# Patient Record
Sex: MALE | Race: WHITE | Employment: OTHER | URBAN - METROPOLITAN AREA
[De-identification: names, ages, dates, MRNs, and addresses within clinical notes are randomized per-mention and may not be internally consistent; named-entity substitution may affect disease eponyms.]

---

## 2017-01-05 ENCOUNTER — OFFICE VISIT (OUTPATIENT)
Dept: INTERNAL MEDICINE CLINIC | Facility: CLINIC | Age: 77
End: 2017-01-05

## 2017-01-05 VITALS
WEIGHT: 141 LBS | HEIGHT: 67 IN | BODY MASS INDEX: 22.13 KG/M2 | SYSTOLIC BLOOD PRESSURE: 136 MMHG | DIASTOLIC BLOOD PRESSURE: 62 MMHG | HEART RATE: 54 BPM

## 2017-01-05 DIAGNOSIS — R42 VERTIGO: Primary | ICD-10-CM

## 2017-01-05 PROCEDURE — G0463 HOSPITAL OUTPT CLINIC VISIT: HCPCS | Performed by: INTERNAL MEDICINE

## 2017-01-05 PROCEDURE — 99213 OFFICE O/P EST LOW 20 MIN: CPT | Performed by: INTERNAL MEDICINE

## 2017-01-05 NOTE — PROGRESS NOTES
Wright Cabot is a 68year old male. Patient presents with:  ER F/U: Pt stts he went to the ER on 12/30 due to vertigo    HPI:   Mr. Marshell Severs presents this morning for ER follow-up.     On December 30, while picking up prescriptions at his pharmacy for h Take 1 capsule by mouth daily. Disp:  Rfl:    Calcium-Phosphorus-Vitamin D (CITRACAL +D3 OR) Take 2 tablets by mouth daily. Disp:  Rfl:    Ascorbic Acid (VITAMIN C) 1000 MG Oral Tab Take 1,000 mg by mouth daily.  Disp:  Rfl:    TRAVATAN Z 0.004 % Ophthalmic kg/m2  HEENT: Anicteric, conjunctiva pink, PERRL, EOMI without nystagmus, oropharynx normal  NECK: Supple without mass or lymphadenopathy  LUNGS: Resonant to percussion and clear to auscultation  CARDIAC: Rhythm regular S1 S2 normal without murmur or edema

## 2017-01-05 NOTE — PATIENT INSTRUCTIONS
Please rest quietly and use meclizine if vertigo recurs. Keep your appointment for your physical in February.

## 2017-01-23 ENCOUNTER — OFFICE VISIT (OUTPATIENT)
Dept: DERMATOLOGY CLINIC | Facility: CLINIC | Age: 77
End: 2017-01-23

## 2017-01-23 DIAGNOSIS — D23.30 BENIGN NEOPLASM OF SKIN OF FACE: ICD-10-CM

## 2017-01-23 DIAGNOSIS — D48.5 NEOPLASM OF UNCERTAIN BEHAVIOR OF SKIN: Primary | ICD-10-CM

## 2017-01-23 DIAGNOSIS — L82.1 SEBORRHEIC KERATOSES: ICD-10-CM

## 2017-01-23 DIAGNOSIS — D23.4 BENIGN NEOPLASM OF SCALP AND SKIN OF NECK: ICD-10-CM

## 2017-01-23 PROCEDURE — 11101 BIOPSY, EACH ADDED LESION: CPT | Performed by: DERMATOLOGY

## 2017-01-23 PROCEDURE — 99213 OFFICE O/P EST LOW 20 MIN: CPT | Performed by: DERMATOLOGY

## 2017-01-23 PROCEDURE — 11100 BIOPSY OF SKIN LESION: CPT | Performed by: DERMATOLOGY

## 2017-01-25 ENCOUNTER — TELEPHONE (OUTPATIENT)
Dept: DERMATOLOGY CLINIC | Facility: CLINIC | Age: 77
End: 2017-01-25

## 2017-01-29 RX ORDER — AMLODIPINE BESYLATE 5 MG/1
TABLET ORAL
Qty: 90 TABLET | Refills: 1 | Status: SHIPPED | OUTPATIENT
Start: 2017-01-29 | End: 2017-08-20

## 2017-01-29 RX ORDER — TRIAMTERENE AND HYDROCHLOROTHIAZIDE 37.5; 25 MG/1; MG/1
CAPSULE ORAL
Qty: 90 CAPSULE | Refills: 1 | Status: SHIPPED | OUTPATIENT
Start: 2017-01-29 | End: 2017-08-01

## 2017-02-01 NOTE — TELEPHONE ENCOUNTER
Spoke to spouse to relay bx results & KMT message. She voiced understanding. Checked off notification in path book.

## 2017-02-12 NOTE — PROGRESS NOTES
Taylor Boyer is a 68year old male. HPI:     CC:  Patient presents with:  Derm Problem: LOV: 8/31/16. Patient presenting with lesion above right eyebrow and above left cheek that started since last visit.  Patient denies pain, itching or family Hx of FINASTERIDE 5 MG Oral Tab TAKE ONE TABLET BY MOUTH EVERY DAY Disp: 90 tablet Rfl: 3   Probiotic Product (PROBIOTIC OR) Take 1 capsule by mouth daily. Disp:  Rfl:    Calcium-Phosphorus-Vitamin D (CITRACAL +D3 OR) Take 2 tablets by mouth daily.  Disp:  Rfl: SURGERY Left 12/2011    Comment L4-L5 microdiscectomy    SPINAL FUSION  10/2011    Comment L4-L5    COLONOSCOPY  1/2016    SKIN SURGERY Right 10/28/2016    Comment Upper back epidermal inclusion cyst       Social History   Marital Status:   Spouse N distress. Examp erformed, including scalp, head, neck, face,nails, hair, external eyes, including conjunctival mucosa, eyelids, lips external ears, upper back, upper chest, arms, palms.      Multiple light to medium brown, well marginated, uniformly pigmen

## 2017-02-13 ENCOUNTER — OFFICE VISIT (OUTPATIENT)
Dept: INTERNAL MEDICINE CLINIC | Facility: CLINIC | Age: 77
End: 2017-02-13

## 2017-02-13 VITALS
HEART RATE: 61 BPM | DIASTOLIC BLOOD PRESSURE: 58 MMHG | WEIGHT: 145.5 LBS | BODY MASS INDEX: 22.84 KG/M2 | SYSTOLIC BLOOD PRESSURE: 132 MMHG | HEIGHT: 67 IN

## 2017-02-13 DIAGNOSIS — N40.0 BENIGN PROSTATIC HYPERPLASIA, PRESENCE OF LOWER URINARY TRACT SYMPTOMS UNSPECIFIED, UNSPECIFIED MORPHOLOGY: ICD-10-CM

## 2017-02-13 DIAGNOSIS — Z00.00 ANNUAL PHYSICAL EXAM: Primary | ICD-10-CM

## 2017-02-13 DIAGNOSIS — I70.0 AORTIC ATHEROSCLEROSIS (HCC): ICD-10-CM

## 2017-02-13 DIAGNOSIS — K27.9 PEPTIC ULCER DISEASE: ICD-10-CM

## 2017-02-13 DIAGNOSIS — K57.30 DIVERTICULOSIS OF LARGE INTESTINE WITHOUT HEMORRHAGE: ICD-10-CM

## 2017-02-13 DIAGNOSIS — I10 ESSENTIAL HYPERTENSION: ICD-10-CM

## 2017-02-13 DIAGNOSIS — K55.9 ISCHEMIC COLITIS (HCC): ICD-10-CM

## 2017-02-13 DIAGNOSIS — H40.9 GLAUCOMA, UNSPECIFIED GLAUCOMA, UNSPECIFIED LATERALITY: ICD-10-CM

## 2017-02-13 DIAGNOSIS — Z00.00 ENCOUNTER FOR ANNUAL HEALTH EXAMINATION: ICD-10-CM

## 2017-02-13 DIAGNOSIS — M81.0 OSTEOPOROSIS: ICD-10-CM

## 2017-02-13 DIAGNOSIS — E78.00 HYPERCHOLESTEROLEMIA: ICD-10-CM

## 2017-02-13 DIAGNOSIS — D50.9 IRON DEFICIENCY ANEMIA, UNSPECIFIED IRON DEFICIENCY ANEMIA TYPE: ICD-10-CM

## 2017-02-13 DIAGNOSIS — C44.92 SCC (SQUAMOUS CELL CARCINOMA): ICD-10-CM

## 2017-02-13 PROCEDURE — G0439 PPPS, SUBSEQ VISIT: HCPCS | Performed by: INTERNAL MEDICINE

## 2017-02-13 PROCEDURE — 96160 PT-FOCUSED HLTH RISK ASSMT: CPT | Performed by: INTERNAL MEDICINE

## 2017-02-14 NOTE — PROGRESS NOTES
HPI:   Jordan Mar is a 68year old male who presents for a MA (Medicare Advantage) [facility] (Once per calendar year). He feels well this evening, and has no specific issues for discussion. He continues to work part-time as a .   Tariq Monterroso lisinopril. CURRENT MEDICATIONS:     Outpatient Prescriptions Marked as Taking for the 2/13/17 encounter (Office Visit) with Laurie Hua MD:  COD LIVER OIL OR Take 1 capsule by mouth 3 (three) times daily.    MAGNESIUM CITRATE OR Take 500 mg by mouth brother. SOCIAL HISTORY:   He  reports that he quit smoking about 55 years ago. His smoking use included Cigarettes. He has a 1.25 pack-year smoking history. He does not have any smokeless tobacco history on file. He reports that he drinks alcohol.  He re People   get annoyed because I misunderstand what they say:  No   I misunderstand what others are saying and make inappropriate responses:    No I avoid social activities because I cannot hear well and fear I will   reply improperly:  No   Family members a diet, regular exercise and maintenance of current body weight. Reinforced annual influenza vaccine. Return visit in 6 months for blood pressure check. Essential hypertension  Well-controlled.   Continue current medication  -     Comp Metabolic Panel (1 state?: Good    How do you maintain positive mental well-being?: Social Interaction;Puzzles; Visiting Friends; Visiting Family    If you are a male age 38-65 or a female age 47-67, do you take aspirin?: No    Have you had any immunizations at another office see your input here.   Cognitive Assessment     What day of the week is this?: Correct    What month is it?: Correct    What year is it?: Correct    Recall \"Ball\": Correct    Recall \"Flag\": Correct    Recall \"Tree\": Correct       This section provided this or any previous visit.          SPECIFIC DISEASE MONITORING Internal Lab or Procedure External Lab or Procedure   Annual Monitoring of Persistent     Medications (ACE/ARB, digoxin diuretics, anticonvulsants.)    Potassium  Annually POTASSIUM (mmol/L)

## 2017-02-14 NOTE — PATIENT INSTRUCTIONS
Please continue your current medications. Please obtain blood tests soon when you can. Continue to follow a healthy diet, exercise regularly and maintain your current body weight. Return visit in 6 months for a blood pressure check.     Elie Singh this or any previous visit.  Limited to patients who meet one of the following criteria:   • Men who are 73-68 years old and have smoked more than 100 cigarettes in their lifetime   • Anyone with a family history    Colorectal Cancer Screening Covered up to Clients of institutions for the mentally retarded   Persons who live in the same house as a HepB virus carrier   Homosexual men   Illicit injectable drug abusers     Tetanus Toxoid- Only covered with a cut with metal- TD and TDaP Not covered by [facility]

## 2017-02-28 ENCOUNTER — APPOINTMENT (OUTPATIENT)
Dept: LAB | Facility: HOSPITAL | Age: 77
End: 2017-02-28
Attending: INTERNAL MEDICINE
Payer: MEDICARE

## 2017-02-28 DIAGNOSIS — I10 ESSENTIAL HYPERTENSION: ICD-10-CM

## 2017-02-28 DIAGNOSIS — E78.00 HYPERCHOLESTEROLEMIA: ICD-10-CM

## 2017-02-28 DIAGNOSIS — D50.9 IRON DEFICIENCY ANEMIA, UNSPECIFIED IRON DEFICIENCY ANEMIA TYPE: ICD-10-CM

## 2017-02-28 LAB
ALBUMIN SERPL BCP-MCNC: 3.3 G/DL (ref 3.5–4.8)
ALBUMIN/GLOB SERPL: 0.9 {RATIO} (ref 1–2)
ALP SERPL-CCNC: 64 U/L (ref 32–100)
ALT SERPL-CCNC: 21 U/L (ref 17–63)
ANION GAP SERPL CALC-SCNC: 8 MMOL/L (ref 0–18)
AST SERPL-CCNC: 26 U/L (ref 15–41)
BILIRUB SERPL-MCNC: 0.7 MG/DL (ref 0.3–1.2)
BUN SERPL-MCNC: 17 MG/DL (ref 8–20)
BUN/CREAT SERPL: 16.3 (ref 10–20)
CALCIUM SERPL-MCNC: 9.2 MG/DL (ref 8.5–10.5)
CHLORIDE SERPL-SCNC: 95 MMOL/L (ref 95–110)
CHOLEST SERPL-MCNC: 209 MG/DL (ref 110–200)
CO2 SERPL-SCNC: 31 MMOL/L (ref 22–32)
CREAT SERPL-MCNC: 1.04 MG/DL (ref 0.5–1.5)
ERYTHROCYTE [DISTWIDTH] IN BLOOD BY AUTOMATED COUNT: 13.1 % (ref 11–15)
GLOBULIN PLAS-MCNC: 3.6 G/DL (ref 2.5–3.7)
GLUCOSE SERPL-MCNC: 98 MG/DL (ref 70–99)
HCT VFR BLD AUTO: 38.5 % (ref 41–52)
HDLC SERPL-MCNC: 51 MG/DL
HGB BLD-MCNC: 13.2 G/DL (ref 13.5–17.5)
LDLC SERPL CALC-MCNC: 144 MG/DL (ref 0–99)
MCH RBC QN AUTO: 30.3 PG (ref 27–32)
MCHC RBC AUTO-ENTMCNC: 34.2 G/DL (ref 32–37)
MCV RBC AUTO: 88.4 FL (ref 80–100)
NONHDLC SERPL-MCNC: 158 MG/DL
OSMOLALITY UR CALC.SUM OF ELEC: 280 MOSM/KG (ref 275–295)
PLATELET # BLD AUTO: 237 K/UL (ref 140–400)
PMV BLD AUTO: 7.4 FL (ref 7.4–10.3)
POTASSIUM SERPL-SCNC: 3.9 MMOL/L (ref 3.3–5.1)
PROT SERPL-MCNC: 6.9 G/DL (ref 5.9–8.4)
RBC # BLD AUTO: 4.35 M/UL (ref 4.5–5.9)
SODIUM SERPL-SCNC: 134 MMOL/L (ref 136–144)
TRIGL SERPL-MCNC: 70 MG/DL (ref 1–149)
WBC # BLD AUTO: 5.1 K/UL (ref 4–11)

## 2017-02-28 PROCEDURE — 36415 COLL VENOUS BLD VENIPUNCTURE: CPT

## 2017-02-28 PROCEDURE — 80053 COMPREHEN METABOLIC PANEL: CPT

## 2017-02-28 PROCEDURE — 85027 COMPLETE CBC AUTOMATED: CPT

## 2017-02-28 PROCEDURE — 80061 LIPID PANEL: CPT

## 2017-03-15 ENCOUNTER — PATIENT MESSAGE (OUTPATIENT)
Dept: INTERNAL MEDICINE CLINIC | Facility: CLINIC | Age: 77
End: 2017-03-15

## 2017-03-21 NOTE — TELEPHONE ENCOUNTER
From: Amos Dobson  To: Lexx Woodard MD  Sent: 3/15/2017 7:58 AM CDT  Subject: Other    I will be able to keep the rescheduled appt. with Dr. Anita Anna on Aug. 14, at 12:40. Thank you.

## 2017-05-17 ENCOUNTER — APPOINTMENT (OUTPATIENT)
Dept: LAB | Facility: HOSPITAL | Age: 77
End: 2017-05-17
Attending: UROLOGY
Payer: MEDICARE

## 2017-05-17 PROCEDURE — 81003 URINALYSIS AUTO W/O SCOPE: CPT | Performed by: UROLOGY

## 2017-05-23 ENCOUNTER — OFFICE VISIT (OUTPATIENT)
Dept: SURGERY | Facility: CLINIC | Age: 77
End: 2017-05-23

## 2017-05-23 VITALS
DIASTOLIC BLOOD PRESSURE: 67 MMHG | TEMPERATURE: 98 F | SYSTOLIC BLOOD PRESSURE: 143 MMHG | HEIGHT: 67 IN | WEIGHT: 144 LBS | RESPIRATION RATE: 16 BRPM | HEART RATE: 52 BPM | BODY MASS INDEX: 22.6 KG/M2

## 2017-05-23 DIAGNOSIS — R35.1 NOCTURIA: ICD-10-CM

## 2017-05-23 DIAGNOSIS — Z12.5 PROSTATE CANCER SCREENING: ICD-10-CM

## 2017-05-23 DIAGNOSIS — N40.1 BENIGN NON-NODULAR PROSTATIC HYPERPLASIA WITH LOWER URINARY TRACT SYMPTOMS: Primary | ICD-10-CM

## 2017-05-23 PROCEDURE — 99214 OFFICE O/P EST MOD 30 MIN: CPT | Performed by: UROLOGY

## 2017-05-23 PROCEDURE — G0463 HOSPITAL OUTPT CLINIC VISIT: HCPCS | Performed by: UROLOGY

## 2017-05-23 RX ORDER — FINASTERIDE 5 MG/1
5 TABLET, FILM COATED ORAL DAILY
Qty: 90 TABLET | Refills: 3 | Status: SHIPPED | OUTPATIENT
Start: 2017-05-23 | End: 2018-06-16

## 2017-05-23 NOTE — PROGRESS NOTES
HPI:    Patient ID: Awais Damian is a 68year old male. HPI     1.   Voiding Dysfunction  Patient has current AUA score of 4, mild voiding dysfunction category, compared to previous score of 2, mild voiding dysfunction category, on 5/23/16 per chart repeat T score -0.7 hip and -2.2 lumbar spine 3-16, Fosamax DC'd   • Iron deficiency anemia September 2008   • BPH (benign prostatic hyperplasia) September 2008     With abnormal PSA, Rx Proscar   • Peptic ulcer disease 1980   • Diverticulosis 2007   • Hyp by mouth daily. Disp:  Rfl:    Calcium-Phosphorus-Vitamin D (CITRACAL +D3 OR) Take 2 tablets by mouth daily. Disp:  Rfl:    Ascorbic Acid (VITAMIN C) 1000 MG Oral Tab Take 1,000 mg by mouth daily.  Disp:  Rfl:    TRAVATAN Z 0.004 % Ophthalmic Solution Place 2-3+ enlarged, 35-40 grams, soft, no nodules or indurations. Pt is currently taking Finasteride 5 MG daily. Discussed treatment options including continuing long term Finasteride VERSUS green light laser ablation of the prostate.  I fully explained to paco tablet 3      Sig: Take 1 tablet (5 mg total) by mouth daily.            Imaging & Referrals:  None       ID#[id number]  By signing my name below, Padmini Oliva,  attest that this documentation has been prepared under the direction and in the presence of Timothy

## 2017-08-01 RX ORDER — TRIAMTERENE AND HYDROCHLOROTHIAZIDE 37.5; 25 MG/1; MG/1
CAPSULE ORAL
Qty: 90 CAPSULE | Refills: 0 | Status: SHIPPED | OUTPATIENT
Start: 2017-08-01 | End: 2017-11-01

## 2017-08-20 ENCOUNTER — TELEPHONE (OUTPATIENT)
Dept: INTERNAL MEDICINE CLINIC | Facility: CLINIC | Age: 77
End: 2017-08-20

## 2017-08-20 RX ORDER — AMLODIPINE BESYLATE 5 MG/1
TABLET ORAL
Qty: 90 TABLET | Refills: 0 | Status: SHIPPED | OUTPATIENT
Start: 2017-08-20 | End: 2017-11-03

## 2017-08-25 ENCOUNTER — OFFICE VISIT (OUTPATIENT)
Dept: INTERNAL MEDICINE CLINIC | Facility: CLINIC | Age: 77
End: 2017-08-25

## 2017-08-25 VITALS
WEIGHT: 141 LBS | HEART RATE: 54 BPM | DIASTOLIC BLOOD PRESSURE: 62 MMHG | HEIGHT: 67 IN | BODY MASS INDEX: 22.13 KG/M2 | SYSTOLIC BLOOD PRESSURE: 136 MMHG

## 2017-08-25 DIAGNOSIS — I10 ESSENTIAL HYPERTENSION: Primary | ICD-10-CM

## 2017-08-25 PROCEDURE — G0463 HOSPITAL OUTPT CLINIC VISIT: HCPCS | Performed by: INTERNAL MEDICINE

## 2017-08-25 PROCEDURE — 99213 OFFICE O/P EST LOW 20 MIN: CPT | Performed by: INTERNAL MEDICINE

## 2017-08-25 RX ORDER — FINASTERIDE 5 MG/1
5 TABLET, FILM COATED ORAL DAILY
Qty: 90 TABLET | Refills: 1 | Status: CANCELLED | OUTPATIENT
Start: 2017-08-25

## 2017-08-25 RX ORDER — AMLODIPINE BESYLATE 5 MG/1
5 TABLET ORAL
Qty: 90 TABLET | Refills: 1 | Status: CANCELLED | OUTPATIENT
Start: 2017-08-25

## 2017-08-25 RX ORDER — TRIAMTERENE AND HYDROCHLOROTHIAZIDE 37.5; 25 MG/1; MG/1
CAPSULE ORAL
Qty: 90 CAPSULE | Refills: 1 | Status: CANCELLED | OUTPATIENT
Start: 2017-08-25

## 2017-08-25 NOTE — PATIENT INSTRUCTIONS
Please continue your current medications. Continue regular physical activity.   Return in 6 months for a physical.

## 2017-08-25 NOTE — PROGRESS NOTES
Elida Park is a 68year old male. Patient presents with:  Hypertension    HPI:   Mr. Woo Anderson presents this afternoon for six-month follow-up of hypertension. He feels well.   In anticipation of today's visit, he recently began checking his blood p 2008    With abnormal PSA, Rx Proscar   • Diverticulosis 2007   • Glaucoma    • Herpes zoster 2008    Left face   • Hypercholesterolemia    • Hypertension August 2011   • Iron deficiency anemia September 2008   • Ischemic colitis [facility]) December 2015   • Os

## 2017-11-01 RX ORDER — TRIAMTERENE AND HYDROCHLOROTHIAZIDE 37.5; 25 MG/1; MG/1
CAPSULE ORAL
Qty: 90 CAPSULE | Refills: 1 | Status: SHIPPED | OUTPATIENT
Start: 2017-11-01 | End: 2018-04-30

## 2017-11-03 RX ORDER — AMLODIPINE BESYLATE 5 MG/1
TABLET ORAL
Qty: 90 TABLET | Refills: 1 | Status: SHIPPED | OUTPATIENT
Start: 2017-11-03 | End: 2018-05-25

## 2018-01-13 ENCOUNTER — IMMUNIZATION (OUTPATIENT)
Dept: INTERNAL MEDICINE CLINIC | Facility: CLINIC | Age: 78
End: 2018-01-13

## 2018-01-13 PROCEDURE — 90653 IIV ADJUVANT VACCINE IM: CPT | Performed by: INTERNAL MEDICINE

## 2018-01-13 PROCEDURE — G0008 ADMIN INFLUENZA VIRUS VAC: HCPCS | Performed by: INTERNAL MEDICINE

## 2018-01-31 ENCOUNTER — OFFICE VISIT (OUTPATIENT)
Dept: DERMATOLOGY CLINIC | Facility: CLINIC | Age: 78
End: 2018-01-31

## 2018-01-31 DIAGNOSIS — D23.4 BENIGN NEOPLASM OF SCALP AND SKIN OF NECK: ICD-10-CM

## 2018-01-31 DIAGNOSIS — D23.30 BENIGN NEOPLASM OF SKIN OF FACE: ICD-10-CM

## 2018-01-31 DIAGNOSIS — D23.70 BENIGN NEOPLASM OF SKIN OF LOWER LIMB, INCLUDING HIP, UNSPECIFIED LATERALITY: ICD-10-CM

## 2018-01-31 DIAGNOSIS — D23.5 BENIGN NEOPLASM OF SKIN OF TRUNK, EXCEPT SCROTUM: ICD-10-CM

## 2018-01-31 DIAGNOSIS — L82.1 SEBORRHEIC KERATOSES: Primary | ICD-10-CM

## 2018-01-31 DIAGNOSIS — D23.60 BENIGN NEOPLASM OF SKIN OF UPPER LIMB, INCLUDING SHOULDER, UNSPECIFIED LATERALITY: ICD-10-CM

## 2018-01-31 PROCEDURE — 99213 OFFICE O/P EST LOW 20 MIN: CPT | Performed by: DERMATOLOGY

## 2018-01-31 PROCEDURE — G0463 HOSPITAL OUTPT CLINIC VISIT: HCPCS | Performed by: DERMATOLOGY

## 2018-02-11 NOTE — PROGRESS NOTES
Dante Thornton is a 68year old male. HPI:     CC:  Patient presents with:  Skin Cancer: Pt here for annual upper body skin check. No concerns today. Hx of SCC L temple 2017. Allergies:  Clindamycin;  Lisinopril    HISTORY:    Past Medical Hist 90 tablet Rfl: 1   TRIAMTERENE-HCTZ 37.5-25 MG Oral Cap TAKE ONE CAPSULE BY MOUTH EVERY DAY IN THE MORNING Disp: 90 capsule Rfl: 1   finasteride 5 MG Oral Tab Take 1 tablet (5 mg total) by mouth daily.  Disp: 90 tablet Rfl: 3   COD LIVER OIL OR Take 1 capsu APPENDECTOMY  12/2011: BACK SURGERY Left      Comment: L4-L5 microdiscectomy  10/2010: CATARACT Left  12/2010: CATARACT Right  1/2016: COLONOSCOPY  1970:  INGUINAL HERNIA REPAIR Bilateral  January 2017: OTHER      Comment: Excision SCCa left temple  10/28/2 noted.       Physical Examination:     Well-developed well-nourished patient alert oriented in no acute distress.   Examp erformed, including scalp, head, neck, face,nails, hair, external eyes, including conjunctival mucosa, eyelids, lips external ears, upp discussed. Pathophysiology discussed with patient. Therapeutic options reviewed. See  Medications in grid. Instructions reviewed at length. Benign nevi, seborrheic  keratoses, cherry angiomas:  Reassurance regarding other benign skin lesions. Signs and

## 2018-02-23 ENCOUNTER — OFFICE VISIT (OUTPATIENT)
Dept: INTERNAL MEDICINE CLINIC | Facility: CLINIC | Age: 78
End: 2018-02-23

## 2018-02-23 VITALS
BODY MASS INDEX: 22.29 KG/M2 | WEIGHT: 142 LBS | SYSTOLIC BLOOD PRESSURE: 130 MMHG | HEIGHT: 67 IN | TEMPERATURE: 98 F | DIASTOLIC BLOOD PRESSURE: 56 MMHG | HEART RATE: 57 BPM

## 2018-02-23 DIAGNOSIS — E78.00 HYPERCHOLESTEROLEMIA: ICD-10-CM

## 2018-02-23 DIAGNOSIS — D50.9 IRON DEFICIENCY ANEMIA, UNSPECIFIED IRON DEFICIENCY ANEMIA TYPE: ICD-10-CM

## 2018-02-23 DIAGNOSIS — I10 ESSENTIAL HYPERTENSION: ICD-10-CM

## 2018-02-23 DIAGNOSIS — K55.9 ISCHEMIC COLITIS (HCC): ICD-10-CM

## 2018-02-23 DIAGNOSIS — K57.30 DIVERTICULOSIS OF LARGE INTESTINE WITHOUT HEMORRHAGE: ICD-10-CM

## 2018-02-23 DIAGNOSIS — M81.0 OSTEOPOROSIS, UNSPECIFIED OSTEOPOROSIS TYPE, UNSPECIFIED PATHOLOGICAL FRACTURE PRESENCE: ICD-10-CM

## 2018-02-23 DIAGNOSIS — K27.9 PEPTIC ULCER DISEASE: ICD-10-CM

## 2018-02-23 DIAGNOSIS — Z00.00 ENCOUNTER FOR ANNUAL HEALTH EXAMINATION: ICD-10-CM

## 2018-02-23 DIAGNOSIS — N40.0 BENIGN PROSTATIC HYPERPLASIA, UNSPECIFIED WHETHER LOWER URINARY TRACT SYMPTOMS PRESENT: ICD-10-CM

## 2018-02-23 DIAGNOSIS — Z00.00 ANNUAL PHYSICAL EXAM: Primary | ICD-10-CM

## 2018-02-23 DIAGNOSIS — H40.9 GLAUCOMA, UNSPECIFIED GLAUCOMA TYPE, UNSPECIFIED LATERALITY: ICD-10-CM

## 2018-02-23 DIAGNOSIS — C44.92 SCC (SQUAMOUS CELL CARCINOMA): ICD-10-CM

## 2018-02-23 DIAGNOSIS — I70.0 AORTIC ATHEROSCLEROSIS (HCC): ICD-10-CM

## 2018-02-23 PROCEDURE — 99397 PER PM REEVAL EST PAT 65+ YR: CPT | Performed by: INTERNAL MEDICINE

## 2018-02-23 PROCEDURE — 96160 PT-FOCUSED HLTH RISK ASSMT: CPT | Performed by: INTERNAL MEDICINE

## 2018-02-23 PROCEDURE — G0439 PPPS, SUBSEQ VISIT: HCPCS | Performed by: INTERNAL MEDICINE

## 2018-02-23 NOTE — PROGRESS NOTES
HPI:   Pino Dunn is a 68year old male who presents this afternoon for a MA (Medicare Advantage) Supervisit (Once per calendar year). Lately he has been feeling well. No specific issues for discussion.   He follows annually with Dermatology and than 12 months ago.   Smoking status: Former Smoker                                                              Packs/day: 0.25      Years: 5.00         Types: Cigarettes     Quit date: 2/14/1962  Smokeless tobacco: Never Used                             C Take 1,000 mg by mouth daily. TRAVATAN Z 0.004 % Ophthalmic Solution Place 1 drop into both eyes nightly. Boswellia Judah (BOSWELLIA OR) Take 2 tablets by mouth 3 (three) times daily.    Timolol Maleate (ISTALOL) 0.5 % (DAILY) Ophthalmic Solution Appl urinary frequency and chronic nocturia ×1.   No dysuria or hematuria  MUSCULOSKELETAL: No leg swelling  NEURO: No headaches      EXAM:   /56   Pulse 57   Temp (!) 97.5 °F (36.4 °C) (Oral)   Ht 5' 7\" (1.702 m)   Wt 142 lb (64.4 kg)   BMI 22.24 kg/m² EXAM:   GENERAL: Pleasant male appearing well in no distress  /56   Pulse 57   Temp (!) 97.5 °F (36.4 °C) (Oral)   Ht 5' 7\" (1.702 m)   Wt 142 lb (64.4 kg)   BMI 22.24 kg/m²   HEENT: Anicteric, conjunctiva pink, oropharynx normal  NECK: Supple anemia type  Await labs  -     CBC, PLATELET; NO DIFFERENTIAL;  Future    Benign prostatic hyperplasia, unspecified whether lower urinary tract symptoms present  On finasteride with regular Urology follow-up    Diverticulosis of large intestine without hemo 10 years There are no preventive care reminders to display for this patient. Update Health Maintenance if applicable    Flex Sigmoidoscopy Screen every 10 years No results found for this or any previous visit. No flowsheet data found.      Fecal Occult Bloo 4.0    No flowsheet data found. Creatinine  Annually Creatinine (mg/dL)   Date Value   02/28/2017 1.04     CREATININE (P) (mg/dL)   Date Value   03/08/2016 1.00    No flowsheet data found.     Drug Serum Conc  Annually No results found for: DIGOXIN, DIG,

## 2018-02-23 NOTE — PATIENT INSTRUCTIONS
Please obtain blood tests soon when you can. Continue current medications. Continue to eat a healthy diet and to remain physically active. Return visit in 6 months.     Susi Forrester's SCREENING SCHEDULE   Tests on this list are recommended by your p previous visit.  Limited to patients who meet one of the following criteria:   • Men who are 73-68 years old and have smoked more than 100 cigarettes in their lifetime   • Anyone with a family history    Colorectal Cancer Screening Covered up to Age 76 IX concentrates   Clients of institutions for the mentally retarded   Persons who live in the same house as a HepB virus carrier   Homosexual men   Illicit injectable drug abusers     Tetanus Toxoid- Only covered with a cut with metal- TD and TDaP Not cove

## 2018-02-24 ENCOUNTER — APPOINTMENT (OUTPATIENT)
Dept: LAB | Facility: HOSPITAL | Age: 78
End: 2018-02-24
Attending: INTERNAL MEDICINE
Payer: MEDICARE

## 2018-02-24 DIAGNOSIS — D50.9 IRON DEFICIENCY ANEMIA, UNSPECIFIED IRON DEFICIENCY ANEMIA TYPE: ICD-10-CM

## 2018-02-24 DIAGNOSIS — E78.00 HYPERCHOLESTEROLEMIA: ICD-10-CM

## 2018-02-24 LAB
ALBUMIN SERPL BCP-MCNC: 3.8 G/DL (ref 3.5–4.8)
ALBUMIN/GLOB SERPL: 1.1 {RATIO} (ref 1–2)
ALP SERPL-CCNC: 61 U/L (ref 32–100)
ALT SERPL-CCNC: 28 U/L (ref 17–63)
ANION GAP SERPL CALC-SCNC: 9 MMOL/L (ref 0–18)
AST SERPL-CCNC: 32 U/L (ref 15–41)
BILIRUB SERPL-MCNC: 0.6 MG/DL (ref 0.3–1.2)
BUN SERPL-MCNC: 13 MG/DL (ref 8–20)
BUN/CREAT SERPL: 11.8 (ref 10–20)
CALCIUM SERPL-MCNC: 9.6 MG/DL (ref 8.5–10.5)
CHLORIDE SERPL-SCNC: 95 MMOL/L (ref 95–110)
CHOLEST SERPL-MCNC: 248 MG/DL (ref 110–200)
CO2 SERPL-SCNC: 30 MMOL/L (ref 22–32)
CREAT SERPL-MCNC: 1.1 MG/DL (ref 0.5–1.5)
ERYTHROCYTE [DISTWIDTH] IN BLOOD BY AUTOMATED COUNT: 13.5 % (ref 11–15)
GLOBULIN PLAS-MCNC: 3.4 G/DL (ref 2.5–3.7)
GLUCOSE SERPL-MCNC: 107 MG/DL (ref 70–99)
HCT VFR BLD AUTO: 41 % (ref 41–52)
HDLC SERPL-MCNC: 64 MG/DL
HGB BLD-MCNC: 14 G/DL (ref 13.5–17.5)
LDLC SERPL CALC-MCNC: 171 MG/DL (ref 0–99)
MCH RBC QN AUTO: 30.5 PG (ref 27–32)
MCHC RBC AUTO-ENTMCNC: 34.2 G/DL (ref 32–37)
MCV RBC AUTO: 89.4 FL (ref 80–100)
NONHDLC SERPL-MCNC: 184 MG/DL
OSMOLALITY UR CALC.SUM OF ELEC: 279 MOSM/KG (ref 275–295)
PATIENT FASTING: YES
PLATELET # BLD AUTO: 240 K/UL (ref 140–400)
PMV BLD AUTO: 6.6 FL (ref 7.4–10.3)
POTASSIUM SERPL-SCNC: 4.6 MMOL/L (ref 3.3–5.1)
PROT SERPL-MCNC: 7.2 G/DL (ref 5.9–8.4)
RBC # BLD AUTO: 4.59 M/UL (ref 4.5–5.9)
SODIUM SERPL-SCNC: 134 MMOL/L (ref 136–144)
TRIGL SERPL-MCNC: 63 MG/DL (ref 1–149)
WBC # BLD AUTO: 5.2 K/UL (ref 4–11)

## 2018-02-24 PROCEDURE — 80053 COMPREHEN METABOLIC PANEL: CPT

## 2018-02-24 PROCEDURE — 85027 COMPLETE CBC AUTOMATED: CPT

## 2018-02-24 PROCEDURE — 80061 LIPID PANEL: CPT

## 2018-02-24 PROCEDURE — 36415 COLL VENOUS BLD VENIPUNCTURE: CPT

## 2018-03-22 ENCOUNTER — APPOINTMENT (OUTPATIENT)
Dept: LAB | Facility: HOSPITAL | Age: 78
End: 2018-03-22
Attending: UROLOGY
Payer: MEDICARE

## 2018-03-22 DIAGNOSIS — R35.1 NOCTURIA: ICD-10-CM

## 2018-03-22 LAB
BILIRUB UR QL: NEGATIVE
CLARITY UR: CLEAR
COLOR UR: YELLOW
GLUCOSE UR-MCNC: NEGATIVE MG/DL
HGB UR QL STRIP.AUTO: NEGATIVE
KETONES UR-MCNC: NEGATIVE MG/DL
LEUKOCYTE ESTERASE UR QL STRIP.AUTO: NEGATIVE
NITRITE UR QL STRIP.AUTO: NEGATIVE
PH UR: 6 [PH] (ref 5–8)
PROT UR-MCNC: NEGATIVE MG/DL
SP GR UR STRIP: 1.02 (ref 1–1.03)
UROBILINOGEN UR STRIP-ACNC: <2
VIT C UR-MCNC: NEGATIVE MG/DL

## 2018-03-22 PROCEDURE — 81003 URINALYSIS AUTO W/O SCOPE: CPT

## 2018-04-30 RX ORDER — TRIAMTERENE AND HYDROCHLOROTHIAZIDE 37.5; 25 MG/1; MG/1
CAPSULE ORAL
Qty: 90 CAPSULE | Refills: 1 | Status: SHIPPED | OUTPATIENT
Start: 2018-04-30 | End: 2018-10-30

## 2018-05-03 ENCOUNTER — OFFICE VISIT (OUTPATIENT)
Dept: SURGERY | Facility: CLINIC | Age: 78
End: 2018-05-03

## 2018-05-03 VITALS
RESPIRATION RATE: 16 BRPM | DIASTOLIC BLOOD PRESSURE: 60 MMHG | WEIGHT: 145 LBS | BODY MASS INDEX: 22.76 KG/M2 | HEART RATE: 72 BPM | SYSTOLIC BLOOD PRESSURE: 132 MMHG | HEIGHT: 67 IN | TEMPERATURE: 98 F

## 2018-05-03 DIAGNOSIS — K59.01 CONSTIPATION BY DELAYED COLONIC TRANSIT: ICD-10-CM

## 2018-05-03 DIAGNOSIS — R35.0 BENIGN PROSTATIC HYPERPLASIA WITH URINARY FREQUENCY: Primary | ICD-10-CM

## 2018-05-03 DIAGNOSIS — R35.1 NOCTURIA: ICD-10-CM

## 2018-05-03 DIAGNOSIS — N40.1 BENIGN PROSTATIC HYPERPLASIA WITH URINARY FREQUENCY: Primary | ICD-10-CM

## 2018-05-03 PROCEDURE — G0463 HOSPITAL OUTPT CLINIC VISIT: HCPCS | Performed by: UROLOGY

## 2018-05-03 PROCEDURE — 99214 OFFICE O/P EST MOD 30 MIN: CPT | Performed by: UROLOGY

## 2018-05-03 NOTE — PATIENT INSTRUCTIONS
1.   Constipation, I recommend taking generic Benefiber, 1 tablespoon in cool water every day without exception; I recommend not taking it at bedtime because that would encourage increased urine output at night and make it more likely that you to wake up a

## 2018-05-03 NOTE — PROGRESS NOTES
HPI:    Patient ID: Jayashree Fletcher is a 68year old male. HPI    1.   Voiding Dysfunction  Patient has current AUA score of 3, mild voiding dysfunction category, mildly better compared to previous score of 4, mild voiding dysfunction category, on 05/2 difficulty postponing urination, and nocturia 1x. Skin: Negative for color change. Neurological: Negative for speech difficulty. Psychiatric/Behavioral: Negative for behavioral problems. The patient is not nervous/anxious.         HISTORY:  Past Medic THE MORNING  Disp: 90 capsule Rfl: 1   AMLODIPINE BESYLATE 5 MG Oral Tab TAKE ONE TABLET BY MOUTH ONE TIME DAILY  Disp: 90 tablet Rfl: 1   finasteride 5 MG Oral Tab Take 1 tablet (5 mg total) by mouth daily.  Disp: 90 tablet Rfl: 3   COD LIVER OIL OR Take 1 (primary encounter diagnosis)  Nocturia  Constipation by delayed colonic transit     (N40.1,  R35.0) Benign prostatic hyperplasia with urinary frequency  (primary encounter diagnosis)  On JOE, prostate 1-2+ enlarged, no palpable nodules or indurations.  I f because that would encourage increased urine output at night and make it more likely that you to wake up an extra time at night to urinate. 2.   Finasteride 5 mg daily    3. Visit in 1 year.   Please submit urinalysis urine test 1--10 days before the vi

## 2018-05-25 RX ORDER — AMLODIPINE BESYLATE 5 MG/1
TABLET ORAL
Qty: 90 TABLET | Refills: 1 | Status: SHIPPED | OUTPATIENT
Start: 2018-05-25 | End: 2018-11-21

## 2018-06-19 RX ORDER — FINASTERIDE 5 MG/1
TABLET, FILM COATED ORAL
Qty: 90 TABLET | Refills: 3 | Status: SHIPPED | OUTPATIENT
Start: 2018-06-19 | End: 2019-06-07

## 2018-06-19 NOTE — TELEPHONE ENCOUNTER
Patients pharmacy is requesting a refill on finasteride 5mg qty 90 with 3 refills last office visit 5/3/18 if you agree please approve

## 2018-08-24 ENCOUNTER — OFFICE VISIT (OUTPATIENT)
Dept: INTERNAL MEDICINE CLINIC | Facility: CLINIC | Age: 78
End: 2018-08-24
Payer: COMMERCIAL

## 2018-08-24 VITALS
HEART RATE: 54 BPM | WEIGHT: 138 LBS | HEIGHT: 67 IN | DIASTOLIC BLOOD PRESSURE: 58 MMHG | SYSTOLIC BLOOD PRESSURE: 132 MMHG | BODY MASS INDEX: 21.66 KG/M2

## 2018-08-24 DIAGNOSIS — I10 ESSENTIAL HYPERTENSION: Primary | ICD-10-CM

## 2018-08-24 DIAGNOSIS — E78.00 HYPERCHOLESTEROLEMIA: ICD-10-CM

## 2018-08-24 PROCEDURE — G0463 HOSPITAL OUTPT CLINIC VISIT: HCPCS | Performed by: INTERNAL MEDICINE

## 2018-08-24 PROCEDURE — 99213 OFFICE O/P EST LOW 20 MIN: CPT | Performed by: INTERNAL MEDICINE

## 2018-08-24 RX ORDER — ATORVASTATIN CALCIUM 20 MG/1
20 TABLET, FILM COATED ORAL NIGHTLY
Qty: 90 TABLET | Refills: 1 | Status: SHIPPED | OUTPATIENT
Start: 2018-08-24 | End: 2019-03-06

## 2018-08-24 NOTE — PATIENT INSTRUCTIONS
Please begin atorvastatin 20 mg daily. Continue other medications. Continue healthy diet and regular physical activity. Schedule a physical in 6 months.

## 2018-08-24 NOTE — PROGRESS NOTES
Awais Damian is a 68year old male. Patient presents with:  Hypertension    HPI:   Mr. El Blackwood presents this afternoon for six-month follow-up of hypertension. He feels well. No recent out of office BP monitoring. Diet healthy.   He is active and 2008    With abnormal PSA, Rx Proscar   • Diverticulosis 2007   • Glaucoma    • Herpes zoster 2008    Left face   • Hypercholesterolemia    • Hypertension August 2011   • Iron deficiency anemia September 2008   • Ischemic colitis [facility]) December 2015   • Os Prescription sent to pharmacy. The patient indicates understanding of these issues and agrees to the plan. The patient is asked to return in 6 months.     Montana Martin MD  8/24/2018  1:57 PM

## 2018-10-30 RX ORDER — TRIAMTERENE AND HYDROCHLOROTHIAZIDE 37.5; 25 MG/1; MG/1
CAPSULE ORAL
Qty: 90 CAPSULE | Refills: 1 | Status: SHIPPED | OUTPATIENT
Start: 2018-10-30 | End: 2019-04-27

## 2018-11-21 RX ORDER — AMLODIPINE BESYLATE 5 MG/1
TABLET ORAL
Qty: 90 TABLET | Refills: 1 | Status: SHIPPED | OUTPATIENT
Start: 2018-11-21 | End: 2019-05-15

## 2018-12-14 ENCOUNTER — PATIENT OUTREACH (OUTPATIENT)
Dept: CASE MANAGEMENT | Age: 78
End: 2018-12-14

## 2018-12-14 NOTE — PROGRESS NOTES
Outreached to patient in regards to scheduling Medicare Annual exam (AHA). Patient scheduled his appt with his PCP for 02/26/2019. Thank you.

## 2019-01-15 ENCOUNTER — MA CHART PREP (OUTPATIENT)
Dept: FAMILY MEDICINE CLINIC | Facility: CLINIC | Age: 79
End: 2019-01-15

## 2019-01-15 PROBLEM — I67.2 INTRACRANIAL ATHEROSCLEROSIS: Status: ACTIVE | Noted: 2019-01-15

## 2019-01-15 PROBLEM — L82.1 SEBORRHEIC KERATOSES: Status: ACTIVE | Noted: 2019-01-15

## 2019-02-13 ENCOUNTER — OFFICE VISIT (OUTPATIENT)
Dept: DERMATOLOGY CLINIC | Facility: CLINIC | Age: 79
End: 2019-02-13
Payer: COMMERCIAL

## 2019-02-13 DIAGNOSIS — D23.4 BENIGN NEOPLASM OF SCALP AND SKIN OF NECK: ICD-10-CM

## 2019-02-13 DIAGNOSIS — D23.60 BENIGN NEOPLASM OF SKIN OF UPPER LIMB, INCLUDING SHOULDER, UNSPECIFIED LATERALITY: ICD-10-CM

## 2019-02-13 DIAGNOSIS — D23.70 BENIGN NEOPLASM OF SKIN OF LOWER LIMB, INCLUDING HIP, UNSPECIFIED LATERALITY: ICD-10-CM

## 2019-02-13 DIAGNOSIS — D23.30 BENIGN NEOPLASM OF SKIN OF FACE: ICD-10-CM

## 2019-02-13 DIAGNOSIS — L82.1 SEBORRHEIC KERATOSES: Primary | ICD-10-CM

## 2019-02-13 DIAGNOSIS — D23.5 BENIGN NEOPLASM OF SKIN OF TRUNK, EXCEPT SCROTUM: ICD-10-CM

## 2019-02-13 PROCEDURE — G0463 HOSPITAL OUTPT CLINIC VISIT: HCPCS | Performed by: DERMATOLOGY

## 2019-02-13 PROCEDURE — 99213 OFFICE O/P EST LOW 20 MIN: CPT | Performed by: DERMATOLOGY

## 2019-02-14 ENCOUNTER — OFFICE VISIT (OUTPATIENT)
Dept: PODIATRY CLINIC | Facility: CLINIC | Age: 79
End: 2019-02-14
Payer: COMMERCIAL

## 2019-02-14 DIAGNOSIS — M79.675 PAIN IN TOES OF BOTH FEET: Primary | ICD-10-CM

## 2019-02-14 DIAGNOSIS — B35.1 ONYCHOMYCOSIS: ICD-10-CM

## 2019-02-14 DIAGNOSIS — M79.674 PAIN IN TOES OF BOTH FEET: Primary | ICD-10-CM

## 2019-02-14 PROCEDURE — 99202 OFFICE O/P NEW SF 15 MIN: CPT | Performed by: PODIATRIST

## 2019-02-14 PROCEDURE — 11721 DEBRIDE NAIL 6 OR MORE: CPT | Performed by: PODIATRIST

## 2019-02-14 NOTE — PROGRESS NOTES
HPI:    Patient ID: Kitty Gomez is a 66year old male. This pleasant 68-year-old male presents as a new patient to me. He is self-referred. Patient's chief complaint is pain and an inability to care for his toenails.   This is been a long-standin for fungus toenails. Patient's only option is debridement. Careful and complete debridement of all 10 toenails was accomplished today without incident. I reduced nail, subungual debris, and some keratosis.   Upon debridement there is no ulceration or inf

## 2019-02-24 NOTE — PROGRESS NOTES
Jaz Osorio is a 66year old male.   HPI:     CC:  Patient presents with:  Full Skin Exam: LOV 1/31/2018 Patient present for annual full body exam . Patient requesting full body skin exam .Patient denies any personal or family hx of sydnee Gomez No         Current Outpatient Medications:  AMLODIPINE BESYLATE 5 MG Oral Tab TAKE ONE TABLET BY MOUTH ONE TIME DAILY  Disp: 90 tablet Rfl: 1   TRIAMTERENE-HCTZ 37.5-25 MG Oral Cap TAKE ONE CAPSULE BY MOUTH IN THE MORNING  Disp: 90 capsule Rfl: 1   atorvas APPENDECTOMY  1961   • BACK SURGERY Left 12/2011    L4-L5 microdiscectomy   • CATARACT Left 10/2010   • CATARACT Right 12/2010   • COLONOSCOPY  1/2016   • INGUINAL HERNIA REPAIR Bilateral 1970   • OTHER  January 2017    Excision SCCa left temple   • SKIN S Physically abused: Not on file        Forced sexual activity: Not on file    Other Topics      Concerns:        G[place] up on a farm: Not Asked        History of tanning: Not Asked        Outdoor occupation: Not Asked        Pt has a pacemaker: No        P conjunctival mucosa, eyelids, lips external ears, upper back, upper chest, arms, palms.      Multiple light to medium brown, well marginated, uniformly pigmented, macules and papules 6 mm and less scattered on exam. pigmented lesions examined with dermoscop options reviewed. See  Medications in grid. Instructions reviewed at length. Benign nevi, seborrheic  keratoses, cherry angiomas:  Reassurance regarding other benign skin lesions. Signs and symptoms of skin cancer, ABCDE's of melanoma discussed with pa

## 2019-02-26 ENCOUNTER — OFFICE VISIT (OUTPATIENT)
Dept: INTERNAL MEDICINE CLINIC | Facility: CLINIC | Age: 79
End: 2019-02-26
Payer: COMMERCIAL

## 2019-02-26 VITALS
WEIGHT: 140 LBS | BODY MASS INDEX: 21.97 KG/M2 | HEIGHT: 66.75 IN | HEART RATE: 60 BPM | SYSTOLIC BLOOD PRESSURE: 132 MMHG | DIASTOLIC BLOOD PRESSURE: 60 MMHG

## 2019-02-26 DIAGNOSIS — E78.00 HYPERCHOLESTEROLEMIA: ICD-10-CM

## 2019-02-26 DIAGNOSIS — K57.30 DIVERTICULOSIS OF LARGE INTESTINE WITHOUT HEMORRHAGE: ICD-10-CM

## 2019-02-26 DIAGNOSIS — D50.9 IRON DEFICIENCY ANEMIA, UNSPECIFIED IRON DEFICIENCY ANEMIA TYPE: ICD-10-CM

## 2019-02-26 DIAGNOSIS — K55.9 ISCHEMIC COLITIS (HCC): ICD-10-CM

## 2019-02-26 DIAGNOSIS — M81.0 OSTEOPOROSIS, UNSPECIFIED OSTEOPOROSIS TYPE, UNSPECIFIED PATHOLOGICAL FRACTURE PRESENCE: ICD-10-CM

## 2019-02-26 DIAGNOSIS — Z00.00 ANNUAL PHYSICAL EXAM: Primary | ICD-10-CM

## 2019-02-26 DIAGNOSIS — C44.92 SCC (SQUAMOUS CELL CARCINOMA): ICD-10-CM

## 2019-02-26 DIAGNOSIS — N40.0 BENIGN PROSTATIC HYPERPLASIA, UNSPECIFIED WHETHER LOWER URINARY TRACT SYMPTOMS PRESENT: ICD-10-CM

## 2019-02-26 DIAGNOSIS — Z00.00 ENCOUNTER FOR ANNUAL HEALTH EXAMINATION: ICD-10-CM

## 2019-02-26 DIAGNOSIS — I70.0 AORTIC ATHEROSCLEROSIS (HCC): ICD-10-CM

## 2019-02-26 DIAGNOSIS — I10 ESSENTIAL HYPERTENSION: ICD-10-CM

## 2019-02-26 DIAGNOSIS — K27.9 PEPTIC ULCER DISEASE: ICD-10-CM

## 2019-02-26 PROCEDURE — G0439 PPPS, SUBSEQ VISIT: HCPCS | Performed by: INTERNAL MEDICINE

## 2019-02-26 PROCEDURE — 96160 PT-FOCUSED HLTH RISK ASSMT: CPT | Performed by: INTERNAL MEDICINE

## 2019-02-26 PROCEDURE — 99397 PER PM REEVAL EST PAT 65+ YR: CPT | Performed by: INTERNAL MEDICINE

## 2019-02-26 NOTE — PATIENT INSTRUCTIONS
Please obtain blood tests soon. Continue current medication. Continue healthy diet, and try to exercise regularly. Return visit in 6 months.   [address] SCREENING SCHEDULE   Tests on this list are recommended by your physician but may not be co of the following criteria:   • Men who are 73-68 years old and have smoked more than 100 cigarettes in their lifetime   • Anyone with a family history    Colorectal Cancer Screening Covered up to Age 76     Colonoscopy Screen   Covered every 10 years- more the mentally retarded   Persons who live in the same house as a HepB virus carrier   Homosexual men   Illicit injectable drug abusers     Tetanus Toxoid- Only covered with a cut with metal- TD and TDaP Not covered by Medicare Part B) No orders found for th

## 2019-02-26 NOTE — PROGRESS NOTES
HPI:   Randal Ott is a 66year old male who presents this afternoon for a MA (Medicare Advantage) Supervisit (Once per calendar year). His last physical was February 2018. He feels well. No specific issues for discussion.   He continues to work smoked tobacco in the past but quit greater than 12 months ago.   Social History    Tobacco Use      Smoking status: Former Smoker        Packs/day: 0.25        Years: 5.00        Pack years: 1.25        Types: Cigarettes        Quit date: 2/14/1962 capsule by mouth daily.    AMLODIPINE BESYLATE 5 MG Oral Tab TAKE ONE TABLET BY MOUTH ONE TIME DAILY    TRIAMTERENE-HCTZ 37.5-25 MG Oral Cap TAKE ONE CAPSULE BY MOUTH IN THE MORNING    atorvastatin 20 MG Oral Tab Take 1 tablet (20 mg total) by mouth nightly REVIEW OF SYSTEMS:   GENERAL: No fever  LUNGS: No cough wheezing or shortness of breath  CARDIAC: No lightheadedness palpitations or chest pain  GI: Occasional heartburn relieved with Zantac. Occasional constipation, recently better.   No anorexia dysp think I may have hearing loss:   No                 Visual Acuity  Right Eye Visual Acuity: Corrected Right Eye Chart Acuity: 20/40   Left Eye Visual Acuity: Corrected Left Eye Chart Acuity: 20/50   Both Eyes Visual Acuity: Corrected Both Eyes Chart Acuity: therapy. Await labs. -     COMP METABOLIC PANEL (14); Future  -     LIPID PANEL;  Future    History of ischemic colitis ([facility] [address]  Currently inactive    Osteoporosis, unspecified osteoporosis type, unspecified pathological fracture presence  Treated with bi Disease Screening     LDL Annually LDL Cholesterol (mg/dL)   Date Value   02/24/2018 171 (H)   03/08/2016 145 (H)        EKG - w/ Initial Preventative Physical Exam only, or if medically necessary Electrocardiogram date12/30/2016    Colorectal Cancer [place] Internal Lab or Procedure External Lab or Procedure      Annual Monitoring of Persistent     Medications (ACE/ARB, digoxin diuretics, anticonvulsants.)    Potassium  Annually Potassium (mmol/L)   Date Value   02/24/2018 4.6     POTASSIUM (P) (mmol/L)   Caleb

## 2019-02-28 ENCOUNTER — APPOINTMENT (OUTPATIENT)
Dept: LAB | Facility: HOSPITAL | Age: 79
End: 2019-02-28
Attending: INTERNAL MEDICINE
Payer: MEDICARE

## 2019-02-28 DIAGNOSIS — E78.00 HYPERCHOLESTEROLEMIA: ICD-10-CM

## 2019-02-28 DIAGNOSIS — D50.9 IRON DEFICIENCY ANEMIA, UNSPECIFIED IRON DEFICIENCY ANEMIA TYPE: ICD-10-CM

## 2019-02-28 LAB
ALBUMIN SERPL-MCNC: 3.6 G/DL (ref 3.4–5)
ALBUMIN/GLOB SERPL: 0.9 {RATIO} (ref 1–2)
ALP LIVER SERPL-CCNC: 78 U/L (ref 45–117)
ALT SERPL-CCNC: 33 U/L (ref 16–61)
ANION GAP SERPL CALC-SCNC: 5 MMOL/L (ref 0–18)
AST SERPL-CCNC: 27 U/L (ref 15–37)
BILIRUB SERPL-MCNC: 0.5 MG/DL (ref 0.1–2)
BUN BLD-MCNC: 15 MG/DL (ref 7–18)
BUN/CREAT SERPL: 14.3 (ref 10–20)
CALCIUM BLD-MCNC: 8.8 MG/DL (ref 8.5–10.1)
CHLORIDE SERPL-SCNC: 99 MMOL/L (ref 98–107)
CHOLEST SMN-MCNC: 144 MG/DL (ref ?–200)
CO2 SERPL-SCNC: 31 MMOL/L (ref 21–32)
CREAT BLD-MCNC: 1.05 MG/DL (ref 0.7–1.3)
DEPRECATED RDW RBC AUTO: 43.8 FL (ref 35.1–46.3)
ERYTHROCYTE [DISTWIDTH] IN BLOOD BY AUTOMATED COUNT: 13 % (ref 11–15)
GLOBULIN PLAS-MCNC: 3.9 G/DL (ref 2.8–4.4)
GLUCOSE BLD-MCNC: 92 MG/DL (ref 70–99)
HCT VFR BLD AUTO: 42.3 % (ref 39–53)
HDLC SERPL-MCNC: 67 MG/DL (ref 40–59)
HGB BLD-MCNC: 14 G/DL (ref 13–17.5)
LDLC SERPL CALC-MCNC: 66 MG/DL (ref ?–100)
M PROTEIN MFR SERPL ELPH: 7.5 G/DL (ref 6.4–8.2)
MCH RBC QN AUTO: 30.4 PG (ref 26–34)
MCHC RBC AUTO-ENTMCNC: 33.1 G/DL (ref 31–37)
MCV RBC AUTO: 92 FL (ref 80–100)
NONHDLC SERPL-MCNC: 77 MG/DL (ref ?–130)
OSMOLALITY SERPL CALC.SUM OF ELEC: 280 MOSM/KG (ref 275–295)
PLATELET # BLD AUTO: 215 10(3)UL (ref 150–450)
POTASSIUM SERPL-SCNC: 3.9 MMOL/L (ref 3.5–5.1)
RBC # BLD AUTO: 4.6 X10(6)UL (ref 3.8–5.8)
SODIUM SERPL-SCNC: 135 MMOL/L (ref 136–145)
TRIGL SERPL-MCNC: 56 MG/DL (ref 30–149)
VLDLC SERPL CALC-MCNC: 11 MG/DL (ref 0–30)
WBC # BLD AUTO: 6.6 X10(3) UL (ref 4–11)

## 2019-02-28 PROCEDURE — 85027 COMPLETE CBC AUTOMATED: CPT

## 2019-02-28 PROCEDURE — 36415 COLL VENOUS BLD VENIPUNCTURE: CPT

## 2019-02-28 PROCEDURE — 80053 COMPREHEN METABOLIC PANEL: CPT

## 2019-02-28 PROCEDURE — 80061 LIPID PANEL: CPT

## 2019-03-06 RX ORDER — ATORVASTATIN CALCIUM 20 MG/1
TABLET, FILM COATED ORAL
Qty: 90 TABLET | Refills: 3 | Status: SHIPPED | OUTPATIENT
Start: 2019-03-06 | End: 2020-03-05

## 2019-04-15 ENCOUNTER — APPOINTMENT (OUTPATIENT)
Dept: LAB | Facility: HOSPITAL | Age: 79
End: 2019-04-15
Attending: UROLOGY
Payer: MEDICARE

## 2019-04-15 DIAGNOSIS — R35.1 NOCTURIA: ICD-10-CM

## 2019-04-15 PROCEDURE — 81003 URINALYSIS AUTO W/O SCOPE: CPT

## 2019-04-27 RX ORDER — TRIAMTERENE AND HYDROCHLOROTHIAZIDE 37.5; 25 MG/1; MG/1
CAPSULE ORAL
Qty: 90 CAPSULE | Refills: 1 | Status: SHIPPED | OUTPATIENT
Start: 2019-04-27 | End: 2019-10-24

## 2019-05-06 ENCOUNTER — OFFICE VISIT (OUTPATIENT)
Dept: SURGERY | Facility: CLINIC | Age: 79
End: 2019-05-06
Payer: COMMERCIAL

## 2019-05-06 VITALS
DIASTOLIC BLOOD PRESSURE: 66 MMHG | SYSTOLIC BLOOD PRESSURE: 122 MMHG | WEIGHT: 140 LBS | BODY MASS INDEX: 22 KG/M2 | TEMPERATURE: 98 F

## 2019-05-06 DIAGNOSIS — R35.0 BENIGN PROSTATIC HYPERPLASIA WITH URINARY FREQUENCY: Primary | ICD-10-CM

## 2019-05-06 DIAGNOSIS — K59.01 CONSTIPATION BY DELAYED COLONIC TRANSIT: ICD-10-CM

## 2019-05-06 DIAGNOSIS — R35.1 NOCTURIA: ICD-10-CM

## 2019-05-06 DIAGNOSIS — N40.1 BENIGN PROSTATIC HYPERPLASIA WITH URINARY FREQUENCY: Primary | ICD-10-CM

## 2019-05-06 PROCEDURE — G0463 HOSPITAL OUTPT CLINIC VISIT: HCPCS | Performed by: UROLOGY

## 2019-05-06 PROCEDURE — 99214 OFFICE O/P EST MOD 30 MIN: CPT | Performed by: UROLOGY

## 2019-05-06 NOTE — PATIENT INSTRUCTIONS
George Shane M.D.      1.   Constipation, I recommendv that you try either generic Metamucil (with sugar or with artificial sugar) or generic Benefiber or Citrucel (which only comes with artificial sugar) 1 large TABLESPOON

## 2019-05-06 NOTE — PROGRESS NOTES
HPI:    Patient ID: Keyla Mares is a 66year old male.     HPI  Voiding Dysfunction  Patient has current AUA score of 3, mild voiding dysfunction category, stable compared to previous score of 3, mild voiding dysfunction category, on 5/3/2018 per caitlin breath. Cardiovascular: Negative for chest pain. Gastrointestinal: Positive for constipation. Negative for abdominal pain. Genitourinary: Negative for dysuria, flank pain and hematuria (Gross).         Positive for urinary frequency less than 2 hours With abnormal PSA, Rx Proscar   • Diverticulosis 2007   • Glaucoma    • Herpes zoster 2008    Left face   • Hypercholesterolemia    • Hypertension August 2011   • Iron deficiency anemia September 2008   • Ischemic colitis [facility]) December 2015   • Osteoporos indurations   Musculoskeletal: Normal range of motion. Neurological: He is alert and oriented to person, place, and time. Skin: Skin is dry. Psychiatric: He has a normal mood and affect. Thought content normal.   Nursing note and vitals reviewed. daily Metamucil or Citrucel orBenefiber for this; discussed their different characteristics. We revisited controversy of annual PSA testing. Patient confirms that he still wishes to discontinue PSA screening per AUA recommendations.       I explained to described in this documentation. All medical record entries made by the scribe were at my direction and in my presence.   I have reviewed the chart and discharge instructions (if applicable) and agree that the record reflects my personal performance and is

## 2019-05-15 RX ORDER — AMLODIPINE BESYLATE 5 MG/1
TABLET ORAL
Qty: 90 TABLET | Refills: 1 | Status: SHIPPED | OUTPATIENT
Start: 2019-05-15 | End: 2019-11-12

## 2019-06-07 ENCOUNTER — APPOINTMENT (OUTPATIENT)
Dept: GENERAL RADIOLOGY | Facility: HOSPITAL | Age: 79
End: 2019-06-07
Attending: EMERGENCY MEDICINE
Payer: MEDICARE

## 2019-06-07 ENCOUNTER — HOSPITAL ENCOUNTER (EMERGENCY)
Facility: HOSPITAL | Age: 79
Discharge: HOME | End: 2019-06-07
Attending: EMERGENCY MEDICINE
Payer: MEDICARE

## 2019-06-07 VITALS
TEMPERATURE: 98 F | SYSTOLIC BLOOD PRESSURE: 135 MMHG | OXYGEN SATURATION: 99 % | WEIGHT: 140 LBS | HEIGHT: 67 IN | BODY MASS INDEX: 21.97 KG/M2 | HEART RATE: 45 BPM | DIASTOLIC BLOOD PRESSURE: 61 MMHG | RESPIRATION RATE: 18 BRPM

## 2019-06-07 DIAGNOSIS — S46.912A LEFT SHOULDER STRAIN, INITIAL ENCOUNTER: Primary | ICD-10-CM

## 2019-06-07 DIAGNOSIS — R00.1 SLOW HEART RATE: ICD-10-CM

## 2019-06-07 PROCEDURE — 93010 ELECTROCARDIOGRAM REPORT: CPT | Performed by: EMERGENCY MEDICINE

## 2019-06-07 PROCEDURE — 85025 COMPLETE CBC W/AUTO DIFF WBC: CPT

## 2019-06-07 PROCEDURE — 73030 X-RAY EXAM OF SHOULDER: CPT | Performed by: EMERGENCY MEDICINE

## 2019-06-07 PROCEDURE — 36415 COLL VENOUS BLD VENIPUNCTURE: CPT

## 2019-06-07 PROCEDURE — 99284 EMERGENCY DEPT VISIT MOD MDM: CPT

## 2019-06-07 PROCEDURE — 80048 BASIC METABOLIC PNL TOTAL CA: CPT

## 2019-06-07 PROCEDURE — 93005 ELECTROCARDIOGRAM TRACING: CPT

## 2019-06-07 PROCEDURE — 80048 BASIC METABOLIC PNL TOTAL CA: CPT | Performed by: EMERGENCY MEDICINE

## 2019-06-07 PROCEDURE — 85025 COMPLETE CBC W/AUTO DIFF WBC: CPT | Performed by: EMERGENCY MEDICINE

## 2019-06-07 NOTE — ED PROVIDER NOTES
Patient Seen in: [facility] AND [facility] Emergency Department    History   Patient presents with:  Upper Extremity Injury (musculoskeletal)    Stated Complaint: fall last night, left shoulder pain    HPI    Patient is a 66-year-old male who comes in for left s OTHER  January 2017    Excision SCCa left temple   • SKIN SURGERY Right 10/28/2016    Excision epidermal inclusion cyst back   • SPINAL FUSION  10/2011    L4-L5   • TONSILLECTOMY  1968       Family history noncontributory    Social History    Tobacco Use of motion (Pain with range of motion but able to AB duct) and tenderness. He exhibits no bony tenderness, no swelling, no effusion, normal pulse and normal strength. Neurological: He is alert and oriented to person, place, and time.  He has normal reflexe 2016 and I do not feel that this is because of patient's fall. He denies any dizziness today and orthostatics are negative.     MDM   Pulse Ox: 99%, Normal, room air    Cardiac Monitor: Pulse Readings from Last 1 Encounters:  06/07/19 : (!) 45  , sinus, br

## 2019-06-10 RX ORDER — FINASTERIDE 5 MG/1
TABLET, FILM COATED ORAL
Qty: 90 TABLET | Refills: 3 | Status: SHIPPED | OUTPATIENT
Start: 2019-06-10 | End: 2020-06-01

## 2019-06-10 NOTE — TELEPHONE ENCOUNTER
Pt LOV with Dr. Jayson Sandoval 5/6/19 pt pharmacy requesting refill on finasteride if you agree please review and sign med, I copied and pasted part of PVK note below,    2.     Continue finasteride 5 mg daily     3.  Visit in 1 year.

## 2019-06-11 ENCOUNTER — OFFICE VISIT (OUTPATIENT)
Dept: INTERNAL MEDICINE CLINIC | Facility: CLINIC | Age: 79
End: 2019-06-11
Payer: COMMERCIAL

## 2019-06-11 VITALS
HEIGHT: 66.75 IN | SYSTOLIC BLOOD PRESSURE: 144 MMHG | WEIGHT: 138 LBS | HEART RATE: 46 BPM | BODY MASS INDEX: 21.66 KG/M2 | DIASTOLIC BLOOD PRESSURE: 62 MMHG

## 2019-06-11 DIAGNOSIS — M25.512 ACUTE PAIN OF LEFT SHOULDER: ICD-10-CM

## 2019-06-11 DIAGNOSIS — R00.1 SINUS BRADYCARDIA: Primary | ICD-10-CM

## 2019-06-11 PROCEDURE — 99213 OFFICE O/P EST LOW 20 MIN: CPT | Performed by: INTERNAL MEDICINE

## 2019-06-11 PROCEDURE — G0463 HOSPITAL OUTPT CLINIC VISIT: HCPCS | Performed by: INTERNAL MEDICINE

## 2019-06-11 NOTE — PATIENT INSTRUCTIONS
Please schedule a Holter monitor. Call if your left shoulder pain does not continue to go away.   Please discuss switching timolol eyedrops with your Ophthalmologist.  Return visit in August.

## 2019-06-11 NOTE — PROGRESS NOTES
Mauro Winters is a 66year old male. Patient presents with:  ER F/U: Was seen in the ER on 6/7 due to left shoulder pain. During ER visit heart rate was low    HPI:   Harvey Mendez presents this morning for ER follow-up.     On June 7, while unloading his dishw Calcium Carbonate-Vitamin D (CALCIUM 600 + D OR) Take 1 tablet by mouth 2 (two) times daily. Disp:  Rfl:    Coenzyme Q10 (COQ10 OR) Take 1 capsule by mouth daily. Disp:  Rfl:    Probiotic Product (PROBIOTIC OR) Take 1 capsule by mouth daily.  Disp:  Rfl: • TONSILLECTOMY  1968      Social History:  Social History    Tobacco Use      Smoking status: Former Smoker        Packs/day: 0.25        Years: 5.00        Pack years: 1.25        Types: Cigarettes        Quit date: 2/14/1962        Years since [facility]

## 2019-06-12 ENCOUNTER — HOSPITAL ENCOUNTER (OUTPATIENT)
Dept: CV DIAGNOSTICS | Facility: HOSPITAL | Age: 79
Discharge: HOME | End: 2019-06-12
Attending: INTERNAL MEDICINE
Payer: MEDICARE

## 2019-06-12 DIAGNOSIS — R00.1 SINUS BRADYCARDIA: ICD-10-CM

## 2019-06-12 PROCEDURE — 93227 XTRNL ECG REC<48 HR R&I: CPT | Performed by: INTERNAL MEDICINE

## 2019-06-12 PROCEDURE — 93225 XTRNL ECG REC<48 HRS REC: CPT | Performed by: INTERNAL MEDICINE

## 2019-08-27 ENCOUNTER — OFFICE VISIT (OUTPATIENT)
Dept: INTERNAL MEDICINE CLINIC | Facility: CLINIC | Age: 79
End: 2019-08-27
Payer: COMMERCIAL

## 2019-08-27 VITALS
DIASTOLIC BLOOD PRESSURE: 62 MMHG | HEIGHT: 66.75 IN | HEART RATE: 54 BPM | SYSTOLIC BLOOD PRESSURE: 136 MMHG | WEIGHT: 137.88 LBS | BODY MASS INDEX: 21.64 KG/M2

## 2019-08-27 DIAGNOSIS — I10 ESSENTIAL HYPERTENSION: Primary | ICD-10-CM

## 2019-08-27 PROCEDURE — G0463 HOSPITAL OUTPT CLINIC VISIT: HCPCS | Performed by: INTERNAL MEDICINE

## 2019-08-27 PROCEDURE — 99213 OFFICE O/P EST LOW 20 MIN: CPT | Performed by: INTERNAL MEDICINE

## 2019-08-27 RX ORDER — NETARSUDIL 0.2 MG/ML
SOLUTION/ DROPS OPHTHALMIC; TOPICAL
COMMUNITY
Start: 2019-08-22 | End: 2020-02-25 | Stop reason: ALTCHOICE

## 2019-08-27 NOTE — PATIENT INSTRUCTIONS
Please continue your current medications. Remember to get a flu shot soon. Schedule a Medicare physical in February.

## 2019-08-27 NOTE — PROGRESS NOTES
Randal Ott is a 66year old male. Patient presents with:  Hypertension    HPI:   Mr. Glenn Piña presents this morning for six-month follow-up of hypertension.     Because of sinus bradycardia, Timolol was recently stopped by his Ophthalmologist, and Rho Proscar   • Diverticulosis 2007   • Glaucoma    • Herpes zoster 2008    Left face   • Hypercholesterolemia    • Hypertension August 2011   • Iron deficiency anemia September 2008   • Ischemic colitis [facility]) December 2015   • Osteoporosis January 2002    T9 patient is asked to return in February.     Bang Saleem MD  8/27/2019  10:25 AM

## 2019-08-28 ENCOUNTER — OFFICE VISIT (OUTPATIENT)
Dept: DERMATOLOGY CLINIC | Facility: CLINIC | Age: 79
End: 2019-08-28
Payer: COMMERCIAL

## 2019-08-28 DIAGNOSIS — L72.0 EPIDERMAL CYST: ICD-10-CM

## 2019-08-28 DIAGNOSIS — D23.70 BENIGN NEOPLASM OF SKIN OF LOWER LIMB, INCLUDING HIP, UNSPECIFIED LATERALITY: ICD-10-CM

## 2019-08-28 DIAGNOSIS — Z85.828 HISTORY OF NONMELANOMA SKIN CANCER: ICD-10-CM

## 2019-08-28 DIAGNOSIS — D23.4 BENIGN NEOPLASM OF SCALP AND SKIN OF NECK: ICD-10-CM

## 2019-08-28 DIAGNOSIS — D23.5 BENIGN NEOPLASM OF SKIN OF TRUNK, EXCEPT SCROTUM: ICD-10-CM

## 2019-08-28 DIAGNOSIS — D23.60 BENIGN NEOPLASM OF SKIN OF UPPER LIMB, INCLUDING SHOULDER, UNSPECIFIED LATERALITY: ICD-10-CM

## 2019-08-28 DIAGNOSIS — D23.30 BENIGN NEOPLASM OF SKIN OF FACE: ICD-10-CM

## 2019-08-28 DIAGNOSIS — L82.1 SEBORRHEIC KERATOSES: Primary | ICD-10-CM

## 2019-08-28 PROCEDURE — 99213 OFFICE O/P EST LOW 20 MIN: CPT | Performed by: DERMATOLOGY

## 2019-08-28 PROCEDURE — G0463 HOSPITAL OUTPT CLINIC VISIT: HCPCS | Performed by: DERMATOLOGY

## 2019-09-08 NOTE — PROGRESS NOTES
Janeen Diaz is a 66year old male. HPI:     CC:  Patient presents with:  Derm Problem: LOV 2/13/19. pt presenting today with upper body skin check. Denies family and personal HX of skin cancer. Allergies:  Clindamycin;  Lisinopril    HISTORY: Outpatient Medications:  RHOPRESSA 0.02 % Ophthalmic Solution  Disp:  Rfl:    FINASTERIDE 5 MG Oral Tab TAKE ONE TABLET BY MOUTH ONE TIME DAILY  Disp: 90 tablet Rfl: 3   AMLODIPINE BESYLATE 5 MG Oral Tab TAKE ONE TABLET BY MOUTH ONE TIME DAILY Disp: 90 tab Past Surgical History:   Procedure Laterality Date   • APPENDECTOMY  1961   • BACK SURGERY Left 12/2011    L4-L5 microdiscectomy   • CATARACT Left 10/2010   • CATARACT Right 12/2010   • COLONOSCOPY  1/2016   • INGUINAL HERNIA REPAIR Bilateral 1970   • current or ex partner: Not on file        Emotionally abused: Not on file        Physically abused: Not on file        Forced sexual activity: Not on file    Other Topics      Concerns:        Grew up on a farm: Not Asked        History of tanning: Not Ask Physical Examination:     Well-developed well-nourished patient alert oriented in no acute distress.   Examp erformed, including scalp, head, neck, face,nails, hair, external eyes, including conjunctival mucosa, eyelids, lips external ears, upper back, uppe reassurance. Discussed pros and cons of removal not bothersome currently observation. Multiple benign seborrheic keratoses noted. No other suspicious lesions  No active AK's    Benign seborrheic keratoses lentigines.   No significant changes  Please

## 2019-10-10 ENCOUNTER — IMMUNIZATION (OUTPATIENT)
Dept: INTERNAL MEDICINE CLINIC | Facility: CLINIC | Age: 79
End: 2019-10-10
Payer: COMMERCIAL

## 2019-10-10 DIAGNOSIS — Z23 NEED FOR VACCINATION: ICD-10-CM

## 2019-10-10 PROCEDURE — 90662 IIV NO PRSV INCREASED AG IM: CPT | Performed by: INTERNAL MEDICINE

## 2019-10-10 PROCEDURE — G0008 ADMIN INFLUENZA VIRUS VAC: HCPCS | Performed by: INTERNAL MEDICINE

## 2019-10-22 ENCOUNTER — OFFICE VISIT (OUTPATIENT)
Dept: PODIATRY CLINIC | Facility: CLINIC | Age: 79
End: 2019-10-22
Payer: COMMERCIAL

## 2019-10-22 DIAGNOSIS — M79.675 PAIN IN TOES OF BOTH FEET: Primary | ICD-10-CM

## 2019-10-22 DIAGNOSIS — B35.1 ONYCHOMYCOSIS: ICD-10-CM

## 2019-10-22 DIAGNOSIS — M79.674 PAIN IN TOES OF BOTH FEET: Primary | ICD-10-CM

## 2019-10-22 PROCEDURE — 11721 DEBRIDE NAIL 6 OR MORE: CPT | Performed by: PODIATRIST

## 2019-10-22 NOTE — PROGRESS NOTES
HPI:    Patient ID: Adina Wall is a 66year old male. 72-year-old male presents with recurrent pain associated with his toenails. He reports relief by previous treatment. The last time I saw this patient was about 8 months ago.     ROS:     I re debridement of all 10 toenails was accomplished today without incident. I reduced nail, subungual debris, and some keratosis. No ulcerations or infections were noted upon debridement.   I anticipate relief will see patient for care if and when symptoms re

## 2019-10-26 RX ORDER — TRIAMTERENE AND HYDROCHLOROTHIAZIDE 37.5; 25 MG/1; MG/1
CAPSULE ORAL
Qty: 90 CAPSULE | Refills: 1 | Status: SHIPPED | OUTPATIENT
Start: 2019-10-26 | End: 2020-04-19

## 2019-11-12 RX ORDER — AMLODIPINE BESYLATE 5 MG/1
TABLET ORAL
Qty: 90 TABLET | Refills: 1 | Status: SHIPPED | OUTPATIENT
Start: 2019-11-12 | End: 2020-04-19

## 2020-02-25 ENCOUNTER — OFFICE VISIT (OUTPATIENT)
Dept: INTERNAL MEDICINE CLINIC | Facility: CLINIC | Age: 80
End: 2020-02-25
Payer: COMMERCIAL

## 2020-02-25 VITALS
DIASTOLIC BLOOD PRESSURE: 58 MMHG | SYSTOLIC BLOOD PRESSURE: 134 MMHG | HEART RATE: 67 BPM | BODY MASS INDEX: 21.63 KG/M2 | WEIGHT: 137.81 LBS | HEIGHT: 66.75 IN

## 2020-02-25 DIAGNOSIS — H40.9 GLAUCOMA, UNSPECIFIED GLAUCOMA TYPE, UNSPECIFIED LATERALITY: ICD-10-CM

## 2020-02-25 DIAGNOSIS — I10 ESSENTIAL HYPERTENSION: ICD-10-CM

## 2020-02-25 DIAGNOSIS — I70.0 AORTIC ATHEROSCLEROSIS (HCC): ICD-10-CM

## 2020-02-25 DIAGNOSIS — Z00.00 ENCOUNTER FOR ANNUAL HEALTH EXAMINATION: ICD-10-CM

## 2020-02-25 DIAGNOSIS — M81.0 OSTEOPOROSIS, UNSPECIFIED OSTEOPOROSIS TYPE, UNSPECIFIED PATHOLOGICAL FRACTURE PRESENCE: ICD-10-CM

## 2020-02-25 DIAGNOSIS — E78.00 HYPERCHOLESTEROLEMIA: ICD-10-CM

## 2020-02-25 DIAGNOSIS — R35.0 BENIGN PROSTATIC HYPERPLASIA WITH URINARY FREQUENCY: ICD-10-CM

## 2020-02-25 DIAGNOSIS — N40.1 BENIGN PROSTATIC HYPERPLASIA WITH URINARY FREQUENCY: ICD-10-CM

## 2020-02-25 DIAGNOSIS — Z00.00 ANNUAL PHYSICAL EXAM: Primary | ICD-10-CM

## 2020-02-25 DIAGNOSIS — Z85.828 HISTORY OF SKIN CANCER: ICD-10-CM

## 2020-02-25 DIAGNOSIS — Z87.19 HISTORY OF ISCHEMIC COLITIS: ICD-10-CM

## 2020-02-25 DIAGNOSIS — D50.9 IRON DEFICIENCY ANEMIA, UNSPECIFIED IRON DEFICIENCY ANEMIA TYPE: ICD-10-CM

## 2020-02-25 PROBLEM — L82.1 SEBORRHEIC KERATOSES: Status: RESOLVED | Noted: 2019-01-15 | Resolved: 2020-02-25

## 2020-02-25 PROBLEM — I67.2 INTRACRANIAL ATHEROSCLEROSIS: Status: RESOLVED | Noted: 2019-01-15 | Resolved: 2020-02-25

## 2020-02-25 PROCEDURE — 99397 PER PM REEVAL EST PAT 65+ YR: CPT | Performed by: INTERNAL MEDICINE

## 2020-02-25 PROCEDURE — 96160 PT-FOCUSED HLTH RISK ASSMT: CPT | Performed by: INTERNAL MEDICINE

## 2020-02-25 PROCEDURE — G0439 PPPS, SUBSEQ VISIT: HCPCS | Performed by: INTERNAL MEDICINE

## 2020-02-25 RX ORDER — BRIMONIDINE TARTRATE/TIMOLOL 0.2%-0.5%
DROPS OPHTHALMIC (EYE)
COMMUNITY
Start: 2020-02-16

## 2020-02-25 NOTE — PATIENT INSTRUCTIONS
Please obtain blood tests soon when you can. Continue current medication. Continue follow-up with Dermatology, Urology and Ophthalmology. Continue healthy diet and regular physical activity. Return visit in 6 months.   Annual Medicare physical.  Dixon Gibson criteria:   • Men who are 73-68 years old and have smoked more than 100 cigarettes in their lifetime   • Anyone with a family history    Colorectal Cancer Screening Covered up to Age 76     Colonoscopy Screen   Covered every 10 years- more often if abnorma institutions for the mentally retarded   Persons who live in the same house as a HepB virus carrier   Homosexual men   Illicit injectable drug abusers     Tetanus Toxoid- Only covered with a cut with metal- TD and TDaP Not covered by Medicare Part B) No or

## 2020-02-25 NOTE — PROGRESS NOTES
HPI:   Kitty Gomez is a 78year old male who presents this afternoon for an MA (Medicare Advantage) Supervisit (Once per calendar year). He feels well. No specific issues for discussion today. Lives with his wife.   He continues to work part-time ago.  Social History    Tobacco Use      Smoking status: Former Smoker        Packs/day: 0.25        Years: 5.00        Pack years: 1.25        Types: Cigarettes        Quit date: 1962        Years since quittin.0      Smokeless tobacco: Never Us Q10 (COQ10 OR), Take 1 capsule by mouth daily. Probiotic Product (PROBIOTIC OR), Take 1 capsule by mouth daily. Boswellia Judah (BOSWELLIA OR), Take 2 tablets by mouth 3 (three) times daily.   Cholecalciferol (VITAMIN D3) 1000 UNITS Oral Cap, Take  by m 21.74 kg/m²   Estimated body mass index is 21.74 kg/m² as calculated from the following:    Height as of this encounter: 5' 6.75\" (1.695 m). Weight as of this encounter: 137 lb 12.8 oz (62.5 kg).     Medicare Hearing Assessment  (Required for AWV/SWV) HEENT: Anicteric, conjunctiva pink, oropharynx normal  NECK: Supple without mass or thyromegaly  NODES: No peripheral adenopathy  LUNGS: Resonant to percussion and clear to auscultation  CARDIAC: Rhythm regular S1 S2 normal without murmur or edema  ABDOM bisphosphonate    History of ischemic colitis  Clinically resolved. Will monitor for recurrent symptoms. Aortic atherosclerosis (HCC)  Asymptomatic.   Continue risk factor control including statin    History of skin cancer  Continue regular follow-up wi results found for this or any previous visit. No flowsheet data found. Fecal Occult Blood Annually No results found for: FOB No flowsheet data found.     Glaucoma Screening      Ophthalmology Visit Annually: Diabetics, FHx Glaucoma, AA>50, > 65 found for: DIGOXIN, DIG, VALP No flowsheet data found.

## 2020-02-26 ENCOUNTER — APPOINTMENT (OUTPATIENT)
Dept: LAB | Facility: HOSPITAL | Age: 80
End: 2020-02-26
Attending: INTERNAL MEDICINE
Payer: MEDICARE

## 2020-02-26 LAB
ALBUMIN SERPL-MCNC: 3.5 G/DL (ref 3.4–5)
ALBUMIN/GLOB SERPL: 0.9 {RATIO} (ref 1–2)
ALP LIVER SERPL-CCNC: 77 U/L (ref 45–117)
ALT SERPL-CCNC: 35 U/L (ref 16–61)
ANION GAP SERPL CALC-SCNC: 1 MMOL/L (ref 0–18)
AST SERPL-CCNC: 35 U/L (ref 15–37)
BILIRUB SERPL-MCNC: 0.5 MG/DL (ref 0.1–2)
BUN BLD-MCNC: 17 MG/DL (ref 7–18)
BUN/CREAT SERPL: 15.7 (ref 10–20)
CALCIUM BLD-MCNC: 9.5 MG/DL (ref 8.5–10.1)
CHLORIDE SERPL-SCNC: 99 MMOL/L (ref 98–112)
CHOLEST SMN-MCNC: 157 MG/DL (ref ?–200)
CO2 SERPL-SCNC: 33 MMOL/L (ref 21–32)
CREAT BLD-MCNC: 1.08 MG/DL (ref 0.7–1.3)
DEPRECATED RDW RBC AUTO: 42.3 FL (ref 35.1–46.3)
ERYTHROCYTE [DISTWIDTH] IN BLOOD BY AUTOMATED COUNT: 12.9 % (ref 11–15)
GLOBULIN PLAS-MCNC: 3.8 G/DL (ref 2.8–4.4)
GLUCOSE BLD-MCNC: 88 MG/DL (ref 70–99)
HCT VFR BLD AUTO: 40.6 % (ref 39–53)
HDLC SERPL-MCNC: 66 MG/DL (ref 40–59)
HGB BLD-MCNC: 13.6 G/DL (ref 13–17.5)
LDLC SERPL CALC-MCNC: 79 MG/DL (ref ?–100)
M PROTEIN MFR SERPL ELPH: 7.3 G/DL (ref 6.4–8.2)
MCH RBC QN AUTO: 30.4 PG (ref 26–34)
MCHC RBC AUTO-ENTMCNC: 33.5 G/DL (ref 31–37)
MCV RBC AUTO: 90.8 FL (ref 80–100)
NONHDLC SERPL-MCNC: 91 MG/DL (ref ?–130)
OSMOLALITY SERPL CALC.SUM OF ELEC: 277 MOSM/KG (ref 275–295)
PATIENT FASTING Y/N/NP: YES
PATIENT FASTING Y/N/NP: YES
PLATELET # BLD AUTO: 214 10(3)UL (ref 150–450)
POTASSIUM SERPL-SCNC: 4.2 MMOL/L (ref 3.5–5.1)
RBC # BLD AUTO: 4.47 X10(6)UL (ref 3.8–5.8)
SODIUM SERPL-SCNC: 133 MMOL/L (ref 136–145)
TRIGL SERPL-MCNC: 61 MG/DL (ref 30–149)
VLDLC SERPL CALC-MCNC: 12 MG/DL (ref 0–30)
WBC # BLD AUTO: 6 X10(3) UL (ref 4–11)

## 2020-02-26 PROCEDURE — 36415 COLL VENOUS BLD VENIPUNCTURE: CPT | Performed by: INTERNAL MEDICINE

## 2020-02-26 PROCEDURE — 85027 COMPLETE CBC AUTOMATED: CPT | Performed by: INTERNAL MEDICINE

## 2020-02-26 PROCEDURE — 80053 COMPREHEN METABOLIC PANEL: CPT | Performed by: INTERNAL MEDICINE

## 2020-02-26 PROCEDURE — 80061 LIPID PANEL: CPT | Performed by: INTERNAL MEDICINE

## 2020-03-05 RX ORDER — ATORVASTATIN CALCIUM 20 MG/1
TABLET, FILM COATED ORAL
Qty: 90 TABLET | Refills: 1 | Status: SHIPPED | OUTPATIENT
Start: 2020-03-05 | End: 2020-09-02

## 2020-04-19 RX ORDER — TRIAMTERENE AND HYDROCHLOROTHIAZIDE 37.5; 25 MG/1; MG/1
CAPSULE ORAL
Qty: 90 CAPSULE | Refills: 1 | Status: SHIPPED | OUTPATIENT
Start: 2020-04-19 | End: 2020-10-21

## 2020-04-19 RX ORDER — AMLODIPINE BESYLATE 5 MG/1
TABLET ORAL
Qty: 90 TABLET | Refills: 1 | Status: SHIPPED | OUTPATIENT
Start: 2020-04-19 | End: 2020-08-15

## 2020-05-02 ENCOUNTER — APPOINTMENT (OUTPATIENT)
Dept: LAB | Facility: HOSPITAL | Age: 80
End: 2020-05-02
Attending: UROLOGY
Payer: MEDICARE

## 2020-05-02 DIAGNOSIS — R35.1 NOCTURIA: ICD-10-CM

## 2020-05-02 PROCEDURE — 81001 URINALYSIS AUTO W/SCOPE: CPT

## 2020-05-07 ENCOUNTER — TELEPHONE (OUTPATIENT)
Dept: SURGERY | Facility: CLINIC | Age: 80
End: 2020-05-07

## 2020-05-07 NOTE — TELEPHONE ENCOUNTER
Do to COVID-19 (Coronavirus) global pandemic and shelter in place order, patient states he will post pone his yearly visit. Patient is aware of his 5/2/2020 urinalysis results, (normal) and verbalized understanding.      Yearly f/u scheduled for Mon 5/

## 2020-06-01 RX ORDER — FINASTERIDE 5 MG/1
TABLET, FILM COATED ORAL
Qty: 90 TABLET | Refills: 0 | Status: SHIPPED | OUTPATIENT
Start: 2020-06-01 | End: 2020-06-03

## 2020-06-03 RX ORDER — FINASTERIDE 5 MG/1
TABLET, FILM COATED ORAL
Qty: 90 TABLET | Refills: 0 | Status: SHIPPED | OUTPATIENT
Start: 2020-06-03 | End: 2020-09-21

## 2020-06-05 ENCOUNTER — OFFICE VISIT (OUTPATIENT)
Dept: PODIATRY CLINIC | Facility: CLINIC | Age: 80
End: 2020-06-05
Payer: COMMERCIAL

## 2020-06-05 DIAGNOSIS — B35.1 ONYCHOMYCOSIS: ICD-10-CM

## 2020-06-05 DIAGNOSIS — M79.674 PAIN IN TOES OF BOTH FEET: Primary | ICD-10-CM

## 2020-06-05 DIAGNOSIS — M79.675 PAIN IN TOES OF BOTH FEET: Primary | ICD-10-CM

## 2020-06-05 PROCEDURE — 11721 DEBRIDE NAIL 6 OR MORE: CPT | Performed by: PODIATRIST

## 2020-06-05 NOTE — PROGRESS NOTES
HPI:    Patient ID: Jayashree Fletcher is a 78year old male. 80-year-old male presents with recurrent pain associated with his toenails. Last time I saw this patient was approximately 8 months ago. He reports relief by previous care.       ROS:   I did r to reevaluate for treatment if and when symptoms redevelop           ASSESSMENT/PLAN:   Pain in toes of both feet  (primary encounter diagnosis)  Onychomycosis    No orders of the defined types were placed in this encounter.       Meds This Visit:  Requeste

## 2020-08-15 RX ORDER — AMLODIPINE BESYLATE 5 MG/1
TABLET ORAL
Qty: 90 TABLET | Refills: 0 | Status: SHIPPED | OUTPATIENT
Start: 2020-08-15 | End: 2021-01-22

## 2020-08-25 ENCOUNTER — OFFICE VISIT (OUTPATIENT)
Dept: INTERNAL MEDICINE CLINIC | Facility: CLINIC | Age: 80
End: 2020-08-25
Payer: COMMERCIAL

## 2020-08-25 VITALS
HEART RATE: 54 BPM | SYSTOLIC BLOOD PRESSURE: 136 MMHG | WEIGHT: 137.63 LBS | RESPIRATION RATE: 16 BRPM | BODY MASS INDEX: 21.6 KG/M2 | DIASTOLIC BLOOD PRESSURE: 64 MMHG | HEIGHT: 66.75 IN

## 2020-08-25 DIAGNOSIS — I10 ESSENTIAL HYPERTENSION: Primary | ICD-10-CM

## 2020-08-25 PROCEDURE — 3078F DIAST BP <80 MM HG: CPT | Performed by: INTERNAL MEDICINE

## 2020-08-25 PROCEDURE — G0463 HOSPITAL OUTPT CLINIC VISIT: HCPCS | Performed by: INTERNAL MEDICINE

## 2020-08-25 PROCEDURE — 99213 OFFICE O/P EST LOW 20 MIN: CPT | Performed by: INTERNAL MEDICINE

## 2020-08-25 PROCEDURE — 3008F BODY MASS INDEX DOCD: CPT | Performed by: INTERNAL MEDICINE

## 2020-08-25 PROCEDURE — 3075F SYST BP GE 130 - 139MM HG: CPT | Performed by: INTERNAL MEDICINE

## 2020-08-25 RX ORDER — VIT A/VIT C/VIT E/ZINC/COPPER 2148-113
2 TABLET ORAL DAILY
COMMUNITY

## 2020-08-25 NOTE — PATIENT INSTRUCTIONS
Continue current medication. Get a flu shot this fall as soon as you can. Continue healthy diet and regular physical activity. Medicare physical with labs in February.

## 2020-08-25 NOTE — PROGRESS NOTES
Wright Cabot is a 78year old male. Patient presents with:  HTN    HPI:   Mr. Marshell Severs presents this morning for 6-month follow-up of hypertension. He lives with his wife and continues to work as a .   He has been practicing appropriate social Diverticulosis 2007   • Glaucoma    • Herpes zoster 2008    Left face   • Hypercholesterolemia    • Hypertension August 2011   • Iron deficiency anemia September 2008   • Ischemic colitis [facility]) December 2015   • Osteoporosis January 2002    T9 compression understanding of these issues and agrees to the plan. The patient is asked to return in 6 months.     Remy Wong MD  8/25/2020  9:05 AM

## 2020-09-02 RX ORDER — ATORVASTATIN CALCIUM 20 MG/1
TABLET, FILM COATED ORAL
Qty: 90 TABLET | Refills: 1 | Status: SHIPPED | OUTPATIENT
Start: 2020-09-02 | End: 2021-02-22

## 2020-09-16 RX ORDER — FINASTERIDE 5 MG/1
TABLET, FILM COATED ORAL
Qty: 90 TABLET | Refills: 0 | OUTPATIENT
Start: 2020-09-16

## 2020-09-16 NOTE — TELEPHONE ENCOUNTER
RN denied the refill of Finasteride. Patient was last seen by Dr Juan Dunn on 5/6/2019. RN called patient. Spoke to wife. Offer appt. Wife unsure. RN encouraged to call her to have patient to call back to set up an appt with Dr Juan Dunn or LASHA and to approve his refill.

## 2020-09-16 NOTE — TELEPHONE ENCOUNTER
Per pt remembers speaking to Dr. Fadumo Julian in the spring, since appt on 5/11/20 was cancelled due to Covid-19 and was advised he did not have to come in until spring 2021. Pt unsure why needing appt.  Please advise

## 2020-09-17 NOTE — TELEPHONE ENCOUNTER
Patient states he had a phone interview with Dr. Frank Evans on May 11 (ofc visit was cancelled and Dr. Frank Evans called him) and everything was good. Patient agreed to appt 11/30 and is asking for refill of finestraside. He has been out for 4 days.  Please advise

## 2020-09-21 RX ORDER — FINASTERIDE 5 MG/1
5 TABLET, FILM COATED ORAL DAILY
Qty: 90 TABLET | Refills: 0 | Status: SHIPPED | OUTPATIENT
Start: 2020-09-21 | End: 2020-12-14

## 2020-10-20 ENCOUNTER — MED REC SCAN ONLY (OUTPATIENT)
Dept: INTERNAL MEDICINE CLINIC | Facility: CLINIC | Age: 80
End: 2020-10-20

## 2020-10-20 ENCOUNTER — IMMUNIZATION (OUTPATIENT)
Dept: INTERNAL MEDICINE CLINIC | Facility: CLINIC | Age: 80
End: 2020-10-20
Payer: COMMERCIAL

## 2020-10-20 DIAGNOSIS — Z23 NEED FOR VACCINATION: ICD-10-CM

## 2020-10-20 PROCEDURE — G0008 ADMIN INFLUENZA VIRUS VAC: HCPCS | Performed by: INTERNAL MEDICINE

## 2020-10-20 PROCEDURE — 90686 IIV4 VACC NO PRSV 0.5 ML IM: CPT | Performed by: INTERNAL MEDICINE

## 2020-10-21 ENCOUNTER — OFFICE VISIT (OUTPATIENT)
Dept: DERMATOLOGY CLINIC | Facility: CLINIC | Age: 80
End: 2020-10-21
Payer: COMMERCIAL

## 2020-10-21 DIAGNOSIS — Z85.828 HISTORY OF NONMELANOMA SKIN CANCER: ICD-10-CM

## 2020-10-21 DIAGNOSIS — D23.4 BENIGN NEOPLASM OF SCALP AND SKIN OF NECK: ICD-10-CM

## 2020-10-21 DIAGNOSIS — L82.1 SEBORRHEIC KERATOSES: Primary | ICD-10-CM

## 2020-10-21 DIAGNOSIS — D23.70 BENIGN NEOPLASM OF SKIN OF LOWER LIMB, INCLUDING HIP, UNSPECIFIED LATERALITY: ICD-10-CM

## 2020-10-21 DIAGNOSIS — D23.5 BENIGN NEOPLASM OF SKIN OF TRUNK, EXCEPT SCROTUM: ICD-10-CM

## 2020-10-21 DIAGNOSIS — D23.60 BENIGN NEOPLASM OF SKIN OF UPPER LIMB, INCLUDING SHOULDER, UNSPECIFIED LATERALITY: ICD-10-CM

## 2020-10-21 DIAGNOSIS — D23.30 BENIGN NEOPLASM OF SKIN OF FACE: ICD-10-CM

## 2020-10-21 PROCEDURE — 99213 OFFICE O/P EST LOW 20 MIN: CPT | Performed by: DERMATOLOGY

## 2020-10-21 PROCEDURE — G0463 HOSPITAL OUTPT CLINIC VISIT: HCPCS | Performed by: DERMATOLOGY

## 2020-10-21 RX ORDER — TRIAMTERENE AND HYDROCHLOROTHIAZIDE 37.5; 25 MG/1; MG/1
1 CAPSULE ORAL EVERY MORNING
Qty: 90 CAPSULE | Refills: 1 | Status: SHIPPED | OUTPATIENT
Start: 2020-10-21 | End: 2021-04-13

## 2020-10-21 RX ORDER — TRAVOPROST OPHTHALMIC SOLUTION 0.04 MG/ML
SOLUTION OPHTHALMIC
COMMUNITY
Start: 2020-10-19

## 2020-11-01 NOTE — PROGRESS NOTES
Pollo Singh is a 78year old male.   HPI:     CC:  Patient presents with:  Upper Body Exam: LOV 2/13/19 Pateint presents for an upper body check, patient states no concerns at this time, history of SK, no family history of skin cancer, no problems wot 0.0 standard drinks      Comment: Rare glass of wine    Drug use: No       Current Outpatient Medications   Medication Sig Dispense Refill   • finasteride 5 MG Oral Tab Take 1 tablet (5 mg total) by mouth daily.  90 tablet 0   • ATORVASTATIN 20 MG Oral Tab 3-16, Fosamax DC'samira   • Peptic ulcer disease 1980   • SCC (squamous cell carcinoma) January 2017    Left temple     Past Surgical History:   Procedure Laterality Date   • APPENDECTOMY  1961   • BACK SURGERY Left 12/2011    L4-L5 microdiscectomy   • CATARACT organizations: Not on file        Relationship status: Not on file      Intimate partner violence        Fear of current or ex partner: Not on file        Emotionally abused: Not on file        Physically abused: Not on file        Forced sexual activity: new information noted in current visit. Patient has been in their usual state of health. History, medications, allergies reviewed as noted. ROS:  Denies any other systemic complaints. No new or changeing lesions other than noted above.  No fevers lateral left brow. Reassurance. Multiple benign keratoses generalized no new suspicious lesions. Continue careful sun protection, regular skin checks history of SCC no recurrence. No new suspicious lesions.   Moderate sun damage continue careful s

## 2020-11-03 ENCOUNTER — OFFICE VISIT (OUTPATIENT)
Dept: PODIATRY CLINIC | Facility: CLINIC | Age: 80
End: 2020-11-03
Payer: COMMERCIAL

## 2020-11-03 DIAGNOSIS — B35.1 ONYCHOMYCOSIS: ICD-10-CM

## 2020-11-03 DIAGNOSIS — M79.675 PAIN IN TOES OF BOTH FEET: Primary | ICD-10-CM

## 2020-11-03 DIAGNOSIS — M79.674 PAIN IN TOES OF BOTH FEET: Primary | ICD-10-CM

## 2020-11-03 PROCEDURE — 11721 DEBRIDE NAIL 6 OR MORE: CPT | Performed by: PODIATRIST

## 2020-11-03 NOTE — PROGRESS NOTES
HPI:    Patient ID: Pino Dunn is a 78year old male. 77-year-old male presents with recurrent pain associated with his toenails. Last time I saw this patient was June 5 of this year. He reports relief by care.       ROS:     I did go over his med accomplished today without incident. I reduced nail, subungual debris, and some keratosis. No ulcerations or infections were noted upon debridement.   I dissipate relief and will see patient for care if and when symptoms redevelop         ASSESSMENT/PLAN:

## 2020-11-30 ENCOUNTER — OFFICE VISIT (OUTPATIENT)
Dept: SURGERY | Facility: CLINIC | Age: 80
End: 2020-11-30
Payer: COMMERCIAL

## 2020-11-30 VITALS
HEART RATE: 50 BPM | SYSTOLIC BLOOD PRESSURE: 154 MMHG | BODY MASS INDEX: 22 KG/M2 | WEIGHT: 137 LBS | DIASTOLIC BLOOD PRESSURE: 72 MMHG

## 2020-11-30 DIAGNOSIS — R35.0 BENIGN PROSTATIC HYPERPLASIA WITH URINARY FREQUENCY: Primary | ICD-10-CM

## 2020-11-30 DIAGNOSIS — K59.01 CONSTIPATION BY DELAYED COLONIC TRANSIT: ICD-10-CM

## 2020-11-30 DIAGNOSIS — N40.1 BENIGN PROSTATIC HYPERPLASIA WITH URINARY FREQUENCY: Primary | ICD-10-CM

## 2020-11-30 DIAGNOSIS — R35.1 NOCTURIA: ICD-10-CM

## 2020-11-30 PROCEDURE — 3078F DIAST BP <80 MM HG: CPT | Performed by: UROLOGY

## 2020-11-30 PROCEDURE — 3077F SYST BP >= 140 MM HG: CPT | Performed by: UROLOGY

## 2020-11-30 PROCEDURE — G0463 HOSPITAL OUTPT CLINIC VISIT: HCPCS | Performed by: UROLOGY

## 2020-11-30 PROCEDURE — 99214 OFFICE O/P EST MOD 30 MIN: CPT | Performed by: UROLOGY

## 2020-11-30 NOTE — PROGRESS NOTES
HPI:    Patient ID: Adina Wall is a 78year old male. HPI     BPH  Chronic. Patient is currently taking finasteride 5 mg daily; denies side effects. He presently denies any pelvic pain or discomfort.  He feels this is stable.      Voiding Dysfunct enlarged, no palpable nodules or indurations; Continue finasteride 5 mg daily; Visit in 1 year with UA before the visit. 05/06/2019 Office visit with me;  On JOE, prostate 2+ enlarged, no palpable nodules or indurations; Continue finasteride 5 mg daily Comment:Angioedema  Lisinopril              OTHER (SEE COMMENTS)    Comment:Angioedema    HISTORY:  Past Medical History:   Diagnosis Date   • Aortic atherosclerosis ([address]     CT scan 6-16   • BPH (benign prostatic hyperplasia) September 2008    With abnor often have you had to urinate again less than 2 hours after you finished urinating?: Less than 1 time in 5  Over the past month, how often have you found that you stopped and started again several times when you urinated?: Less than 1 time in 5  Over the p Microscopic not indicated  05/17/2017 UA blood = negative; Microscopic not indicated   04/15/2016 PSA = 1.1 x 2 = 2.2 (adjusted for finasteride); UA RBC = 1      I spent 25  minutes with patient, and greater than 50% of this time was spent discussing treat questions concerning them; patient understands all of this and decides to proceed with the following:     Treatment Plan & Patient Instructions    1.   For longstanding constipation, make sure that your Dulcolax product does not contain a \"stimulant\", imelda

## 2020-11-30 NOTE — PATIENT INSTRUCTIONS
Remy Bonner M.D.      1.  For longstanding constipation, make sure that your Dulcolax product does not contain a \"stimulant\", especially if you are using it almost every day; long-term use of \"stimulants\" might l

## 2020-12-14 RX ORDER — FINASTERIDE 5 MG/1
5 TABLET, FILM COATED ORAL DAILY
Qty: 90 TABLET | Refills: 3 | Status: SHIPPED | OUTPATIENT
Start: 2020-12-14 | End: 2021-12-06

## 2020-12-14 NOTE — TELEPHONE ENCOUNTER
Benign Prostatic Hypertrophy Medications      Protocol Criteria:  • Appointment scheduled in the past 12 months or in the next 2 months  • If patients is between the ages of 48 and 79 then PSA done within the last 2 years and is either  o Less than or [facility]

## 2021-01-22 RX ORDER — AMLODIPINE BESYLATE 5 MG/1
TABLET ORAL
Qty: 90 TABLET | Refills: 0 | Status: SHIPPED | OUTPATIENT
Start: 2021-01-22 | End: 2021-01-25

## 2021-01-25 RX ORDER — AMLODIPINE BESYLATE 5 MG/1
TABLET ORAL
Qty: 90 TABLET | Refills: 0 | Status: SHIPPED | OUTPATIENT
Start: 2021-01-25 | End: 2021-02-23 | Stop reason: DRUGHIGH

## 2021-02-22 RX ORDER — ATORVASTATIN CALCIUM 20 MG/1
20 TABLET, FILM COATED ORAL NIGHTLY
Qty: 90 TABLET | Refills: 3 | Status: SHIPPED | OUTPATIENT
Start: 2021-02-22

## 2021-02-23 ENCOUNTER — LAB ENCOUNTER (OUTPATIENT)
Dept: LAB | Facility: HOSPITAL | Age: 81
End: 2021-02-23
Attending: INTERNAL MEDICINE
Payer: MEDICARE

## 2021-02-23 ENCOUNTER — OFFICE VISIT (OUTPATIENT)
Dept: INTERNAL MEDICINE CLINIC | Facility: CLINIC | Age: 81
End: 2021-02-23
Payer: COMMERCIAL

## 2021-02-23 VITALS
SYSTOLIC BLOOD PRESSURE: 162 MMHG | DIASTOLIC BLOOD PRESSURE: 68 MMHG | BODY MASS INDEX: 21.55 KG/M2 | WEIGHT: 137.31 LBS | HEIGHT: 66.75 IN | HEART RATE: 55 BPM

## 2021-02-23 DIAGNOSIS — Z87.19 HISTORY OF ISCHEMIC COLITIS: ICD-10-CM

## 2021-02-23 DIAGNOSIS — R35.0 BENIGN PROSTATIC HYPERPLASIA WITH URINARY FREQUENCY: ICD-10-CM

## 2021-02-23 DIAGNOSIS — Z00.00 ANNUAL PHYSICAL EXAM: Primary | ICD-10-CM

## 2021-02-23 DIAGNOSIS — D50.9 IRON DEFICIENCY ANEMIA, UNSPECIFIED IRON DEFICIENCY ANEMIA TYPE: ICD-10-CM

## 2021-02-23 DIAGNOSIS — M81.0 OSTEOPOROSIS, UNSPECIFIED OSTEOPOROSIS TYPE, UNSPECIFIED PATHOLOGICAL FRACTURE PRESENCE: ICD-10-CM

## 2021-02-23 DIAGNOSIS — I10 ESSENTIAL HYPERTENSION: ICD-10-CM

## 2021-02-23 DIAGNOSIS — N40.1 BENIGN PROSTATIC HYPERPLASIA WITH URINARY FREQUENCY: ICD-10-CM

## 2021-02-23 DIAGNOSIS — Z00.00 ENCOUNTER FOR ANNUAL HEALTH EXAMINATION: ICD-10-CM

## 2021-02-23 DIAGNOSIS — Z85.828 HISTORY OF SKIN CANCER: ICD-10-CM

## 2021-02-23 DIAGNOSIS — E78.00 HYPERCHOLESTEROLEMIA: ICD-10-CM

## 2021-02-23 DIAGNOSIS — I70.0 AORTIC ATHEROSCLEROSIS (HCC): ICD-10-CM

## 2021-02-23 LAB
ALBUMIN SERPL-MCNC: 3.3 G/DL (ref 3.4–5)
ALBUMIN/GLOB SERPL: 0.9 {RATIO} (ref 1–2)
ALP LIVER SERPL-CCNC: 79 U/L
ALT SERPL-CCNC: 33 U/L
ANION GAP SERPL CALC-SCNC: 2 MMOL/L (ref 0–18)
AST SERPL-CCNC: 27 U/L (ref 15–37)
BILIRUB SERPL-MCNC: 0.5 MG/DL (ref 0.1–2)
BUN BLD-MCNC: 18 MG/DL (ref 7–18)
BUN/CREAT SERPL: 18.6 (ref 10–20)
CALCIUM BLD-MCNC: 9.2 MG/DL (ref 8.5–10.1)
CHLORIDE SERPL-SCNC: 100 MMOL/L (ref 98–112)
CHOLEST SMN-MCNC: 144 MG/DL (ref ?–200)
CO2 SERPL-SCNC: 34 MMOL/L (ref 21–32)
CREAT BLD-MCNC: 0.97 MG/DL
DEPRECATED RDW RBC AUTO: 43.1 FL (ref 35.1–46.3)
ERYTHROCYTE [DISTWIDTH] IN BLOOD BY AUTOMATED COUNT: 12.9 % (ref 11–15)
GLOBULIN PLAS-MCNC: 3.8 G/DL (ref 2.8–4.4)
GLUCOSE BLD-MCNC: 95 MG/DL (ref 70–99)
HCT VFR BLD AUTO: 38.5 %
HDLC SERPL-MCNC: 65 MG/DL (ref 40–59)
HGB BLD-MCNC: 12.8 G/DL
LDLC SERPL CALC-MCNC: 67 MG/DL (ref ?–100)
M PROTEIN MFR SERPL ELPH: 7.1 G/DL (ref 6.4–8.2)
MCH RBC QN AUTO: 30.5 PG (ref 26–34)
MCHC RBC AUTO-ENTMCNC: 33.2 G/DL (ref 31–37)
MCV RBC AUTO: 91.9 FL
NONHDLC SERPL-MCNC: 79 MG/DL (ref ?–130)
OSMOLALITY SERPL CALC.SUM OF ELEC: 284 MOSM/KG (ref 275–295)
PATIENT FASTING Y/N/NP: NO
PATIENT FASTING Y/N/NP: NO
PLATELET # BLD AUTO: 194 10(3)UL (ref 150–450)
POTASSIUM SERPL-SCNC: 3.8 MMOL/L (ref 3.5–5.1)
RBC # BLD AUTO: 4.19 X10(6)UL
SODIUM SERPL-SCNC: 136 MMOL/L (ref 136–145)
TRIGL SERPL-MCNC: 61 MG/DL (ref 30–149)
VLDLC SERPL CALC-MCNC: 12 MG/DL (ref 0–30)
WBC # BLD AUTO: 6.3 X10(3) UL (ref 4–11)

## 2021-02-23 PROCEDURE — 3008F BODY MASS INDEX DOCD: CPT | Performed by: INTERNAL MEDICINE

## 2021-02-23 PROCEDURE — G0439 PPPS, SUBSEQ VISIT: HCPCS | Performed by: INTERNAL MEDICINE

## 2021-02-23 PROCEDURE — 36415 COLL VENOUS BLD VENIPUNCTURE: CPT | Performed by: INTERNAL MEDICINE

## 2021-02-23 PROCEDURE — 96160 PT-FOCUSED HLTH RISK ASSMT: CPT | Performed by: INTERNAL MEDICINE

## 2021-02-23 PROCEDURE — 99397 PER PM REEVAL EST PAT 65+ YR: CPT | Performed by: INTERNAL MEDICINE

## 2021-02-23 PROCEDURE — 80061 LIPID PANEL: CPT | Performed by: INTERNAL MEDICINE

## 2021-02-23 PROCEDURE — 3077F SYST BP >= 140 MM HG: CPT | Performed by: INTERNAL MEDICINE

## 2021-02-23 PROCEDURE — 85027 COMPLETE CBC AUTOMATED: CPT | Performed by: INTERNAL MEDICINE

## 2021-02-23 PROCEDURE — 3078F DIAST BP <80 MM HG: CPT | Performed by: INTERNAL MEDICINE

## 2021-02-23 PROCEDURE — 80053 COMPREHEN METABOLIC PANEL: CPT | Performed by: INTERNAL MEDICINE

## 2021-02-23 RX ORDER — AMLODIPINE BESYLATE 10 MG/1
10 TABLET ORAL DAILY
Qty: 90 TABLET | Refills: 1 | Status: SHIPPED | OUTPATIENT
Start: 2021-02-23 | End: 2021-09-10

## 2021-02-23 NOTE — PATIENT INSTRUCTIONS
Await results of today's blood tests. Increase amlodipine to 10 mg daily. Continue other medications. Continue healthy diet and regular physical activity. Follow-up visit in 1 month.   Xavi Forrester's SCREENING SCHEDULE   Tests on this list are shaun than 100 cigarettes in their lifetime   • Anyone with a family history    Colorectal Cancer Screening Covered up to Age 76     Colonoscopy Screen   Covered every 10 years- more often if abnormal There are no preventive care reminders to display for this pa Factor VIII or IX concentrates   Clients of institutions for the mentally retarded   Persons who live in the same house as a HepB virus carrier   Homosexual men   Illicit injectable drug abusers     Tetanus Toxoid- Only covered with a cut with metal- TD an

## 2021-02-23 NOTE — PROGRESS NOTES
HPI:   Gonsalo Rey is an [de-identified]year old male who presents this morning for an MA (Medicare Advantage) Supervisit (Once per calendar year). His last Medicare physical was February 2020. He feels well. No specific issues for discussion.   He continues Packs/day: 0.25        Years: 5.00        Pack years: 1.25        Types: Cigarettes        Quit date: 1962        Years since quittin.0      Smokeless tobacco: Never Used         CAGE Alcohol screening   Miri Reed was screened for Alcohol eye.    •  Omega-3 Fatty Acids (FISH OIL OR), Take 1 capsule by mouth 3 (three) times daily. •  Calcium Carbonate-Vitamin D (CALCIUM 600 + D OR), Take 1 tablet by mouth 2 (two) times daily. •  Coenzyme Q10 (COQ10 OR), Take 1 capsule by mouth daily. diarrhea constipation or rectal bleeding  : No urinary frequency dysuria or hematuria  MUSCULOSKELETAL: No leg swelling  NEURO: No headaches        EXAM:   BP (!) 162/68   Pulse 55   Ht 5' 6.75\" (1.695 m)   Wt 137 lb 4.8 oz (62.3 kg)   BMI 21.67 kg/m² GENERAL: Pleasant male appearing well in no distress  BP (!) 162/68   Pulse 55   Ht 5' 6.75\" (1.695 m)   Wt 137 lb 4.8 oz (62.3 kg)   BMI 21.67 kg/m²   HEENT: Anicteric, conjunctiva pink  NECK: Supple without mass or thyromegaly  NODES: No peripheral ad hypertension  BP elevated today. Increase amlodipine as above and follow-up in 1 month. Hypercholesterolemia  On statin therapy.   Await labs    Iron deficiency anemia, unspecified iron deficiency anemia type  Last CBC normal.  Await labs    Osteoporosi Colorectal Cancer Screening      Colonoscopy Screen every 10 years There are no preventive care reminders to display for this patient.  Update Health Maintenance if applicable    Flex Sigmoidoscopy Screen every 10 years No results found for this or any pr POTASSIUM (P) (mmol/L)   Date Value   03/08/2016 4.0    No flowsheet data found. Creatinine  Annually Creatinine (mg/dL)   Date Value   02/26/2020 1.08    No flowsheet data found.     Drug Serum Conc  Annually No results found for: DIGOXIN, DIG, VALP

## 2021-03-17 ENCOUNTER — IMMUNIZATION (OUTPATIENT)
Dept: LAB | Age: 81
End: 2021-03-17
Attending: HOSPITALIST
Payer: MEDICARE

## 2021-03-17 DIAGNOSIS — Z23 NEED FOR VACCINATION: Primary | ICD-10-CM

## 2021-03-17 PROCEDURE — 0001A SARSCOV2 VAC 30MCG/0.3ML IM: CPT

## 2021-03-24 ENCOUNTER — OFFICE VISIT (OUTPATIENT)
Dept: INTERNAL MEDICINE CLINIC | Facility: CLINIC | Age: 81
End: 2021-03-24
Payer: COMMERCIAL

## 2021-03-24 VITALS
WEIGHT: 137.81 LBS | HEART RATE: 51 BPM | SYSTOLIC BLOOD PRESSURE: 132 MMHG | HEIGHT: 66.75 IN | RESPIRATION RATE: 16 BRPM | DIASTOLIC BLOOD PRESSURE: 58 MMHG | BODY MASS INDEX: 21.63 KG/M2

## 2021-03-24 DIAGNOSIS — I10 ESSENTIAL HYPERTENSION: Primary | ICD-10-CM

## 2021-03-24 PROCEDURE — 3075F SYST BP GE 130 - 139MM HG: CPT | Performed by: INTERNAL MEDICINE

## 2021-03-24 PROCEDURE — 3078F DIAST BP <80 MM HG: CPT | Performed by: INTERNAL MEDICINE

## 2021-03-24 PROCEDURE — 99213 OFFICE O/P EST LOW 20 MIN: CPT | Performed by: INTERNAL MEDICINE

## 2021-03-24 PROCEDURE — 3008F BODY MASS INDEX DOCD: CPT | Performed by: INTERNAL MEDICINE

## 2021-03-24 RX ORDER — MULTIVIT-MIN/IRON/FOLIC ACID/K 18-600-40
1 CAPSULE ORAL DAILY
COMMUNITY

## 2021-03-24 NOTE — PROGRESS NOTES
Jayashree Fletcher is a [de-identified]year old male. Patient presents with:  Hypertension    HPI:   Mr. Sharma Contreras presents this morning for follow-up of hypertension. At his physical in February, dose of amlodipine was increased.     Compliant with medications as listed Diagnosis Date   • Aortic atherosclerosis ([facility] [address]     CT scan 6-16   • BPH (benign prostatic hyperplasia) September 2008    With abnormal PSA, Rx Proscar   • Diverticulosis 2007   • Glaucoma    • Herpes zoster 2008    Left face   • Hypercholesterolemia hypertension  Well-controlled. Continue current medication. Return visit in 6 months. The patient indicates understanding of these issues and agrees to the plan. The patient is asked to return in 6 months.     Jerome Allred MD  3/24/2021  10:02 AM

## 2021-03-24 NOTE — PATIENT INSTRUCTIONS
Your blood pressure today was well controlled at 132/58. Continue your current medication and see me again in 6 months.

## 2021-04-07 ENCOUNTER — IMMUNIZATION (OUTPATIENT)
Dept: LAB | Age: 81
End: 2021-04-07
Attending: HOSPITALIST
Payer: MEDICARE

## 2021-04-07 DIAGNOSIS — Z23 NEED FOR VACCINATION: Primary | ICD-10-CM

## 2021-04-07 PROCEDURE — 0002A SARSCOV2 VAC 30MCG/0.3ML IM: CPT

## 2021-04-13 RX ORDER — TRIAMTERENE AND HYDROCHLOROTHIAZIDE 37.5; 25 MG/1; MG/1
1 CAPSULE ORAL EVERY MORNING
Qty: 90 CAPSULE | Refills: 1 | Status: SHIPPED | OUTPATIENT
Start: 2021-04-13 | End: 2021-10-13

## 2021-05-04 ENCOUNTER — OFFICE VISIT (OUTPATIENT)
Dept: PODIATRY CLINIC | Facility: CLINIC | Age: 81
End: 2021-05-04
Payer: COMMERCIAL

## 2021-05-04 DIAGNOSIS — M79.675 PAIN IN TOES OF BOTH FEET: Primary | ICD-10-CM

## 2021-05-04 DIAGNOSIS — M79.674 PAIN IN TOES OF BOTH FEET: Primary | ICD-10-CM

## 2021-05-04 DIAGNOSIS — B35.1 ONYCHOMYCOSIS: ICD-10-CM

## 2021-05-04 PROCEDURE — 11721 DEBRIDE NAIL 6 OR MORE: CPT | Performed by: PODIATRIST

## 2021-05-04 NOTE — PROGRESS NOTES
HPI:    Patient ID: Kitty Gomez is a [de-identified]year old male. 51-year-old male presents with recurrent pain associated with his toenails. The last visit with this patient was November 3 of 2020.   He reports relief by previous treatment      ROS:     I did longstanding and chronic frustration. Patient is aware of all options of treatment. Careful and complete debridement of all 10 toenails was accomplished today without incident. I reduced nail, subungual debris, and some keratosis.   Upon debridement no u

## 2021-07-02 ENCOUNTER — OFFICE VISIT (OUTPATIENT)
Dept: DERMATOLOGY CLINIC | Facility: CLINIC | Age: 81
End: 2021-07-02
Payer: COMMERCIAL

## 2021-07-02 DIAGNOSIS — D23.5 BENIGN NEOPLASM OF SKIN OF TRUNK, EXCEPT SCROTUM: ICD-10-CM

## 2021-07-02 DIAGNOSIS — L57.0 AK (ACTINIC KERATOSIS): Primary | ICD-10-CM

## 2021-07-02 DIAGNOSIS — D23.60 BENIGN NEOPLASM OF SKIN OF UPPER LIMB, INCLUDING SHOULDER, UNSPECIFIED LATERALITY: ICD-10-CM

## 2021-07-02 DIAGNOSIS — D23.4 BENIGN NEOPLASM OF SCALP AND SKIN OF NECK: ICD-10-CM

## 2021-07-02 DIAGNOSIS — L82.1 SEBORRHEIC KERATOSES: ICD-10-CM

## 2021-07-02 DIAGNOSIS — Z85.828 HISTORY OF NONMELANOMA SKIN CANCER: ICD-10-CM

## 2021-07-02 DIAGNOSIS — D23.30 BENIGN NEOPLASM OF SKIN OF FACE: ICD-10-CM

## 2021-07-02 PROCEDURE — 99213 OFFICE O/P EST LOW 20 MIN: CPT | Performed by: DERMATOLOGY

## 2021-07-02 PROCEDURE — 17003 DESTRUCT PREMALG LES 2-14: CPT | Performed by: DERMATOLOGY

## 2021-07-02 PROCEDURE — 17000 DESTRUCT PREMALG LESION: CPT | Performed by: DERMATOLOGY

## 2021-07-11 NOTE — PROGRESS NOTES
Keyla Mares is a [de-identified]year old male. HPI:     CC:  Patient presents with:  Full Skin Exam: LOV 10/21/20. Presents for regular skin check, spot on forehead for check, hx SK and SCC?         Allergies:  Clindamycin and Lisinopril    HISTORY:    Past Med use: No       Current Outpatient Medications   Medication Sig Dispense Refill   • Triamterene-HCTZ 37.5-25 MG Oral Cap Take 1 capsule by mouth every morning. 90 capsule 1   • Ascorbic Acid (VITAMIN C) 500 MG Oral Cap Take 1 tablet by mouth daily.      • amL -0.7 hip and -2.2 lumbar spine 3-16, Fosamax DC'samira   • Peptic ulcer disease 1980   • SCC (squamous cell carcinoma) January 2017    Left temple     Past Surgical History:   Procedure Laterality Date   • APPENDECTOMY  1961   • BACK SURGERY Left 12/2011    L4- Special Diet: Not Asked        Back Care: Not Asked        Exercise: Not Asked        Bike Helmet: Not Asked        Seat Belt: Not Asked        Self-Exams: Not Asked    Social History Narrative      Not on file    Social Determinants of Health  Financial and new information noted in current visit. Patient has been in their usual state of health. History, medications, allergies reviewed as noted. ROS:  Denies any other systemic complaints. No new or changeing lesions other than noted above.  No fe prominent cutaneous horn at left temple if not resolved the next couple of weeks we will plan shave biopsy  Actinic keratoses. Precancerous nature discussed. Lesions treated with cryo- . Biopsy if not resolved.   x4    Forehead stable  Multiple benign ke these issues and agrees to the plan. The patient is asked to return as noted in follow-up/ above. This note was generated using Dragon voice recognition software. Please contact me regarding any confusion resulting from errors in recognition.

## 2021-09-10 RX ORDER — AMLODIPINE BESYLATE 10 MG/1
TABLET ORAL
Qty: 90 TABLET | Refills: 0 | Status: SHIPPED | OUTPATIENT
Start: 2021-09-10 | End: 2021-09-14

## 2021-09-14 RX ORDER — AMLODIPINE BESYLATE 10 MG/1
TABLET ORAL
Qty: 90 TABLET | Refills: 0 | Status: SHIPPED | OUTPATIENT
Start: 2021-09-14 | End: 2021-12-04

## 2021-09-24 ENCOUNTER — OFFICE VISIT (OUTPATIENT)
Dept: INTERNAL MEDICINE CLINIC | Facility: CLINIC | Age: 81
End: 2021-09-24
Payer: COMMERCIAL

## 2021-09-24 VITALS
HEART RATE: 60 BPM | RESPIRATION RATE: 16 BRPM | HEIGHT: 66.75 IN | DIASTOLIC BLOOD PRESSURE: 58 MMHG | BODY MASS INDEX: 21.66 KG/M2 | WEIGHT: 138 LBS | SYSTOLIC BLOOD PRESSURE: 132 MMHG

## 2021-09-24 DIAGNOSIS — I10 ESSENTIAL HYPERTENSION: Primary | ICD-10-CM

## 2021-09-24 PROCEDURE — G0009 ADMIN PNEUMOCOCCAL VACCINE: HCPCS | Performed by: INTERNAL MEDICINE

## 2021-09-24 PROCEDURE — G0008 ADMIN INFLUENZA VIRUS VAC: HCPCS | Performed by: INTERNAL MEDICINE

## 2021-09-24 PROCEDURE — 90662 IIV NO PRSV INCREASED AG IM: CPT | Performed by: INTERNAL MEDICINE

## 2021-09-24 PROCEDURE — 3075F SYST BP GE 130 - 139MM HG: CPT | Performed by: INTERNAL MEDICINE

## 2021-09-24 PROCEDURE — 90732 PPSV23 VACC 2 YRS+ SUBQ/IM: CPT | Performed by: INTERNAL MEDICINE

## 2021-09-24 PROCEDURE — 3008F BODY MASS INDEX DOCD: CPT | Performed by: INTERNAL MEDICINE

## 2021-09-24 PROCEDURE — 3078F DIAST BP <80 MM HG: CPT | Performed by: INTERNAL MEDICINE

## 2021-09-24 PROCEDURE — 99213 OFFICE O/P EST LOW 20 MIN: CPT | Performed by: INTERNAL MEDICINE

## 2021-09-24 NOTE — PATIENT INSTRUCTIONS
Your blood pressure today was excellent at 132/58. You received a high-dose flu shot and Pneumovax today. You should get 2 doses of Shingrix vaccine at your pharmacy. Continue current medications. Please schedule a Medicare physical in February.

## 2021-09-24 NOTE — PROGRESS NOTES
Jordan Mar is a [de-identified]year old male. Patient presents with: Follow - Up    HPI:   Lucas Hayes presents this morning for 6-month follow-up of hypertension. He continues to work periodically, painting apartments.   BP checks consistently 110-130s/50-60s, wit • Aortic atherosclerosis (HCC)     CT scan 6-16   • BPH (benign prostatic hyperplasia) September 2008    With abnormal PSA, Rx Proscar   • Diverticulosis 2007   • Glaucoma    • Herpes zoster 2008    Left face   • Hypercholesterolemia    • Hypertension Au influenza vaccine today. Discussed and recommended 2 doses of Shingrix at pharmacy. Continue current medications. Medicare physical with labs in February. The patient indicates understanding of these issues and agrees to the plan.   The patient is a

## 2021-10-01 ENCOUNTER — OFFICE VISIT (OUTPATIENT)
Dept: PODIATRY CLINIC | Facility: CLINIC | Age: 81
End: 2021-10-01
Payer: COMMERCIAL

## 2021-10-01 VITALS — BODY MASS INDEX: 22.18 KG/M2 | WEIGHT: 138 LBS | HEIGHT: 66 IN

## 2021-10-01 DIAGNOSIS — M79.675 PAIN IN TOES OF BOTH FEET: Primary | ICD-10-CM

## 2021-10-01 DIAGNOSIS — B35.1 ONYCHOMYCOSIS: ICD-10-CM

## 2021-10-01 DIAGNOSIS — M79.674 PAIN IN TOES OF BOTH FEET: Primary | ICD-10-CM

## 2021-10-01 PROCEDURE — 3008F BODY MASS INDEX DOCD: CPT | Performed by: PODIATRIST

## 2021-10-01 PROCEDURE — 11721 DEBRIDE NAIL 6 OR MORE: CPT | Performed by: PODIATRIST

## 2021-10-01 NOTE — PROGRESS NOTES
HPI:    Patient ID: Keyla Mares is a [de-identified]year old male. 26-year-old male presents for care associated with his painful fungus toenails. Last time I saw him was May 4. He reports relief by previous care.       ROS:     There are no apparent changes i only option of care is periodic debridement. Careful and complete debridement of all 10 nails was accomplished today without incident. I reduced nail, subungual debris, and some keratosis. No ulcerations or infections were noted.   I dissipate relief by

## 2021-10-13 RX ORDER — TRIAMTERENE AND HYDROCHLOROTHIAZIDE 37.5; 25 MG/1; MG/1
1 CAPSULE ORAL EVERY MORNING
Qty: 90 CAPSULE | Refills: 1 | Status: SHIPPED | OUTPATIENT
Start: 2021-10-13 | End: 2021-12-04

## 2021-10-30 ENCOUNTER — LAB ENCOUNTER (OUTPATIENT)
Dept: LAB | Facility: HOSPITAL | Age: 81
End: 2021-10-30
Attending: INTERNAL MEDICINE
Payer: MEDICARE

## 2021-10-30 DIAGNOSIS — R35.1 NOCTURIA: ICD-10-CM

## 2021-10-30 PROCEDURE — 81001 URINALYSIS AUTO W/SCOPE: CPT

## 2021-12-02 ENCOUNTER — HOSPITAL ENCOUNTER (OUTPATIENT)
Facility: HOSPITAL | Age: 81
Setting detail: OBSERVATION
Discharge: HOME | End: 2021-12-04
Attending: EMERGENCY MEDICINE | Admitting: HOSPITALIST
Payer: MEDICARE

## 2021-12-02 ENCOUNTER — APPOINTMENT (OUTPATIENT)
Dept: CT IMAGING | Facility: HOSPITAL | Age: 81
End: 2021-12-02
Attending: EMERGENCY MEDICINE
Payer: MEDICARE

## 2021-12-02 DIAGNOSIS — K55.9 ISCHEMIC COLITIS (HCC): Primary | ICD-10-CM

## 2021-12-02 PROBLEM — R73.9 HYPERGLYCEMIA: Status: ACTIVE | Noted: 2021-12-02

## 2021-12-02 PROCEDURE — 99223 1ST HOSP IP/OBS HIGH 75: CPT | Performed by: HOSPITALIST

## 2021-12-02 PROCEDURE — 74177 CT ABD & PELVIS W/CONTRAST: CPT | Performed by: EMERGENCY MEDICINE

## 2021-12-02 RX ORDER — MORPHINE SULFATE 4 MG/ML
4 INJECTION, SOLUTION INTRAMUSCULAR; INTRAVENOUS ONCE
Status: COMPLETED | OUTPATIENT
Start: 2021-12-02 | End: 2021-12-02

## 2021-12-02 RX ORDER — MORPHINE SULFATE 2 MG/ML
2 INJECTION, SOLUTION INTRAMUSCULAR; INTRAVENOUS ONCE
Status: COMPLETED | OUTPATIENT
Start: 2021-12-02 | End: 2021-12-02

## 2021-12-02 RX ORDER — SODIUM CHLORIDE 9 MG/ML
1000 INJECTION, SOLUTION INTRAVENOUS ONCE
Status: COMPLETED | OUTPATIENT
Start: 2021-12-02 | End: 2021-12-02

## 2021-12-02 RX ORDER — MORPHINE SULFATE 2 MG/ML
INJECTION, SOLUTION INTRAMUSCULAR; INTRAVENOUS
Status: COMPLETED
Start: 2021-12-02 | End: 2021-12-02

## 2021-12-02 RX ORDER — MORPHINE SULFATE 4 MG/ML
4 INJECTION, SOLUTION INTRAMUSCULAR; INTRAVENOUS ONCE
Status: DISCONTINUED | OUTPATIENT
Start: 2021-12-02 | End: 2021-12-02

## 2021-12-02 RX ORDER — ONDANSETRON 2 MG/ML
4 INJECTION INTRAMUSCULAR; INTRAVENOUS EVERY 6 HOURS PRN
Status: DISCONTINUED | OUTPATIENT
Start: 2021-12-02 | End: 2021-12-04

## 2021-12-02 RX ORDER — ACETAMINOPHEN 325 MG/1
650 TABLET ORAL EVERY 6 HOURS PRN
Status: DISCONTINUED | OUTPATIENT
Start: 2021-12-02 | End: 2021-12-04

## 2021-12-02 RX ORDER — MORPHINE SULFATE 4 MG/ML
4 INJECTION, SOLUTION INTRAMUSCULAR; INTRAVENOUS EVERY 2 HOUR PRN
Status: DISCONTINUED | OUTPATIENT
Start: 2021-12-02 | End: 2021-12-04

## 2021-12-02 RX ORDER — MORPHINE SULFATE 4 MG/ML
INJECTION, SOLUTION INTRAMUSCULAR; INTRAVENOUS
Status: COMPLETED
Start: 2021-12-02 | End: 2021-12-02

## 2021-12-02 RX ORDER — TEMAZEPAM 15 MG/1
15 CAPSULE ORAL NIGHTLY PRN
Status: DISCONTINUED | OUTPATIENT
Start: 2021-12-02 | End: 2021-12-04

## 2021-12-02 RX ORDER — MORPHINE SULFATE 2 MG/ML
2 INJECTION, SOLUTION INTRAMUSCULAR; INTRAVENOUS EVERY 2 HOUR PRN
Status: DISCONTINUED | OUTPATIENT
Start: 2021-12-02 | End: 2021-12-04

## 2021-12-02 RX ORDER — DEXTROSE, SODIUM CHLORIDE, AND POTASSIUM CHLORIDE 5; .45; .15 G/100ML; G/100ML; G/100ML
INJECTION INTRAVENOUS CONTINUOUS
Status: DISCONTINUED | OUTPATIENT
Start: 2021-12-02 | End: 2021-12-04

## 2021-12-02 RX ORDER — METOCLOPRAMIDE HYDROCHLORIDE 5 MG/ML
10 INJECTION INTRAMUSCULAR; INTRAVENOUS EVERY 8 HOURS PRN
Status: DISCONTINUED | OUTPATIENT
Start: 2021-12-02 | End: 2021-12-04

## 2021-12-02 RX ORDER — MORPHINE SULFATE 2 MG/ML
1 INJECTION, SOLUTION INTRAMUSCULAR; INTRAVENOUS EVERY 2 HOUR PRN
Status: DISCONTINUED | OUTPATIENT
Start: 2021-12-02 | End: 2021-12-04

## 2021-12-02 NOTE — ED PROVIDER NOTES
Patient Seen in: [facility] AND [facility] Emergency Department      History   Patient presents with:  Abdomen/Flank Pain    Stated Complaint: lla abd pain     Subjective:   HPI    Patient presents the emergency department complaining of left lower quadrant abdo 1.25        Types: Cigarettes        Quit date: 1962        Years since quittin.8      Smokeless tobacco: Never Used    Vaping Use      Vaping Use: Never used    Alcohol use:  Yes      Alcohol/week: 0.0 standard drinks      Comment: Rare glass of Glucose 115 (*)     BUN 19 (*)     All other components within normal limits   URINALYSIS WITH CULTURE REFLEX - Abnormal; Notable for the following components:    Ketones Urine Trace (*)     Urobilinogen Urine 2.0 (*)     All other components within normal Radiology exams  Viewed and reviewed by myself and findings discussed with patient including need for follow up    Admission disposition: 12/2/2021  6:07 PM         Differential diagnosis includes diverticulitis, perforated colon, ischemic colitis

## 2021-12-03 PROCEDURE — 99233 SBSQ HOSP IP/OBS HIGH 50: CPT | Performed by: HOSPITALIST

## 2021-12-03 PROCEDURE — 99223 1ST HOSP IP/OBS HIGH 75: CPT | Performed by: INTERNAL MEDICINE

## 2021-12-03 RX ORDER — VANCOMYCIN HYDROCHLORIDE 125 MG/1
125 CAPSULE ORAL DAILY
Status: DISCONTINUED | OUTPATIENT
Start: 2021-12-03 | End: 2021-12-04

## 2021-12-03 NOTE — CONSULTS
[facility] [address]   Gastroenterology Consultation Note    Gopi Ball  Patient Status:    Inpatient  Date of Admission:         12/2/2021, Hospital day #1  Attending:   Kylie Deleon MD  PCP:     Danyelle Duncan MD    Reason for Consultat SURGERY Left 12/2011    L4-L5 microdiscectomy   • CATARACT Left 10/2010   • CATARACT Right 12/2010   • COLONOSCOPY  1/2016   • INGUINAL HERNIA REPAIR Bilateral 1970   • OTHER  January 2017    Excision SCCa left temple   • SKIN SURGERY Right 10/28/2016    E MG Oral Tab, Take 1 tablet (20 mg total) by mouth nightly., Disp: 90 tablet, Rfl: 3, 12/2/2021 at Unknown time  FINASTERIDE 5 MG Oral Tab, Take 1 tablet (5 mg total) by mouth daily. , Disp: 90 tablet, Rfl: 3, 12/2/2021 at Unknown time  Travoprost, JAYLEEN Free, 99.7 °F (37.6 °C), temperature source Oral, resp. rate 16, height 5' 7\" (1.702 m), weight 133 lb 9.6 oz (60.6 kg), SpO2 93 %. Body mass index is 20.92 kg/m².     General: awake, alert and oriented, no acute distress  HEENT: moist mucus membranes  PULM: no CT abdomen pelvis on admission with findings of diffuse enterocolitis worsened in the disease as well as severe atherosclerotic calcifications. C. difficile and GI stool PCR negative. Benign abdominal exam this afternoon.   The diffuse nature of colitis

## 2021-12-03 NOTE — PROGRESS NOTES
[facility]    Progress Note    Jordan Laangel Patient Status:  Inpatient    1940 MRN [id number]   Location [facility] 5SW/SE Attending Benita Carrizales MD   Hosp Day # 1 PCP Claudio Saldivar MD       Subjective:   Xavi Pineda involving the descending colon. 2. Over distended urinary bladder with moderate prostatomegaly. Correlate for partial bladder outlet obstruction. 3. Moderate left convex scoliosis of the lumbar spine. Postsurgical changes of the lower lumbar spine.   4.

## 2021-12-03 NOTE — PLAN OF CARE
Patient from home with wife, LLQ abd pain since yesterday. Received morphine in emergency, pain 2/10 on arrival to unit. Started on Zosyn. Afebrile, vitals stable. Safety precautions maintained, frequent rounding done. Continue to monitor.     Problem: Ester assistance with activity based on assessment  - Modify environment to reduce risk of injury  - Provide assistive devices as appropriate  - Consider OT/PT consult to assist with strengthening/mobility  - Encourage toileting schedule  Outcome: Progressing

## 2021-12-03 NOTE — H&P
[facility]    PATIENT'S NAME: Dae Dora   ATTENDING PHYSICIAN: Linda Harden MD   PATIENT ACCOUNT#:   [id number]    LOCATION:  [address] [place] [address] Dr RECORD #:   [id number]       YOB: 1940  ADMISSION DATE:       12/02/2 SYSTEMS:  Patient reports that pain started intensely and suddenly around afternoon today after he ate an apple, earlier he ate pancakes. Patient said the pain is crampy, intense, left lower quadrant.   He has been having constipation on and off for the la

## 2021-12-03 NOTE — ED QUICK NOTES
PATIENT AOX3 TO ED VIA EMS. PATIENT CO OF LLQ ABD PAIN X 2 HOURS PTA. PATIENT HX OF ISCHEMIC COLITIS. DENIES N/V DENIES FEVER/DIARRHEA. PAIN 8/10. PATIENT CHANGED INTO GOWN ON NIBP AND SPO2 MONITORS.  SIDE RAILS X2 CALL LIGHT WITHIN REACH

## 2021-12-03 NOTE — PAYOR COMM NOTE
--------------  ADMISSION REVIEW     Payor: Joni Mccauley [place] #:  [id number]  Authorization Number: [id number]    Admit date: 12/2/21  Admit time:  9:34 PM       REVIEW DOCUMENTATION:  Admit to inpatient Once (Order #[id number]) on 12/2/ History:   Procedure Laterality Date   • APPENDECTOMY  1961   • BACK SURGERY Left 12/2011    L4-L5 microdiscectomy   • CATARACT Left 10/2010   • CATARACT Right 12/2010   • COLONOSCOPY  1/2016   • INGUINAL HERNIA REPAIR Bilateral 1970   • OTHER  January 201 General: Skin is warm and dry. Findings: No rash. Neurological:      Mental Status: He is alert and oriented to person, place, and time.                ED Course     Labs Reviewed   BASIC METABOLIC PANEL (8) - Abnormal; Notable for the following co abdominal aorta and major branches. 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 5. Additional incidental findings as above.      Dictated by (CST): Mandy Rockwell MD on 12/02/2021 at 5:42 PM     Finalized by (CST): Sharmin Physician    Filed: 12/2/2021 10:21 PM Status: [id number] Transcription    : Luanne Azul MD (Physician)         [facility]    PATIENT'S NAME: Paige Pardeep   ATTENDING PHYSICIAN: Laxmi Knott MD   PATIENT ACCOUNT#:   [id number]    LO No tobacco, alcohol, or drug use. Lives with his wife. Independent in his basic activities of daily living.     REVIEW OF SYSTEMS:  Patient reports that pain started intensely and suddenly around afternoon today after he ate an apple, earlier he ate panca 80 mL Intravenous (Right Antecubital) Lizzy Sung      dextrose 5 % and 0.45 % NaCl with KCl 20 mEq infusion     Date Action Dose Route User    12/2/2021 5904 New Bag (none) Intravenous Gilbert Ronquillo, RN      morphINE sulfate (PF) 2 MG/ML injection 2 mg NEXT REVIEW DATE

## 2021-12-03 NOTE — PLAN OF CARE
Pt remains NPO. No c/o of pain, no PRNs given. Stool collected for C.diff and GI stool panel. [facility] called and meds refilled recently were charted as \"taken yesterday\" in the med rec; Dr. Yaritza Pulido made aware. No new orders.        Problem: Patient C unsuccessful or patient reports new pain  - Anticipate increased pain with activity and pre-medicate as appropriate  Outcome: Progressing     Problem: SAFETY ADULT - FALL  Goal: Free from fall injury  Description: INTERVENTIONS:  - Assess pt frequently for redness and/or skin breakdown  - Initiate interventions, skin care algorithm/standards of care as needed  Outcome: Progressing

## 2021-12-03 NOTE — ED QUICK NOTES
Orders for admission, patient is aware of plan and ready to go upstairs. Any questions, please call ED ROSALINE tellez  at extension [phone].    Patient presents with:  Abdomen/Flank Pain      Patient AO x 3  Ambulation: ambulatory   Belongings: accompanying patient

## 2021-12-04 VITALS
DIASTOLIC BLOOD PRESSURE: 54 MMHG | TEMPERATURE: 99 F | BODY MASS INDEX: 20.97 KG/M2 | RESPIRATION RATE: 16 BRPM | OXYGEN SATURATION: 94 % | HEIGHT: 67 IN | SYSTOLIC BLOOD PRESSURE: 122 MMHG | WEIGHT: 133.63 LBS | HEART RATE: 58 BPM

## 2021-12-04 PROCEDURE — 99232 SBSQ HOSP IP/OBS MODERATE 35: CPT | Performed by: INTERNAL MEDICINE

## 2021-12-04 PROCEDURE — 99239 HOSP IP/OBS DSCHRG MGMT >30: CPT | Performed by: HOSPITALIST

## 2021-12-04 RX ORDER — AMLODIPINE BESYLATE 5 MG/1
5 TABLET ORAL DAILY
Qty: 30 TABLET | Refills: 0 | Status: SHIPPED | OUTPATIENT
Start: 2021-12-05

## 2021-12-04 RX ORDER — AMLODIPINE BESYLATE 5 MG/1
5 TABLET ORAL DAILY
Status: DISCONTINUED | OUTPATIENT
Start: 2021-12-04 | End: 2021-12-04

## 2021-12-04 RX ORDER — AMOXICILLIN AND CLAVULANATE POTASSIUM 875; 125 MG/1; MG/1
1 TABLET, FILM COATED ORAL 2 TIMES DAILY
Qty: 8 TABLET | Refills: 0 | Status: SHIPPED | OUTPATIENT
Start: 2021-12-04 | End: 2021-12-08

## 2021-12-04 NOTE — PLAN OF CARE
Problem: Patient/Family Goals  Goal: Patient/Family Short Term Goal  Description: Patient's Short Term Goal: to go home    Interventions:   - IV fluids  - NPO  - IV antibiotics    - See additional Care Plan goals for specific interventions  Outcome: Prog medications  - Provide nonpharmacologic comfort measures as appropriate  - Advance diet as tolerated, if ordered  - Obtain nutritional consult as needed  - Evaluate fluid balance  Outcome: Progressing  Goal: Maintains or returns to baseline bowel function

## 2021-12-04 NOTE — DISCHARGE SUMMARY
[facility]    Discharge Summary    Reji Cornea Patient Status:  Inpatient    1940 MRN [id number]   Location [facility] 5SW/SE Attending Leopold Mcalpine, MD   Hosp Day # 2 PCP Antwan Cesar MD     Date of Admission: movement since. He came into the emergency department for evaluation. CBC and chemistry are unremarkable. CT scan of the abdomen showed diffuse enterocolitis worse involving descending colon.   Patient was started on IV fluids, pain medications, and he w capsule by mouth daily. Refills: 0     finasteride 5 MG Tabs  Commonly known as: PROSCAR      Take 1 tablet (5 mg total) by mouth daily. Quantity: 90 tablet  Refills: 3     FISH OIL OR      Take 1 capsule by mouth 3 (three) times daily.    Refills: 0

## 2021-12-04 NOTE — PLAN OF CARE
Problem: Patient Centered Care  Goal: Patient preferences are identified and integrated in the patient's plan of care  Description: Interventions:  - What would you like us to know as we care for you?  I live with my wife at home  - Provide timely, comple frequently for physical needs  - Identify cognitive and physical deficits and behaviors that affect risk of falls.   - [place] fall precautions as indicated by assessment.  - Educate pt/family on patient safety including physical limitations  - Instruct p

## 2021-12-04 NOTE — PROGRESS NOTES
[facility]     Gastroenterology Progress Note    Awais Mohair Patient Status:  Inpatient    1940 MRN [id number]   Location [facility] 5SW/SE Attending Aramis Anderson MD   Hosp Day # 2 PCP Pasty Cooks, MD His white count did go up to 15.7 from 5.5. C diff and stool panel negative        Recommend:  - continue supportive care including IVF, abx  - slow advance to low residue diet today as tolerated  - if tolerates with out pain or bloody stool ok to discharg at 17:05 by Jack Siddiqi

## 2021-12-05 ENCOUNTER — TELEPHONE (OUTPATIENT)
Dept: GASTROENTEROLOGY | Facility: CLINIC | Age: 81
End: 2021-12-05

## 2021-12-05 NOTE — TELEPHONE ENCOUNTER
Patient admitted for likely ischemic colitis  Needs follow-up in gi clinic upon discharge to consider interval colonoscopy in 8 weeks.

## 2021-12-05 NOTE — PLAN OF CARE
Discharge instructions given to patient at bedside, scripts for amlodipine and augmentin sent to [facility] pharmacy per AVS. IV discontinued. Patient aware of doing follow up  in 1 week with PCP, and check BP daily. VSS at time of discharge.     Problem: Patient anxiety  - Utilize distraction and/or relaxation techniques  - Monitor for opioid side effects  - Notify MD/LIP if interventions unsuccessful or patient reports new pain  - Anticipate increased pain with activity and pre-medicate as appropriate  12/4/2021 Encourage mobilization and activity  - Obtain nutritional consult as needed  - Establish a toileting routine/schedule  - Consider collaborating with pharmacy to review patient's medication profile  12/4/2021 1813 by Heather Valderrama RN  Outcome: Adequate f

## 2021-12-06 ENCOUNTER — PATIENT OUTREACH (OUTPATIENT)
Dept: CASE MANAGEMENT | Age: 81
End: 2021-12-06

## 2021-12-06 ENCOUNTER — OFFICE VISIT (OUTPATIENT)
Dept: SURGERY | Facility: CLINIC | Age: 81
End: 2021-12-06
Payer: COMMERCIAL

## 2021-12-06 DIAGNOSIS — K59.01 CONSTIPATION BY DELAYED COLONIC TRANSIT: ICD-10-CM

## 2021-12-06 DIAGNOSIS — Z02.9 ENCOUNTERS FOR UNSPECIFIED ADMINISTRATIVE PURPOSE: ICD-10-CM

## 2021-12-06 DIAGNOSIS — N40.1 BENIGN PROSTATIC HYPERPLASIA WITH URINARY FREQUENCY: Primary | ICD-10-CM

## 2021-12-06 DIAGNOSIS — R35.0 BENIGN PROSTATIC HYPERPLASIA WITH URINARY FREQUENCY: Primary | ICD-10-CM

## 2021-12-06 DIAGNOSIS — R35.1 NOCTURIA: ICD-10-CM

## 2021-12-06 DIAGNOSIS — K52.9 COLITIS: ICD-10-CM

## 2021-12-06 PROCEDURE — 1111F DSCHRG MED/CURRENT MED MERGE: CPT

## 2021-12-06 PROCEDURE — 1111F DSCHRG MED/CURRENT MED MERGE: CPT | Performed by: UROLOGY

## 2021-12-06 PROCEDURE — 99214 OFFICE O/P EST MOD 30 MIN: CPT | Performed by: UROLOGY

## 2021-12-06 RX ORDER — NETARSUDIL 0.2 MG/ML
SOLUTION/ DROPS OPHTHALMIC; TOPICAL
COMMUNITY
Start: 2021-10-01 | End: 2021-12-10

## 2021-12-06 RX ORDER — FINASTERIDE 5 MG/1
5 TABLET, FILM COATED ORAL DAILY
Qty: 90 TABLET | Refills: 3 | Status: SHIPPED | OUTPATIENT
Start: 2021-12-06

## 2021-12-06 NOTE — PROGRESS NOTES
HPI:    Patient ID: Gopi Ball is a [de-identified]year old male. HPI       BPH  Chronic. Patient is currently taking finasteride 5 mg daily; denies side effects. He presently denies any pelvic pain or discomfort. He feels this is stable.      Voiding Dysfunc Office visit with me;  On JOE, prostate 2+ enlarged, no palpable nodules or indurations; Continue finasteride 5 mg daily   11/30/2020 Office visit with me;  On JOE, prostate 2+ enlarged, no palpable nodules or indurations.; Continue Finasteride 5 mg; for lo OR) Take 1 capsule by mouth daily. • Probiotic Product (PROBIOTIC OR) Take 1 capsule by mouth daily. • Boswellia Judah (BOSWELLIA OR) Take 2 tablets by mouth 3 (three) times daily.      • Cholecalciferol (VITAMIN D3) 1000 UNITS Oral Cap Take  by m Cigarettes        Quit date: 1962        Years since quittin.8      Smokeless tobacco: Never Used    Vaping Use      Vaping Use: Never used    Alcohol use:  Yes      Alcohol/week: 0.0 standard drinks      Comment: Rare glass of wine    Drug use: N moderate degnerative change of the hip joints; over distended urinary bladder with moderate prostatomegaly         I spent 31  minutes with patient and on this case today, in reviewing chart and labs before entering the room, taking patient's  history, kendall advise and patient agrees to consider starting MiraLax       I explained to patient the risks, side effects, and alternatives, and I answered questions concerning them; I also explained to patient that I am retiring in 9 months and I explained transitionin

## 2021-12-06 NOTE — PROGRESS NOTES
Initial Post Discharge Follow Up   Discharge Date: 12/4/21  Contact Date: 12/6/2021    Consent Verification:  Assessment Completed With: Patient  HIPAA Verified?   Yes    Discharge Dx:     Ischemic colitis     Was TCC ordered: no      Did Pt Receive Thei MG Oral Tab Take 1 tablet by mouth 2 (two) times daily for 4 days. 8 tablet 0   • Ascorbic Acid (VITAMIN C) 500 MG Oral Cap Take 1 tablet by mouth daily. • atorvastatin 20 MG Oral Tab Take 1 tablet (20 mg total) by mouth nightly.  90 tablet 3   • Travop hard              Needs post D/C:   Now that you are home, are there any needs or concerns you need addressed before your next visit with your PCP?  (DME, meds, disease concerns, Etc): No     Follow up appointments:      Your appointments     Date & Time A plans to check twice daily until his appointment on Friday. Patient denies any dizziness, SOB, chest pain, headache, etc.  Reviewed with patient the importance of taking the antibiotic as prescribed and completely the full course.   Patient aware when to c

## 2021-12-06 NOTE — PATIENT INSTRUCTIONS
Dustin Rangel M.D.        1. Please take   MiraLAX or generic equivalent, 17 g 1 capful once daily or twice daily, once again, not at bedtime.     2. Continue Finasteride 5 mg daily     3.   Visit in 1 year with nurse

## 2021-12-06 NOTE — PAYOR COMM NOTE
--------------  DISCHARGE REVIEW    Payor: [facility] Rigo Mccauley [place] #:  [id number]  Authorization Number: [id number]    Admit date: 12/2/21  Admit time:   9:34 PM  Discharge Date: 12/4/2021  6:25 PM     Admitting Physician: Sanchez Payne MD masses,  no organomegaly  Extremities: extremities normal, atraumatic, no cyanosis or edema  Psychiatric: calm        History of Present Illness:   Per Dr. Anna Palmer:  Patient is an 31-year-old  male who was on a trip to on: December 5, 2021  What changed:   · medication strength  · how much to take      Take 1 tablet (5 mg total) by mouth daily.    Quantity: 30 tablet  Refills: 0        CONTINUE taking these medications      Instructions Prescription details   atorvastatin MD  12/4/2021  3:54 PM    > 35 min       Electronically signed by Wendy Leahy MD on 12/4/2021  3:59 PM         REVIEWER COMMENTS

## 2021-12-06 NOTE — TELEPHONE ENCOUNTER
Noted and appreciated. I called back and spoke to Asmita. He is agreeable to the offered date/time.  He is scheduled as seen below:       Friday January 07, 2022  3:30 PM  Follow Up Visit with Abdon Najera MD  [facility], [facility], [address] [place] [address]

## 2021-12-10 ENCOUNTER — OFFICE VISIT (OUTPATIENT)
Dept: INTERNAL MEDICINE CLINIC | Facility: CLINIC | Age: 81
End: 2021-12-10
Payer: COMMERCIAL

## 2021-12-10 VITALS
DIASTOLIC BLOOD PRESSURE: 62 MMHG | SYSTOLIC BLOOD PRESSURE: 128 MMHG | HEIGHT: 67 IN | WEIGHT: 134.81 LBS | BODY MASS INDEX: 21.16 KG/M2 | HEART RATE: 54 BPM

## 2021-12-10 DIAGNOSIS — I10 ESSENTIAL HYPERTENSION: ICD-10-CM

## 2021-12-10 DIAGNOSIS — K55.9 ISCHEMIC COLITIS (HCC): Primary | ICD-10-CM

## 2021-12-10 PROCEDURE — 3008F BODY MASS INDEX DOCD: CPT | Performed by: INTERNAL MEDICINE

## 2021-12-10 PROCEDURE — 3078F DIAST BP <80 MM HG: CPT | Performed by: INTERNAL MEDICINE

## 2021-12-10 PROCEDURE — 99495 TRANSJ CARE MGMT MOD F2F 14D: CPT | Performed by: INTERNAL MEDICINE

## 2021-12-10 PROCEDURE — 3074F SYST BP LT 130 MM HG: CPT | Performed by: INTERNAL MEDICINE

## 2021-12-10 NOTE — PROGRESS NOTES
HPI:    Reji Portillo is an [de-identified]year old male here today for hospital follow up.    He was discharged from Inpatient hospital, [facility] AND [facility]  to Home   Admission Date: 12/2/21   Discharge Date: 12/4/21  Hospital Discharge Diagnoses (since 11/10/2021) revealed findings of diffuse enterocolitis especially involving the descending colon. EKG revealed sinus rhythm, rate 52, normal.  He was diagnosed with likely ischemic colitis. While hospitalized he was seen by GI.   He was treated with IV fluids and Zos Carbonate-Vitamin D (CALCIUM 600 + D OR), Take 1 tablet by mouth 2 (two) times daily. Coenzyme Q10 (COQ10 OR), Take 1 capsule by mouth daily. Probiotic Product (PROBIOTIC OR), Take 1 capsule by mouth daily.   Boswellia Judah (BOSWELLIA OR), Take 2 table appearing well in no distress  /62   Pulse 54   Ht 5' 7\" (1.702 m)   Wt 134 lb 12.8 oz (61.1 kg)   BMI 21.11 kg/m²   HEENT: Anicteric, conjunctiva pink  NECK: Supple without mass or lymphadenopathy  LUNGS: Resonant to percussion and clear to auscult

## 2021-12-10 NOTE — PATIENT INSTRUCTIONS
Please continue current medications. Remain off triamterene/HCTZ. Keep your scheduled appointment with GI in January. Medicare physical in March.

## 2022-01-07 ENCOUNTER — OFFICE VISIT (OUTPATIENT)
Dept: GASTROENTEROLOGY | Facility: CLINIC | Age: 82
End: 2022-01-07
Payer: COMMERCIAL

## 2022-01-07 VITALS
HEART RATE: 68 BPM | SYSTOLIC BLOOD PRESSURE: 132 MMHG | DIASTOLIC BLOOD PRESSURE: 67 MMHG | BODY MASS INDEX: 21.66 KG/M2 | TEMPERATURE: 99 F | HEIGHT: 67 IN | WEIGHT: 138 LBS

## 2022-01-07 DIAGNOSIS — K55.9 ISCHEMIC COLITIS (HCC): Primary | ICD-10-CM

## 2022-01-07 DIAGNOSIS — R93.3 ABNORMAL CT SCAN, COLON: ICD-10-CM

## 2022-01-07 PROCEDURE — 3075F SYST BP GE 130 - 139MM HG: CPT | Performed by: INTERNAL MEDICINE

## 2022-01-07 PROCEDURE — 3078F DIAST BP <80 MM HG: CPT | Performed by: INTERNAL MEDICINE

## 2022-01-07 PROCEDURE — 99213 OFFICE O/P EST LOW 20 MIN: CPT | Performed by: INTERNAL MEDICINE

## 2022-01-07 PROCEDURE — 3008F BODY MASS INDEX DOCD: CPT | Performed by: INTERNAL MEDICINE

## 2022-01-09 NOTE — PATIENT INSTRUCTIONS
1.  Obtain additional blood work. 2.  Schedule a colonoscopy for a history of colitis following a split dose TriLyte or Suprep and monitored anesthesia care. 3.  Please contact us if you have recurrent symptoms.

## 2022-01-17 ENCOUNTER — LAB ENCOUNTER (OUTPATIENT)
Dept: LAB | Facility: HOSPITAL | Age: 82
End: 2022-01-17
Attending: INTERNAL MEDICINE
Payer: MEDICARE

## 2022-01-17 DIAGNOSIS — K55.9 ISCHEMIC COLITIS (HCC): Primary | ICD-10-CM

## 2022-01-17 PROCEDURE — 85300 ANTITHROMBIN III ACTIVITY: CPT

## 2022-01-17 PROCEDURE — 81279 JAK2 GENE TRGT SEQUENCE ALYS: CPT

## 2022-01-17 PROCEDURE — 36415 COLL VENOUS BLD VENIPUNCTURE: CPT

## 2022-01-20 LAB — ANTITHROMBIN, ENZYMATIC (ACTIVITY): 100 %

## 2022-01-26 LAB — JAK2 EXON 12 MUTATION ANAL PCR: NOT DETECTED

## 2022-02-23 RX ORDER — ATORVASTATIN CALCIUM 20 MG/1
20 TABLET, FILM COATED ORAL EVERY EVENING
Qty: 90 TABLET | Refills: 1 | Status: SHIPPED | OUTPATIENT
Start: 2022-02-23 | End: 2022-08-14

## 2022-02-23 NOTE — TELEPHONE ENCOUNTER
Refill passed per [place] [employer] [facility] protocol.   Requested Prescriptions   Pending Prescriptions Disp Refills    ATORVASTATIN 20 MG Oral Tab [Pharmacy Med Name: Atorvastatin Calcium 20 Mg Tab Nort] 90 tablet 0     Sig: TAKE ONE TABLET BY MOUTH IN THE EVENING        Cholesterol Medication Protocol Passed - 2/22/2022  9:45 PM        Passed - ALT in past 12 months        Passed - LDL in past 12 months        Passed - Last ALT < 80       Lab Results   Component Value Date    ALT 33 02/23/2021             Passed - Last LDL < 130     Lab Results   Component Value Date    LDL 67 02/23/2021               Passed - Appointment in past 12 or next 3 months             Recent Outpatient Visits              1 month ago Ischemic colitis [facility])    Antony Meade MD    Office Visit    2 months ago Ischemic colitis [facility])    [facility], [address], Leanor Hashimoto, MD    Office Visit    2 months ago Benign prostatic hyperplasia with urinary frequency    [facility], [address], Jorge Matos MD    Office Visit    4 months ago Pain in toes of both feet    [facility], [address], [address] [facility] [place], [place]    Office Visit    5 months ago Essential hypertension    [facility], [address], Leanor Hashimoto, MD    Office Visit           Future Appointments         Provider Department Appt Notes    In 6 days Bharati Tarango MD [facility], [address], [facility] px    In 1 month Luisana Stewart MD [facility] Dermatology upper body

## 2022-03-01 ENCOUNTER — OFFICE VISIT (OUTPATIENT)
Dept: INTERNAL MEDICINE CLINIC | Facility: CLINIC | Age: 82
End: 2022-03-01
Payer: COMMERCIAL

## 2022-03-01 VITALS
SYSTOLIC BLOOD PRESSURE: 134 MMHG | HEIGHT: 67 IN | WEIGHT: 137.63 LBS | BODY MASS INDEX: 21.6 KG/M2 | HEART RATE: 54 BPM | DIASTOLIC BLOOD PRESSURE: 60 MMHG

## 2022-03-01 DIAGNOSIS — D50.9 IRON DEFICIENCY ANEMIA, UNSPECIFIED IRON DEFICIENCY ANEMIA TYPE: ICD-10-CM

## 2022-03-01 DIAGNOSIS — Z87.19 HISTORY OF ISCHEMIC COLITIS: ICD-10-CM

## 2022-03-01 DIAGNOSIS — Z00.00 ANNUAL PHYSICAL EXAM: Primary | ICD-10-CM

## 2022-03-01 DIAGNOSIS — I70.0 AORTIC ATHEROSCLEROSIS (HCC): ICD-10-CM

## 2022-03-01 DIAGNOSIS — M81.0 OSTEOPOROSIS, UNSPECIFIED OSTEOPOROSIS TYPE, UNSPECIFIED PATHOLOGICAL FRACTURE PRESENCE: ICD-10-CM

## 2022-03-01 DIAGNOSIS — R35.0 BENIGN PROSTATIC HYPERPLASIA WITH URINARY FREQUENCY: ICD-10-CM

## 2022-03-01 DIAGNOSIS — E78.00 HYPERCHOLESTEROLEMIA: ICD-10-CM

## 2022-03-01 DIAGNOSIS — Z85.828 HISTORY OF SKIN CANCER: ICD-10-CM

## 2022-03-01 DIAGNOSIS — Z00.00 ENCOUNTER FOR ANNUAL HEALTH EXAMINATION: ICD-10-CM

## 2022-03-01 DIAGNOSIS — N40.1 BENIGN PROSTATIC HYPERPLASIA WITH URINARY FREQUENCY: ICD-10-CM

## 2022-03-01 DIAGNOSIS — H40.9 GLAUCOMA, UNSPECIFIED GLAUCOMA TYPE, UNSPECIFIED LATERALITY: ICD-10-CM

## 2022-03-01 DIAGNOSIS — I10 PRIMARY HYPERTENSION: ICD-10-CM

## 2022-03-01 PROCEDURE — 3008F BODY MASS INDEX DOCD: CPT | Performed by: INTERNAL MEDICINE

## 2022-03-01 PROCEDURE — 3078F DIAST BP <80 MM HG: CPT | Performed by: INTERNAL MEDICINE

## 2022-03-01 PROCEDURE — 3075F SYST BP GE 130 - 139MM HG: CPT | Performed by: INTERNAL MEDICINE

## 2022-03-01 PROCEDURE — G0439 PPPS, SUBSEQ VISIT: HCPCS | Performed by: INTERNAL MEDICINE

## 2022-03-01 PROCEDURE — 99397 PER PM REEVAL EST PAT 65+ YR: CPT | Performed by: INTERNAL MEDICINE

## 2022-03-01 PROCEDURE — 96160 PT-FOCUSED HLTH RISK ASSMT: CPT | Performed by: INTERNAL MEDICINE

## 2022-03-01 RX ORDER — LATANOPROST 50 UG/ML
1 SOLUTION/ DROPS OPHTHALMIC NIGHTLY
COMMUNITY
Start: 2022-02-03

## 2022-03-02 ENCOUNTER — LAB ENCOUNTER (OUTPATIENT)
Dept: LAB | Facility: HOSPITAL | Age: 82
End: 2022-03-02
Attending: INTERNAL MEDICINE
Payer: MEDICARE

## 2022-03-02 DIAGNOSIS — Z00.00 ANNUAL PHYSICAL EXAM: ICD-10-CM

## 2022-03-02 LAB — VIT D+METAB SERPL-MCNC: 47.1 NG/ML (ref 30–100)

## 2022-03-02 PROCEDURE — 83090 ASSAY OF HOMOCYSTEINE: CPT | Performed by: INTERNAL MEDICINE

## 2022-03-02 PROCEDURE — 84439 ASSAY OF FREE THYROXINE: CPT | Performed by: INTERNAL MEDICINE

## 2022-03-02 PROCEDURE — 85027 COMPLETE CBC AUTOMATED: CPT | Performed by: INTERNAL MEDICINE

## 2022-03-02 PROCEDURE — 82607 VITAMIN B-12: CPT | Performed by: INTERNAL MEDICINE

## 2022-03-02 PROCEDURE — 86141 C-REACTIVE PROTEIN HS: CPT | Performed by: INTERNAL MEDICINE

## 2022-03-02 PROCEDURE — 82306 VITAMIN D 25 HYDROXY: CPT

## 2022-03-02 PROCEDURE — 84443 ASSAY THYROID STIM HORMONE: CPT | Performed by: INTERNAL MEDICINE

## 2022-03-02 PROCEDURE — 36415 COLL VENOUS BLD VENIPUNCTURE: CPT | Performed by: INTERNAL MEDICINE

## 2022-03-02 PROCEDURE — 80061 LIPID PANEL: CPT | Performed by: INTERNAL MEDICINE

## 2022-03-02 PROCEDURE — 83036 HEMOGLOBIN GLYCOSYLATED A1C: CPT | Performed by: INTERNAL MEDICINE

## 2022-03-02 PROCEDURE — 80053 COMPREHEN METABOLIC PANEL: CPT | Performed by: INTERNAL MEDICINE

## 2022-03-03 ENCOUNTER — TELEPHONE (OUTPATIENT)
Dept: GASTROENTEROLOGY | Facility: CLINIC | Age: 82
End: 2022-03-03

## 2022-03-03 NOTE — TELEPHONE ENCOUNTER
Per OV 01/07/2022 w/Dr Lloyd    Assessment & Plan:      Ischemic colitis ([facility] [address]  (primary encounter diagnosis) K55.9  Abnormal CT scan, colon R93.3  The patient presents with a recurrent episode of severe abdominal pain along with nonbloody diarrhea. He had similar episodes in 2015 and 2016. Cultures were negative for infection on each occasion. The most recent CT scan revealed a left-sided colitis. The patient's clinical history is consistent with ischemic colitis (albeit no bleeding was noted). The patient's symptoms have resolved. The etiology of the recurrent episodes is unclear. Relative volume depletion in the setting of advanced atherosclerotic change is a possibility. I would suspect ongoing symptoms if there was a critical mesenteric stenosis. Previous hypercoagulable work-up was negative with the exception of a slightly low Antithrombin III level. I am recommending that the patient maintain adequate hydration and avoid constipation. I would repeat the Antithrombin III level and also check for a Nabil 2 mutation. I would favor a repeat colonoscopy as it has been several years since the patient's last study to exclude structural lesions. This can be arranged following a split dose TriLyte or Suprep and monitored anesthesia care. If the patient has any recurrent episodes he was asked to contact us.

## 2022-03-05 RX ORDER — SODIUM, POTASSIUM,MAG SULFATES 17.5-3.13G
SOLUTION, RECONSTITUTED, ORAL ORAL
Qty: 1 EACH | Refills: 0 | Status: SHIPPED | OUTPATIENT
Start: 2022-03-05

## 2022-03-22 ENCOUNTER — OFFICE VISIT (OUTPATIENT)
Dept: PODIATRY CLINIC | Facility: CLINIC | Age: 82
End: 2022-03-22
Payer: COMMERCIAL

## 2022-03-22 DIAGNOSIS — M79.675 PAIN IN TOES OF BOTH FEET: Primary | ICD-10-CM

## 2022-03-22 DIAGNOSIS — B35.1 ONYCHOMYCOSIS: ICD-10-CM

## 2022-03-22 DIAGNOSIS — M79.674 PAIN IN TOES OF BOTH FEET: Primary | ICD-10-CM

## 2022-03-22 PROCEDURE — 11721 DEBRIDE NAIL 6 OR MORE: CPT | Performed by: PODIATRIST

## 2022-04-02 ENCOUNTER — OFFICE VISIT (OUTPATIENT)
Dept: DERMATOLOGY CLINIC | Facility: CLINIC | Age: 82
End: 2022-04-02
Payer: COMMERCIAL

## 2022-04-02 DIAGNOSIS — D23.5 BENIGN NEOPLASM OF SKIN OF TRUNK, EXCEPT SCROTUM: ICD-10-CM

## 2022-04-02 DIAGNOSIS — L82.1 SEBORRHEIC KERATOSES: ICD-10-CM

## 2022-04-02 DIAGNOSIS — Z85.828 HISTORY OF NONMELANOMA SKIN CANCER: ICD-10-CM

## 2022-04-02 DIAGNOSIS — D23.30 BENIGN NEOPLASM OF SKIN OF FACE: ICD-10-CM

## 2022-04-02 DIAGNOSIS — D23.4 BENIGN NEOPLASM OF SCALP AND SKIN OF NECK: ICD-10-CM

## 2022-04-02 DIAGNOSIS — D23.60 BENIGN NEOPLASM OF SKIN OF UPPER LIMB, INCLUDING SHOULDER, UNSPECIFIED LATERALITY: ICD-10-CM

## 2022-04-02 DIAGNOSIS — L57.0 AK (ACTINIC KERATOSIS): Primary | ICD-10-CM

## 2022-04-02 PROCEDURE — 17003 DESTRUCT PREMALG LES 2-14: CPT | Performed by: DERMATOLOGY

## 2022-04-02 PROCEDURE — 17000 DESTRUCT PREMALG LESION: CPT | Performed by: DERMATOLOGY

## 2022-04-02 PROCEDURE — 99213 OFFICE O/P EST LOW 20 MIN: CPT | Performed by: DERMATOLOGY

## 2022-04-20 NOTE — TELEPHONE ENCOUNTER
Pt to verify dose of amlodipine. Chart states amlodipine 5mg. Request is for amlodipine 10mg. [employer] message sent.

## 2022-04-21 RX ORDER — AMLODIPINE BESYLATE 10 MG/1
TABLET ORAL
Qty: 90 TABLET | Refills: 0 | OUTPATIENT
Start: 2022-04-21

## 2022-04-25 RX ORDER — AMLODIPINE BESYLATE 10 MG/1
TABLET ORAL
Qty: 90 TABLET | Refills: 0 | OUTPATIENT
Start: 2022-04-25

## 2022-04-28 LAB — AMB EXT COVID-19 RESULT: DETECTED

## 2022-05-02 ENCOUNTER — NURSE TRIAGE (OUTPATIENT)
Dept: INTERNAL MEDICINE CLINIC | Facility: CLINIC | Age: 82
End: 2022-05-02

## 2022-05-05 ENCOUNTER — TELEPHONE (OUTPATIENT)
Dept: INTERNAL MEDICINE CLINIC | Facility: CLINIC | Age: 82
End: 2022-05-05

## 2022-05-05 NOTE — TELEPHONE ENCOUNTER
Spoke with romario lugo tech from [facility], LAURA verified. She stated they can't fax nor Erx is not working. She is looking for rx ref for pt for amlodipine 10 mg every day # 90. She was informed per EMR pt on amlodipine 5 mg every day, she will f/u with pt regarding updated dosage and she will call back.        YAHAIRA

## 2022-05-07 RX ORDER — AMLODIPINE BESYLATE 10 MG/1
TABLET ORAL
Qty: 90 TABLET | Refills: 0 | OUTPATIENT
Start: 2022-05-07

## 2022-05-07 NOTE — TELEPHONE ENCOUNTER
RX  request for Amlodipine 10mg   Current RX for Amlodipine  5mg po daily ,  LOV 3/01//2022    Please advise and thank you.

## 2022-05-13 RX ORDER — AMLODIPINE BESYLATE 10 MG/1
TABLET ORAL
Qty: 90 TABLET | Refills: 0 | OUTPATIENT
Start: 2022-05-13

## 2022-05-16 RX ORDER — AMLODIPINE BESYLATE 5 MG/1
5 TABLET ORAL DAILY
Qty: 90 TABLET | Refills: 1 | Status: SHIPPED | OUTPATIENT
Start: 2022-05-16

## 2022-05-16 NOTE — TELEPHONE ENCOUNTER
Refill passed per [facility] protocol, but last Amlodipine 5 mg Rx shows 12/05/2021 for #30 only, 10mg Amlodipine Rx discontinued    Please send, if appropriate      Requested Prescriptions   Pending Prescriptions Disp Refills    amLODIPine 5 MG Oral Tab 90 tablet 1     Sig: Take 1 tablet (5 mg total) by mouth daily.         Hypertensive Medications Protocol Passed - 5/16/2022  2:27 PM        Passed - CMP or BMP in past 12 months        Passed - Appointment in past 6 or next 3 months        Passed - GFR Non- > 50     Lab Results   Component Value Date    GFRNAA 80 03/02/2022                         Recent Outpatient Visits              1 month ago AK (actinic keratosis)    [facility] Dermatology Fortino Medellin MD    Office Visit    1 month ago Pain in toes of both feet    [facility], [address], [address] [place] [address], Melvin Leslee, DP    Office Visit    2 months ago Annual physical exam    [facility], Juan Chan MD    Office Visit    4 months ago Ischemic colitis [facility])    Danyelle Christiansen MD    Office Visit    5 months ago Ischemic colitis [facility])    [place] [facility] [place], [facility], [address], Juan Chan MD    Office Visit             Future Appointments         Provider Department Appt Notes    In 2 months [facility], PROCEDURE [address] GI PROCEDURE Colon @ [address]    In 3 months Allie Abraham MD [place] [facility], [address], [place] 6 Month Follow Up    In 4 months Fortino Medellin MD [facility] Dermatology 6 month f/u upper

## 2022-06-30 ENCOUNTER — OFFICE VISIT (OUTPATIENT)
Dept: DERMATOLOGY CLINIC | Facility: CLINIC | Age: 82
End: 2022-06-30
Payer: COMMERCIAL

## 2022-06-30 DIAGNOSIS — L25.9 CONTACT DERMATITIS AND ECZEMA: Primary | ICD-10-CM

## 2022-06-30 PROCEDURE — 99213 OFFICE O/P EST LOW 20 MIN: CPT | Performed by: DERMATOLOGY

## 2022-06-30 PROCEDURE — 1126F AMNT PAIN NOTED NONE PRSNT: CPT | Performed by: DERMATOLOGY

## 2022-07-15 ENCOUNTER — SURGERY CENTER DOCUMENTATION (OUTPATIENT)
Dept: SURGERY | Age: 82
End: 2022-07-15

## 2022-07-15 NOTE — PROCEDURES
[address] Endoscopy Report      Date of Procedure:  07/15/22      Preoperative Diagnosis:  1. Transient abdominal pain and diarrhea  2. Abnormal CT scan of the left colon      Postoperative Diagnosis:  1. Diminutive colon polyp  2. Diverticulosis right and left colon  3. Scar deformity left colon from previous colitis      Procedure:    Colonoscopy with biopsy      Surgeon:  Prisca Rodriguez M.D. Anesthesia:  Monitored anesthesia care  Cecal withdrawal time: 21 minutes  EBL:  Insignificant      Brief History: This is a 80year old male who was hospitalized in December 2021 with severe abdominal pain and non-bloody diarrhea. A CT scan revealed inflammatory changes of the descending colon that responded to conservative measures. Ischemic colitis was suspected. The patient has had previous episodes of ischemic colitis in 2015 and 2016 with a negative hypercoagulable work-up. He presents for a colonoscopy in light of the above. He is currently asymptomatic. Technique:  After informed consent, the patient was placed in the left lateral recumbent position. Digital rectal examination revealed no palpable intraluminal abnormalities. An Olympus variable stiffness 190 series HD pediatric colonoscope was inserted into the rectum and advanced under direct vision by following the lumen to the sigmoid colon. The patient was made supine and he has been advanced to somewhat of a tortuous and floppy left colon to the terminal ileum. The colon was examined upon withdrawal in the supine position. Findings:  The preparation of the colon was reasonably good. The terminal ileum was examined for 5 cm and visually normal.  The ileocecal valve was well preserved. The visualized colonic mucosa from the cecum to the anal verge was normal with an intact vascular pattern. There was linear scar deformity in the descending colon from previous inflammation.   There was a 3 mm polyp in the distal ascending colon which was removed with cold biopsy forceps. No ongoing bleeding. There were a few diverticula seen in the right colon and a several diverticula in the sigmoid without current signs of complication. There were no other colonic polyps, mass lesions, vascular anomalies or signs of active inflammation seen. Random biopsies were taken upon withdrawal including biopsies from the areas of scar deformity. Retroflexion in the rectum revealed no abnormalities. The procedure was well tolerated without immediate complication. Impression:  1. Diminutive ascending colon polyp  2. Uncomplicated diverticulosis right and left colon  3. Scar deformity descending colon from previous colitis. 4.  Floppy and redundant sigmoid colon. Transient obstruction leading to the episodes of ischemia due to the floppy colon cannot be excluded. Recommendations:  1. High-fiber diet. 2.  Follow-up biopsy results. 3.  Probable observation unless any new symptoms or signs are noted.         Paco Echeverria MD  7/15/2022

## 2022-07-20 ENCOUNTER — OFFICE VISIT (OUTPATIENT)
Dept: PODIATRY CLINIC | Facility: CLINIC | Age: 82
End: 2022-07-20
Payer: COMMERCIAL

## 2022-07-20 DIAGNOSIS — B35.1 ONYCHOMYCOSIS: ICD-10-CM

## 2022-07-20 DIAGNOSIS — M79.674 PAIN IN TOES OF BOTH FEET: Primary | ICD-10-CM

## 2022-07-20 DIAGNOSIS — M79.675 PAIN IN TOES OF BOTH FEET: Primary | ICD-10-CM

## 2022-07-20 PROCEDURE — 11721 DEBRIDE NAIL 6 OR MORE: CPT | Performed by: PODIATRIST

## 2022-07-20 PROCEDURE — 1126F AMNT PAIN NOTED NONE PRSNT: CPT | Performed by: PODIATRIST

## 2022-07-21 ENCOUNTER — TELEPHONE (OUTPATIENT)
Dept: GASTROENTEROLOGY | Facility: CLINIC | Age: 82
End: 2022-07-21

## 2022-07-22 NOTE — TELEPHONE ENCOUNTER
Please see the following Caregivers message sent to the patient: \"Sarbjit Lam,    I left a message on your voicemail and spoke to [place] earlier. Your colonoscopy revealed #1 small polyp which was removed. 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 This is the type of polyp that has a potential to become a tumor or cancer, however, at this stage was small, benign and completely removed. Most polyps of this size and type do not progress to cancer. Uncomplicated diverticulosis was present. Scarring from the previous episodes of colitis was present, however, biopsies of the colon did not show active inflammation. The cause of the recurrent episodes of ischemic colitis remains unclear. Please maintain good hydration in effort to prevent any future episodes. I would not repeat a colonoscopy unless any new symptoms or signs are noted. If I can answer any questions regarding the above, please do not hesitate to contact me. Sincerely,    Dr. Meredith Daugherty"    GI RNs: Please enter in health maintenance that a colonoscopy was performed. No recall.

## 2022-07-22 NOTE — TELEPHONE ENCOUNTER
I left a message on the patient's voicemail that was calling with favorable test results. I also spoke to the patient's wife Erin Hdez. There are no ANGELINE parameters regarding sharing information with the patient spouse. I discussed with Erin Hdez that the biopsy results were favorable but did not go into details.

## 2022-07-22 NOTE — TELEPHONE ENCOUNTER
Dr. Isma Springer-    Patient had a colonoscopy with you on 7/15/22. Pathology report obtained and placed below. Please advise once reviewed. Thank you!

## 2022-08-14 RX ORDER — ATORVASTATIN CALCIUM 20 MG/1
20 TABLET, FILM COATED ORAL NIGHTLY
Qty: 90 TABLET | Refills: 2 | Status: SHIPPED | OUTPATIENT
Start: 2022-08-14

## 2022-09-02 ENCOUNTER — OFFICE VISIT (OUTPATIENT)
Dept: INTERNAL MEDICINE CLINIC | Facility: CLINIC | Age: 82
End: 2022-09-02
Payer: COMMERCIAL

## 2022-09-02 VITALS
DIASTOLIC BLOOD PRESSURE: 58 MMHG | HEIGHT: 67 IN | WEIGHT: 135.81 LBS | SYSTOLIC BLOOD PRESSURE: 134 MMHG | BODY MASS INDEX: 21.31 KG/M2 | HEART RATE: 59 BPM

## 2022-09-02 DIAGNOSIS — E78.00 HYPERCHOLESTEROLEMIA: ICD-10-CM

## 2022-09-02 DIAGNOSIS — I10 PRIMARY HYPERTENSION: Primary | ICD-10-CM

## 2022-09-02 DIAGNOSIS — Z86.010 HX OF ADENOMATOUS COLONIC POLYPS: ICD-10-CM

## 2022-09-02 PROCEDURE — 3075F SYST BP GE 130 - 139MM HG: CPT | Performed by: INTERNAL MEDICINE

## 2022-09-02 PROCEDURE — 99214 OFFICE O/P EST MOD 30 MIN: CPT | Performed by: INTERNAL MEDICINE

## 2022-09-02 PROCEDURE — 3078F DIAST BP <80 MM HG: CPT | Performed by: INTERNAL MEDICINE

## 2022-09-02 PROCEDURE — 3008F BODY MASS INDEX DOCD: CPT | Performed by: INTERNAL MEDICINE

## 2022-09-02 NOTE — PATIENT INSTRUCTIONS
Your blood pressure today was good at 134/58. Please continue current medications. Continue regular physical activity and healthy diet. Please get the new COVID booster soon and also a flu shot this fall. Please schedule a Medicare physical in 6 months.

## 2022-10-10 ENCOUNTER — OFFICE VISIT (OUTPATIENT)
Dept: DERMATOLOGY CLINIC | Facility: CLINIC | Age: 82
End: 2022-10-10
Payer: COMMERCIAL

## 2022-10-10 DIAGNOSIS — D22.9 MULTIPLE NEVI: ICD-10-CM

## 2022-10-10 DIAGNOSIS — L82.1 SEBORRHEIC KERATOSES: ICD-10-CM

## 2022-10-10 DIAGNOSIS — D23.4 BENIGN NEOPLASM OF SCALP AND SKIN OF NECK: ICD-10-CM

## 2022-10-10 DIAGNOSIS — D23.5 BENIGN NEOPLASM OF SKIN OF TRUNK, EXCEPT SCROTUM: ICD-10-CM

## 2022-10-10 DIAGNOSIS — D23.30 BENIGN NEOPLASM OF SKIN OF FACE: ICD-10-CM

## 2022-10-10 DIAGNOSIS — D23.60 BENIGN NEOPLASM OF SKIN OF UPPER LIMB, INCLUDING SHOULDER, UNSPECIFIED LATERALITY: ICD-10-CM

## 2022-10-10 DIAGNOSIS — L81.4 LENTIGO: ICD-10-CM

## 2022-10-10 DIAGNOSIS — L57.0 AK (ACTINIC KERATOSIS): Primary | ICD-10-CM

## 2022-10-10 DIAGNOSIS — Z85.828 HISTORY OF NONMELANOMA SKIN CANCER: ICD-10-CM

## 2022-10-10 PROCEDURE — 99213 OFFICE O/P EST LOW 20 MIN: CPT | Performed by: DERMATOLOGY

## 2022-10-10 PROCEDURE — 1126F AMNT PAIN NOTED NONE PRSNT: CPT | Performed by: DERMATOLOGY

## 2022-11-10 RX ORDER — AMLODIPINE BESYLATE 5 MG/1
5 TABLET ORAL DAILY
Qty: 90 TABLET | Refills: 1 | Status: SHIPPED | OUTPATIENT
Start: 2022-11-10

## 2022-11-23 ENCOUNTER — OFFICE VISIT (OUTPATIENT)
Dept: PODIATRY CLINIC | Facility: CLINIC | Age: 82
End: 2022-11-23
Payer: COMMERCIAL

## 2022-11-23 DIAGNOSIS — B35.1 ONYCHOMYCOSIS: ICD-10-CM

## 2022-11-23 DIAGNOSIS — M79.675 PAIN IN TOES OF BOTH FEET: Primary | ICD-10-CM

## 2022-11-23 DIAGNOSIS — M79.674 PAIN IN TOES OF BOTH FEET: Primary | ICD-10-CM

## 2022-11-23 PROCEDURE — 1126F AMNT PAIN NOTED NONE PRSNT: CPT | Performed by: PODIATRIST

## 2022-11-23 PROCEDURE — 11721 DEBRIDE NAIL 6 OR MORE: CPT | Performed by: PODIATRIST

## 2022-11-29 ENCOUNTER — LAB ENCOUNTER (OUTPATIENT)
Dept: LAB | Facility: HOSPITAL | Age: 82
End: 2022-11-29
Attending: UROLOGY
Payer: MEDICARE

## 2022-11-29 DIAGNOSIS — R35.1 NOCTURIA: ICD-10-CM

## 2022-11-29 LAB
BILIRUB UR QL: NEGATIVE
CLARITY UR: CLEAR
COLOR UR: YELLOW
GLUCOSE UR-MCNC: NEGATIVE MG/DL
HGB UR QL STRIP.AUTO: NEGATIVE
HYALINE CASTS #/AREA URNS AUTO: PRESENT /LPF
HYALINE CASTS #/AREA URNS AUTO: PRESENT /LPF
KETONES UR-MCNC: NEGATIVE MG/DL
LEUKOCYTE ESTERASE UR QL STRIP.AUTO: NEGATIVE
NITRITE UR QL STRIP.AUTO: NEGATIVE
PH UR: 7 [PH] (ref 5–8)
SP GR UR STRIP: 1.01 (ref 1–1.03)
UROBILINOGEN UR STRIP-ACNC: 0.2

## 2022-11-29 PROCEDURE — 81015 MICROSCOPIC EXAM OF URINE: CPT

## 2022-11-29 PROCEDURE — 81001 URINALYSIS AUTO W/SCOPE: CPT

## 2022-12-05 ENCOUNTER — OFFICE VISIT (OUTPATIENT)
Dept: SURGERY | Facility: CLINIC | Age: 82
End: 2022-12-05
Payer: COMMERCIAL

## 2022-12-05 VITALS — DIASTOLIC BLOOD PRESSURE: 70 MMHG | HEART RATE: 59 BPM | SYSTOLIC BLOOD PRESSURE: 176 MMHG

## 2022-12-05 DIAGNOSIS — N40.1 BENIGN PROSTATIC HYPERPLASIA WITH URINARY FREQUENCY: Primary | ICD-10-CM

## 2022-12-05 DIAGNOSIS — R35.0 BENIGN PROSTATIC HYPERPLASIA WITH URINARY FREQUENCY: Primary | ICD-10-CM

## 2022-12-05 PROCEDURE — 3077F SYST BP >= 140 MM HG: CPT | Performed by: UROLOGY

## 2022-12-05 PROCEDURE — 99213 OFFICE O/P EST LOW 20 MIN: CPT | Performed by: UROLOGY

## 2022-12-05 PROCEDURE — 51798 US URINE CAPACITY MEASURE: CPT | Performed by: UROLOGY

## 2022-12-05 PROCEDURE — 3078F DIAST BP <80 MM HG: CPT | Performed by: UROLOGY

## 2022-12-23 RX ORDER — FINASTERIDE 5 MG/1
5 TABLET, FILM COATED ORAL DAILY
Qty: 90 TABLET | Refills: 3 | Status: SHIPPED | OUTPATIENT
Start: 2022-12-23

## 2022-12-23 NOTE — TELEPHONE ENCOUNTER
Patient requesting medication refill.  Please advise     finasteride 5 MG Oral Tab    [id number] - [place], [place] - [address], [address]   Phone: [phone] Fax: [phone]   Hours: Not open 24 hours

## 2023-02-27 ENCOUNTER — OFFICE VISIT (OUTPATIENT)
Dept: PODIATRY CLINIC | Facility: CLINIC | Age: 83
End: 2023-02-27

## 2023-02-27 DIAGNOSIS — B35.1 ONYCHOMYCOSIS: ICD-10-CM

## 2023-02-27 DIAGNOSIS — G60.9 IDIOPATHIC POLYNEUROPATHY: Primary | ICD-10-CM

## 2023-02-27 PROCEDURE — 99213 OFFICE O/P EST LOW 20 MIN: CPT | Performed by: PODIATRIST

## 2023-02-27 PROCEDURE — 1126F AMNT PAIN NOTED NONE PRSNT: CPT | Performed by: PODIATRIST

## 2023-03-17 ENCOUNTER — OFFICE VISIT (OUTPATIENT)
Dept: INTERNAL MEDICINE CLINIC | Facility: CLINIC | Age: 83
End: 2023-03-17
Payer: MEDICARE

## 2023-03-17 VITALS
HEART RATE: 58 BPM | HEIGHT: 67 IN | SYSTOLIC BLOOD PRESSURE: 134 MMHG | DIASTOLIC BLOOD PRESSURE: 58 MMHG | BODY MASS INDEX: 21.85 KG/M2 | WEIGHT: 139.19 LBS

## 2023-03-17 DIAGNOSIS — Z87.19 HISTORY OF ISCHEMIC COLITIS: ICD-10-CM

## 2023-03-17 DIAGNOSIS — I10 PRIMARY HYPERTENSION: ICD-10-CM

## 2023-03-17 DIAGNOSIS — E03.8 SUBCLINICAL HYPOTHYROIDISM: ICD-10-CM

## 2023-03-17 DIAGNOSIS — D50.9 IRON DEFICIENCY ANEMIA, UNSPECIFIED IRON DEFICIENCY ANEMIA TYPE: ICD-10-CM

## 2023-03-17 DIAGNOSIS — Z86.010 HX OF ADENOMATOUS COLONIC POLYPS: ICD-10-CM

## 2023-03-17 DIAGNOSIS — Z00.00 ANNUAL PHYSICAL EXAM: Primary | ICD-10-CM

## 2023-03-17 DIAGNOSIS — Z85.828 HISTORY OF SKIN CANCER: ICD-10-CM

## 2023-03-17 DIAGNOSIS — M81.0 OSTEOPOROSIS, UNSPECIFIED OSTEOPOROSIS TYPE, UNSPECIFIED PATHOLOGICAL FRACTURE PRESENCE: ICD-10-CM

## 2023-03-17 DIAGNOSIS — N40.1 BENIGN PROSTATIC HYPERPLASIA WITH URINARY FREQUENCY: ICD-10-CM

## 2023-03-17 DIAGNOSIS — R35.0 BENIGN PROSTATIC HYPERPLASIA WITH URINARY FREQUENCY: ICD-10-CM

## 2023-03-17 DIAGNOSIS — I70.0 AORTIC ATHEROSCLEROSIS (HCC): ICD-10-CM

## 2023-03-17 DIAGNOSIS — Z00.00 ENCOUNTER FOR ANNUAL HEALTH EXAMINATION: ICD-10-CM

## 2023-03-17 DIAGNOSIS — H40.9 GLAUCOMA, UNSPECIFIED GLAUCOMA TYPE, UNSPECIFIED LATERALITY: ICD-10-CM

## 2023-03-17 DIAGNOSIS — E78.00 HYPERCHOLESTEROLEMIA: ICD-10-CM

## 2023-03-17 PROBLEM — K55.9 ISCHEMIC COLITIS (HCC): Status: RESOLVED | Noted: 2021-12-02 | Resolved: 2023-03-17

## 2023-03-18 ENCOUNTER — LAB ENCOUNTER (OUTPATIENT)
Dept: LAB | Facility: HOSPITAL | Age: 83
End: 2023-03-18
Attending: INTERNAL MEDICINE
Payer: MEDICARE

## 2023-03-18 LAB
ALBUMIN SERPL-MCNC: 3.3 G/DL (ref 3.4–5)
ALBUMIN/GLOB SERPL: 0.8 {RATIO} (ref 1–2)
ALP LIVER SERPL-CCNC: 74 U/L
ALT SERPL-CCNC: 36 U/L
ANION GAP SERPL CALC-SCNC: 4 MMOL/L (ref 0–18)
AST SERPL-CCNC: 32 U/L (ref 15–37)
BILIRUB SERPL-MCNC: 0.4 MG/DL (ref 0.1–2)
BUN BLD-MCNC: 20 MG/DL (ref 7–18)
BUN/CREAT SERPL: 19.6 (ref 10–20)
CALCIUM BLD-MCNC: 9 MG/DL (ref 8.5–10.1)
CHLORIDE SERPL-SCNC: 106 MMOL/L (ref 98–112)
CHOLEST SERPL-MCNC: 138 MG/DL (ref ?–200)
CO2 SERPL-SCNC: 32 MMOL/L (ref 21–32)
CREAT BLD-MCNC: 1.02 MG/DL
DEPRECATED RDW RBC AUTO: 45.7 FL (ref 35.1–46.3)
ERYTHROCYTE [DISTWIDTH] IN BLOOD BY AUTOMATED COUNT: 13.2 % (ref 11–15)
FASTING PATIENT LIPID ANSWER: YES
FASTING STATUS PATIENT QL REPORTED: YES
GFR SERPLBLD BASED ON 1.73 SQ M-ARVRAT: 73 ML/MIN/1.73M2 (ref 60–?)
GLOBULIN PLAS-MCNC: 3.9 G/DL (ref 2.8–4.4)
GLUCOSE BLD-MCNC: 94 MG/DL (ref 70–99)
HCT VFR BLD AUTO: 40.8 %
HDLC SERPL-MCNC: 72 MG/DL (ref 40–59)
HGB BLD-MCNC: 13 G/DL
LDLC SERPL CALC-MCNC: 56 MG/DL (ref ?–100)
MCH RBC QN AUTO: 29.9 PG (ref 26–34)
MCHC RBC AUTO-ENTMCNC: 31.9 G/DL (ref 31–37)
MCV RBC AUTO: 93.8 FL
NONHDLC SERPL-MCNC: 66 MG/DL (ref ?–130)
OSMOLALITY SERPL CALC.SUM OF ELEC: 296 MOSM/KG (ref 275–295)
PLATELET # BLD AUTO: 213 10(3)UL (ref 150–450)
POTASSIUM SERPL-SCNC: 4 MMOL/L (ref 3.5–5.1)
PROT SERPL-MCNC: 7.2 G/DL (ref 6.4–8.2)
RBC # BLD AUTO: 4.35 X10(6)UL
SODIUM SERPL-SCNC: 142 MMOL/L (ref 136–145)
TRIGL SERPL-MCNC: 42 MG/DL (ref 30–149)
TSI SER-ACNC: 3.33 MIU/ML (ref 0.36–3.74)
VLDLC SERPL CALC-MCNC: 6 MG/DL (ref 0–30)
WBC # BLD AUTO: 5.4 X10(3) UL (ref 4–11)

## 2023-03-18 PROCEDURE — 36415 COLL VENOUS BLD VENIPUNCTURE: CPT | Performed by: INTERNAL MEDICINE

## 2023-03-18 PROCEDURE — 84443 ASSAY THYROID STIM HORMONE: CPT | Performed by: INTERNAL MEDICINE

## 2023-03-18 PROCEDURE — 85027 COMPLETE CBC AUTOMATED: CPT | Performed by: INTERNAL MEDICINE

## 2023-03-18 PROCEDURE — 80053 COMPREHEN METABOLIC PANEL: CPT | Performed by: INTERNAL MEDICINE

## 2023-03-18 PROCEDURE — 80061 LIPID PANEL: CPT | Performed by: INTERNAL MEDICINE

## 2023-04-10 ENCOUNTER — OFFICE VISIT (OUTPATIENT)
Dept: DERMATOLOGY CLINIC | Facility: CLINIC | Age: 83
End: 2023-04-10

## 2023-04-10 DIAGNOSIS — L81.4 LENTIGO: ICD-10-CM

## 2023-04-10 DIAGNOSIS — D23.60 BENIGN NEOPLASM OF SKIN OF UPPER LIMB, INCLUDING SHOULDER, UNSPECIFIED LATERALITY: ICD-10-CM

## 2023-04-10 DIAGNOSIS — Z85.828 HISTORY OF NONMELANOMA SKIN CANCER: ICD-10-CM

## 2023-04-10 DIAGNOSIS — D22.9 MULTIPLE NEVI: ICD-10-CM

## 2023-04-10 DIAGNOSIS — L57.0 AK (ACTINIC KERATOSIS): Primary | ICD-10-CM

## 2023-04-10 DIAGNOSIS — L82.1 SEBORRHEIC KERATOSES: ICD-10-CM

## 2023-04-10 DIAGNOSIS — D23.30 BENIGN NEOPLASM OF SKIN OF FACE: ICD-10-CM

## 2023-04-10 DIAGNOSIS — D23.4 BENIGN NEOPLASM OF SCALP AND SKIN OF NECK: ICD-10-CM

## 2023-05-04 RX ORDER — AMLODIPINE BESYLATE 5 MG/1
5 TABLET ORAL DAILY
Qty: 90 TABLET | Refills: 3 | Status: SHIPPED | OUTPATIENT
Start: 2023-05-04

## 2023-05-04 RX ORDER — ATORVASTATIN CALCIUM 20 MG/1
20 TABLET, FILM COATED ORAL EVERY EVENING
Qty: 90 TABLET | Refills: 3 | Status: SHIPPED | OUTPATIENT
Start: 2023-05-04

## 2023-06-02 ENCOUNTER — OFFICE VISIT (OUTPATIENT)
Dept: PODIATRY CLINIC | Facility: CLINIC | Age: 83
End: 2023-06-02

## 2023-06-02 DIAGNOSIS — B35.1 ONYCHOMYCOSIS: ICD-10-CM

## 2023-06-02 DIAGNOSIS — G60.9 IDIOPATHIC POLYNEUROPATHY: Primary | ICD-10-CM

## 2023-06-02 PROCEDURE — 1159F MED LIST DOCD IN RCRD: CPT | Performed by: PODIATRIST

## 2023-06-02 PROCEDURE — 1126F AMNT PAIN NOTED NONE PRSNT: CPT | Performed by: PODIATRIST

## 2023-06-02 PROCEDURE — 99213 OFFICE O/P EST LOW 20 MIN: CPT | Performed by: PODIATRIST

## 2023-09-11 ENCOUNTER — OFFICE VISIT (OUTPATIENT)
Dept: PODIATRY CLINIC | Facility: CLINIC | Age: 83
End: 2023-09-11

## 2023-09-11 DIAGNOSIS — G60.9 IDIOPATHIC POLYNEUROPATHY: Primary | ICD-10-CM

## 2023-09-11 DIAGNOSIS — B35.1 ONYCHOMYCOSIS: ICD-10-CM

## 2023-09-11 PROCEDURE — 1159F MED LIST DOCD IN RCRD: CPT | Performed by: PODIATRIST

## 2023-09-11 PROCEDURE — 99213 OFFICE O/P EST LOW 20 MIN: CPT | Performed by: PODIATRIST

## 2023-09-11 NOTE — PROGRESS NOTES
[address] [facility] Podiatry  Progress Note    Chance Hernandez is a 80year old male. Patient presents with:  Toenail Care: 3 months f/u - Pt here for nail care. Denies pain or concerns. HPI:     This is a pleasant male with HTN. He has PMH of lumbar fusion. He notes numbness to his toes. He presents to clinic today due to thick toenails which he is unable to trim himself. Allergies: Clindamycin and Lisinopril   Current Outpatient Medications   Medication Sig Dispense Refill    atorvastatin 20 MG Oral Tab Take 1 tablet (20 mg total) by mouth every evening. 90 tablet 3    amLODIPine 5 MG Oral Tab Take 1 tablet (5 mg total) by mouth daily. 90 tablet 3    finasteride 5 MG Oral Tab Take 1 tablet (5 mg total) by mouth daily. 90 tablet 3    latanoprost 0.005 % Ophthalmic Solution Place 1 drop into both eyes nightly. Ascorbic Acid (VITAMIN C) 500 MG Oral Cap Take 1 tablet by mouth daily. Multiple Vitamins-Minerals (PRESERVISION AREDS) Oral Tab Take 2 tablets by mouth daily. Omega-3 Fatty Acids (FISH OIL OR) Take 1 capsule by mouth 3 (three) times daily. Calcium Carbonate-Vitamin D (CALCIUM 600 + D OR) Take 1 tablet by mouth 2 (two) times daily. Coenzyme Q10 (COQ10 OR) Take 1 capsule by mouth daily. Probiotic Product (PROBIOTIC OR) Take 1 capsule by mouth daily. Boswellia Judah (BOSWELLIA OR) Take 2 tablets by mouth 3 (three) times daily. Cholecalciferol (VITAMIN D3) 1000 UNITS Oral Cap Take  by mouth.  take 1 daily        Past Medical History:   Diagnosis Date    Aortic atherosclerosis ([address]     CT scan 6-16    BPH (benign prostatic hyperplasia) 09/2008    With abnormal PSA, Rx Proscar    Diverticulosis 2007    Glaucoma     Herpes zoster 2008    Left face    Hx of adenomatous colonic polyps 07/2022    Hypercholesterolemia     Hypertension 08/2011    Iron deficiency anemia 09/2008    Ischemic colitis ([address] 12/2015    Recurrent 12-21    Osteoporosis 01/2002    T9 compression fracture, T score -3.8 spine and -0.9 hip, Rx Fosamax, repeat T score -2.5 spine and -1.0 hip 10-10, repeat T score -0.7 hip and -2.2 lumbar spine 3-16, Fosamax DC'd    Peptic ulcer disease     SCC (squamous cell carcinoma) 2017    Left temple    Subclinical hypothyroidism       Past Surgical History:   Procedure Laterality Date    APPENDECTOMY      BACK SURGERY Left 2011    L4-L5 microdiscectomy    CATARACT Left 10/2010    CATARACT Right 2010    COLONOSCOPY  2016    INGUINAL HERNIA REPAIR Bilateral 1970    OTHER  2017    Excision SCCa left temple    SKIN SURGERY Right 10/28/2016    Excision epidermal inclusion cyst back    SPINAL FUSION  10/2011    L4-L5    TONSILLECTOMY  1968      Family History   Problem Relation Age of Onset    Heart Disease Father         CHF age 76    Hypertension Mother     Other (Kidney Cancer[other]) Brother       Social History    Socioeconomic History      Marital status:       Number of children: 2    Occupational History      Occupation: [employer]: part time      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, language arts        Comment: retired    Tobacco Use      Smoking status: Former        Packs/day: 0.25        Years: 5.00        Pack years: 1.25        Types: Cigarettes        Quit date: 1962        Years since quittin.6      Smokeless tobacco: Never    Vaping Use      Vaping Use: Never used    Substance and Sexual Activity      Alcohol use: Yes        Comment: Occasional glass of wine      Drug use: No    Other Topics      Concerns:        Pt has a pacemaker: No        Pt has a defibrillator: No        Reaction to local anesthetic: No        Caffeine Concern: Yes          coffee, 3cups/day          REVIEW OF SYSTEMS:   Denies nausea, fever, chills  No calf pain  No other muscle or joint aches  Denies chest pain or shortness of breath. EXAM:   There were no vitals taken for this visit.     Constitutional:   Patient in no apparent distress. Well kept. Of normal body habitus. Alert and oriented to person, place, and time. Vascular Examination:  DP pulse is 2/4  PT pulse is 2/4  Capillary refill is immediate  Integumentary Examination:   The patient's nails appear incurvated, thickened, elongated, dystrophic, discolored with subungual debris 1-5 right, 1-5  left nails. Digital hair growth is present left and is present right. Skin is of diminished texture and decreased turgor. Neurological Examination:  Monofilament (10-g) sensation is diminished b/l  Sharp/dull is present to right and is present to left. Parasthesias present to b/l toes  Musculoskeletal Examination:  Muscle Strength is 5/5. LABS & IMAGING:     Lab Results   Component Value Date    GLU 94 03/18/2023    BUN 20 (H) 03/18/2023    CREATSERUM 1.02 03/18/2023    BUNCREA 19.6 03/18/2023    ANIONGAP 4 03/18/2023    GFRAA 93 03/02/2022    GFRNAA 81 03/02/2022    CA 9.0 03/18/2023     03/18/2023    K 4.0 03/18/2023     03/18/2023    CO2 32.0 03/18/2023    OSMOCALC 296 (H) 03/18/2023        Lab Results   Component Value Date     03/02/2022    A1C 5.7 (H) 03/02/2022        No results found. ASSESSMENT AND PLAN:   Diagnoses and all orders for this visit:    Idiopathic polyneuropathy    Onychomycosis          Plan:     Discussed etiology and symptoms of neuropathy/neuritis  Discussed treatment options such as medication, vitamins, physical therapy. Discussed risk, indications, and benefits of medication as well as all side effects of medication. Discussed potential for symptoms to be related to spinal pathology and discussed the nature of radiculopathy and impact of the sciatic nerve as well as peripheral nerves to the lower extremity. May consider referral to neurologist.    Evaluated the patient. Discussed treatment options with the patient. Discussed with patient proper care and hygiene for their feet.   Patient tolerated procedures well, without incident. Instrumentation used includes nail nippers and electric  where appropriate. Procedure: (15043 Debridement of toenails 6-10) Surgically debrided and mechanically reduced 6 or more toenails. Hemmorhage occurred none. Nails that were debrided appeared dystrophic and caused the patient pain in shoe gear. Nails 5 Left & 5 Right. RTC 3 months for high risk nail care    No follow-ups on file.     Larisa Mitchell DPM  9/11/23

## 2023-09-18 ENCOUNTER — OFFICE VISIT (OUTPATIENT)
Dept: INTERNAL MEDICINE CLINIC | Facility: CLINIC | Age: 83
End: 2023-09-18

## 2023-09-18 ENCOUNTER — TELEPHONE (OUTPATIENT)
Dept: INTERNAL MEDICINE CLINIC | Facility: CLINIC | Age: 83
End: 2023-09-18

## 2023-09-18 VITALS
HEIGHT: 67 IN | BODY MASS INDEX: 22.1 KG/M2 | SYSTOLIC BLOOD PRESSURE: 154 MMHG | WEIGHT: 140.81 LBS | DIASTOLIC BLOOD PRESSURE: 68 MMHG

## 2023-09-18 DIAGNOSIS — E78.00 HYPERCHOLESTEROLEMIA: ICD-10-CM

## 2023-09-18 DIAGNOSIS — I10 PRIMARY HYPERTENSION: Primary | ICD-10-CM

## 2023-09-18 PROCEDURE — 3008F BODY MASS INDEX DOCD: CPT | Performed by: INTERNAL MEDICINE

## 2023-09-18 PROCEDURE — 3078F DIAST BP <80 MM HG: CPT | Performed by: INTERNAL MEDICINE

## 2023-09-18 PROCEDURE — 1126F AMNT PAIN NOTED NONE PRSNT: CPT | Performed by: INTERNAL MEDICINE

## 2023-09-18 PROCEDURE — 1159F MED LIST DOCD IN RCRD: CPT | Performed by: INTERNAL MEDICINE

## 2023-09-18 PROCEDURE — 99214 OFFICE O/P EST MOD 30 MIN: CPT | Performed by: INTERNAL MEDICINE

## 2023-09-18 PROCEDURE — 3077F SYST BP >= 140 MM HG: CPT | Performed by: INTERNAL MEDICINE

## 2023-09-18 PROCEDURE — 1160F RVW MEDS BY RX/DR IN RCRD: CPT | Performed by: INTERNAL MEDICINE

## 2023-09-18 RX ORDER — AMLODIPINE BESYLATE 10 MG/1
10 TABLET ORAL DAILY
Qty: 90 TABLET | Refills: 1 | Status: SHIPPED | OUTPATIENT
Start: 2023-09-18

## 2023-10-09 ENCOUNTER — OFFICE VISIT (OUTPATIENT)
Dept: DERMATOLOGY CLINIC | Facility: CLINIC | Age: 83
End: 2023-10-09

## 2023-10-09 DIAGNOSIS — L82.1 SEBORRHEIC KERATOSES: ICD-10-CM

## 2023-10-09 DIAGNOSIS — Z85.828 HISTORY OF NONMELANOMA SKIN CANCER: ICD-10-CM

## 2023-10-09 DIAGNOSIS — D22.9 MULTIPLE NEVI: ICD-10-CM

## 2023-10-09 DIAGNOSIS — L81.4 LENTIGO: ICD-10-CM

## 2023-10-09 DIAGNOSIS — L57.0 AK (ACTINIC KERATOSIS): Primary | ICD-10-CM

## 2023-10-09 DIAGNOSIS — D23.5 BENIGN NEOPLASM OF SKIN OF TRUNK, EXCEPT SCROTUM: ICD-10-CM

## 2023-10-09 PROCEDURE — 17000 DESTRUCT PREMALG LESION: CPT | Performed by: DERMATOLOGY

## 2023-10-09 PROCEDURE — 17003 DESTRUCT PREMALG LES 2-14: CPT | Performed by: DERMATOLOGY

## 2023-10-09 PROCEDURE — 99213 OFFICE O/P EST LOW 20 MIN: CPT | Performed by: DERMATOLOGY

## 2023-10-09 PROCEDURE — 1159F MED LIST DOCD IN RCRD: CPT | Performed by: DERMATOLOGY

## 2023-10-09 PROCEDURE — 1160F RVW MEDS BY RX/DR IN RCRD: CPT | Performed by: DERMATOLOGY

## 2023-10-22 NOTE — PROGRESS NOTES
Christian Andrew is a 80year old male. HPI:     CC:  Patient presents with:  Upper Body Exam: LOV 4/10/23. Patient present for 6 month Upper Body skin exam. Denies any concerns at this time. Has personal Hx of AK's and SCC.         Allergies:  Clindamycin and Lisinopril    HISTORY:    Past Medical History:   Diagnosis Date    Aortic atherosclerosis ([facility]     CT scan 6-16    BPH (benign prostatic hyperplasia) 09/2008    With abnormal PSA, Rx Proscar    Diverticulosis 2007    Glaucoma     Herpes zoster 2008    Left face    Hx of adenomatous colonic polyps 07/2022    Hypercholesterolemia     Hypertension 08/2011    Iron deficiency anemia 09/2008    Ischemic colitis ([facility] 12/2015    Recurrent 12-21    Osteoporosis 01/2002    T9 compression fracture, T score -3.8 spine and -0.9 hip, Rx Fosamax, repeat T score -2.5 spine and -1.0 hip 10-10, repeat T score -0.7 hip and -2.2 lumbar spine 3-16, Fosamax DC'd    Peptic ulcer disease 1980    SCC (squamous cell carcinoma) 01/2017    Left temple    Subclinical hypothyroidism       Past Surgical History:   Procedure Laterality Date    APPENDECTOMY  1961    BACK SURGERY Left 12/2011    L4-L5 microdiscectomy    CATARACT Left 10/2010    CATARACT Right 12/2010    COLONOSCOPY  1/2016    INGUINAL HERNIA REPAIR Bilateral 1970    OTHER  January 2017    Excision SCCa left temple    SKIN SURGERY Right 10/28/2016    Excision epidermal inclusion cyst back    SPINAL FUSION  10/2011    L4-L5    TONSILLECTOMY  1968      Family History   Problem Relation Age of Onset    Heart Disease Father         CHF age 76    Hypertension Mother     Other (Kidney Cancer[other]) Brother       Social History     Socioeconomic History    Marital status:     Number of children: 2   Occupational History    Occupation: [employer]     Comment: part time    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, language arts     Comment: retired   Tobacco Use    Smoking status: Former     Packs/day: 0.25     Years: 5.00     Additional pack years: 0.00     Total pack years: 1.25     Types: Cigarettes     Quit date: 1962     Years since quittin.7    Smokeless tobacco: Never   Vaping Use    Vaping Use: Never used   Substance and Sexual Activity    Alcohol use: Yes     Comment: Occasional glass of wine    Drug use: No   Other Topics Concern    Pt has a pacemaker No    Pt has a defibrillator No    Reaction to local anesthetic No    Caffeine Concern Yes     Comment: coffee, 3cups/day        Current Outpatient Medications   Medication Sig Dispense Refill    amLODIPine 10 MG Oral Tab Take 1 tablet (10 mg total) by mouth daily. 90 tablet 1    atorvastatin 20 MG Oral Tab Take 1 tablet (20 mg total) by mouth every evening. 90 tablet 3    finasteride 5 MG Oral Tab Take 1 tablet (5 mg total) by mouth daily. 90 tablet 3    latanoprost 0.005 % Ophthalmic Solution Place 1 drop into both eyes nightly. Ascorbic Acid (VITAMIN C) 500 MG Oral Cap Take 1 tablet by mouth daily. Multiple Vitamins-Minerals (PRESERVISION AREDS) Oral Tab Take 2 tablets by mouth daily. Omega-3 Fatty Acids (FISH OIL OR) Take 1 capsule by mouth 3 (three) times daily. Calcium Carbonate-Vitamin D (CALCIUM 600 + D OR) Take 1 tablet by mouth 2 (two) times daily. Coenzyme Q10 (COQ10 OR) Take 1 capsule by mouth daily. Probiotic Product (PROBIOTIC OR) Take 1 capsule by mouth daily. Boswellia Judah (BOSWELLIA OR) Take 2 tablets by mouth 3 (three) times daily. Cholecalciferol (VITAMIN D3) 1000 UNITS Oral Cap Take  by mouth.  take 1 daily       Allergies:     Clindamycin             OTHER (SEE COMMENTS)    Comment:Angioedema  Lisinopril              OTHER (SEE COMMENTS)    Comment:Angioedema    Past Medical History:   Diagnosis Date    Aortic atherosclerosis (HCC)     CT scan 6-16    BPH (benign prostatic hyperplasia) 2008    With abnormal PSA, Rx Proscar    Diverticulosis     Glaucoma     Herpes zoster 2008    Left face    Hx of adenomatous colonic polyps 2022    Hypercholesterolemia     Hypertension 2011    Iron deficiency anemia 2008    Ischemic colitis ([facility] 2015    Recurrent 12-21    Osteoporosis 2002    T9 compression fracture, T score -3.8 spine and -0.9 hip, Rx Fosamax, repeat T score -2.5 spine and -1.0 hip 10-10, repeat T score -0.7 hip and -2.2 lumbar spine 3-16, Fosamax DC'd    Peptic ulcer disease     SCC (squamous cell carcinoma) 2017    Left temple    Subclinical hypothyroidism      Past Surgical History:   Procedure Laterality Date    APPENDECTOMY      BACK SURGERY Left 2011    L4-L5 microdiscectomy    CATARACT Left 10/2010    CATARACT Right 2010    COLONOSCOPY  2016    INGUINAL HERNIA REPAIR Bilateral 1970    OTHER  2017    Excision SCCa left temple    SKIN SURGERY Right 10/28/2016    Excision epidermal inclusion cyst back    SPINAL FUSION  10/2011    L4-L5    TONSILLECTOMY  1968     Social History    Socioeconomic History      Marital status:       Spouse name: Not on file      Number of children: 2      Years of education: Not on file      Highest education level: Not on file    Occupational History      Occupation: [employer]: part time      Occupation: [employer] teacher, [employer]        Comment: retired    Tobacco Use      Smoking status: Former        Packs/day: 0.25        Years: 5.00        Additional pack years: 0.00        Total pack years: 1.25        Types: Cigarettes        Quit date: 1962        Years since quittin.7      Smokeless tobacco: Never    Vaping Use      Vaping Use: Never used    Substance and Sexual Activity      Alcohol use: Yes        Comment: Occasional glass of wine      Drug use: No      Sexual activity: Not on file    Other Topics      Concerns:        Grew up on a farm: Not Asked        History of tanning: Not Asked        Outdoor occupation: Not Asked        Pt has a pacemaker: No        Pt has a defibrillator: No        Reaction to local anesthetic: No         Service: Not Asked        Blood Transfusions: Not Asked        Caffeine Concern: Yes          coffee, 3cups/day        Occupational Exposure: Not Asked        Hobby Hazards: Not Asked        Sleep Concern: Not Asked        Stress Concern: Not Asked        Weight Concern: Not Asked        Special Diet: Not Asked        Back Care: Not Asked        Exercise: Not Asked        Bike Helmet: Not Asked        Seat Belt: Not Asked        Self-Exams: Not Asked    Social History Narrative      Not on file    Social Determinants of Health  Financial Resource Strain: Not on file  Food Insecurity: Not on file  Transportation Needs: Not on file  Physical Activity: Not on file  Stress: Not on file  Social Connections: Not on file  Housing Stability: Not on file  Family History   Problem Relation Age of Onset    Heart Disease Father         CHF age 76    Hypertension Mother     Other (Kidney Cancer[other]) Brother        There were no vitals filed for this visit. HPI:    Patient presents with:  Upper Body Exam: LOV 4/10/23. Patient present for 6 month Upper Body skin exam. Denies any concerns at this time. Has personal Hx of AK's and SCC. Of note patient's daughter physician at [facility] preventative care-remains in [facility] starting new preventative medicine program through oJann daughter in [place]. Patient hopes to travel this year    Does generally wear hat when he is outside= [employer]   Follow-up AK's no particular concerns. Dermatitic changes have resolved    Follow-up history of SKs, history of skin cancer SCC left temple January 2017. History of skin cyst.  No particular worrisome lesions at this time has been careful to use sun protection. Patient presents with concerns above. Past notes/ records and appropriate/relevant lab results including pathology and past body maps reviewed. Updated and new information noted in current visit. Patient has been in their usual state of health. History, medications, allergies reviewed as noted. ROS:  Denies any other systemic complaints. No new or changeing lesions other than noted above. No fevers, chills, night sweats, unusual sun sensitivity. No other skin complaints. History, medications, allergies reviewed as noted. Physical Examination:     Well-developed well-nourished patient alert oriented in no acute distress. Examp erformed, including scalp, head, neck, face,nails, hair, external eyes, including conjunctival mucosa, eyelids, lips external ears, upper back, upper chest, arms, palms. Multiple light to medium brown, well marginated, uniformly pigmented, macules and papules 6 mm and less scattered on exam. pigmented lesions examined with dermoscopy benign-appearing patterns. Waxy tannish keratotic papules scattered, cherry-red vascular papules scattered. See map today's date for lesions noted . Background of sun damage temples forehead brow with minimal erythema and scaling. Minimal erythema at left brow near area of previous SCC no evidence recurrence multiple benign keratoses stable  Otherwise remarkable for lesions as noted on map. See Assessment /Plan for additional history and physical exam also:    Assessment / plan:    No orders of the defined types were placed in this encounter. Meds & Refills for this Visit:  Requested Prescriptions      No prescriptions requested or ordered in this encounter         Ak (actinic keratosis)  (primary encounter diagnosis)  Seborrheic keratoses  Lentigo  Multiple nevi  Benign neoplasm of skin of trunk, except scrotum  History of nonmelanoma skin cancer    Erythematous scaling keratotic papules noted at sites noted on map  Actinic Keratoses. Precancerous nature discussed. Sun protection, sunscreen/ blocks encouraged Lesions treated with cryo- . Biopsy if not resolved.     Left forehead temple lateral brow X3    Arms and hands okay no active AK's in this area    Continue sunscreen    Papule at right forehead observe. If appears to be small cyst versus intradermal nevus monitor recheck at follow-up if this does become symptomatic we will recheck sooner overall stable    Scattered benign keratoses over the face scalp neck arms hands       Continue careful sun protection, regular skin checks history of SCC no recurrence. No new suspicious lesions. Moderate sun damage continue careful sun protection hat use sunscreen use    Patient is an avid  continue careful sun protection. Does wear hat and sunscreen. Patient's daughter is preventative health provider at [facility]. No recent trips  Multiple waxy tan keratotic papules over the upper back. Cystic papule at left lateral back reassurance. Discussed pros and cons of removal not bothersome currently observation. No other susupicious lesions on todays  exam.      Benign seborrheic keratoses lentigines. No significant changes  Please refer to map for specific lesions. See additional diagnoses. Pros cons of various therapies, risks benefits discussed. Pathophysiology discussed with patient. Therapeutic options reviewed. See  Medications in grid. Instructions reviewed at length. Benign nevi, seborrheic  keratoses, cherry angiomas:  Reassurance regarding other benign skin lesions. Signs and symptoms of skin cancer, ABCDE's of melanoma discussed with patient. Sunscreen use, sun protection, self exams reviewed. Followup as noted RTC routine checkup 6 mos - one year or p.r.n. Encounter Times  Including precharting, reviewing chart, prior notes obtaining history: 5 minutes, medical exam :10 minutes, notes on body map, plan, counseling 10minutes My total time spent caring for the patient on the day of the encounter: 25 minutes     The patient indicates understanding of these issues and agrees to the plan.   The patient is asked to return as noted in follow-up/ above. This note was generated using Dragon voice recognition software. Please contact me regarding any confusion resulting from errors in recognition. Note to patient and family: The [facility] makes medical notes like these available to patients. However, be advised this is a medical document. It is intended as [id number] communication and monitoring of a patient's care needs. It is written in medical language and may contain abbreviations or verbiage that are unfamiliar. It may appear blunt or direct. Medical documents are intended to carry relevant information, facts as evident and the clinical opinion of the practitioner.

## 2023-10-30 ENCOUNTER — OFFICE VISIT (OUTPATIENT)
Dept: INTERNAL MEDICINE CLINIC | Facility: CLINIC | Age: 83
End: 2023-10-30

## 2023-10-30 VITALS
SYSTOLIC BLOOD PRESSURE: 134 MMHG | BODY MASS INDEX: 22.23 KG/M2 | HEART RATE: 76 BPM | WEIGHT: 141.63 LBS | HEIGHT: 67 IN | DIASTOLIC BLOOD PRESSURE: 58 MMHG

## 2023-10-30 DIAGNOSIS — I10 PRIMARY HYPERTENSION: Primary | ICD-10-CM

## 2023-10-30 PROCEDURE — 3078F DIAST BP <80 MM HG: CPT | Performed by: INTERNAL MEDICINE

## 2023-10-30 PROCEDURE — 1160F RVW MEDS BY RX/DR IN RCRD: CPT | Performed by: INTERNAL MEDICINE

## 2023-10-30 PROCEDURE — 1159F MED LIST DOCD IN RCRD: CPT | Performed by: INTERNAL MEDICINE

## 2023-10-30 PROCEDURE — 3008F BODY MASS INDEX DOCD: CPT | Performed by: INTERNAL MEDICINE

## 2023-10-30 PROCEDURE — 99213 OFFICE O/P EST LOW 20 MIN: CPT | Performed by: INTERNAL MEDICINE

## 2023-10-30 PROCEDURE — 3075F SYST BP GE 130 - 139MM HG: CPT | Performed by: INTERNAL MEDICINE

## 2023-10-30 PROCEDURE — 1126F AMNT PAIN NOTED NONE PRSNT: CPT | Performed by: INTERNAL MEDICINE

## 2023-10-30 NOTE — PATIENT INSTRUCTIONS
Your blood pressure today was good at 134/58. Please continue your medications, healthy diet and regular physical activity. Please schedule a Medicare physical in March.

## 2023-12-18 ENCOUNTER — OFFICE VISIT (OUTPATIENT)
Dept: PODIATRY CLINIC | Facility: CLINIC | Age: 83
End: 2023-12-18

## 2023-12-18 DIAGNOSIS — B35.1 ONYCHOMYCOSIS: ICD-10-CM

## 2023-12-18 DIAGNOSIS — G60.9 IDIOPATHIC POLYNEUROPATHY: Primary | ICD-10-CM

## 2023-12-18 PROCEDURE — 1159F MED LIST DOCD IN RCRD: CPT | Performed by: PODIATRIST

## 2023-12-18 PROCEDURE — 99213 OFFICE O/P EST LOW 20 MIN: CPT | Performed by: PODIATRIST

## 2023-12-18 NOTE — PROGRESS NOTES
[facility], [facility] Podiatry  Progress Note    Ivelisse Rich is a 80year old male. Chief Complaint   Patient presents with    Toenail Care     F/u Toenail Care. Elderly patient unable to trim toenail due to medical condition. HPI:     This is a pleasant male with HTN. He has PMH of lumbar fusion. He notes numbness to his toes. He presents to clinic today due to thick toenails which he is unable to trim himself. Allergies: Clindamycin and Lisinopril   Current Outpatient Medications   Medication Sig Dispense Refill    amLODIPine 10 MG Oral Tab Take 1 tablet (10 mg total) by mouth daily. 90 tablet 1    atorvastatin 20 MG Oral Tab Take 1 tablet (20 mg total) by mouth every evening. 90 tablet 3    finasteride 5 MG Oral Tab Take 1 tablet (5 mg total) by mouth daily. 90 tablet 3    latanoprost 0.005 % Ophthalmic Solution Place 1 drop into both eyes nightly. Ascorbic Acid (VITAMIN C) 500 MG Oral Cap Take 1 tablet by mouth daily. Multiple Vitamins-Minerals (PRESERVISION AREDS) Oral Tab Take 2 tablets by mouth daily. Omega-3 Fatty Acids (FISH OIL OR) Take 1 capsule by mouth 3 (three) times daily. Calcium Carbonate-Vitamin D (CALCIUM 600 + D OR) Take 1 tablet by mouth 2 (two) times daily. Coenzyme Q10 (COQ10 OR) Take 1 capsule by mouth daily. Probiotic Product (PROBIOTIC OR) Take 1 capsule by mouth daily. Boswellia Judah (BOSWELLIA OR) Take 2 tablets by mouth 3 (three) times daily. Cholecalciferol (VITAMIN D3) 1000 UNITS Oral Cap Take  by mouth.  take 1 daily        Past Medical History:   Diagnosis Date    Aortic atherosclerosis ([address]     CT scan 6-16    BPH (benign prostatic hyperplasia) 09/2008    With abnormal PSA, Rx Proscar    Diverticulosis 2007    Glaucoma     Herpes zoster 2008    Left face    Hx of adenomatous colonic polyps 07/2022    Hypercholesterolemia     Hypertension 08/2011    Iron deficiency anemia 09/2008    Ischemic colitis ([address] 12/2015 Recurrent 12-21    Osteoporosis 2002    T9 compression fracture, T score -3.8 spine and -0.9 hip, Rx Fosamax, repeat T score -2.5 spine and -1.0 hip 10-10, repeat T score -0.7 hip and -2.2 lumbar spine 3-16, Fosamax DC'd    Peptic ulcer disease     SCC (squamous cell carcinoma) 2017    Left temple    Subclinical hypothyroidism       Past Surgical History:   Procedure Laterality Date    APPENDECTOMY      BACK SURGERY Left 2011    L4-L5 microdiscectomy    CATARACT Left 10/2010    CATARACT Right 2010    COLONOSCOPY  2016    INGUINAL HERNIA REPAIR Bilateral 1970    OTHER  2017    Excision SCCa left temple    SKIN SURGERY Right 10/28/2016    Excision epidermal inclusion cyst back    SPINAL FUSION  10/2011    L4-L5    TONSILLECTOMY  1968      Family History   Problem Relation Age of Onset    Heart Disease Father         CHF age 76    Hypertension Mother     Other (Kidney Cancer[other]) Brother       Social History     Socioeconomic History    Marital status:     Number of children: 2   Occupational History    Occupation: [employer]: part time    Occupation: [employer] teacher, language arts     Comment: retired   Tobacco Use    Smoking status: Former     Packs/day: 0.25     Years: 5.00     Additional pack years: 0.00     Total pack years: 1.25     Types: Cigarettes     Quit date: 1962     Years since quittin.8    Smokeless tobacco: Never   Vaping Use    Vaping Use: Never used   Substance and Sexual Activity    Alcohol use: Yes     Comment: Occasional glass of wine    Drug use: No   Other Topics Concern    Pt has a pacemaker No    Pt has a defibrillator No    Reaction to local anesthetic No    Caffeine Concern Yes     Comment: coffee, 3cups/day           REVIEW OF SYSTEMS:   Denies nausea, fever, chills  No calf pain  No other muscle or joint aches  Denies chest pain or shortness of breath. EXAM:   There were no vitals taken for this visit.     Constitutional: Patient in no apparent distress. Well kept. Of normal body habitus. Alert and oriented to person, place, and time. Vascular Examination:  DP pulse is 2/4  PT pulse is 2/4  Capillary refill is immediate  Integumentary Examination:   The patient's nails appear incurvated, thickened, elongated, dystrophic, discolored with subungual debris 1-5 right, 1-5  left nails. Digital hair growth is present left and is present right. Skin is of diminished texture and decreased turgor. Neurological Examination:  Monofilament (10-g) sensation is diminished b/l  Sharp/dull is present to right and is present to left. Parasthesias present to b/l toes  Musculoskeletal Examination:  Muscle Strength is 5/5. LABS & IMAGING:     Lab Results   Component Value Date    GLU 94 03/18/2023    BUN 20 (H) 03/18/2023    CREATSERUM 1.02 03/18/2023    BUNCREA 19.6 03/18/2023    ANIONGAP 4 03/18/2023    GFRAA 93 03/02/2022    GFRNAA 81 03/02/2022    CA 9.0 03/18/2023     03/18/2023    K 4.0 03/18/2023     03/18/2023    CO2 32.0 03/18/2023    OSMOCALC 296 (H) 03/18/2023        Lab Results   Component Value Date     03/02/2022    A1C 5.7 (H) 03/02/2022        No results found. ASSESSMENT AND PLAN:   Diagnoses and all orders for this visit:    Idiopathic polyneuropathy    Onychomycosis            Plan:     Discussed etiology and symptoms of neuropathy/neuritis  Discussed treatment options such as medication, vitamins, physical therapy. Discussed risk, indications, and benefits of medication as well as all side effects of medication. Discussed potential for symptoms to be related to spinal pathology and discussed the nature of radiculopathy and impact of the sciatic nerve as well as peripheral nerves to the lower extremity. May consider referral to neurologist.    Evaluated the patient. Discussed treatment options with the patient. Discussed with patient proper care and hygiene for their feet.   Patient tolerated procedures well, without incident. Instrumentation used includes nail nippers and electric  where appropriate. Procedure: (87786 Debridement of toenails 6-10) Surgically debrided and mechanically reduced 6 or more toenails. Hemmorhage occurred none. Nails that were debrided appeared dystrophic and caused the patient pain in shoe gear. Nails 5 Left & 5 Right. RTC 3 months for high risk nail care    No follow-ups on file.     Malachi Stevens DPM  12/18/23

## 2024-01-03 RX ORDER — FINASTERIDE 5 MG/1
5 TABLET, FILM COATED ORAL DAILY
Qty: 30 TABLET | Refills: 0 | Status: SHIPPED | OUTPATIENT
Start: 2024-01-03

## 2024-01-03 NOTE — TELEPHONE ENCOUNTER
1 month refill sent. Pt has f/u appt with AR on 1/8/24    Benign Prostatic Hypertrophy Medications      Protocol Criteria:  Appointment scheduled in the past 12 months or in the next 2 months  If patients is between the ages of 50 and 70 then PSA done within the last 2 years and is either  Less than or equal to 4.0 ng/ml  Or if greater than 4.0 there is no significant increase (>0.5 ng/ml) in PSA over the last 2 years    Recent Outpatient Visits              2 weeks ago Idiopathic polyneuropathy    [facility] M[facility], Jordan Torsten, DPM    Office Visit    2 months ago Primary hypertension    [facility] Josh Li MD    Office Visit    2 months ago AK (actinic keratosis)    [facility] Lisa Dorman MD    Office Visit    3 months ago Primary hypertension    [facility] Josh Li MD    Office Visit    3 months ago Idiopathic polyneuropathy    [facility] Meshulam, Jordan Torsten, DPM    Office Visit          Future Appointments         Provider Department Appt Notes    In 5 days Gracie Anderson MD [facility] medication refill    In 2 months Josh Li MD [facility] MA SUPER last px 3/17/23    In 2 months Meshulam, Jordan Torsten, DPM [facility] -[facility]    In 3 months Lisa Dorman MD [facility] followup                PSA:  No results found for: \"PSA\"    PSA Screen:  No results found for: \"PSAS\"

## 2024-01-08 ENCOUNTER — OFFICE VISIT (OUTPATIENT)
Dept: SURGERY | Facility: CLINIC | Age: 84
End: 2024-01-08

## 2024-01-08 DIAGNOSIS — R39.9 LOWER URINARY TRACT SYMPTOMS: Primary | ICD-10-CM

## 2024-01-08 PROCEDURE — 1159F MED LIST DOCD IN RCRD: CPT | Performed by: UROLOGY

## 2024-01-08 PROCEDURE — 99214 OFFICE O/P EST MOD 30 MIN: CPT | Performed by: UROLOGY

## 2024-01-08 PROCEDURE — 1160F RVW MEDS BY RX/DR IN RCRD: CPT | Performed by: UROLOGY

## 2024-01-08 NOTE — PROGRESS NOTES
Gracie Anderson MD  Department of Urology  [address] [place] [address] [address]  [place] [address]    T: [phone]  F: [phone]    To: Josh Li MD   [facility] [id number]    Re: Armando Forrester   MRN: [id number]  : 1940    Dear Josh Li MD,    Today I had the pleasure of seeing Armando Forrester in my clinic. As you know, Mr. Forrester is a pleasant 83 year old year old male who I am seeing for 1 year follow up. Patient was last seen in this department on Visit date not found.    Briefly, patient was followed by Dr. Andrews for lower urinary tract symptoms, namely frequency, intermittency, nocturia.  He was placed on finasteride only which apparently relieved his symptoms.  Plan at last visit was for patient to continue finasteride, take MiraLAX and follow-up with Pb bennett in 1 year.     Note that his last PSA was in  and was 1.1.  Creatinine in 2022 0.88.  Urinalysis in 2022 with no microscopic hematuria, negative nitrites and leukocyte Estrace.     Today patient states he has absolutely no complaints today.  He ran out of finasteride 1 month ago and has not been taking it.    On further questioning patient states that he is not taking tamsulosin 0.4 mg nightly.  He does not believe he has taken it in the past.  He also states that he never had any lower urinary tract symptoms that prompted the finasteride initiation by Dr. Andrews.    On review of Dr. Andrews's notes, in  patient had no significant lower urinary tract symptoms.  He was told to continue finasteride 5 mg daily which she had been taking since .  There is no record of him taking tamsulosin 0.4 mg nightly.  There is no record of why the finasteride was started as I do not have the notes from .  Per chart review it appears that Dr. Andrews offered him a greenlight laser or finasteride although patient had no symptoms.  This was simply for size of prostate on digital  rectal exam.       PAST MEDICAL HISTORY:  Past Medical History:   Diagnosis Date    Aortic atherosclerosis (HCC)     CT scan 6-16    BPH (benign prostatic hyperplasia) 09/2008    With abnormal PSA, Rx Proscar    Diverticulosis 2007    Glaucoma     Herpes zoster 2008    Left face    Hx of adenomatous colonic polyps 07/2022    Hypercholesterolemia     Hypertension 08/2011    Iron deficiency anemia 09/2008    Ischemic colitis (HCC) 12/2015    Recurrent 12-21    Osteoporosis 01/2002    T9 compression fracture, T score -3.8 spine and -0.9 hip, Rx Fosamax, repeat T score -2.5 spine and -1.0 hip 10-10, repeat T score -0.7 hip and -2.2 lumbar spine 3-16, Fosamax DC'd    Peptic ulcer disease 1980    SCC (squamous cell carcinoma) 01/2017    Left temple    Subclinical hypothyroidism         PAST SURGICAL HISTORY:  Past Surgical History:   Procedure Laterality Date    APPENDECTOMY  1961    BACK SURGERY Left 12/2011    L4-L5 microdiscectomy    CATARACT Left 10/2010    CATARACT Right 12/2010    COLONOSCOPY  1/2016    INGUINAL HERNIA REPAIR Bilateral 1970    OTHER  January 2017    Excision SCCa left temple    SKIN SURGERY Right 10/28/2016    Excision epidermal inclusion cyst back    SPINAL FUSION  10/2011    L4-L5    TONSILLECTOMY  1968         ALLERGIES:  Allergies   Allergen Reactions    Clindamycin OTHER (SEE COMMENTS)     Angioedema    Lisinopril OTHER (SEE COMMENTS)     Angioedema         MEDICATIONS:  Current Outpatient Medications   Medication Instructions    amLODIPine (NORVASC) 10 mg, Oral, Daily    Ascorbic Acid (VITAMIN C) 500 MG Oral Cap 1 tablet, Oral, Daily    atorvastatin (LIPITOR) 20 mg, Oral, Every evening    Boswellia Judah (BOSWELLIA OR) 2 tablets, Oral, 3 times daily    Calcium Carbonate-Vitamin D (CALCIUM 600 + D OR) 1 tablet, Oral, 2 times daily    Cholecalciferol (VITAMIN D3) 1000 UNITS Oral Cap Oral, take 1 daily    Coenzyme Q10 (COQ10 OR) 1 capsule, Oral, Daily    finasteride (PROSCAR) 5 mg, Oral, Daily     latanoprost 0.005 % Ophthalmic Solution 1 drop, Both Eyes, Nightly    Multiple Vitamins-Minerals (PRESERVISION AREDS) Oral Tab 2 tablets, Oral, Daily    Omega-3 Fatty Acids (FISH OIL OR) 1 capsule, Oral, 3 times daily    Probiotic Product (PROBIOTIC OR) 1 capsule, Oral, Daily        FAMILY HISTORY:  Family History   Problem Relation Age of Onset    Heart Disease Father         CHF age 68    Hypertension Mother     Other (Kidney Cancer[other]) Brother         SOCIAL HISTORY:  Social History     Socioeconomic History    Marital status:     Number of children: 2   Occupational History    Occupation:      Comment: part time    Occupation: [employer] teacher, [employer]     Comment: retired   Tobacco Use    Smoking status: Former     Packs/day: 0.25     Years: 5.00     Additional pack years: 0.00     Total pack years: 1.25     Types: Cigarettes     Quit date: 1962     Years since quittin.9    Smokeless tobacco: Never   Vaping Use    Vaping Use: Never used   Substance and Sexual Activity    Alcohol use: Yes     Comment: Occasional glass of wine    Drug use: No   Other Topics Concern    Pt has a pacemaker No    Pt has a defibrillator No    Reaction to local anesthetic No    Caffeine Concern Yes     Comment: coffee, 3cups/day          PHYSICAL EXAMINATION:  There were no vitals filed for this visit.  CONSTITUTIONAL: No apparent distress, cooperative and communicative  NEUROLOGIC: Alert and oriented   HEAD: Normocephalic, atraumatic   EYES: Sclera non-icteric   ENT: Hearing intact, moist mucous membranes   NECK: No obvious goiter or masses   RESPIRATORY: Normal respiratory effort, Nonlabored breathing on room air  SKIN: No evident rashes   ABDOMEN: Soft, nontender, nondistended, no rebound tenderness, no guarding, no masses      REVIEW OF SYSTEMS:    A comprehensive 10-point review of systems was completed.  Pertinent positives and negatives are noted in the the HPI.       LABORATORY  DATA:  WBC  4.0 - 11.0 x10(3) uL 5.4   RBC  3.80 - 5.80 x10(6)uL 4.35   HGB  13.0 - 17.5 g/dL 13.0   HCT  39.0 - 53.0 % 40.8   MCV  80.0 - 100.0 fL 93.8   MCH  26.0 - 34.0 pg 29.9   MCHC  31.0 - 37.0 g/dL 31.9   RDW  11.0 - 15.0 % 13.2   RDW-SD  35.1 - 46.3 fL 45.7   PLT  150.0 - 450.0 10(3)uL 213.0         Component  Ref Range & Units 3/18/23  9:31 AM   Glucose  70 - 99 mg/dL 94   Sodium  136 - 145 mmol/L 142   Potassium  3.5 - 5.1 mmol/L 4.0   Chloride  98 - 112 mmol/L 106   CO2  21.0 - 32.0 mmol/L 32.0   Anion Gap  0 - 18 mmol/L 4   BUN  7 - 18 mg/dL 20 High    Creatinine  0.70 - 1.30 mg/dL 1.02   BUN/CREA Ratio  10.0 - 20.0 19.6   Calcium, Total  8.5 - 10.1 mg/dL 9.0   Calculated Osmolality  275 - 295 mOsm/kg 296 High    eGFR-Cr  >=60 mL/min/1.73m2 73           IMAGING REVIEW:  Narrative   PROCEDURE: CT ABDOMEN PELVIS IV CONTRAST NO ORAL (ER)     COMPARISON: [facility], CT ABDOMEN PELVIS W CON, 6/29/2016, 4:00 PM.     INDICATIONS: Left flank pain today. History of appendectomy.     TECHNIQUE: CT images of the abdomen and pelvis were obtained with non-ionic intravenous contrast material.  Automated exposure control for dose reduction was used. Adjustment of the mA and/or kV was done based on the patient's size. Use of iterative  reconstruction technique for dose reduction was used.  Dose information is transmitted to the ACR (American College of Radiology) NRDR (National Radiology Data Registry) which includes the Dose Index Registry.     FINDINGS:  LIVER: No enlargement, atrophy, abnormal density, or significant focal lesion.    GALLBLADDER: Few small adjacent subcentimeter gallstones are noted dependently within the gallbladder lumen versus 1 lobulated stone, measuring 5 mm.  Gallbladder is mildly over distended.  No pericholecystic fluid noted.  BILIARY: Possible pancreatic divisum noted at the pancreatic head.  CBD is not dilated.  No significant intrahepatic biliary ductal  dilatation.  SPLEEN: No enlargement or focal lesion.    STOMACH: Small hiatal hernia.  Stomach otherwise grossly unremarkable.  PANCREAS: No lesion, fluid collection, ductal dilatation, or atrophy.    ADRENALS: No defined mass or abnormal enlargement.    KIDNEYS: Negative for hydronephrosis.  Symmetric renal cortical enhancement bilaterally.  Subcentimeter hypodensities bilaterally are too small to fully characterize, statistics most likely cysts.  AORTA/VASCULAR:   Tortuous course of the abdominal aorta with severe atherosclerotic calcifications.  Extensive calcifications of the major aortic branch vessels as well.  There is a mild fusiform aneurysm of the infrarenal abdominal aorta measuring 2.4  cm diameter, similar to prior (series 5, image 41).  RETROPERITONEUM: No mass or enlarged adenopathy.    BOWEL/MESENTERY: There is a large amount of stool throughout the colon.  There are scattered colonic diverticula noted without acute inflammatory changes to suggest diverticulitis.  There is long segment pericolonic fat stranding and wall thickening,  moderate, involving the descending colon (series 5, image 48).  The colon is thickened more proximally as well to a lesser degree.  Small bowel loops are normal in caliber without dilated loops to suggest obstructive process.  Small bowel loops are  diffusely fluid-filled and several are mildly thickened with increased enhancement.  Appendix is not discretely visualized, consistent with given history of appendectomy.  ABDOMINAL WALL: Normal.  No mass or hernia.  URINARY BLADDER: Over distended with fluid.  PELVIC NODES: No enlarged mass or adenopathy.    PELVIC ORGANS: Prostate is moderately enlarged.  BONES:   Diffusely demineralized.  Postsurgical changes of lower lumbar spine decompression and fusion.  Moderate left convex scoliosis of the lumbar spine. No acute fracture or focal destructive bone lesion noted in the imaged volume.  Moderate  degenerative change of the  hip joints.  LUNG BASES: Mild curvilinear opacities in the lung bases are probably combination of subsegmental atelectasis and scarring.  OTHER: Borderline cardiomegaly.               Impression   CONCLUSION:     1. Findings consistent with diffuse enterocolitis, worst involving the descending colon.     2. Over distended urinary bladder with moderate prostatomegaly.  Correlate for partial bladder outlet obstruction.     3. Moderate left convex scoliosis of the lumbar spine.  Postsurgical changes of the lower lumbar spine.     4. Severe atherosclerotic calcification of the abdominal aorta and major branches.  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     5. Additional incidental findings as above.             Dictated by (CST): Debo Finney MD on 12/02/2021 at 5:42 PM      Finalized by (CST): Debo Finney MD on 12/02/2021 at 5:58 PM              OTHER RELEVANT DATA:   none     IMPRESSION: Patient without any bothersome lower urinary tract symptoms or any lower urinary tract symptoms at all currently on finasteride.    On review of his chart it appears that he has never had lower urinary tract symptoms and finasteride was continued and surgery was offered simply because his prostate was \"enlarged\" on digital rectal exam.    I had a discussion with patient and mention that generally the size of the prostate does not dictate management.  The presence of bothersome lower urinary tract symptoms (e.g. weak stream, straining to void, incomplete emptying, urgency, frequency, nocturia) is what initiates management with behaviors, medications or surgeries.    As he has absolutely no urinary symptoms currently I do not feel that any medications are indicated at this time.  If he does develop lower urinary tract symptoms as listed above we could start with behavioral management and tamsulosin 0.4 mg nightly.  We will hold off on refilling the finasteride as I do not feel that this is  indicated at this moment in time.  Patient is in agreement.    We discussed the tamsulosin is an alpha-blocker and relaxes the bladder neck and prostate that allows the urine to flow easily through a \"wider channel\".  Finasteride works over 6 to 12 months to shrink the prostate cells and keep the prostate small.    For any nocturnal urinary symptoms we typically recommend avoiding fluids 4 hours before bedtime, voiding prior to bedtime and wearing compression stockings.  Other general behavioral management for good bladder health is listed below.    Behavioral management with timed voiding, double voiding, avoiding bladder irritants (coffee, tea, soda/pop, alcohol), voiding prior to bedtime, avoiding fluids 2-4 hours prior to bedtime, compression stockings and elevation of feet        PLAN:  Stop medications  Return to clinic as needed  If any urinary symptoms develop he knows to contact    Thank you for referring this very pleasant patient to my clinic. If you have any questions or concerns, please do not hesitate to contact me.    Sincerely,  Gracie Anderson MD    30 minutes were spent on this patient at this visit obtaining a history, reviewing medical records, developing a treatment plan, counseling and discussing treatment strategy with patient, coordination of care and documentation.     The 21st Century Cures Act makes medical notes available to patients in the interest of transparency.  However, please be advised that this is a medical document.  It is intended as a peer to peer communication.  It is written in medical language and may contain abbreviations or verbiage that are technical and unfamiliar.  It may appear blunt or direct.  Medical documents are intended to carry relevant information, facts as evident, and the clinical opinion of the practitioner.

## 2024-01-17 ENCOUNTER — APPOINTMENT (OUTPATIENT)
Dept: GENERAL RADIOLOGY | Facility: HOSPITAL | Age: 84
End: 2024-01-17
Attending: EMERGENCY MEDICINE
Payer: MEDICARE

## 2024-01-17 ENCOUNTER — HOSPITAL ENCOUNTER (EMERGENCY)
Facility: HOSPITAL | Age: 84
Discharge: HOME | End: 2024-01-17
Attending: EMERGENCY MEDICINE
Payer: MEDICARE

## 2024-01-17 VITALS
WEIGHT: 140 LBS | RESPIRATION RATE: 16 BRPM | OXYGEN SATURATION: 98 % | HEART RATE: 57 BPM | BODY MASS INDEX: 21.97 KG/M2 | SYSTOLIC BLOOD PRESSURE: 136 MMHG | HEIGHT: 67 IN | TEMPERATURE: 98 F | DIASTOLIC BLOOD PRESSURE: 62 MMHG

## 2024-01-17 DIAGNOSIS — S39.012A BACK STRAIN, INITIAL ENCOUNTER: Primary | ICD-10-CM

## 2024-01-17 PROCEDURE — 99283 EMERGENCY DEPT VISIT LOW MDM: CPT

## 2024-01-17 PROCEDURE — 72100 X-RAY EXAM L-S SPINE 2/3 VWS: CPT | Performed by: EMERGENCY MEDICINE

## 2024-01-17 PROCEDURE — 99284 EMERGENCY DEPT VISIT MOD MDM: CPT

## 2024-01-17 RX ORDER — HYDROCODONE BITARTRATE AND ACETAMINOPHEN 5; 325 MG/1; MG/1
1 TABLET ORAL ONCE
Status: COMPLETED | OUTPATIENT
Start: 2024-01-17 | End: 2024-01-17

## 2024-01-17 RX ORDER — LIDOCAINE 50 MG/G
1 PATCH TOPICAL EVERY 24 HOURS
Qty: 15 PATCH | Refills: 0 | Status: SHIPPED | OUTPATIENT
Start: 2024-01-17

## 2024-01-17 RX ORDER — IBUPROFEN 600 MG/1
600 TABLET ORAL EVERY 8 HOURS PRN
Qty: 30 TABLET | Refills: 0 | Status: SHIPPED | OUTPATIENT
Start: 2024-01-17 | End: 2024-01-24

## 2024-01-17 RX ORDER — HYDROCODONE BITARTRATE AND ACETAMINOPHEN 5; 325 MG/1; MG/1
1 TABLET ORAL EVERY 6 HOURS PRN
Qty: 10 TABLET | Refills: 0 | Status: SHIPPED | OUTPATIENT
Start: 2024-01-17 | End: 2024-01-24

## 2024-01-17 NOTE — ED INITIAL ASSESSMENT (HPI)
Pt ambulatory through triage c/o severe LBP since last Sunday. Pt hurt back while shoveling snow. Pt had lumbar fusion 2011. Pt denies pain in legs or any numbness/tingling. Pain localized to lower back. Pt denies bowel or bladder issues.     200mg ibuprofen 1545

## 2024-01-18 NOTE — ED QUICK NOTES
Patient discharged home in no acute distress in care of spouse. A&Ox4, skin p/w/d, denies cp/sob. Ambulating with steady gait and verbalized understanding of d/c instructions and prescriptions.

## 2024-01-18 NOTE — ED PROVIDER NOTES
Patient Seen in: [facility] Emergency Department    History     Chief Complaint   Patient presents with    Back Pain       HPI    History is provided by patient/independent historian: Patient, patient's wife  83-year-old male with history of previous lumbar fusion, here with complaints of worsening  right-sided lower back pain after shoveling the snow 4 days ago.  Symptoms gradually got worse, and he took 200 mg of Motrin prior to arrival no incontinence, radiation of the pain.  No numbness.  Aggravating factors are movement.  Wife reports he could barely walk today, and they were about to call 911 when she was able to finally get him in the car.    History reviewed.   Past Medical History:   Diagnosis Date    Aortic atherosclerosis (HCC)     CT scan 6-16    BPH (benign prostatic hyperplasia) 09/2008    With abnormal PSA, Rx Proscar    Diverticulosis 2007    Glaucoma     Herpes zoster 2008    Left face    Hx of adenomatous colonic polyps 07/2022    Hypercholesterolemia     Hypertension 08/2011    Iron deficiency anemia 09/2008    Ischemic colitis (HCC) 12/2015    Recurrent 12-21    Osteoporosis 01/2002    T9 compression fracture, T score -3.8 spine and -0.9 hip, Rx Fosamax, repeat T score -2.5 spine and -1.0 hip 10-10, repeat T score -0.7 hip and -2.2 lumbar spine 3-16, Fosamax DC'd    Peptic ulcer disease 1980    SCC (squamous cell carcinoma) 01/2017    Left temple    Subclinical hypothyroidism          History reviewed.   Past Surgical History:   Procedure Laterality Date    APPENDECTOMY  1961    BACK SURGERY Left 12/2011    L4-L5 microdiscectomy    CATARACT Left 10/2010    CATARACT Right 12/2010    COLONOSCOPY  1/2016    INGUINAL HERNIA REPAIR Bilateral 1970    OTHER  January 2017    Excision SCCa left temple    SKIN SURGERY Right 10/28/2016    Excision epidermal inclusion cyst back    SPINAL FUSION  10/2011    L4-L5    TONSILLECTOMY  1968         Home Medications reviewed :  (Not in a hospital  admission)        History reviewed.   Social History     Socioeconomic History    Marital status:     Number of children: 2   Occupational History    Occupation:      Comment: part time    Occupation:  High teacher, language arts     Comment: retired   Tobacco Use    Smoking status: Former     Packs/day: 0.25     Years: 5.00     Additional pack years: 0.00     Total pack years: 1.25     Types: Cigarettes     Quit date: 1962     Years since quittin.9    Smokeless tobacco: Never   Vaping Use    Vaping Use: Never used   Substance and Sexual Activity    Alcohol use: Yes     Comment: Occasional glass of wine    Drug use: No   Other Topics Concern    Pt has a pacemaker No    Pt has a defibrillator No    Reaction to local anesthetic No    Caffeine Concern Yes     Comment: coffee, 3cups/day         ROS  Review of Systems   Respiratory:  Negative for shortness of breath.    Cardiovascular:  Negative for chest pain.   Musculoskeletal:  Positive for back pain.   All other systems reviewed and are negative.     All other pertinent organ systems are reviewed and are negative.      Physical Exam     ED Triage Vitals [24 1650]   BP (!) 163/69   Pulse 61   Resp 18   Temp 98.1 °F (36.7 °C)   Temp src Oral   SpO2 98 %   O2 Device None (Room air)     Vital signs reviewed.      Physical Exam  Vitals and nursing note reviewed.   Cardiovascular:      Pulses: Normal pulses.   Pulmonary:      Effort: No respiratory distress.   Abdominal:      General: There is no distension.   Musculoskeletal:      Comments: No midline spinal tenderness, no obvious tenderness to R lumbar paraspinal area, no dermatomal rash, limited ROM 2/2 pain   Neurological:      Mental Status: He is alert.         ED Course       Labs:   Labs Reviewed - No data to display      My EKG Interpretation:   As reviewed and Interpreted by me      Imaging Results Available and Reviewed while in ED:   XR LUMBAR SPINE (MIN 2 VIEWS)  (CPT=72100)    Result Date: 1/17/2024  CONCLUSION: No compression fracture.  Large volume colonic stool    Dictated by (CST): Elie Woods MD on 1/17/2024 at 6:49 PM     Finalized by (CST): Elie Woods MD on 1/17/2024 at 6:50 PM         My review and independent interpretation of XR images: no fracture. Radiology report corroborates this in addition to other details as reported by them.      Decision rules/scores evaluated: none      Diagnostic labs/tests considered but not ordered: CBC, BMP, UA    ED Medications Administered:   Medications   HYDROcodone-acetaminophen (Norco) 5-325 MG per tab 1 tablet (1 tablet Oral Given 1/17/24 1845)                MDM       Medical Decision Making      Differential Diagnosis: After obtaining the patient's history, performing the physical exam and reviewing the diagnostics, multiple initial diagnoses were considered based on the presenting problem including lumbar strain, sprain, fracture, nephrolithiasis, shingles    External document review: I personally reviewed available external medical records for any recent pertinent discharge summaries, testing, and procedures - the findings are as follows: 1/8/24 visitwith Dr. Anderson for lower UTI    Complicating Factors: The patient already  has a past medical history of Aortic atherosclerosis (HCC), BPH (benign prostatic hyperplasia) (09/2008), Diverticulosis (2007), Glaucoma, Herpes zoster (2008), adenomatous colonic polyps (07/2022), Hypercholesterolemia, Hypertension (08/2011), Iron deficiency anemia (09/2008), Ischemic colitis (HCC) (12/2015), Osteoporosis (01/2002), Peptic ulcer disease (1980), SCC (squamous cell carcinoma) (01/2017), and Subclinical hypothyroidism. to contribute to the complexity of this ED evaluation.    Procedures performed: none    Discussed management with physician/appropriate source: none    Considered admission/deescalation of care for: none    Social determinants of health affecting patient care:  none    Prescription medications considered: norco, lidoderm, prescription strength ibuprofen, discussed continuing current medication regimen    The patient requires continuous monitoring for: back pain    Shared decision making: discussed possible admission          Disposition and Plan     Clinical Impression:  1. Back strain, initial encounter        Disposition:  Discharge    Follow-up:  Josh Li MD  [address] [place] [id number]  [phone]    Follow up        Medications Prescribed:  Current Discharge Medication List        START taking these medications    Details   HYDROcodone-acetaminophen 5-325 MG Oral Tab Take 1 tablet by mouth every 6 (six) hours as needed.  Qty: 10 tablet, Refills: 0    Associated Diagnoses: Back strain, initial encounter      ibuprofen 600 MG Oral Tab Take 1 tablet (600 mg total) by mouth every 8 (eight) hours as needed for Pain or Fever.  Qty: 30 tablet, Refills: 0      lidocaine 5 % External Patch Place 1 patch onto the skin daily.  Qty: 15 patch, Refills: 0

## 2024-01-19 ENCOUNTER — NURSE TRIAGE (OUTPATIENT)
Dept: INTERNAL MEDICINE CLINIC | Facility: CLINIC | Age: 84
End: 2024-01-19

## 2024-01-19 NOTE — TELEPHONE ENCOUNTER
Spoke with patient regarding provider's recommendations.   Patient verbalized understanding.   Future Appointments   Date Time Provider Department Center   1/20/2024 10:30 AM Josh Li MD [facility]   3/18/2024  9:30 AM Josh Li MD [facility]   3/18/2024 11:00 AM Jordan Castellanos DPM [facility]   4/15/2024 10:00 AM Lisa Dorman MD [facility]

## 2024-01-19 NOTE — TELEPHONE ENCOUNTER
Action Requested: Summary for Provider     []  Critical Lab, Recommendations Needed  [] Need Additional Advice  []   FYI    []   Need Orders  [] Need Medications Sent to Pharmacy  []  Other     SUMMARY:    Please advice on any further instructions.    The patient on 1/13/24 was shoveling snow. He injured his lower back.        His  lower middle back to his right side has 8-9/10 when walking today.    Lying down it is a 0/10.  He did take Norco this morning.   He denies any numbness, tingling, weakness of the legs..      He was seen in the ED on 1/17/24 for the lower back pain and after the ED visit felt fine and went out to dinner.   Yesterday was fine.   This morning upon getting up had the 8-9/10 pain.    He had a previous L4-5 back infusion years ago.    He has an appointment to see Dr. Li on  1/20/24 but is asking what to do in the mean time?    I stated to try putting ice on the area incase he re-injured the lower back.  He has been applying heat.   Instructed to sleep on a firm mattress and to put a  pillow between his knees if lying on his side.    Instructed if condition warrants needs to go back to the ED with understanding.    He will try the ice and has the Norco in him now so he will try to get up and out of bed.  He will call us back on how he does.   He also has not placed his lidocaine patch on as yet for today.        Reason for call: Back Pain  Onset: Data Unavailable      General Assessment (questions to ask the caller)  Do you consider this a medical emergency?: No  Can you access 911?: Yes  Do you have any pain?: No (lying)  Do you have a fever?: No  What is the date of your last medical exam?: 03/17/23  Additional Assessments  Are these symptoms new, recurrent, or chronic?: new (started on 1/13/24)              Reason for Disposition   SEVERE back pain (e.g., excruciating, unable to do any normal activities) and not improved after pain medicine and CARE ADVICE    Protocols used: Back  Injury-A-[place]

## 2024-01-19 NOTE — TELEPHONE ENCOUNTER
Recommend rest and heat or ice application 2-3 times daily along with medications prescribed in the ER.

## 2024-01-23 ENCOUNTER — HOSPITAL ENCOUNTER (EMERGENCY)
Facility: HOSPITAL | Age: 84
Discharge: HOME | End: 2024-01-23
Attending: EMERGENCY MEDICINE
Payer: MEDICARE

## 2024-01-23 VITALS
WEIGHT: 141.13 LBS | SYSTOLIC BLOOD PRESSURE: 178 MMHG | RESPIRATION RATE: 20 BRPM | TEMPERATURE: 99 F | DIASTOLIC BLOOD PRESSURE: 64 MMHG | HEART RATE: 56 BPM | BODY MASS INDEX: 22 KG/M2 | OXYGEN SATURATION: 98 %

## 2024-01-23 DIAGNOSIS — R10.31 RIGHT INGUINAL PAIN: ICD-10-CM

## 2024-01-23 DIAGNOSIS — M54.50 ACUTE RIGHT-SIDED LOW BACK PAIN WITHOUT SCIATICA: Primary | ICD-10-CM

## 2024-01-23 LAB
ALBUMIN SERPL-MCNC: 4.2 G/DL (ref 3.2–4.8)
ALBUMIN/GLOB SERPL: 1.4 {RATIO} (ref 1–2)
ALP LIVER SERPL-CCNC: 84 U/L
ALT SERPL-CCNC: 31 U/L
ANION GAP SERPL CALC-SCNC: 8 MMOL/L (ref 0–18)
AST SERPL-CCNC: 29 U/L (ref ?–34)
BASOPHILS # BLD AUTO: 0.05 X10(3) UL (ref 0–0.2)
BASOPHILS NFR BLD AUTO: 0.6 %
BILIRUB SERPL-MCNC: 0.5 MG/DL (ref 0.2–1.1)
BUN BLD-MCNC: 25 MG/DL (ref 9–23)
BUN/CREAT SERPL: 26.3 (ref 10–20)
CALCIUM BLD-MCNC: 9.5 MG/DL (ref 8.7–10.4)
CHLORIDE SERPL-SCNC: 104 MMOL/L (ref 98–112)
CO2 SERPL-SCNC: 28 MMOL/L (ref 21–32)
CREAT BLD-MCNC: 0.95 MG/DL
DEPRECATED RDW RBC AUTO: 44.2 FL (ref 35.1–46.3)
EGFRCR SERPLBLD CKD-EPI 2021: 79 ML/MIN/1.73M2 (ref 60–?)
EOSINOPHIL # BLD AUTO: 0.04 X10(3) UL (ref 0–0.7)
EOSINOPHIL NFR BLD AUTO: 0.5 %
ERYTHROCYTE [DISTWIDTH] IN BLOOD BY AUTOMATED COUNT: 13.2 % (ref 11–15)
GLOBULIN PLAS-MCNC: 2.9 G/DL (ref 2.8–4.4)
GLUCOSE BLD-MCNC: 105 MG/DL (ref 70–99)
HCT VFR BLD AUTO: 39.4 %
HGB BLD-MCNC: 13.4 G/DL
IMM GRANULOCYTES # BLD AUTO: 0.03 X10(3) UL (ref 0–1)
IMM GRANULOCYTES NFR BLD: 0.4 %
LYMPHOCYTES # BLD AUTO: 0.92 X10(3) UL (ref 1–4)
LYMPHOCYTES NFR BLD AUTO: 11.3 %
MCH RBC QN AUTO: 30.7 PG (ref 26–34)
MCHC RBC AUTO-ENTMCNC: 34 G/DL (ref 31–37)
MCV RBC AUTO: 90.2 FL
MONOCYTES # BLD AUTO: 0.47 X10(3) UL (ref 0.1–1)
MONOCYTES NFR BLD AUTO: 5.8 %
NEUTROPHILS # BLD AUTO: 6.6 X10 (3) UL (ref 1.5–7.7)
NEUTROPHILS # BLD AUTO: 6.6 X10(3) UL (ref 1.5–7.7)
NEUTROPHILS NFR BLD AUTO: 81.4 %
OSMOLALITY SERPL CALC.SUM OF ELEC: 295 MOSM/KG (ref 275–295)
PLATELET # BLD AUTO: 264 10(3)UL (ref 150–450)
POTASSIUM SERPL-SCNC: 3.8 MMOL/L (ref 3.5–5.1)
PROT SERPL-MCNC: 7.1 G/DL (ref 5.7–8.2)
RBC # BLD AUTO: 4.37 X10(6)UL
SODIUM SERPL-SCNC: 140 MMOL/L (ref 136–145)
WBC # BLD AUTO: 8.1 X10(3) UL (ref 4–11)

## 2024-01-23 PROCEDURE — 80053 COMPREHEN METABOLIC PANEL: CPT

## 2024-01-23 PROCEDURE — 85025 COMPLETE CBC W/AUTO DIFF WBC: CPT | Performed by: EMERGENCY MEDICINE

## 2024-01-23 PROCEDURE — 80053 COMPREHEN METABOLIC PANEL: CPT | Performed by: EMERGENCY MEDICINE

## 2024-01-23 PROCEDURE — 36415 COLL VENOUS BLD VENIPUNCTURE: CPT

## 2024-01-23 PROCEDURE — 99283 EMERGENCY DEPT VISIT LOW MDM: CPT

## 2024-01-23 PROCEDURE — 99284 EMERGENCY DEPT VISIT MOD MDM: CPT

## 2024-01-23 PROCEDURE — 85025 COMPLETE CBC W/AUTO DIFF WBC: CPT

## 2024-01-23 RX ORDER — HYDROCODONE BITARTRATE AND ACETAMINOPHEN 5; 325 MG/1; MG/1
1 TABLET ORAL ONCE
Status: COMPLETED | OUTPATIENT
Start: 2024-01-23 | End: 2024-01-23

## 2024-01-23 RX ORDER — DIAZEPAM 5 MG/1
5 TABLET ORAL ONCE
Status: COMPLETED | OUTPATIENT
Start: 2024-01-23 | End: 2024-01-23

## 2024-01-23 RX ORDER — CYCLOBENZAPRINE HCL 10 MG
5 TABLET ORAL NIGHTLY
Qty: 3 TABLET | Refills: 0 | Status: SHIPPED | OUTPATIENT
Start: 2024-01-23 | End: 2024-01-29

## 2024-01-23 NOTE — ED PROVIDER NOTES
Patient Seen in: [facility] Emergency Department    History     Chief Complaint   Patient presents with    Abdomen/Flank Pain    Back Pain       HPI    83-year-old male sent by his primary care physician's office given his right lower back pain and sensation of right inguinal mild discomfort with history of repaired right inguinal hernia.  The patient reports his pain started 6 days ago after he was shoveling snow.  Denies any bowel or bladder incontinence nor any midline low back pain or any fevers or any focal weakness or numbness or paresthesias.  He was started on Norco with mild relief in thus had not taken it for the last 4 days.  One of his daughters is a physician and started him on a steroid taper without significant relief he states.  He is still ambulatory.    He denies any nausea or emesis.  He reports 1 week with a trace right inguinal discomfort, not currently with this symptom.  Has been passing hard stools chronically, no changes bowel patterns recently      History reviewed.   Past Medical History:   Diagnosis Date    Aortic atherosclerosis (HCC)     CT scan 6-16    BPH (benign prostatic hyperplasia) 09/2008    With abnormal PSA, Rx Proscar    Diverticulosis 2007    Glaucoma     Herpes zoster 2008    Left face    Hx of adenomatous colonic polyps 07/2022    Hypercholesterolemia     Hypertension 08/2011    Iron deficiency anemia 09/2008    Ischemic colitis (HCC) 12/2015    Recurrent 12-21    Osteoporosis 01/2002    T9 compression fracture, T score -3.8 spine and -0.9 hip, Rx Fosamax, repeat T score -2.5 spine and -1.0 hip 10-10, repeat T score -0.7 hip and -2.2 lumbar spine 3-16, Fosamax DC'd    Peptic ulcer disease 1980    SCC (squamous cell carcinoma) 01/2017    Left temple    Subclinical hypothyroidism        History reviewed.   Past Surgical History:   Procedure Laterality Date    APPENDECTOMY  1961    BACK SURGERY Left 12/2011    L4-L5 microdiscectomy    CATARACT Left 10/2010    CATARACT  Right 2010    COLONOSCOPY  2016    INGUINAL HERNIA REPAIR Bilateral 1970    OTHER  2017    Excision SCCa left temple    SKIN SURGERY Right 10/28/2016    Excision epidermal inclusion cyst back    SPINAL FUSION  10/2011    L4-L5    TONSILLECTOMY  1968         Medications :  (Not in a hospital admission)       Family History   Problem Relation Age of Onset    Heart Disease Father         CHF age 68    Hypertension Mother     Other (Kidney Cancer[other]) Brother        Smoking Status:   Social History     Socioeconomic History    Marital status:     Number of children: 2   Occupational History    Occupation:      Comment: part time    Occupation: [employer] teacher, [employer]     Comment: retired   Tobacco Use    Smoking status: Former     Packs/day: 0.25     Years: 5.00     Additional pack years: 0.00     Total pack years: 1.25     Types: Cigarettes     Quit date: 1962     Years since quittin.9    Smokeless tobacco: Never   Vaping Use    Vaping Use: Never used   Substance and Sexual Activity    Alcohol use: Yes     Comment: Occasional glass of wine    Drug use: No   Other Topics Concern    Pt has a pacemaker No    Pt has a defibrillator No    Reaction to local anesthetic No    Caffeine Concern Yes     Comment: coffee, 3cups/day       Constitutional and vital signs reviewed.      Social History and Family History elements reviewed from today, pertinent positives to the presenting problem noted.    Physical Exam     ED Triage Vitals [24 1438]   /78   Pulse 69   Resp 16   Temp 98.9 °F (37.2 °C)   Temp src Temporal   SpO2 99 %   O2 Device None (Room air)       All measures to prevent infection transmission during my interaction with the patient were taken. The patient was already wearing a droplet mask on my arrival to the room. Personal protective equipment was worn throughout the duration of the exam.  Handwashing was performed prior to and after the exam.  Stethoscope  and any equipment used during my examination was cleaned with super sani-cloth germicidal wipes following the exam.     Physical Exam    General: NAD  Head: Normocephalic and atraumatic.  Mouth/Throat/Ears/Nose: Oropharynx is clear   Eyes: Conjunctivae and EOM are normal.   Neck: Normal range of motion. Supple.   Cardiovascular: Normal rate, regular rhythm, normal heart sounds.  Respiratory/Chest: Clear and equal bilaterally. Exhibits no tenderness.  Gastrointestinal: Soft, non-tender, non-distended. Bowel sounds are normal.   : No inguinal hernia palpated.  Normal-appearing scrotum and penis tenderness.  Musculoskeletal:No swelling or deformity.   Neurological: Alert and appropriate. No focal deficits.   Back: Bilateral hip flexion, knee extension, plantarflexion 5 out of 5 strength.  Bilateral sensation intact to light touch throughout the lower extremities.  No midline lumbar ttp.   Normal gait.    Skin: Skin is warm and dry. No pallor.  Psychiatric: Has a normal mood and affect.      ED Course        Labs Reviewed   COMP METABOLIC PANEL (14) - Abnormal; Notable for the following components:       Result Value    Glucose 105 (*)     BUN 25 (*)     BUN/CREA Ratio 26.3 (*)     All other components within normal limits   CBC W/ DIFFERENTIAL - Abnormal; Notable for the following components:    Lymphocyte Absolute 0.92 (*)     All other components within normal limits   CBC WITH DIFFERENTIAL WITH PLATELET    Narrative:     The following orders were created for panel order CBC With Differential With Platelet.                  Procedure                               Abnormality         Status                                     ---------                               -----------         ------                                     CBC W/ DIFFERENTIAL[[id number]]          Abnormal            Final result                                                 Please view results for these tests on the individual orders.       As  Interpreted by me    Imaging Results Available and Reviewed while in ED: No results found.  ED Medications Administered:   Medications   HYDROcodone-acetaminophen (Norco) 5-325 MG per tab 1 tablet (1 tablet Oral Given 1/23/24 1725)   diazePAM (Valium) tab 5 mg (5 mg Oral Given 1/23/24 1726)         MDM     Vitals:    01/23/24 1438 01/23/24 1715 01/23/24 1728 01/23/24 1730   BP: 156/78 (S) (!) 193/69  (!) 178/64   Pulse: 69 56     Resp: 16 20     Temp: 98.9 °F (37.2 °C)  98.5 °F (36.9 °C)    TempSrc: Temporal  Temporal    SpO2: 99% 98%     Weight: 64 kg        *I personally reviewed and interpreted all ED vitals.    Pulse Ox: 98%, Room air, Normal       Medical Decision Making      Differential Diagnosis/ Diagnostic Considerations: Lumbosacral strain, disc herniation, inguinal hernia    Complicating Factors: The patient already has does not have any pertinent problems on file. to contribute to the complexity of this ED evaluation.    I reviewed prior chart records including ED visit note from January 17, 2024 when patient had lumbar x-ray   Performed.  Patient is here without any improvement to the his low back pain and I discussed with him he would likely benefit from a physiatry referral which was provided.  Dose of Norco and diazepam was provided in the emergency department with improvement in pain.  Patient is comfortable with discharge plan he has no evidence of cauda equina or epidural abscess or intractable pain to indicate admission or further emergent cross-sectional imaging at this time.  Flexeril prescription provided.  He has no neurologic deficits.  He also has no inguinal hernia on exam or any appreciable hernia on my exam in addition to a nontender abdomen.  I also messaged his primary care physician.  Discharged home in stable condition.    Disposition and Plan     Clinical Impression:  1. Acute right-sided low back pain without sciatica    2. Right inguinal pain         Disposition:  Discharge    Follow-up:  Josh Li MD  [address] [facility] [id number] [phone]    Schedule an appointment as soon as possible for a visit in 1 day(s)      Kannan Gutierrez Y,   [address] [facility] [address]  [facility] [id number] [phone]    Schedule an appointment as soon as possible for a visit in 1 day(s)        Medications Prescribed:  Current Discharge Medication List        START taking these medications    Details   cyclobenzaprine 10 MG Oral Tab Take 0.5 tablets (5 mg total) by mouth nightly for 6 doses.  Qty: 3 tablet, Refills: 0

## 2024-01-23 NOTE — ED INITIAL ASSESSMENT (HPI)
Pt c/o lower back pain x 1 week, seen here in ED, back due to pain persisting.  Pt c/o pain d/t hernia x4 days.

## 2024-01-24 ENCOUNTER — NURSE TRIAGE (OUTPATIENT)
Dept: INTERNAL MEDICINE CLINIC | Facility: CLINIC | Age: 84
End: 2024-01-24

## 2024-01-24 ENCOUNTER — TELEPHONE (OUTPATIENT)
Dept: INTERNAL MEDICINE CLINIC | Facility: CLINIC | Age: 84
End: 2024-01-24

## 2024-01-24 DIAGNOSIS — S39.012A BACK STRAIN, INITIAL ENCOUNTER: ICD-10-CM

## 2024-01-24 RX ORDER — HYDROCODONE BITARTRATE AND ACETAMINOPHEN 5; 325 MG/1; MG/1
1 TABLET ORAL EVERY 6 HOURS PRN
Qty: 10 TABLET | Refills: 0 | Status: SHIPPED | OUTPATIENT
Start: 2024-01-24 | End: 2024-01-31

## 2024-01-24 NOTE — TELEPHONE ENCOUNTER
Spoke with patient and advised pharmacy stated he had picked up rx already. There was confusion with generic/brand name. Patient confirmed he had picked up medication. Advised Clark was also sent to pharmacy today by provider. Patient verbalized understanding.      Spoke with Doris at [facility] and states patient picked up medication on 1/23.      Patient states he was prescribed flexeril in the ED on 1/23; however patient states he checked with pharmacy and they do not have the Rx. Patient advised this RN would call [facility] to inquire about the medication. Patient verbalized understanding.

## 2024-01-24 NOTE — TELEPHONE ENCOUNTER
Action Requested: Summary for Provider     []  Critical Lab, Recommendations Needed  [x] Need Additional Advice  []   FYI    []   Need Orders  [] Need Medications Sent to Pharmacy  []  Other     SUMMARY: Per protocol, patient to go to ED; however patient states he was in the ED yesterday and provider evaluated groin and nothing further was said/done.  Message sent to provider for further recommendations.      Patient reports groin pain. States right scrotum is swollen vs. the left side. Patient states it is sensitive to the touch, and hurts when he walks. States when laying down he has no pain.    Denies: vomiting, fever      Reason for call: Groin Pain  Onset: 4 days ago        Reason for Disposition   Swollen scrotum    Protocols used: Scrotal Pain-A-[place]    
Called patient regarding prescription (see previous TE); however there was a message pending for patient.   Reviewed message from Dr. Li with patient. Patient verbalized understanding. Appt made with provider.  
He has been to the ER twice within the last week.  I am not sure another ER visit is necessary.  I would be happy to evaluate his symptoms but he would need to schedule an appointment with me.  He recently had an appointment with me that he canceled.  
home

## 2024-01-24 NOTE — TELEPHONE ENCOUNTER
Patient requesting a refill on Norco 5-325mg.   States he was in ED yesterday, and provider was to prescribe medication for back pain; however provider did not give patient a script for [place]. Patient asking provider to send a refill to local pharmacy.  Message routed to provider.

## 2024-01-26 ENCOUNTER — OFFICE VISIT (OUTPATIENT)
Dept: INTERNAL MEDICINE CLINIC | Facility: CLINIC | Age: 84
End: 2024-01-26

## 2024-01-26 VITALS
HEART RATE: 60 BPM | DIASTOLIC BLOOD PRESSURE: 78 MMHG | BODY MASS INDEX: 22.26 KG/M2 | WEIGHT: 141.81 LBS | SYSTOLIC BLOOD PRESSURE: 173 MMHG | HEIGHT: 67 IN

## 2024-01-26 DIAGNOSIS — R10.31 RIGHT GROIN PAIN: ICD-10-CM

## 2024-01-26 DIAGNOSIS — M54.50 ACUTE RIGHT-SIDED LOW BACK PAIN WITHOUT SCIATICA: Primary | ICD-10-CM

## 2024-01-26 PROCEDURE — 1159F MED LIST DOCD IN RCRD: CPT | Performed by: INTERNAL MEDICINE

## 2024-01-26 PROCEDURE — 1160F RVW MEDS BY RX/DR IN RCRD: CPT | Performed by: INTERNAL MEDICINE

## 2024-01-26 PROCEDURE — 3078F DIAST BP <80 MM HG: CPT | Performed by: INTERNAL MEDICINE

## 2024-01-26 PROCEDURE — 3008F BODY MASS INDEX DOCD: CPT | Performed by: INTERNAL MEDICINE

## 2024-01-26 PROCEDURE — 1125F AMNT PAIN NOTED PAIN PRSNT: CPT | Performed by: INTERNAL MEDICINE

## 2024-01-26 PROCEDURE — 3077F SYST BP >= 140 MM HG: CPT | Performed by: INTERNAL MEDICINE

## 2024-01-26 PROCEDURE — 99214 OFFICE O/P EST MOD 30 MIN: CPT | Performed by: INTERNAL MEDICINE

## 2024-01-26 RX ORDER — CYCLOBENZAPRINE HCL 10 MG
10 TABLET ORAL 3 TIMES DAILY PRN
Qty: 30 TABLET | Refills: 0 | Status: SHIPPED | OUTPATIENT
Start: 2024-01-26 | End: 2024-02-15

## 2024-01-26 RX ORDER — METHYLPREDNISOLONE 4 MG/1
4 TABLET ORAL AS DIRECTED
COMMUNITY
Start: 2024-01-19

## 2024-01-26 RX ORDER — LIDOCAINE 50 MG/G
1 PATCH TOPICAL EVERY 24 HOURS
Qty: 10 EACH | Refills: 1 | Status: SHIPPED | OUTPATIENT
Start: 2024-01-26

## 2024-01-26 RX ORDER — NAPROXEN 500 MG/1
500 TABLET ORAL 2 TIMES DAILY WITH MEALS
Qty: 30 TABLET | Refills: 0 | Status: SHIPPED | OUTPATIENT
Start: 2024-01-26 | End: 2025-01-20

## 2024-01-26 NOTE — PATIENT INSTRUCTIONS
Please stop Solu-Medrol and limit hydrocodone use.  Take naproxen 500 mg twice daily with meals.  Take cyclobenzaprine 10 mg 3 times daily as needed.  Continue Lidoderm patches daily.  Schedule physical therapy and a CT scan of the lumbar spine.  Follow-up with Dr. Gutierrez.

## 2024-01-26 NOTE — PROGRESS NOTES
Armando Forrester is a 83 year old male.   Chief Complaint   Patient presents with    ER F/U     Patient went to [facility] ER on 1/23/24     HPI:   Mr. Forrester presents this afternoon, accompanied by his wife and daughter, for ER follow-up.    About 2 weeks ago, he was out shoveling snow when he injured his lower back.  He developed significant right-sided low back pain and went to [place] ER initially on January 17.  X-ray lumbar spine at that time essentially normal status post lumbar fusion L4-L5, without compression fracture.  Norco 5 mg, ibuprofen 600 mg and Lidoderm patches were prescribed.    Pain persisted and he went again to [place] ER on January 23.  CMP normal except glucose 105 and CBC normal.  Cyclobenzaprine was prescribed and it was recommended he see Dr. Gutierrez of Physiatry.    He presents today for follow-up, with ongoing right-sided low back pain, worse with prolonged standing and sitting.  Pain is not particularly affected by other movements.  When lying, pain is very mild, 1/10 in severity, but increases to 8/10 in severity with prolonged sitting and standing.  His daughter who is a physician prescribed Medrol Dosepak without relief.  He stopped ibuprofen since it did not help.  He has been taking Norco 3 tablets daily.  No leg pain numbness tingling or weakness.  For the past few days he has had right-sided groin pain and became concerned about a possible hernia    Pain is significantly affecting normal ADLs and he has been having a difficult time getting in and out of bed, showering and dressing.  His daughter requests spinal imaging.  Current Outpatient Medications   Medication Sig Dispense Refill    methylPREDNISolone 4 MG Oral Tablet Therapy Pack Take 1 tablet (4 mg total) by mouth As Directed. FOLLOW DIRECTIONS ON PACKAGING      naproxen 500 MG Oral Tab Take 1 tablet (500 mg total) by mouth 2 (two) times daily with meals. 30 tablet 0    cyclobenzaprine 10 MG Oral Tab Take 1 tablet (10 mg total)  by mouth 3 (three) times daily as needed for Muscle spasms. 30 tablet 0    lidocaine 5 % External Patch Place 1 patch onto the skin daily. 10 each 1    HYDROcodone-acetaminophen 5-325 MG Oral Tab Take 1 tablet by mouth every 6 (six) hours as needed. 10 tablet 0    cyclobenzaprine 10 MG Oral Tab Take 0.5 tablets (5 mg total) by mouth nightly for 6 doses. 3 tablet 0    lidocaine 5 % External Patch Place 1 patch onto the skin daily. 15 patch 0    finasteride 5 MG Oral Tab Take 1 tablet by mouth daily. 30 tablet 0    amLODIPine 10 MG Oral Tab Take 1 tablet (10 mg total) by mouth daily. 90 tablet 1    atorvastatin 20 MG Oral Tab Take 1 tablet (20 mg total) by mouth every evening. 90 tablet 3    latanoprost 0.005 % Ophthalmic Solution Place 1 drop into both eyes nightly.      Ascorbic Acid (VITAMIN C) 500 MG Oral Cap Take 1 tablet by mouth daily.      Multiple Vitamins-Minerals (PRESERVISION AREDS) Oral Tab Take 2 tablets by mouth daily.      Omega-3 Fatty Acids (FISH OIL OR) Take 1 capsule by mouth 3 (three) times daily.      Calcium Carbonate-Vitamin D (CALCIUM 600 + D OR) Take 1 tablet by mouth 2 (two) times daily.      Coenzyme Q10 (COQ10 OR) Take 1 capsule by mouth daily.      Probiotic Product (PROBIOTIC OR) Take 1 capsule by mouth daily.      Boswellia Judah (BOSWELLIA OR) Take 2 tablets by mouth 3 (three) times daily.      Cholecalciferol (VITAMIN D3) 1000 UNITS Oral Cap Take  by mouth. take 1 daily       Allergies   Allergen Reactions    Clindamycin OTHER (SEE COMMENTS)     Angioedema    Lisinopril OTHER (SEE COMMENTS)     Angioedema      Past Medical History:   Diagnosis Date    Aortic atherosclerosis (HCC)     CT scan 6-16    BPH (benign prostatic hyperplasia) 09/2008    With abnormal PSA, Rx Proscar    Diverticulosis 2007    Glaucoma     Herpes zoster 2008    Left face    Hx of adenomatous colonic polyps 07/2022    Hypercholesterolemia     Hypertension 08/2011    Iron deficiency anemia 09/2008    Ischemic  colitis (HCC) 2015    Recurrent 12-21    Osteoporosis 2002    T9 compression fracture, T score -3.8 spine and -0.9 hip, Rx Fosamax, repeat T score -2.5 spine and -1.0 hip 10-10, repeat T score -0.7 hip and -2.2 lumbar spine 3-16, Fosamax DC'd    Peptic ulcer disease     SCC (squamous cell carcinoma) 2017    Left temple    Subclinical hypothyroidism       Social History:  Social History     Socioeconomic History    Marital status:     Number of children: 2   Occupational History    Occupation: [employer]     Comment: part time    Occupation:  High teacher, [employer]     Comment: retired   Tobacco Use    Smoking status: Former     Packs/day: 0.25     Years: 5.00     Additional pack years: 0.00     Total pack years: 1.25     Types: Cigarettes     Quit date: 1962     Years since quittin.9    Smokeless tobacco: Never   Vaping Use    Vaping Use: Never used   Substance and Sexual Activity    Alcohol use: Yes     Comment: Occasional glass of wine    Drug use: No   Other Topics Concern    Pt has a pacemaker No    Pt has a defibrillator No    Reaction to local anesthetic No    Caffeine Concern Yes     Comment: coffee, 3cups/day          EXAM:   GENERAL: Pleasant male appearing well in no distress, moving comfortably  BP (!) 173/78 (BP Location: Left arm, Patient Position: Sitting, Cuff Size: adult)   Pulse 60   Ht 5' 7\" (1.702 m)   Wt 141 lb 12.8 oz (64.3 kg)   BMI 22.21 kg/m²   BACK: No spinal, paraspinal muscle or CVA tenderness. No muscle spasm  EXTREMITIES: Straight leg raising negative bilaterally  NEURO: Reflexes 2+ bilateral patellar and 1-2+ bilateral Achilles. Strength 5/5 bilateral lower extremities without weakness  GENITAL: Testes normal without inguinal hernia or palpable abnormality      ASSESSMENT AND PLAN:   1. Acute right-sided low back pain without sciatica  Stop Medrol.  Naproxen 500 mg twice daily with meals.  Prescription sent to pharmacy.  Cyclobenzaprine 10 mg 3  times daily as needed.  Prescription sent to pharmacy.  Continue Lidoderm patches 5% apply daily.  Prescription sent to pharmacy.  Recommend he limit hydrocodone use.  Physical therapy.  Order and phone number given.  He will schedule.  CT scan lumbar spine.  Order sent.  Recommend follow-up with Physiatry as recommended.  - PHYSICAL THERAPY - INTERNAL  - CT SPINE LUMBAR (CPT=72131); Future    2. Right groin pain  No evidence of inguinal hernia.  Possibly radicular?    The patient indicates understanding of these issues and agrees to the plan.    Josh Li MD  1/26/2024  3:00 PM

## 2024-01-30 ENCOUNTER — TELEPHONE (OUTPATIENT)
Dept: PHYSICAL THERAPY | Facility: HOSPITAL | Age: 84
End: 2024-01-30

## 2024-02-02 ENCOUNTER — HOSPITAL ENCOUNTER (OUTPATIENT)
Dept: CT IMAGING | Facility: HOSPITAL | Age: 84
Discharge: HOME | End: 2024-02-02
Attending: INTERNAL MEDICINE
Payer: MEDICARE

## 2024-02-02 ENCOUNTER — PATIENT MESSAGE (OUTPATIENT)
Dept: OTHER | Age: 84
End: 2024-02-02

## 2024-02-02 ENCOUNTER — TELEPHONE (OUTPATIENT)
Dept: INTERNAL MEDICINE CLINIC | Facility: CLINIC | Age: 84
End: 2024-02-02

## 2024-02-02 DIAGNOSIS — M54.50 ACUTE RIGHT-SIDED LOW BACK PAIN WITHOUT SCIATICA: ICD-10-CM

## 2024-02-02 PROCEDURE — 72131 CT LUMBAR SPINE W/O DYE: CPT | Performed by: INTERNAL MEDICINE

## 2024-02-02 RX ORDER — NAPROXEN 500 MG/1
500 TABLET ORAL 2 TIMES DAILY WITH MEALS
Qty: 30 TABLET | Refills: 0 | Status: SHIPPED | OUTPATIENT
Start: 2024-02-02

## 2024-02-02 NOTE — TELEPHONE ENCOUNTER
Patient read mychart/result notes on recent CT scan.  States his pain is the same. He has PT scheduled and is taking naproxen as prescribed.  He has a back brace that he will use for support and comfort.  He inquires about norco which he has taken in the past.  RN advised this is an opiate and is reserved for significant pain and used sparingly.  He should notify the clinic if his pain is increasing.  He will use naproxen for first line pain relief.  Dr. Guillermo SYED.

## 2024-02-06 NOTE — TELEPHONE ENCOUNTER
Called patient and verified medrol dose pack used for temporary use and updated medication list for patient. Nothing further needed at this time.

## 2024-02-06 NOTE — TELEPHONE ENCOUNTER
Pt called and would like a call to discuss Rx prednisone. Pt stts not sure why he took medication off and not sure why he started. Please advise

## 2024-02-09 ENCOUNTER — TELEPHONE (OUTPATIENT)
Dept: PHYSICAL THERAPY | Facility: HOSPITAL | Age: 84
End: 2024-02-09

## 2024-02-12 NOTE — PROGRESS NOTES
SPINE EVALUATION:     Diagnosis:   Acute right-sided low back pain without sciatica (M54.50)       Referring Provider: Guillermo  Date of Evaluation:    2/13/2024    Precautions:   osteoporosis, h/o lumbar fusion L4-L5 2011 Next MD visit:   none scheduled  Date of Surgery: n/a     PATIENT SUMMARY   Armando Forrester is a 83 year old male who presents to therapy today with complaints of right sided low back pain. Was shoveling snow early January and developed significant right-sided low back pain. Went to [place] ER, though xray was insignificant. Initially the back pain was horrible, then the stabbing pain in his right groin started about a week later. Pain has improved since then.   Currently, his back feels better (tolerable), but the groin pain is his primary complaint. Described as constant dull aching.     Pt describes pain level current 1/10, at best 0/10, at worst 5/10.   Improved with lying and naproxen and flexorol. Usually worst in the morning.   Current functional limitations include bending over, prolonged standing and sitting, getting in and our of bed, and dressing.   Home environment: 2 level home with basement, 13 steps from 1st to 2nd, basement is 6 stairs.   Entrance up the main floor bilateral railings, From main level to 2nd L raill to ascend but has side rail as well.    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 Pt goals include strengthening my back. Treating bulging lumbar discs. Treating pain that radiates to my right groin, be able to walk around hospital campus.  Past medical history was reviewed with Armando. Significant findings include  has a past medical history of Aortic atherosclerosis (HCC), BPH (benign prostatic hyperplasia) (09/2008), Diverticulosis (2007), Glaucoma, Herpes zoster (2008), adenomatous colonic polyps (07/2022), Hypercholesterolemia,  Hypertension (08/2011), Iron deficiency anemia (09/2008), Ischemic colitis (HCC) (12/2015), Osteoporosis (01/2002), Peptic ulcer disease (1980), SCC (squamous cell carcinoma) (01/2017), and Subclinical hypothyroidism.   Pt denies diplopia, dysarthria, dysphasia, dizziness, drop attacks, bowel/bladder changes, saddle anesthesia, and JERSON LE N/T.    ASSESSMENT  Armando presents to physical therapy evaluation with primary c/o right groin pain and low back pain. The results of the objective tests and measures show decreased lumbar mobility, LE muscle tightness, and proximal hip strength deficits (R>L).  Functional deficits include but are not limited to bending over, prolonged standing and sitting, getting in and our of bed, and dressing.  Signs and symptoms are consistent with physician diagnosis, with possible iliopsoas strain on the right. Pt and PT discussed evaluation findings, pathology, POC and HEP.  Pt voiced understanding and performs HEP correctly without reported pain. Skilled Physical Therapy is medically necessary to address the above impairments and reach functional goals.     OBJECTIVE:   Observation/Posture: adjusting in seat frequently, increased thoracic kyphosis, scoliosis     Thoracolumbar AROM: (* denotes performed with pain)  Flexion: slightly limited, felt some pain in the groin while lowering   Extension: slightly limited, no pain  Sidebending: R slightly limited and painful R low back; L slightly limited and painful R low back   Rotation: R slightly limited*; L slightly limited     Strength: (* denotes performed with pain)  LE   Hip flexion (L2): R 4+/5; L 5-/5  Hip abduction: R 4-/5; L 4+/5  Hip Extension: R 4/5; L 4-/5   Hip ER: R 4/5; L 4/5  Hip IR: R 5-/5; L 5/5  Knee Flexion: R 5/5; L 5/5   Knee extension (L3): R 5-/5; L 5/5      Flexibility:   LE   Hip Flexor: R limited, L limited  Hamstrings: R limited; L limited  Piriformis: R limited; L limited     Special tests:   Slump: - bilat  SLR: R  -, L -  Active SLR: R -, L -  Long Axis Distraction: + relief R    Gait: pt ambulates on level ground with antalgia.  Balance: SLS R NT, L NT    Today’s Treatment and Response:   Pt education was provided on exam findings, treatment diagnosis, treatment plan, expectations, and prognosis. Pt was also provided recommendations for activity modifications, possible soreness after evaluation, ergonomics, and importance of remaining active  Patient was instructed in and issued a HEP for: LTR, seated HS stretch, seated figure 4 ant hip capsule stretch    Charges: PT Eval Moderate Complexity, 2 TE      Total Timed Treatment: 25 min     Total Treatment Time: 60 min     Based on clinical rationale and outcome measures, this evaluation involved Moderate Complexity decision making due to 1-2 personal factors/comorbidities, 3 body structures involved/activity limitations, and evolving symptoms including changing pain levels.  PLAN OF CARE:    Goals: (to be met in 10 visits)   Pt will improve transversus abdominis recruitment to perform proper isometric contraction without requiring verbal or tactile cuing to promote advancement of therex   Pt will demonstrate good understanding of proper posture and body mechanics to decrease pain and improve spinal safety   Pt will improve pain-free lumbar spine AROM flexion to allow increase ease with bending forward to don shoes   Pt will report improved symptom centralization and absence of radicular symptoms for 3 consecutive days to improve function with ADL   Pt will have decreased paraspinal mm tension to tolerate standing >30 minutes for work and home activities   Pt will demonstrate improved core strength to be able to perform yardwork with <2/10 pain   Pt will be independent and compliant with comprehensive HEP to maintain progress achieved in PT      Frequency / Duration: Patient will be seen for 1-2 x/week or a total of 10 visits over a 90 day period. Treatment will include: Manual  Therapy, Neuromuscular Re-education, Therapeutic Activities, Therapeutic Exercise, and Home Exercise Program instruction    Education or treatment limitation: None  Rehab Potential:excellent    Oswestry Disability Index Score  Score: 60 % (2/6/2024 12:01 PM)      Patient/Family/Caregiver was advised of these findings, precautions, and treatment options and has agreed to actively participate in planning and for this course of care.    Thank you for your referral. Please co-sign or sign and return this letter via fax as soon as possible to [phone]. If you have any questions, please contact me at Dept: [phone]    Sincerely,  Electronically signed by therapist: Prosper Howell PT    Physician's certification required: Yes  I certify the need for these services furnished under this plan of treatment and while under my care.    X___________________________________________________ Date____________________    Certification From: 2/12/2024  To:5/12/2024

## 2024-02-13 ENCOUNTER — OFFICE VISIT (OUTPATIENT)
Dept: PHYSICAL THERAPY | Facility: HOSPITAL | Age: 84
End: 2024-02-13
Attending: INTERNAL MEDICINE
Payer: MEDICARE

## 2024-02-13 DIAGNOSIS — M54.50 ACUTE RIGHT-SIDED LOW BACK PAIN WITHOUT SCIATICA: Primary | ICD-10-CM

## 2024-02-13 PROCEDURE — 97110 THERAPEUTIC EXERCISES: CPT

## 2024-02-13 PROCEDURE — 97162 PT EVAL MOD COMPLEX 30 MIN: CPT

## 2024-02-19 NOTE — PROGRESS NOTES
Diagnosis:   Acute right-sided low back pain without sciatica (M54.50)      Iliopsoas strain   Referring Provider: Guillermo  Date of Evaluation:    2/13/2024    Precautions:   osteoporosis, h/o lumbar fusion L4-L5 2011 Next MD visit:   none scheduled  Date of Surgery: n/a     Insurance Primary/Secondary: [employer] INS / N/A     # Auth Visits: N/A            Subjective: Pt had flare of symptoms in the low back. Pain in the groin has resolved. Pt bent forward to pick something up yesterday afternoon while going to lift bird seeds. Felt it come on later.   Got appointment with Dr Gutierrez March 11    Pain: 1/10 at rest; 9/10 when getting up out of the bed this morning      Objective: pain with raising and lower leg while in supine, when cued to activate TA, pain resolved, pain with position changes (bridging)     Assess SLS next session  -----    Observation/Posture: adjusting in seat frequently, increased thoracic kyphosis, scoliosis     Thoracolumbar AROM: (* denotes performed with pain)  Flexion: slightly limited, felt some pain in the groin while lowering   Extension: slightly limited, no pain  Sidebending: R slightly limited and painful R low back; L slightly limited and painful R low back   Rotation: R slightly limited*; L slightly limited     Strength: (* denotes performed with pain)  LE   Hip flexion (L2): R 4+/5; L 5-/5  Hip abduction: R 4-/5; L 4+/5  Hip Extension: R 4/5; L 4-/5   Hip ER: R 4/5; L 4/5  Hip IR: R 5-/5; L 5/5  Knee Flexion: R 5/5; L 5/5   Knee extension (L3): R 5-/5; L 5/5      Flexibility:   LE   Hip Flexor: R limited, L limited  Hamstrings: R limited; L limited  Piriformis: R limited; L limited     Special tests:   Slump: - bilat  SLR: R -, L -  Active SLR: R -, L -  Long Axis Distraction: + relief R    Gait: pt ambulates on level ground with antalgia.  Balance: next session      Assessment: Pt was able to perform all planned exercises, except bridging. He also had difficulty changing positions, but  when cued to activate his TA the pain did ease. He responded well to manual work, TA activation, and hip strengthening- will continue to focus on these areas to reduce strain in low back.       Goals:   Goals: (to be met in 10 visits)   Pt will improve transversus abdominis recruitment to perform proper isometric contraction without requiring verbal or tactile cuing to promote advancement of therex   Pt will demonstrate good understanding of proper posture and body mechanics to decrease pain and improve spinal safety   Pt will improve pain-free lumbar spine AROM flexion to allow increase ease with bending forward to don shoes   Pt will report improved symptom centralization and absence of radicular symptoms for 3 consecutive days to improve function with ADL   Pt will have decreased paraspinal mm tension to tolerate standing >30 minutes for work and home activities   Pt will demonstrate improved core strength to be able to perform yardwork with <2/10 pain   Pt will be independent and compliant with comprehensive HEP to maintain progress achieved in PT      Plan: Patient will be seen for 1-2 x/week or a total of 10 visits over a 90 day period. Treatment will include: Manual Therapy, Neuromuscular Re-education, Therapeutic Activities, Therapeutic Exercise, and Home Exercise Program instruction  Date: 2/19/2024  TX#: 2/10 Date:                 TX#: 3/ Date:                 TX#: 4/ Date:                 TX#: 5/ Date:   Tx#: 6/   TE 20'  Nustep 5' lvl 3 (Arms and legs)  LTR x2'   SKTC (painful to R low back) HELD  DKTC with SB x2'   Bridge with hip add iso (HELD) painful with bridge  Hooklying hip add iso with glute set x2'   Pt edu: TA and multifidi anatomy and physiology, support of LBP, difference between bulging disc and stenosis       NR 25'  TA iso with red ball pressing into thigh with PPT x2' with 5\" hold   TA iso red ball push with 90/90 march 2x10 bilat  SL clam x30 bilat  SL hip abd x15 bilat       MT  x10'  STM to low back and glutes  Segmental rotation              HEP: LTR, seated HS stretch, seated figure 4 ant hip capsule stretch  - Clamshell  - 1 x daily - 5 x weekly - 2-3 sets - 10 reps  - Sidelying Hip Abduction  - 1 x daily - 5 x weekly - 2-3 sets - 10 reps  - Supine Transversus Abdominis Bracing - Hands on Thighs  - 1 x daily - 7 x weekly - 10 reps - 5 sec hold    Charges: 1 TE 2 NR 1 MT       Total Timed Treatment: 55 min  Total Treatment Time: 55 min

## 2024-02-21 ENCOUNTER — OFFICE VISIT (OUTPATIENT)
Dept: PHYSICAL THERAPY | Facility: HOSPITAL | Age: 84
End: 2024-02-21
Attending: INTERNAL MEDICINE
Payer: MEDICARE

## 2024-02-21 DIAGNOSIS — S32.000D COMPRESSION FRACTURE OF LUMBAR VERTEBRA WITH ROUTINE HEALING, UNSPECIFIED LUMBAR VERTEBRAL LEVEL, SUBSEQUENT ENCOUNTER: Primary | ICD-10-CM

## 2024-02-21 PROCEDURE — 97110 THERAPEUTIC EXERCISES: CPT

## 2024-02-21 PROCEDURE — 97140 MANUAL THERAPY 1/> REGIONS: CPT

## 2024-02-21 PROCEDURE — 97112 NEUROMUSCULAR REEDUCATION: CPT

## 2024-02-23 ENCOUNTER — OFFICE VISIT (OUTPATIENT)
Dept: PHYSICAL THERAPY | Facility: HOSPITAL | Age: 84
End: 2024-02-23
Attending: INTERNAL MEDICINE
Payer: MEDICARE

## 2024-02-23 ENCOUNTER — TELEPHONE (OUTPATIENT)
Dept: PHYSICAL THERAPY | Facility: HOSPITAL | Age: 84
End: 2024-02-23

## 2024-02-23 PROCEDURE — 97112 NEUROMUSCULAR REEDUCATION: CPT

## 2024-02-23 PROCEDURE — 97110 THERAPEUTIC EXERCISES: CPT

## 2024-02-23 NOTE — PROGRESS NOTES
Diagnosis:   Acute right-sided low back pain without sciatica (M54.50)      Iliopsoas strain   Referring Provider: Guillermo  Date of Evaluation:    2/13/2024    Precautions:   osteoporosis, h/o lumbar fusion L4-L5 2011 Next MD visit:   none scheduled  Date of Surgery: n/a     Insurance Primary/Secondary: [employer] INS / N/A     # Auth Visits: N/A            Subjective: Pt is still experiencing high level of back in his lower back. Felt sore after last session, but overall pain is worse than Wednesday. Pt also noticed more bulging in the right lower quadrant vs the left- not tender  Pain:  1/10 at rest; 9/10 when changing positions     Objective:     SLS: R 11 sec; L 9 sec  Directional preference testing:   TRE: x10 described as \"good pain\", no change   RFIS: NT due to CT finding of compression fx at L2  -----    Observation/Posture: adjusting in seat frequently, increased thoracic kyphosis, scoliosis     Thoracolumbar AROM: (* denotes performed with pain)  Flexion: slightly limited, felt some pain in the groin while lowering   Extension: slightly limited, no pain  Sidebending: R slightly limited and painful R low back; L slightly limited and painful R low back   Rotation: R slightly limited*; L slightly limited     Strength: (* denotes performed with pain)  LE   Hip flexion (L2): R 4+/5; L 5-/5  Hip abduction: R 4-/5; L 4+/5  Hip Extension: R 4/5; L 4-/5   Hip ER: R 4/5; L 4/5  Hip IR: R 5-/5; L 5/5  Knee Flexion: R 5/5; L 5/5   Knee extension (L3): R 5-/5; L 5/5      Flexibility:   LE   Hip Flexor: R limited, L limited  Hamstrings: R limited; L limited  Piriformis: R limited; L limited     Special tests:   Slump: - bilat  SLR: R -, L -  Active SLR: R -, L -  Long Axis Distraction: + relief R    Gait: pt ambulates on level ground with antalgia.  Balance: see above      Assessment: Spent time education patient on physiology of compression fractures and the effect on the surrounding muscles and joints. Also reviewed TA  activation to support the spine with transitons, such as sit to stand, rolling, or sidelying to sitting. Provided back extensor and TA strengthening for home, will plan to continue focusing on TA act, hip stabilization, and back extensor strengthening to reduce strain in the spine.       Goals:   Goals: (to be met in 10 visits)   Pt will improve transversus abdominis recruitment to perform proper isometric contraction without requiring verbal or tactile cuing to promote advancement of therex   Pt will demonstrate good understanding of proper posture and body mechanics to decrease pain and improve spinal safety   Pt will improve pain-free lumbar spine AROM flexion to allow increase ease with bending forward to don shoes   Pt will report improved symptom centralization and absence of radicular symptoms for 3 consecutive days to improve function with ADL   Pt will have decreased paraspinal mm tension to tolerate standing >30 minutes for work and home activities   Pt will demonstrate improved core strength to be able to perform yardwork with <2/10 pain   Pt will be independent and compliant with comprehensive HEP to maintain progress achieved in PT      Plan: Patient will be seen for 1-2 x/week or a total of 10 visits over a 90 day period. Treatment will include: Manual Therapy, Neuromuscular Re-education, Therapeutic Activities, Therapeutic Exercise, and Home Exercise Program instruction  Continue with manual work as needed, core and hip stability, glute strengthening, TENS?  Stairs   Date: 2/21/2024  TX#: 2/10 Date:  2/23/24               TX#: 3/10 Date:                 TX#: 4/ Date:                 TX#: 5/ Date:   Tx#: 6/   TE 20'  Nustep 5' lvl 3 (Arms and legs)  LTR x2'   SKTC (painful to R low back) HELD  DKTC with SB x2'   Bridge with hip add iso (HELD) painful with bridge  Hooklying hip add iso with glute set x2'   Pt edu: TA and multifidi anatomy and physiology, support of LBP, difference between bulging disc  and stenosis TE 20'  Nustep 5' lvl 3 (Arms and legs)  Repeated motions testing- no flexion, extension no change   Pt edu: discussion on compression fracture, physiology of TA and multifidi; difference between back braces  HEP update       NR 25'  TA iso with red ball pressing into thigh with PPT x2' with 5\" hold   TA iso red ball push with 90/90 march 2x10 bilat  SL clam x30 bilat  SL hip abd x15 bilat NR 30'  Seated hip hinge: cues for TA activation x5' (with pt edu)  TA iso with red ball pressing into thigh with PPT x2' with 10\" hold   TA iso red ball push with 90 deg hip flex to hip ext x10 bilat   SL clam x20 bilat  SL hip abd x20 bilat  GH row GTB x10  GH ext for TA act YTB x10      MT x10'  STM to low back and glutes  Segmental rotation              HEP: LTR, seated HS stretch, seated figure 4 ant hip capsule stretch  - Clamshell  - 1 x daily - 5 x weekly - 2-3 sets - 10 reps  - Sidelying Hip Abduction  - 1 x daily - 5 x weekly - 2-3 sets - 10 reps  - Supine Transversus Abdominis Bracing - Hands on Thighs  - 1 x daily - 7 x weekly - 10 reps - 5 sec hold    Charges: 1 TE 2 NR      Total Timed Treatment: 50 min  Total Treatment Time: 50 min

## 2024-02-23 NOTE — PROGRESS NOTES
Diagnosis:   Acute right-sided low back pain without sciatica (M54.50)      Iliopsoas strain   Referring Provider: Guillermo  Date of Evaluation:    2/13/2024    Precautions:   osteoporosis, h/o lumbar fusion L4-L5 2011 Next MD visit:   none scheduled  Date of Surgery: n/a     Insurance Primary/Secondary: [employer] INS / N/A     # Auth Visits: N/A            Subjective: Pt said the pain has been feeling worse in general. Is able to do all the exercises, but pain remains the same when changing positions.   Pain:  0/10 at rest; 8/10 when changing positions     Objective:  pain with movement changes, improves with TA act    SLS: R 11 sec; L 9 sec  Directional preference testing:   TRE: x10 described as \"good pain\", no change   RFIS: NT due to CT finding of compression fx at L2  -----    Observation/Posture: adjusting in seat frequently, increased thoracic kyphosis, scoliosis     Thoracolumbar AROM: (* denotes performed with pain)  Flexion: slightly limited, felt some pain in the groin while lowering   Extension: slightly limited, no pain  Sidebending: R slightly limited and painful R low back; L slightly limited and painful R low back   Rotation: R slightly limited*; L slightly limited     Strength: (* denotes performed with pain)  LE   Hip flexion (L2): R 4+/5; L 5-/5  Hip abduction: R 4-/5; L 4+/5  Hip Extension: R 4/5; L 4-/5   Hip ER: R 4/5; L 4/5  Hip IR: R 5-/5; L 5/5  Knee Flexion: R 5/5; L 5/5   Knee extension (L3): R 5-/5; L 5/5      Flexibility:   LE   Hip Flexor: R limited, L limited  Hamstrings: R limited; L limited  Piriformis: R limited; L limited     Special tests:   Slump: - bilat  SLR: R -, L -  Active SLR: R -, L -  Long Axis Distraction: + relief R    Gait: pt ambulates on level ground with antalgia.  Balance: see above      Assessment: Focused on manual work during beginning of session, overall no significant decrease with symptoms with joint movement. Most of his discomfort is when changing positions,  and he requires frequent cues for TA activation to reduce strain in the spine. Continued focusing on core and hip strengthening, will continue focusing on TA act, hip stabilization, and back extensor strengthening to reduce strain in the spine.       Goals:   Goals: (to be met in 10 visits)   Pt will improve transversus abdominis recruitment to perform proper isometric contraction without requiring verbal or tactile cuing to promote advancement of therex   Pt will demonstrate good understanding of proper posture and body mechanics to decrease pain and improve spinal safety   Pt will improve pain-free lumbar spine AROM flexion to allow increase ease with bending forward to don shoes   Pt will report improved symptom centralization and absence of radicular symptoms for 3 consecutive days to improve function with ADL   Pt will have decreased paraspinal mm tension to tolerate standing >30 minutes for work and home activities   Pt will demonstrate improved core strength to be able to perform yardwork with <2/10 pain   Pt will be independent and compliant with comprehensive HEP to maintain progress achieved in PT      Plan: Patient will be seen for 1-2 x/week or a total of 10 visits over a 90 day period. Treatment will include: Manual Therapy, Neuromuscular Re-education, Therapeutic Activities, Therapeutic Exercise, and Home Exercise Program instruction  Continue with manual work as needed, core and hip stability, glute strengthening, TENS?   Date: 2/21/2024  TX#: 2/10 Date:  2/23/24               TX#: 3/10 Date: 2/26/24                TX#: 4/10 Date:                 TX#: 5/ Date:   Tx#: 6/   TE 20'  Nustep 5' lvl 3 (Arms and legs)  LTR x2'   SKTC (painful to R low back) HELD  DKTC with SB x2'   Bridge with hip add iso (HELD) painful with bridge  Hooklying hip add iso with glute set x2'   Pt edu: TA and multifidi anatomy and physiology, support of LBP, difference between bulging disc and stenosis TE 20'  Nustep 5' lvl 3  (Arms and legs)  Repeated motions testing- no flexion, extension no change   Pt edu: discussion on compression fracture, physiology of TA and multifidi; difference between back braces  HEP update  TE 15'  Discussion on change in symptoms   Pt edu: TA activation and importance of protecting spine   Standing hip extension with slider x10 bilat        NR 25'  TA iso with red ball pressing into thigh with PPT x2' with 5\" hold   TA iso red ball push with 90/90 march 2x10 bilat  SL clam x30 bilat  SL hip abd x15 bilat NR 30'  Seated hip hinge: cues for TA activation x5' (with pt edu)  TA iso with red ball pressing into thigh with PPT x2' with 10\" hold   TA iso red ball push with 90 deg hip flex to hip ext x10 bilat   SL clam x20 bilat  SL hip abd x20 bilat  GH row GTB x10  GH ext for TA act YTB x10 NR 20'   TA iso red ball push with 90 deg hip flex to hip ext x20 bilat   Hooklying marching alternating x2' (cues to activate TA)   +improved with opposite glute activation   GH row GTB x20  GH ext for TA act YTB x20  Paloff press GTB x10 bilat     MT x10'  STM to low back and glutes  Segmental rotation  MT x15'  Segmental rotation lumbar and lower thoracic spine R SL  R iliopsoas release             HEP: LTR, seated HS stretch, seated figure 4 ant hip capsule stretch  - Clamshell  - 1 x daily - 5 x weekly - 2-3 sets - 10 reps  - Sidelying Hip Abduction  - 1 x daily - 5 x weekly - 2-3 sets - 10 reps  - Supine Transversus Abdominis Bracing - Hands on Thighs  - 1 x daily - 7 x weekly - 10 reps - 5 sec hold  - GH row and gh ext TA   - Supine March  - 1 x daily - 5 x weekly - 1-2 sets - 10 reps  - Supine 90/90 with Leg Extensions  - 1 x daily - 5 x weekly - 1-2 sets - 10 reps    Charges: 1 TE 1 NR 1 MT     Total Timed Treatment: 50 min  Total Treatment Time: 50 min

## 2024-02-26 ENCOUNTER — OFFICE VISIT (OUTPATIENT)
Dept: PHYSICAL THERAPY | Facility: HOSPITAL | Age: 84
End: 2024-02-26
Attending: INTERNAL MEDICINE
Payer: MEDICARE

## 2024-02-26 PROCEDURE — 97112 NEUROMUSCULAR REEDUCATION: CPT

## 2024-02-26 PROCEDURE — 97140 MANUAL THERAPY 1/> REGIONS: CPT

## 2024-02-26 PROCEDURE — 97110 THERAPEUTIC EXERCISES: CPT

## 2024-02-27 NOTE — PROGRESS NOTES
Diagnosis:   Acute right-sided low back pain without sciatica (M54.50)      Iliopsoas strain   Referring Provider: Guillermo  Date of Evaluation:    2/13/2024    Precautions:   osteoporosis, h/o lumbar fusion L4-L5 2011 Next MD visit:   none scheduled  Date of Surgery: n/a     Insurance Primary/Secondary: [employer] INS / N/A     # Auth Visits: N/A            Subjective: Pt said today has been his \"best day\" Started taking the tylenol and ibuprofen. Started taking it Monday or yesterday.   Pain:  0/10 at rest; 3/10 when changing positions     Objective:  decreased pain today, felt fatigue in hips with side stepping and monster walks    SLS: R 11 sec; L 9 sec  Directional preference testing:   TRE: x10 described as \"good pain\", no change   RFIS: NT due to CT finding of compression fx at L2  -----    Observation/Posture: adjusting in seat frequently, increased thoracic kyphosis, scoliosis     Thoracolumbar AROM: (* denotes performed with pain)  Flexion: slightly limited, felt some pain in the groin while lowering   Extension: slightly limited, no pain  Sidebending: R slightly limited and painful R low back; L slightly limited and painful R low back   Rotation: R slightly limited*; L slightly limited     Strength: (* denotes performed with pain)  LE   Hip flexion (L2): R 4+/5; L 5-/5  Hip abduction: R 4-/5; L 4+/5  Hip Extension: R 4/5; L 4-/5   Hip ER: R 4/5; L 4/5  Hip IR: R 5-/5; L 5/5  Knee Flexion: R 5/5; L 5/5   Knee extension (L3): R 5-/5; L 5/5      Flexibility:   LE   Hip Flexor: R limited, L limited  Hamstrings: R limited; L limited  Piriformis: R limited; L limited     Special tests:   Slump: - bilat  SLR: R -, L -  Active SLR: R -, L -  Long Axis Distraction: + relief R    Gait: pt ambulates on level ground with antalgia.  Balance: see above      Assessment: Focused on manual work during beginning of session, overall no significant decrease with symptoms with joint movement. Most of his discomfort is when  changing positions, and he requires frequent cues for TA activation to reduce strain in the spine. Continued focusing on core and hip strengthening, will continue focusing on TA act, hip stabilization, and back extensor strengthening to reduce strain in the spine.       Goals:   Goals: (to be met in 10 visits)   Pt will improve transversus abdominis recruitment to perform proper isometric contraction without requiring verbal or tactile cuing to promote advancement of therex   Pt will demonstrate good understanding of proper posture and body mechanics to decrease pain and improve spinal safety   Pt will improve pain-free lumbar spine AROM flexion to allow increase ease with bending forward to don shoes   Pt will report improved symptom centralization and absence of radicular symptoms for 3 consecutive days to improve function with ADL   Pt will have decreased paraspinal mm tension to tolerate standing >30 minutes for work and home activities   Pt will demonstrate improved core strength to be able to perform yardwork with <2/10 pain   Pt will be independent and compliant with comprehensive HEP to maintain progress achieved in PT      Plan: Patient will be seen for 1-2 x/week or a total of 10 visits over a 90 day period. Treatment will include: Manual Therapy, Neuromuscular Re-education, Therapeutic Activities, Therapeutic Exercise, and Home Exercise Program instruction  Continue with manual work as needed, core and hip stability, glute strengthening, TENS?   Date: 2/21/2024  TX#: 2/10 Date:  2/23/24               TX#: 3/10 Date: 2/26/24                TX#: 4/10 Date:  2/28/24               TX#: 5/10 Date:   Tx#: 6/ TE 20'  Nustep 5' lvl 3 (Arms and legs)  LTR x2'   SKTC (painful to R low back) HELD  DKTC with SB x2'   Bridge with hip add iso (HELD) painful with bridge  Hooklying hip add iso with glute set x2'   Pt edu: TA and multifidi anatomy and physiology, support of LBP, difference between bulging disc and  stenosis TE 20'  Nustep 5' lvl 3 (Arms and legs)  Repeated motions testing- no flexion, extension no change   Pt edu: discussion on compression fracture, physiology of TA and multifidi; difference between back braces  HEP update  TE 15'  Discussion on change in symptoms   Pt edu: TA activation and importance of protecting spine   Standing hip extension with slider x10 bilat    TE 15'  Discussion on change in symptoms   Shuttle DLP 62# 2x10, 67# x10    Pt edu: plan for visits, exercise schedule       NR 25'  TA iso with red ball pressing into thigh with PPT x2' with 5\" hold   TA iso red ball push with 90/90 march 2x10 bilat  SL clam x30 bilat  SL hip abd x15 bilat NR 30'  Seated hip hinge: cues for TA activation x5' (with pt edu)  TA iso with red ball pressing into thigh with PPT x2' with 10\" hold   TA iso red ball push with 90 deg hip flex to hip ext x10 bilat   SL clam x20 bilat  SL hip abd x20 bilat  GH row GTB x10  GH ext for TA act YTB x10 NR 20'   TA iso red ball push with 90 deg hip flex to hip ext x20 bilat   Hooklying marching alternating x2' (cues to activate TA)   +improved with opposite glute activation   GH row GTB x20  GH ext for TA act YTB x20  Paloff press GTB x10 bilat NR 30'   Paloff press GTB x20 bilat  + side step onto blue airex x10 bilat  Antirotation step outs RTB x5 ea side   Monster walks fwd/bwd YTB x3 laps  Side stepping YTB x3 laps     MT x10'  STM to low back and glutes  Segmental rotation  MT x15'  Segmental rotation lumbar and lower thoracic spine R SL  R iliopsoas release             HEP: LTR, seated HS stretch, seated figure 4 ant hip capsule stretch  - Clamshell  - 1 x daily - 5 x weekly - 2-3 sets - 10 reps  - Sidelying Hip Abduction  - 1 x daily - 5 x weekly - 2-3 sets - 10 reps  - Supine Transversus Abdominis Bracing - Hands on Thighs  - 1 x daily - 7 x weekly - 10 reps - 5 sec hold  - GH row and gh ext TA   - Supine March  - 1 x daily - 5 x weekly - 1-2 sets - 10 reps  - Supine  90/90 with Leg Extensions  - 1 x daily - 5 x weekly - 1-2 sets - 10 reps    Charges: 1 TE 2 NR     Total Timed Treatment: 45 min  Total Treatment Time: 55 min

## 2024-02-28 ENCOUNTER — OFFICE VISIT (OUTPATIENT)
Dept: PHYSICAL THERAPY | Facility: HOSPITAL | Age: 84
End: 2024-02-28
Attending: INTERNAL MEDICINE
Payer: MEDICARE

## 2024-02-28 PROCEDURE — 97110 THERAPEUTIC EXERCISES: CPT

## 2024-02-28 PROCEDURE — 97112 NEUROMUSCULAR REEDUCATION: CPT

## 2024-03-04 NOTE — PROGRESS NOTES
Diagnosis:   Acute right-sided low back pain without sciatica (M54.50)      Iliopsoas strain   Referring Provider: Guillermo  Date of Evaluation:    2/13/2024    Precautions:   osteoporosis, h/o lumbar fusion L4-L5 2011 Next MD visit:   none scheduled  Date of Surgery: n/a     Insurance Primary/Secondary: [employer] INS / N/A     # Auth Visits: N/A            Subjective: Pt's younger FRANCO is PT in [place] and took a look at the exercises. He had him do repeated L rotation, go for a walk, then repeat L rotation. Pt was in a lot of pain.   Pain:  0/10 at rest; 6/10 when changing positions     Objective:  hip abd focused requires cues for foot positioning.     SLS: R 11 sec; L 9 sec  Directional preference testing:   TRE: x10 described as \"good pain\", no change   RFIS: NT due to CT finding of compression fx at L2  -----    Observation/Posture: adjusting in seat frequently, increased thoracic kyphosis, scoliosis     Thoracolumbar AROM: (* denotes performed with pain)  Flexion: slightly limited, felt some pain in the groin while lowering   Extension: slightly limited, no pain  Sidebending: R slightly limited and painful R low back; L slightly limited and painful R low back   Rotation: R slightly limited*; L slightly limited     Strength: (* denotes performed with pain)  LE   Hip flexion (L2): R 4+/5; L 5-/5  Hip abduction: R 4-/5; L 4+/5  Hip Extension: R 4/5; L 4-/5   Hip ER: R 4/5; L 4/5  Hip IR: R 5-/5; L 5/5  Knee Flexion: R 5/5; L 5/5   Knee extension (L3): R 5-/5; L 5/5      Flexibility:   LE   Hip Flexor: R limited, L limited  Hamstrings: R limited; L limited  Piriformis: R limited; L limited     Special tests:   Slump: - bilat  SLR: R -, L -  Active SLR: R -, L -  Long Axis Distraction: + relief R    Gait: pt ambulates on level ground with antalgia.  Balance: see above      Assessment: Pt was feeling improved last session, though after attempting a new exercise his FRANCO told him to perform he feels worse. Session  always include extensive discussion and education regarding changes in symptoms, anatomy and physiology of structures involved, and plan for visits to address the symptoms. Told him to schedule more visits, as the PT appears to be improving symptoms though he;s had flares in symptoms due to extrinsic causes.       Goals:   Goals: (to be met in 10 visits)   Pt will improve transversus abdominis recruitment to perform proper isometric contraction without requiring verbal or tactile cuing to promote advancement of therex   Pt will demonstrate good understanding of proper posture and body mechanics to decrease pain and improve spinal safety   Pt will improve pain-free lumbar spine AROM flexion to allow increase ease with bending forward to don shoes   Pt will report improved symptom centralization and absence of radicular symptoms for 3 consecutive days to improve function with ADL   Pt will have decreased paraspinal mm tension to tolerate standing >30 minutes for work and home activities   Pt will demonstrate improved core strength to be able to perform yardwork with <2/10 pain   Pt will be independent and compliant with comprehensive HEP to maintain progress achieved in PT      Plan: Patient will be seen for 1-2 x/week or a total of 10 visits over a 90 day period. Treatment will include: Manual Therapy, Neuromuscular Re-education, Therapeutic Activities, Therapeutic Exercise, and Home Exercise Program instruction  Continue with manual work as needed, core and hip stability, glute strengthening, TENS?   Date: 2/21/2024  TX#: 2/10 Date:  2/23/24               TX#: 3/10 Date: 2/26/24                TX#: 4/10 Date:  2/28/24               TX#: 5/10 Date: 3/5/24  Tx#: 6/10   TE 20'  Nustep 5' lvl 3 (Arms and legs)  LTR x2'   SKTC (painful to R low back) HELD  DKTC with SB x2'   Bridge with hip add iso (HELD) painful with bridge  Hooklying hip add iso with glute set x2'   Pt edu: TA and multifidi anatomy and physiology,  support of LBP, difference between bulging disc and stenosis TE 20'  Nustep 5' lvl 3 (Arms and legs)  Repeated motions testing- no flexion, extension no change   Pt edu: discussion on compression fracture, physiology of TA and multifidi; difference between back braces  HEP update  TE 15'  Discussion on change in symptoms   Pt edu: TA activation and importance of protecting spine   Standing hip extension with slider x10 bilat    TE 15'  Discussion on change in symptoms   Shuttle DLP 62# 2x10, 67# x10    Pt edu: plan for visits, exercise schedule    TE 38'  Discussion change in symptoms and explanation  STS with OH reach with ball  x10- HELD longer lever movements increased tension in low back  Practice sitting to standing: fwd lean and hip hinging  Pt edu: TA act, HEP discussion, STS technique    NR 25'  TA iso with red ball pressing into thigh with PPT x2' with 5\" hold   TA iso red ball push with 90/90 march 2x10 bilat  SL clam x30 bilat  SL hip abd x15 bilat NR 30'  Seated hip hinge: cues for TA activation x5' (with pt edu)  TA iso with red ball pressing into thigh with PPT x2' with 10\" hold   TA iso red ball push with 90 deg hip flex to hip ext x10 bilat   SL clam x20 bilat  SL hip abd x20 bilat  GH row GTB x10  GH ext for TA act YTB x10 NR 20'   TA iso red ball push with 90 deg hip flex to hip ext x20 bilat   Hooklying marching alternating x2' (cues to activate TA)   +improved with opposite glute activation   GH row GTB x20  GH ext for TA act YTB x20  Paloff press GTB x10 bilat NR 30'   Paloff press GTB x20 bilat  + side step onto blue airex x10 bilat  Antirotation step outs RTB x5 ea side   Monster walks fwd/bwd YTB x3 laps  Side stepping YTB x3 laps  NR 15'   SL hip abd x10, x5 bilat TA iso with ball   Prone glute/quad set with small red ball 2x10 bilat   Monster walks fwd/bwd YTB around thighs x2 laps  Side stepping YTB around thighsx3 laps    MT x10'  STM to low back and glutes  Segmental rotation  MT  x15'  Segmental rotation lumbar and lower thoracic spine R SL  R iliopsoas release             HEP: LTR, seated HS stretch, seated figure 4 ant hip capsule stretch  - Clamshell  - 1 x daily - 5 x weekly - 2-3 sets - 10 reps  - Sidelying Hip Abduction  - 1 x daily - 5 x weekly - 2-3 sets - 10 reps  - Supine Transversus Abdominis Bracing - Hands on Thighs  - 1 x daily - 7 x weekly - 10 reps - 5 sec hold  - GH row and gh ext TA   - Supine March  - 1 x daily - 5 x weekly - 1-2 sets - 10 reps  - Supine 90/90 with Leg Extensions  - 1 x daily - 5 x weekly - 1-2 sets - 10 reps    Charges: 3 TE 1 NR     Total Timed Treatment: 53 min  Total Treatment Time: 53 min

## 2024-03-05 ENCOUNTER — OFFICE VISIT (OUTPATIENT)
Dept: PHYSICAL THERAPY | Facility: HOSPITAL | Age: 84
End: 2024-03-05
Attending: INTERNAL MEDICINE
Payer: MEDICARE

## 2024-03-05 PROCEDURE — 97110 THERAPEUTIC EXERCISES: CPT

## 2024-03-05 PROCEDURE — 97112 NEUROMUSCULAR REEDUCATION: CPT

## 2024-03-05 NOTE — TELEPHONE ENCOUNTER
Spoke with pt,  verified   Pt req rx ref for Ibuprofen 600 mg for back pain, pt stated he was seen in ER on 24 for back pain, he suffer from compression fracture on his back.   ER Doc rx'd Ibuprofen 600 mg, pt is not looking for a refill from PCP.   Routed to RN triage to run for protocol , thanks.           Requested Prescriptions     Pending Prescriptions Disp Refills    ibuprofen 600 MG Oral Tab 30 tablet 0     Sig: Take 1 tablet (600 mg total) by mouth every 8 (eight) hours as needed for Pain or Fever.       Last Office Visit with PCP: 2024

## 2024-03-07 ENCOUNTER — TELEPHONE (OUTPATIENT)
Dept: PHYSICAL THERAPY | Facility: HOSPITAL | Age: 84
End: 2024-03-07

## 2024-03-07 ENCOUNTER — APPOINTMENT (OUTPATIENT)
Dept: PHYSICAL THERAPY | Facility: HOSPITAL | Age: 84
End: 2024-03-07
Attending: INTERNAL MEDICINE
Payer: MEDICARE

## 2024-03-07 RX ORDER — IBUPROFEN 600 MG/1
600 TABLET ORAL EVERY 8 HOURS PRN
Qty: 30 TABLET | Refills: 0 | Status: SHIPPED | OUTPATIENT
Start: 2024-03-07 | End: 2024-03-14

## 2024-03-07 NOTE — TELEPHONE ENCOUNTER
Refill passed per [facility] protocol.  Medication is listed as patient reported.    Seema Cooper, RN2 days ago     ES  Spoke with pt,  verified   Pt req rx ref for Ibuprofen 600 mg for back pain, pt stated he was seen in ER on 24 for back pain, he suffer from compression fracture on his back.   ER Doc rx'd Ibuprofen 600 mg, pt is not looking for a refill from PCP.   Routed to RN triage to run for protocol , thanks.           Requested Prescriptions   Pending Prescriptions Disp Refills    ibuprofen 600 MG Oral Tab 30 tablet 0     Sig: Take 1 tablet (600 mg total) by mouth every 8 (eight) hours as needed for Pain or Fever.       Non-Narcotic Pain Medication Protocol Passed - 3/5/2024 12:13 PM        Passed - In person appointment or virtual visit in the past 6 mos or appointment in next 3 mos     Recent Outpatient Visits              2 days ago     [facility]    Office Visit    1 week ago     [facility]    Office Visit    1 week ago     [facility]    Office Visit    1 week ago     [facility]    Office Visit    2 weeks ago     [facility] Propser Howell, PT    Office Visit          Future Appointments         Provider Department Appt Notes    In 4 days Kannan Gutierrez DO [facility] Rfd by Dr Li/ Mary Anton/ Chrisetl HOWARD    In 5 days Prosper Howell PT [facility] [employer] MCR  no c/p, no limit, no auth    In 1 week [facility] [employer] [facility]  no c/p, no limit, no auth    In 1 week Josh Li MD [facility] SUPER last px 3/17/23    In 1 week Jordan Castellanos DPM [facility] -[facility]    In 1  week CreProsper mullen, PT [facility] [employer] MCR  no c/p, no limit, no auth    In 2 weeks CreProsper mullen, PT [facility] [employer] MCR  no c/p, no limit, no auth    In 3 weeks CreLenin mullenir, PT [facility] [employer] MCR  no c/p, no limit, no auth    In 1 month CremerProsper taylor, PT [facility] [employer] MCR  no c/p, no limit, no auth    In 1 month Lisa Dorman MD [facility] followup    [facility] [employer] MCR  no c/p, no limit, no auth                  Recent Outpatient Visits              2 days [facility]    Office Visit    1 week ago     [facility] CreProsper mullen, PT    Office Visit    1 week ago     [facility] CreProsper mullen, PT    Office Visit    1 week ago     [facility] CreProsper mullen, PT    Office Visit    2 weeks ago     [facility]    Office Visit          Future Appointments         Provider Department Appt Notes    In 4 days Kannan Gutierrez DO [facility] Rfd by Dr Li/ Mary Anton/ Christel HOWARD    In 5 days [facility] [employer] MCR  no c/p, no limit, no auth    In 1 week CreProsper mullen, PT [facility] [employer] MCR  no c/p, no limit, no auth    In 1 week Josh Li MD [facility] SUPER last px 3/17/23    In 1 week Jordan Castellanos DPM [facility] -[facility]    In 1 week [facility] [employer] MCR  no c/p, no limit, no auth    In 2 weeks Cremerius, Prosper, PT [facility] [employer] [facility]  no c/p, no  limit, no auth    In 3 weeks Prosper Howell, PT [facility]  no c/p, no limit, no auth    In 1 month Prosper Howell, PT [facility]  no c/p, no limit, no auth    In 1 month Lisa Dorman MD [place] followup    In 1 month Prosper Howell, PT [facility]  no c/p, no limit, no auth

## 2024-03-11 ENCOUNTER — OFFICE VISIT (OUTPATIENT)
Dept: PHYSICAL MEDICINE AND REHAB | Facility: CLINIC | Age: 84
End: 2024-03-11
Payer: MEDICARE

## 2024-03-11 ENCOUNTER — HOSPITAL ENCOUNTER (OUTPATIENT)
Dept: GENERAL RADIOLOGY | Facility: HOSPITAL | Age: 84
Discharge: HOME | End: 2024-03-11
Attending: PHYSICAL MEDICINE & REHABILITATION
Payer: MEDICARE

## 2024-03-11 ENCOUNTER — TELEPHONE (OUTPATIENT)
Dept: PHYSICAL THERAPY | Facility: HOSPITAL | Age: 84
End: 2024-03-11

## 2024-03-11 VITALS — HEART RATE: 58 BPM | OXYGEN SATURATION: 98 % | WEIGHT: 141 LBS | BODY MASS INDEX: 22.13 KG/M2 | HEIGHT: 67 IN

## 2024-03-11 DIAGNOSIS — M16.11 PRIMARY OSTEOARTHRITIS OF RIGHT HIP: ICD-10-CM

## 2024-03-11 DIAGNOSIS — S32.020A COMPRESSION FRACTURE OF L2 VERTEBRA, INITIAL ENCOUNTER (HCC): ICD-10-CM

## 2024-03-11 DIAGNOSIS — M16.11 PRIMARY OSTEOARTHRITIS OF RIGHT HIP: Primary | ICD-10-CM

## 2024-03-11 PROCEDURE — 73502 X-RAY EXAM HIP UNI 2-3 VIEWS: CPT | Performed by: PHYSICAL MEDICINE & REHABILITATION

## 2024-03-11 PROCEDURE — 99204 OFFICE O/P NEW MOD 45 MIN: CPT | Performed by: PHYSICAL MEDICINE & REHABILITATION

## 2024-03-11 RX ORDER — TRAMADOL HYDROCHLORIDE 50 MG/1
50 TABLET ORAL EVERY 6 HOURS PRN
Qty: 30 TABLET | Refills: 0 | Status: SHIPPED | OUTPATIENT
Start: 2024-03-11

## 2024-03-11 NOTE — PATIENT INSTRUCTIONS
Start tramadol 50 mg as needed for pain  Ibuprofen only when the pain is more severe.  X-ray of the right hip on the way out today  MRI of the left lumbar spine and hip and follow-up after  Will consider referral for kyphoplasty after

## 2024-03-11 NOTE — PROGRESS NOTES
[facility]  NEW PATIENT EVALUATION    Consultation as a request of Josh Francis      HISTORY OF PRESENT ILLNESS:     Chief Complaint   Patient presents with    New Patient     New patient is here with complaints of back pain. CT of spine lumbar was completed 2/2/24. XR of lumbar spine was completed 1/17/24. He was referred by Dr. Li. Pain started 1/13/2024, pain came after shoveling the snow. No N/T. Reports pain to be throbbing pain and states it's worse in the morning. Pain depends on activity, Takes ibuprofen to ease pain        The patient is a 83 year old male with significant past medical history of aortic atherosclerosis cirrhosis, BPH, diverticulosis, glaucoma, herpes, hyperlipidemia, hypertension, iron deficiency anemia, ischemic colitis, osteoporosis, peptic ulcer disease who presents with acute onset of low back pain.  Patient states he was shoveling snow about 2 months ago and thinks this may have exacerbated his pain.  He did not have a specific injury or trauma or event however the next day he started to notice worsening pain.  It progressively worsened and he presented to the ER.  He then started experiencing pain burning in nature severe in the groin which was worse with sitting and improved with laying down.  He presented to the ER again had CT imaging of the lumbar spine completed recently which was notable for a acute endplate compression fracture at L2.  He has history of lumbar fusion at L4-5.  He denies any radiating symptoms in the leg, any numbness tingling or weakness.  He has a very difficult time bending forward.  He continues to take ibuprofen which is the only medication that he finds helpful.  He has completed Norco and steroid but has not found those to be particular Aliga helpful.  He denies any saddle anesthesia, any loss of bowel bladder control.He is currently in physical therapy and doing home exercises he finds that he is  in more pain after PT though.    PHYSICAL EXAM:   Pulse 58   Ht 67\"   Wt 141 lb (64 kg)   SpO2 98%   BMI 22.08 kg/m²     Gait  Able to toe walk and heel walk without any difficulty.  Thoracolumbar scoliotic curve noted    LUMBAR SPINE:  Inspection: no erythema, swelling, or obvious deformity.  Their iliac crest and shoulder heights are symmetrical.   Previous surgical scar from fusion surgery noted  Palpation: Non tender to palpation of the spinous process.  Nontender to palpation of the spinous process.  ROM: Restricted in all planes but worse pain with forward flexion better with extension  Strength: 5/5 in bilateral lower extremities  Sensation: Intact to light touch in all dermatomes of the lower extremities  Reflexes: 2/4 at L4 and S1  Facet Loading: no specific facet pain  Straight leg raise: negative for radicular pain symptoms  Slump test: negative for pain symptoms for radicular pain symptoms  MADHAV: Mildly positive      IMAGING:     CT lumbar spine completed on 2/2/2024 was notable for acute compression fracture of the L2 vertebral body which appears to be mild to moderate in nature.  There are postoperative changes of posterior fusion at L4-5.  There is moderate spinal canal narrowing at L3-4 with mild to moderate right neuroforaminal stenosis.  There is moderate foraminal narrowing at multiple levels throughout the lumbar spine.    All imaging results were reviewed and discussed with patient.      ASSESSMENT/PLAN:     1. Primary osteoarthritis of right hip    2. Compression fracture of L2 vertebra, initial encounter ([facility])        Armando Forrester is a pleasant 83-year-old male presenting today for new evaluation of acute onset of low back pain after an injury while shoveling snow.  He does have an acute compression fracture at the L2 vertebral body and has difficulty with forward flexion.  I believe his pain is consistent with this.  He does have degenerative changes in the facet joint as well but  has intact hardware fusion from previous surgery.  He has been attending PT but notices pain is exacerbated after.  I recommend he start tramadol 50 mg as needed and recommend using ice and heat and Tylenol instead of ibuprofen if possible.  I recommended x-ray imaging of the hip as well as he did have acute groin pain and would like to rule out any AVN or stress fracture.  I have recommended MRI imaging of the hip for this reason along with MRI imaging of the lumbar spine for further evaluation.  He will likely benefit from IR consultation for consideration of kyphoplasty.      The patient verbalized understanding with the plan and was in agreement. All questions/concerns were addressed and there were no barriers to learning.  Please note Dragon dictation software was used to dictate this note and may result in inadvertent typos.    Kannan Gutierrez DO, FAAPMR & CAQSM  Physical Medicine and Rehabilitation  Sports and Spine Medicine    PAST MEDICAL HISTORY:     Past Medical History:   Diagnosis Date    Aortic atherosclerosis (HCC)     CT scan 6-16    BPH (benign prostatic hyperplasia) 09/2008    With abnormal PSA, Rx Proscar    Diverticulosis 2007    Glaucoma     Herpes zoster 2008    Left face    Hx of adenomatous colonic polyps 07/2022    Hypercholesterolemia     Hypertension 08/2011    Iron deficiency anemia 09/2008    Ischemic colitis (HCC) 12/2015    Recurrent 12-21    Osteoporosis 01/2002    T9 compression fracture, T score -3.8 spine and -0.9 hip, Rx Fosamax, repeat T score -2.5 spine and -1.0 hip 10-10, repeat T score -0.7 hip and -2.2 lumbar spine 3-16, Fosamax DC'd    Peptic ulcer disease 1980    SCC (squamous cell carcinoma) 01/2017    Left temple    Subclinical hypothyroidism          PAST SURGICAL HISTORY:     Past Surgical History:   Procedure Laterality Date    APPENDECTOMY  1961    BACK SURGERY Left 12/2011    L4-L5 microdiscectomy    CATARACT Left 10/2010    CATARACT Right 12/2010    COLONOSCOPY  1/2016     INGUINAL HERNIA REPAIR Bilateral 1970    OTHER  January 2017    Excision SCCa left temple    SKIN SURGERY Right 10/28/2016    Excision epidermal inclusion cyst back    SPINAL FUSION  10/2011    L4-L5    TONSILLECTOMY  1968         CURRENT MEDICATIONS:     Current Outpatient Medications   Medication Sig Dispense Refill    traMADol 50 MG Oral Tab Take 1 tablet (50 mg total) by mouth every 6 (six) hours as needed for Pain. 30 tablet 0    ibuprofen 600 MG Oral Tab Take 1 tablet (600 mg total) by mouth every 8 (eight) hours as needed for Pain or Fever. 30 tablet 0    naproxen 500 MG Oral Tab Take 1 tablet (500 mg total) by mouth 2 (two) times daily with meals. 30 tablet 0    lidocaine 5 % External Patch Place 1 patch onto the skin daily. 10 each 1    lidocaine 5 % External Patch Place 1 patch onto the skin daily. 15 patch 0    finasteride 5 MG Oral Tab Take 1 tablet by mouth daily. 30 tablet 0    amLODIPine 10 MG Oral Tab Take 1 tablet (10 mg total) by mouth daily. 90 tablet 1    atorvastatin 20 MG Oral Tab Take 1 tablet (20 mg total) by mouth every evening. 90 tablet 3    latanoprost 0.005 % Ophthalmic Solution Place 1 drop into both eyes nightly.      Ascorbic Acid (VITAMIN C) 500 MG Oral Cap Take 1 tablet by mouth daily.      Multiple Vitamins-Minerals (PRESERVISION AREDS) Oral Tab Take 2 tablets by mouth daily.      Omega-3 Fatty Acids (FISH OIL OR) Take 1 capsule by mouth 3 (three) times daily.      Calcium Carbonate-Vitamin D (CALCIUM 600 + D OR) Take 1 tablet by mouth 2 (two) times daily.      Coenzyme Q10 (COQ10 OR) Take 1 capsule by mouth daily.      Probiotic Product (PROBIOTIC OR) Take 1 capsule by mouth daily.      Boswellia Judah (BOSWELLIA OR) Take 2 tablets by mouth 3 (three) times daily.      Cholecalciferol (VITAMIN D3) 1000 UNITS Oral Cap Take  by mouth. take 1 daily           ALLERGIES:     Allergies   Allergen Reactions    Clindamycin OTHER (SEE COMMENTS)     Angioedema    Lisinopril OTHER (SEE  COMMENTS)     Angioedema         FAMILY HISTORY:     Family History   Problem Relation Age of Onset    Heart Disease Father         CHF age 68    Hypertension Mother     Other (Kidney Cancer[other]) Brother           SOCIAL HISTORY:     Social History     Socioeconomic History    Marital status:     Number of children: 2   Occupational History    Occupation:      Comment: part time    Occupation: [employer] teacher, [employer]     Comment: retired   Tobacco Use    Smoking status: Former     Packs/day: 0.00     Years: 5.00     Additional pack years: 0.00     Total pack years: 0.00     Types: Cigarettes     Quit date: 1962     Years since quittin.1    Smokeless tobacco: Never   Vaping Use    Vaping Use: Never used   Substance and Sexual Activity    Alcohol use: Yes     Alcohol/week: 1.0 standard drink of alcohol     Types: 1 Glasses of wine per week     Comment: Occasional glass of wine    Drug use: No   Other Topics Concern    Pt has a pacemaker No    Pt has a defibrillator No    Reaction to local anesthetic No    Caffeine Concern Yes     Comment: coffee, 3cups/day          REVIEW OF SYSTEMS:   Patient-reported ROS  Constitutional  Sleep Disturbance: denies  Chills: denies  Fever: denies  Weight Gain: denies  Weight Loss: denies   Cardiovascular  Chest Pain: denies  Irregular Heartbeat: denies   Respiratory  Painful Breathing: denies  Wheezing: denies   Gastrointestinal  Bowel Incontinence: denies  Heartburn: denies  Abdominal Pain: denies  Blood in Stool : denies  Rectal Pain: denies   Hematology  Easy Bruising: denies  Easy Bleeding: denies   Genitourinary  Difficulty Urinating: denies  Bladder Incontinence: denies  Pelvic Pain: denies  Painful Urination: denies   Musculoskeletal  Joint Stiffness: denies  Painful Joints: denies  Tailbone Pain: denies  Swollen Joints: denies   Peripheral Vascular  Swelling of Legs/Feet: denies  Cold Extremities: denies   Skin  Open Sores: denies  Nodules or  Lumps: denies  Rash: denies   Neurological  Loss of Strength Since last Visit: denies  Tingling/Numbness: denies  Balance: denies   Psychiatric  Anxiety: denies  Depressed Mood: denies       PHYSICAL EXAM:   General: No immediate distress  Head: Normocephalic/ Atraumatic  Eyes: Extra-occular movements intact.   Ears: No auricular hematoma or deformities  Mouth: No lesions or ulcerations  Heart: peripheral pulses intact. Normal capillary refill.   Lungs: Non-labored respirations  Abdomen: No abdominal guarding  Extremities: No lower extremity edema bilaterally   Skin: No lesions noted   Cognition: alert & oriented x 3, attentive, able to follow 2 step commands, comprehention intact, spontaneous speech intact  Psychiatric: Mood and affect appropriate      LABS:     Lab Results   Component Value Date     03/02/2022    A1C 5.7 (H) 03/02/2022     Lab Results   Component Value Date    WBC 8.1 01/23/2024    RBC 4.37 01/23/2024    HGB 13.4 01/23/2024    HCT 39.4 01/23/2024    MCV 90.2 01/23/2024    MCH 30.7 01/23/2024    MCHC 34.0 01/23/2024    RDW 13.2 01/23/2024    .0 01/23/2024    MPV 6.6 (L) 02/24/2018     Lab Results   Component Value Date     (H) 01/23/2024    BUN 25 (H) 01/23/2024    BUNCREA 26.3 (H) 01/23/2024    CREATSERUM 0.95 01/23/2024    ANIONGAP 8 01/23/2024    GFRNAA 81 03/02/2022    GFRAA 93 03/02/2022    CA 9.5 01/23/2024    OSMOCALC 295 01/23/2024    ALKPHO 84 01/23/2024    AST 29 01/23/2024    ALT 31 01/23/2024    ALKPHOS 54 03/08/2016    BILT 0.5 01/23/2024    TP 7.1 01/23/2024    ALB 4.2 01/23/2024    GLOBULIN 2.9 01/23/2024    AGRATIO 1.0 03/08/2016     01/23/2024    K 3.8 01/23/2024     01/23/2024    CO2 28.0 01/23/2024     No results found for: \"PTP\", \"PT\", \"INR\"  Lab Results   Component Value Date    VITD 47.1 03/02/2022

## 2024-03-12 ENCOUNTER — APPOINTMENT (OUTPATIENT)
Dept: PHYSICAL THERAPY | Facility: HOSPITAL | Age: 84
End: 2024-03-12
Attending: INTERNAL MEDICINE
Payer: MEDICARE

## 2024-03-14 ENCOUNTER — APPOINTMENT (OUTPATIENT)
Dept: PHYSICAL THERAPY | Facility: HOSPITAL | Age: 84
End: 2024-03-14
Attending: INTERNAL MEDICINE
Payer: MEDICARE

## 2024-03-19 ENCOUNTER — APPOINTMENT (OUTPATIENT)
Dept: PHYSICAL THERAPY | Facility: HOSPITAL | Age: 84
End: 2024-03-19
Attending: INTERNAL MEDICINE
Payer: MEDICARE

## 2024-03-25 ENCOUNTER — OFFICE VISIT (OUTPATIENT)
Dept: INTERNAL MEDICINE CLINIC | Facility: CLINIC | Age: 84
End: 2024-03-25
Payer: MEDICARE

## 2024-03-25 VITALS
BODY MASS INDEX: 21.31 KG/M2 | WEIGHT: 135.81 LBS | DIASTOLIC BLOOD PRESSURE: 58 MMHG | HEART RATE: 60 BPM | SYSTOLIC BLOOD PRESSURE: 134 MMHG | HEIGHT: 67 IN

## 2024-03-25 DIAGNOSIS — Z00.00 ENCOUNTER FOR ANNUAL HEALTH EXAMINATION: ICD-10-CM

## 2024-03-25 DIAGNOSIS — I10 PRIMARY HYPERTENSION: ICD-10-CM

## 2024-03-25 DIAGNOSIS — R35.0 BENIGN PROSTATIC HYPERPLASIA WITH URINARY FREQUENCY: ICD-10-CM

## 2024-03-25 DIAGNOSIS — M81.0 OSTEOPOROSIS, UNSPECIFIED OSTEOPOROSIS TYPE, UNSPECIFIED PATHOLOGICAL FRACTURE PRESENCE: ICD-10-CM

## 2024-03-25 DIAGNOSIS — E78.00 HYPERCHOLESTEROLEMIA: ICD-10-CM

## 2024-03-25 DIAGNOSIS — I70.0 AORTIC ATHEROSCLEROSIS (HCC): ICD-10-CM

## 2024-03-25 DIAGNOSIS — E03.8 SUBCLINICAL HYPOTHYROIDISM: ICD-10-CM

## 2024-03-25 DIAGNOSIS — Z86.010 HX OF ADENOMATOUS COLONIC POLYPS: ICD-10-CM

## 2024-03-25 DIAGNOSIS — Z87.19 HISTORY OF ISCHEMIC COLITIS: ICD-10-CM

## 2024-03-25 DIAGNOSIS — Z00.00 ANNUAL PHYSICAL EXAM: Primary | ICD-10-CM

## 2024-03-25 DIAGNOSIS — Z85.828 HISTORY OF SKIN CANCER: ICD-10-CM

## 2024-03-25 DIAGNOSIS — H40.9 GLAUCOMA, UNSPECIFIED GLAUCOMA TYPE, UNSPECIFIED LATERALITY: ICD-10-CM

## 2024-03-25 DIAGNOSIS — D50.9 IRON DEFICIENCY ANEMIA, UNSPECIFIED IRON DEFICIENCY ANEMIA TYPE: ICD-10-CM

## 2024-03-25 DIAGNOSIS — N40.1 BENIGN PROSTATIC HYPERPLASIA WITH URINARY FREQUENCY: ICD-10-CM

## 2024-03-25 NOTE — PATIENT INSTRUCTIONS
Your blood pressure today was good at 134/58  Come in soon when you can for blood tests  Please schedule a repeat bone density test  Continue current medication, healthy diet and regular physical activity  Return visit in 6 months for a blood pressure check  Armando Forrester's SCREENING SCHEDULE   Tests on this list are recommended by your physician but may not be covered, or covered at this frequency, by your insurer.   Please check with your insurance carrier before scheduling to verify coverage.   PREVENTATIVE SERVICES FREQUENCY &  COVERAGE DETAILS LAST COMPLETION DATE   Diabetes Screening    Fasting Blood Sugar / Glucose    One screening every 12 months if never tested or if previously tested but not diagnosed with pre-diabetes   One screening every 6 months if diagnosed with pre-diabetes Lab Results   Component Value Date     (H) 01/23/2024        Cardiovascular Disease Screening    Lipid Panel  Cholesterol  Lipoprotein (HDL)  Triglycerides Covered every 5 years for all Medicare beneficiaries without apparent signs or symptoms of cardiovascular disease Lab Results   Component Value Date    CHOLEST 138 03/18/2023    HDL 72 (H) 03/18/2023    LDL 56 03/18/2023    TRIG 42 03/18/2023         Electrocardiogram (EKG)   Covered if needed at Welcome to Medicare, and non-screening if indicated for medical reasons 12/02/2021      Ultrasound Screening for Abdominal Aortic Aneurysm (AAA) Covered once in a lifetime for one of the following risk factors   • Men who are 65-75 years old and have ever smoked   • Anyone with a family history -     Colorectal Cancer Screening  Covered for ages 50-85; only need ONE of the following:    Colonoscopy   Covered every 10 years    Covered every 2 years if patient is at high risk or previous colonoscopy was abnormal 07/15/2022    Health Maintenance   Topic Date Due   • Colorectal Cancer Screening  Discontinued       Flexible Sigmoidoscopy   Covered every 4 years -    Fecal Occult  Blood Test Covered annually -   Prostate Cancer Screening    Prostate-Specific Antigen (PSA) Annually No results found for: \"PSA\"  There are no preventive care reminders to display for this patient.   Immunizations    Influenza Covered once per flu season  Please get every year 10/14/2023  No recommendations at this time    Pneumococcal Each vaccine ([id number] & [id number]) covered once after 65 Prevnar 13: 02/19/2016    [id number]: 09/24/2021     No recommendations at this time    Hepatitis B One screening covered for patients with certain risk factors   -  No recommendations at this time    Tetanus Toxoid Not covered by Medicare Part B unless medically necessary (cut with metal); may be covered with your pharmacy prescription benefits 09/16/2010    Tetanus, Diptheria and Pertusis TD and TDaP Not covered by Medicare Part B -  No recommendations at this time    Zoster Not covered by Medicare Part B; may be covered with your pharmacy  prescription benefits -  No recommendations at this time

## 2024-03-25 NOTE — PROGRESS NOTES
Subjective:   Armando Forrester is an 83 year old male who presents this afternoon for an MA (Medicare Advantage) Supervisit (Once per calendar year) and scheduled follow up of multiple significant but stable problems including hypertension, hypercholesterolemia, subclinical hypothyroidism and osteoporosis.    His last Medicare physical was March 2023.  Low back pain from recent L2 vertebral compression fracture is now significantly better, with pain primarily in the morning and when getting out of bed.  Tramadol provides some relief.  MRI lumbar spine and right hip scheduled.  No other issues for discussion today.    In terms of his hypertension, BP checks typically 130s/60-70s.  Diet healthy and he is active, though he has not been exercising recently.  Weight down 4 pounds since physical March 2023.  In terms of his prior skin cancer, he follows regularly with Dermatology.    History/Other:   Fall Risk Assessment:   He has been screened for Falls and is low risk.      Cognitive Assessment:   He had a completely normal cognitive assessment - see flowsheet entries     Functional Ability/Status:   Armando Forrester has a completely normal functional assessment. See flowsheet for details.      Depression Screening (PHQ-2/PHQ-9): PHQ-2 SCORE: 0  , done 3/21/2024        Advanced Directives:   He has a Living Will on file in [employer]; reviewed and discussed documents with patient (and family/surrogate if present).  He has a Power of  for Health Care on file in [employer].  Not discussed      Patient Active Problem List   Diagnosis    Hypertension    Osteoporosis    Iron deficiency anemia    BPH (benign prostatic hyperplasia)    Hypercholesterolemia    Glaucoma    History of ischemic colitis    History of skin cancer    Aortic atherosclerosis (HCC)    Hyperglycemia    Colitis    LLQ abdominal pain    Hx of adenomatous colonic polyps    Subclinical hypothyroidism     Allergies:  He is allergic to clindamycin and  lisinopril.    Current Medications:  Outpatient Medications Marked as Taking for the 3/25/24 encounter (Office Visit) with Josh Li MD   Medication Sig    traMADol 50 MG Oral Tab Take 1 tablet (50 mg total) by mouth every 6 (six) hours as needed for Pain.    finasteride 5 MG Oral Tab Take 1 tablet by mouth daily.    amLODIPine 10 MG Oral Tab Take 1 tablet (10 mg total) by mouth daily.    atorvastatin 20 MG Oral Tab Take 1 tablet (20 mg total) by mouth every evening.    latanoprost 0.005 % Ophthalmic Solution Place 1 drop into both eyes nightly.    Ascorbic Acid (VITAMIN C) 500 MG Oral Cap Take 1 tablet by mouth daily.    Multiple Vitamins-Minerals (PRESERVISION AREDS) Oral Tab Take 2 tablets by mouth daily.    Omega-3 Fatty Acids (FISH OIL OR) Take 1 capsule by mouth 3 (three) times daily.    Calcium Carbonate-Vitamin D (CALCIUM 600 + D OR) Take 1 tablet by mouth 2 (two) times daily.    Coenzyme Q10 (COQ10 OR) Take 1 capsule by mouth daily.    Probiotic Product (PROBIOTIC OR) Take 1 capsule by mouth daily.    Boswellia Judah (BOSWELLIA OR) Take 2 tablets by mouth 3 (three) times daily.    Cholecalciferol (VITAMIN D3) 1000 UNITS Oral Cap Take  by mouth. take 1 daily       Medical History:  He  has a past medical history of Aortic atherosclerosis (HCC), BPH (benign prostatic hyperplasia) (09/2008), Diverticulosis (2007), Glaucoma, Herpes zoster (2008), adenomatous colonic polyps (07/2022), Hypercholesterolemia, Hypertension (08/2011), Iron deficiency anemia (09/2008), Ischemic colitis (HCC) (12/2015), Osteoporosis (01/2002), Peptic ulcer disease (1980), SCC (squamous cell carcinoma) (01/2017), and Subclinical hypothyroidism.  Surgical History:  He  has a past surgical history that includes appendectomy (1961); tonsillectomy (1968); Inguinal hernia repair (Bilateral, 1970); cataract (Left, 10/2010); cataract (Right, 12/2010); back surgery (Left, 12/2011); spinal fusion (10/2011); colonoscopy (1/2016); skin  surgery (Right, 10/28/2016); and other (2017).   Family History:  His family history includes Heart Disease in his father; Hypertension in his mother; Kidney Cancer in his brother.  Social History:  He  reports that he quit smoking about 62 years ago. His smoking use included cigarettes. He has never used smokeless tobacco. He reports current alcohol use of about 1.0 standard drink of alcohol per week. He reports that he does not use drugs.    Tobacco:  He smoked tobacco in the past but quit greater than 12 months ago.  Social History    Tobacco Use      Smoking status: Former        Packs/day: 0.00        Years: 5.00        Additional pack years: 0.00        Total pack years: 0.00        Types: Cigarettes        Quit date: 1962        Years since quittin.1      Smokeless tobacco: Never       CAGE Alcohol Screen:   CAGE screening score of 0 on 3/21/2024, showing low risk of alcohol abuse.      Patient Care Team:  Josh Li MD as PCP - General (Internal Medicine)  Prosper Howell PT as Physical Therapist (Physical Therapy)    Review of Systems    REVIEW OF SYSTEMS:   GENERAL: No fever  LUNGS: No cough wheezing or shortness of breath  CARDIAC: No lightheadedness palpitations or chest pain  GI: No anorexia heartburn dysphagia nausea vomiting abdominal pain diarrhea constipation or rectal bleeding  : Weak urinary stream stable.  No urinary frequency dysuria or hematuria  MUSCULOSKELETAL: No leg swelling  NEURO: No headaches     Objective:   Physical Exam    EXAM:   GENERAL: Pleasant male appearing well in no distress  /58   Pulse 60   Ht 5' 7\" (1.702 m)   Wt 135 lb 12.8 oz (61.6 kg)   BMI 21.27 kg/m²   HEENT: Anicteric, conjunctiva pink, oropharynx normal  NECK: Supple without mass or thyromegaly  NODES: No peripheral adenopathy  LUNGS: Resonant to percussion and clear to auscultation  CARDIAC: Rhythm regular S1 S2 normal without murmur or edema  ABDOMEN: Bowel sounds normal soft  nontender without mass or hepatosplenomegaly  EXTREMITIES: Normal without cyanosis or clubbing  PULSES: Carotid upstrokes 2+ without carotid bruits, distal pulses 2+ bilateral dorsalis pedis and posterior tibial  NEURO: Reflexes 2+ bilaterally and symmetric     /58   Pulse 60   Ht 5' 7\" (1.702 m)   Wt 135 lb 12.8 oz (61.6 kg)   BMI 21.27 kg/m²  Estimated body mass index is 21.27 kg/m² as calculated from the following:    Height as of this encounter: 5' 7\" (1.702 m).    Weight as of this encounter: 135 lb 12.8 oz (61.6 kg).    Medicare Hearing Assessment:   Hearing Screening    Time taken: 3/25/2024  1:23 PM  Entry User: Esther Prince LPN  Screening Method: Finger Rub  Finger Rub Result: Pass               Assessment & Plan:   Armando Forrester is a 83 year old male who presents for a Medicare Assessment.     1. Annual physical exam (Primary)  Check CMP CBC lipid profile and TSH with reflex T4.  Order sent  Continue current medication  Reinforced healthy diet and regular physical activity  Annual Medicare physical  Return visit in 6 months for BP check.  -     Comp Metabolic Panel (14)  -     CBC, Platelet; No Differential  -     Lipid Panel  -     TSH W Reflex To Free T4    2. Primary hypertension  Well-controlled.  Continue current medications    3. Hypercholesterolemia  Continue statin and await labs    4. Subclinical hypothyroidism  Await TSH    5. Iron deficiency anemia, unspecified iron deficiency anemia type  Await labs     6. Hx of adenomatous colonic polyps  Further colonoscopies deferred    7. History of ischemic colitis  No recurrence    8. Osteoporosis, unspecified osteoporosis type, unspecified pathological fracture presence  Previously treated with Fosamax  Given recent L2 vertebral compression fracture, recommend repeat DEXA scan.  Order sent.  He will schedule  -     XR DEXA BONE DENSITOMETRY (CPT=77080); Future; Expected date: 03/25/2024    9. Benign prostatic hyperplasia with urinary  frequency  Continue finasteride    10. History of skin cancer  Ongoing follow-up with Dermatology  Overview:  left temple    11. Glaucoma, unspecified glaucoma type, unspecified laterality  Ongoing follow-up with Ophthalmology    12. Aortic atherosclerosis (HCC)  Asymptomatic.  Continue risk factor control including statin    The patient indicates understanding of these issues and agrees to the plan.  Reinforced healthy diet, lifestyle, and exercise.      No follow-ups on file.     Josh Li MD, 3/25/2024     Supplementary Documentation:   General Health:  In the past six months, have you lost more than 10 pounds without trying?: 2 - No  Has your appetite been poor?: No  Type of Diet: Balanced  How does the patient maintain a good energy level?: Daily Walks  How would you describe your daily physical activity?: Light  How would you describe your current health state?: Fair  How do you maintain positive mental well-being?: Social Interaction;Puzzles;Visiting Friends  On a scale of 0 to 10, with 0 being no pain and 10 being severe pain, what is your pain level?: 6 - (Moderate)  In the past six months, have you experienced urine leakage?: 0-No  At any time do you feel concerned for the safety/well-being of yourself and/or your children, in your home or elsewhere?: No  Have you had any immunizations at another office such as Influenza, Hepatitis B, Tetanus, or Pneumococcal?: No        Armando Forrester's SCREENING SCHEDULE   Tests on this list are recommended by your physician but may not be covered, or covered at this frequency, by your insurer.   Please check with your insurance carrier before scheduling to verify coverage.   PREVENTATIVE SERVICES FREQUENCY &  COVERAGE DETAILS LAST COMPLETION DATE   Diabetes Screening    Fasting Blood Sugar / Glucose    One screening every 12 months if never tested or if previously tested but not diagnosed with pre-diabetes   One screening every 6 months if diagnosed with  pre-diabetes Lab Results   Component Value Date     (H) 01/23/2024        Cardiovascular Disease Screening    Lipid Panel  Cholesterol  Lipoprotein (HDL)  Triglycerides Covered every 5 years for all Medicare beneficiaries without apparent signs or symptoms of cardiovascular disease Lab Results   Component Value Date    CHOLEST 138 03/18/2023    HDL 72 (H) 03/18/2023    LDL 56 03/18/2023    TRIG 42 03/18/2023         Electrocardiogram (EKG)   Covered if needed at Welcome to Medicare, and non-screening if indicated for medical reasons 12/02/2021      Ultrasound Screening for Abdominal Aortic Aneurysm (AAA) Covered once in a lifetime for one of the following risk factors    Men who are 65-75 years old and have ever smoked    Anyone with a family history -     Colorectal Cancer Screening  Covered for ages 50-85; only need ONE of the following:    Colonoscopy   Covered every 10 years    Covered every 2 years if patient is at high risk or previous colonoscopy was abnormal 07/15/2022    Health Maintenance   Topic Date Due    Colorectal Cancer Screening  Discontinued       Flexible Sigmoidoscopy   Covered every 4 years -    Fecal Occult Blood Test Covered annually -   Prostate Cancer Screening    Prostate-Specific Antigen (PSA) Annually No results found for: \"PSA\"  There are no preventive care reminders to display for this patient.   Immunizations    Influenza Covered once per flu season  Please get every year 10/14/2023  No recommendations at this time    Pneumococcal Each vaccine ([id number] & [id number]) covered once after 65 Prevnar 13: 02/19/2016    [id number]: 09/24/2021     No recommendations at this time    Hepatitis B One screening covered for patients with certain risk factors   -  No recommendations at this time    Tetanus Toxoid Not covered by Medicare Part B unless medically necessary (cut with metal); may be covered with your pharmacy prescription benefits 09/16/2010    Tetanus, Diptheria and Pertusis  TD and TDaP Not covered by Medicare Part B -  No recommendations at this time    Zoster Not covered by Medicare Part B; may be covered with your pharmacy  prescription benefits -  No recommendations at this time

## 2024-03-26 ENCOUNTER — APPOINTMENT (OUTPATIENT)
Dept: PHYSICAL THERAPY | Facility: HOSPITAL | Age: 84
End: 2024-03-26
Attending: INTERNAL MEDICINE
Payer: MEDICARE

## 2024-03-27 ENCOUNTER — LAB ENCOUNTER (OUTPATIENT)
Dept: LAB | Facility: HOSPITAL | Age: 84
End: 2024-03-27
Attending: INTERNAL MEDICINE
Payer: MEDICARE

## 2024-03-27 LAB
ALBUMIN SERPL-MCNC: 4.2 G/DL (ref 3.2–4.8)
ALBUMIN/GLOB SERPL: 1.4 {RATIO} (ref 1–2)
ALP LIVER SERPL-CCNC: 101 U/L
ALT SERPL-CCNC: 16 U/L
ANION GAP SERPL CALC-SCNC: 6 MMOL/L (ref 0–18)
AST SERPL-CCNC: 28 U/L (ref ?–34)
BILIRUB SERPL-MCNC: 0.6 MG/DL (ref 0.2–1.1)
BUN BLD-MCNC: 19 MG/DL (ref 9–23)
BUN/CREAT SERPL: 16.7 (ref 10–20)
CALCIUM BLD-MCNC: 9.5 MG/DL (ref 8.7–10.4)
CHLORIDE SERPL-SCNC: 104 MMOL/L (ref 98–112)
CHOLEST SERPL-MCNC: 155 MG/DL (ref ?–200)
CO2 SERPL-SCNC: 30 MMOL/L (ref 21–32)
CREAT BLD-MCNC: 1.14 MG/DL
DEPRECATED RDW RBC AUTO: 45.7 FL (ref 35.1–46.3)
EGFRCR SERPLBLD CKD-EPI 2021: 64 ML/MIN/1.73M2 (ref 60–?)
ERYTHROCYTE [DISTWIDTH] IN BLOOD BY AUTOMATED COUNT: 14.1 % (ref 11–15)
FASTING PATIENT LIPID ANSWER: YES
FASTING STATUS PATIENT QL REPORTED: YES
GLOBULIN PLAS-MCNC: 3 G/DL (ref 2.8–4.4)
GLUCOSE BLD-MCNC: 93 MG/DL (ref 70–99)
HCT VFR BLD AUTO: 36.3 %
HDLC SERPL-MCNC: 63 MG/DL (ref 40–59)
HGB BLD-MCNC: 12.5 G/DL
LDLC SERPL CALC-MCNC: 80 MG/DL (ref ?–100)
MCH RBC QN AUTO: 30.7 PG (ref 26–34)
MCHC RBC AUTO-ENTMCNC: 34.4 G/DL (ref 31–37)
MCV RBC AUTO: 89.2 FL
NONHDLC SERPL-MCNC: 92 MG/DL (ref ?–130)
OSMOLALITY SERPL CALC.SUM OF ELEC: 292 MOSM/KG (ref 275–295)
PLATELET # BLD AUTO: 250 10(3)UL (ref 150–450)
POTASSIUM SERPL-SCNC: 3.7 MMOL/L (ref 3.5–5.1)
PROT SERPL-MCNC: 7.2 G/DL (ref 5.7–8.2)
RBC # BLD AUTO: 4.07 X10(6)UL
SODIUM SERPL-SCNC: 140 MMOL/L (ref 136–145)
T4 FREE SERPL-MCNC: 1 NG/DL (ref 0.8–1.7)
TRIGL SERPL-MCNC: 59 MG/DL (ref 30–149)
TSI SER-ACNC: 5.58 MIU/ML (ref 0.55–4.78)
VLDLC SERPL CALC-MCNC: 9 MG/DL (ref 0–30)
WBC # BLD AUTO: 6.3 X10(3) UL (ref 4–11)

## 2024-03-27 PROCEDURE — 80053 COMPREHEN METABOLIC PANEL: CPT | Performed by: INTERNAL MEDICINE

## 2024-03-27 PROCEDURE — 36415 COLL VENOUS BLD VENIPUNCTURE: CPT | Performed by: INTERNAL MEDICINE

## 2024-03-27 PROCEDURE — 80061 LIPID PANEL: CPT | Performed by: INTERNAL MEDICINE

## 2024-03-27 PROCEDURE — 84443 ASSAY THYROID STIM HORMONE: CPT | Performed by: INTERNAL MEDICINE

## 2024-03-27 PROCEDURE — 85027 COMPLETE CBC AUTOMATED: CPT | Performed by: INTERNAL MEDICINE

## 2024-03-27 PROCEDURE — 84439 ASSAY OF FREE THYROXINE: CPT | Performed by: INTERNAL MEDICINE

## 2024-03-30 DIAGNOSIS — S32.020A COMPRESSION FRACTURE OF L2 VERTEBRA, INITIAL ENCOUNTER (HCC): ICD-10-CM

## 2024-04-01 RX ORDER — TRAMADOL HYDROCHLORIDE 50 MG/1
50 TABLET ORAL EVERY 6 HOURS PRN
Qty: 30 TABLET | Refills: 0 | Status: SHIPPED | OUTPATIENT
Start: 2024-04-01

## 2024-04-01 NOTE — TELEPHONE ENCOUNTER
Refill Request    Medication request: traMADol 50 MG Oral Tab.  Take 1 tablet (50 mg total) by mouth every 6 (six) hours as needed for Pain.      LOV:3/11/2024 Kannan Gutierrez DO   Due back to clinic per last office note:  MRI of the left lumbar spine and hip and follow-up after   NOV: Visit date not found      [place]: 3/11/2024 #30 (7 days)    Urine drug screen (if applicable): none  Pain contract: None    LOV plan (if weaning or changing medications): Start tramadol 50 mg as needed for pain

## 2024-04-03 ENCOUNTER — APPOINTMENT (OUTPATIENT)
Dept: PHYSICAL THERAPY | Facility: HOSPITAL | Age: 84
End: 2024-04-03
Attending: INTERNAL MEDICINE
Payer: MEDICARE

## 2024-04-05 ENCOUNTER — HOSPITAL ENCOUNTER (INPATIENT)
Facility: HOSPITAL | Age: 84
LOS: 5 days | Discharge: HOME HEALTH | End: 2024-04-10
Attending: EMERGENCY MEDICINE | Admitting: HOSPITALIST
Payer: MEDICARE

## 2024-04-05 ENCOUNTER — APPOINTMENT (OUTPATIENT)
Dept: CT IMAGING | Facility: HOSPITAL | Age: 84
End: 2024-04-05
Attending: EMERGENCY MEDICINE
Payer: MEDICARE

## 2024-04-05 DIAGNOSIS — S32.020A COMPRESSION FRACTURE OF L2 VERTEBRA, INITIAL ENCOUNTER (HCC): ICD-10-CM

## 2024-04-05 DIAGNOSIS — S32.039A CLOSED FRACTURE OF THIRD LUMBAR VERTEBRA, UNSPECIFIED FRACTURE MORPHOLOGY, INITIAL ENCOUNTER (HCC): Primary | ICD-10-CM

## 2024-04-05 DIAGNOSIS — S39.012A BACK STRAIN, INITIAL ENCOUNTER: ICD-10-CM

## 2024-04-05 PROBLEM — E87.6 HYPOKALEMIA: Status: ACTIVE | Noted: 2024-04-05

## 2024-04-05 LAB
ANION GAP SERPL CALC-SCNC: 4 MMOL/L (ref 0–18)
BASOPHILS # BLD AUTO: 0.05 X10(3) UL (ref 0–0.2)
BASOPHILS NFR BLD AUTO: 0.7 %
BUN BLD-MCNC: 15 MG/DL (ref 9–23)
BUN/CREAT SERPL: 15.6 (ref 10–20)
CALCIUM BLD-MCNC: 9.4 MG/DL (ref 8.7–10.4)
CHLORIDE SERPL-SCNC: 106 MMOL/L (ref 98–112)
CO2 SERPL-SCNC: 26 MMOL/L (ref 21–32)
CREAT BLD-MCNC: 0.96 MG/DL
DEPRECATED RDW RBC AUTO: 44.4 FL (ref 35.1–46.3)
EGFRCR SERPLBLD CKD-EPI 2021: 78 ML/MIN/1.73M2 (ref 60–?)
EOSINOPHIL # BLD AUTO: 0.19 X10(3) UL (ref 0–0.7)
EOSINOPHIL NFR BLD AUTO: 2.5 %
ERYTHROCYTE [DISTWIDTH] IN BLOOD BY AUTOMATED COUNT: 13.8 % (ref 11–15)
GLUCOSE BLD-MCNC: 109 MG/DL (ref 70–99)
HCT VFR BLD AUTO: 36.6 %
HGB BLD-MCNC: 12.3 G/DL
IMM GRANULOCYTES # BLD AUTO: 0.07 X10(3) UL (ref 0–1)
IMM GRANULOCYTES NFR BLD: 0.9 %
LYMPHOCYTES # BLD AUTO: 1.33 X10(3) UL (ref 1–4)
LYMPHOCYTES NFR BLD AUTO: 17.6 %
MCH RBC QN AUTO: 29.5 PG (ref 26–34)
MCHC RBC AUTO-ENTMCNC: 33.6 G/DL (ref 31–37)
MCV RBC AUTO: 87.8 FL
MONOCYTES # BLD AUTO: 0.46 X10(3) UL (ref 0.1–1)
MONOCYTES NFR BLD AUTO: 6.1 %
NEUTROPHILS # BLD AUTO: 5.46 X10 (3) UL (ref 1.5–7.7)
NEUTROPHILS # BLD AUTO: 5.46 X10(3) UL (ref 1.5–7.7)
NEUTROPHILS NFR BLD AUTO: 72.2 %
OSMOLALITY SERPL CALC.SUM OF ELEC: 283 MOSM/KG (ref 275–295)
PLATELET # BLD AUTO: 218 10(3)UL (ref 150–450)
POTASSIUM SERPL-SCNC: 3.5 MMOL/L (ref 3.5–5.1)
RBC # BLD AUTO: 4.17 X10(6)UL
SODIUM SERPL-SCNC: 136 MMOL/L (ref 136–145)
WBC # BLD AUTO: 7.6 X10(3) UL (ref 4–11)

## 2024-04-05 PROCEDURE — 72131 CT LUMBAR SPINE W/O DYE: CPT | Performed by: EMERGENCY MEDICINE

## 2024-04-05 PROCEDURE — 74176 CT ABD & PELVIS W/O CONTRAST: CPT | Performed by: EMERGENCY MEDICINE

## 2024-04-05 PROCEDURE — 70450 CT HEAD/BRAIN W/O DYE: CPT | Performed by: EMERGENCY MEDICINE

## 2024-04-05 PROCEDURE — 99222 1ST HOSP IP/OBS MODERATE 55: CPT | Performed by: HOSPITALIST

## 2024-04-05 RX ORDER — METOCLOPRAMIDE HYDROCHLORIDE 5 MG/ML
10 INJECTION INTRAMUSCULAR; INTRAVENOUS EVERY 8 HOURS PRN
Status: DISCONTINUED | OUTPATIENT
Start: 2024-04-05 | End: 2024-04-10

## 2024-04-05 RX ORDER — HYDROCODONE BITARTRATE AND ACETAMINOPHEN 5; 325 MG/1; MG/1
2 TABLET ORAL EVERY 4 HOURS PRN
Status: DISCONTINUED | OUTPATIENT
Start: 2024-04-05 | End: 2024-04-10

## 2024-04-05 RX ORDER — ACETAMINOPHEN 500 MG
500 TABLET ORAL EVERY 4 HOURS PRN
Status: DISCONTINUED | OUTPATIENT
Start: 2024-04-05 | End: 2024-04-10

## 2024-04-05 RX ORDER — TRAMADOL HYDROCHLORIDE 50 MG/1
50 TABLET ORAL ONCE
Status: COMPLETED | OUTPATIENT
Start: 2024-04-05 | End: 2024-04-05

## 2024-04-05 RX ORDER — HYDROCODONE BITARTRATE AND ACETAMINOPHEN 5; 325 MG/1; MG/1
1 TABLET ORAL EVERY 4 HOURS PRN
Status: DISCONTINUED | OUTPATIENT
Start: 2024-04-05 | End: 2024-04-10

## 2024-04-05 RX ORDER — MORPHINE SULFATE 2 MG/ML
1 INJECTION, SOLUTION INTRAMUSCULAR; INTRAVENOUS EVERY 2 HOUR PRN
Status: DISCONTINUED | OUTPATIENT
Start: 2024-04-05 | End: 2024-04-10

## 2024-04-05 RX ORDER — MORPHINE SULFATE 4 MG/ML
4 INJECTION, SOLUTION INTRAMUSCULAR; INTRAVENOUS EVERY 2 HOUR PRN
Status: DISCONTINUED | OUTPATIENT
Start: 2024-04-05 | End: 2024-04-10

## 2024-04-05 RX ORDER — ACETAMINOPHEN 325 MG/1
650 TABLET ORAL EVERY 4 HOURS PRN
Status: DISCONTINUED | OUTPATIENT
Start: 2024-04-05 | End: 2024-04-10

## 2024-04-05 RX ORDER — MORPHINE SULFATE 4 MG/ML
4 INJECTION, SOLUTION INTRAMUSCULAR; INTRAVENOUS ONCE
Status: COMPLETED | OUTPATIENT
Start: 2024-04-05 | End: 2024-04-05

## 2024-04-05 RX ORDER — ONDANSETRON 2 MG/ML
4 INJECTION INTRAMUSCULAR; INTRAVENOUS EVERY 6 HOURS PRN
Status: DISCONTINUED | OUTPATIENT
Start: 2024-04-05 | End: 2024-04-10

## 2024-04-05 RX ORDER — MORPHINE SULFATE 2 MG/ML
2 INJECTION, SOLUTION INTRAMUSCULAR; INTRAVENOUS EVERY 2 HOUR PRN
Status: DISCONTINUED | OUTPATIENT
Start: 2024-04-05 | End: 2024-04-10

## 2024-04-05 NOTE — ED INITIAL ASSESSMENT (HPI)
Pt arrived via EMS from home d/t a fall. He states he fell on the floor and landed on his right side. He denies thinners or hitting his head. The fall occurred \"about 30 mins ago\". He said he was walking towards the bathroom and \"I totally collapsed and my legs gave out.\" He was using a walker when he fell.

## 2024-04-05 NOTE — H&P
[facility]    PATIENT'S NAME: SHASHANK BACA   ATTENDING PHYSICIAN: Yair Chavez MD   PATIENT ACCOUNT#:   [id number]    LOCATION:  [place] [address]  MEDICAL RECORD #:   [id number]       YOB: 1940  ADMISSION DATE:       04/05/2024    HISTORY AND PHYSICAL EXAMINATION    CHIEF COMPLAINT:  Intractable back pain after mechanical fall.    HISTORY OF PRESENT ILLNESS:  The patient is an 83-year-old  male who presented to the emergency department for evaluation of intractable back pain after a mechanical fall at home.  CBC and chemistry unremarkable.  CT scan of the abdomen and pelvis showed no acute intra-abdominal findings.  CT scan of the brain was negative.  CT scan of the lumbar spine showed L3 vertebral compression fracture with 50% loss of body height.  There is subacute fracture of L2 with 50% loss of vertebral body height which has progressed since February 2024.  The patient will be admitted to hospital for pain control and further evaluation.    PAST MEDICAL HISTORY:  Recurrent ischemic colitis, osteoarthritis, osteoporosis, cervical and lumbar degenerative joint disease, benign prostatic hypertrophy, peptic ulcer disease, hypertension, hyperlipidemia.    PAST SURGICAL HISTORY:  The patient had sustained an L2 compression fracture after shoveling snow at the end of January 2024 and treated conservatively.  He had bilateral inguinal hernia repair, appendectomy, lumbar laminectomy and revision, tonsillectomy, and cataract procedure.    MEDICATIONS:  Please see medication reconciliation list.    ALLERGIES:  No known drug allergies.    FAMILY HISTORY:  Father had heart disease.  Mother had hypertension.    SOCIAL HISTORY:  No tobacco, alcohol, or drug use.  Lives with his wife.  Independent in his basic activities of daily living.  Usually uses a walker.    REVIEW OF SYSTEMS:  The patient said the fall was mechanical.  He lost his balance and then his legs gave out and  landed on his buttocks and then his back.  He denies any significant head injury.  His pain now is in mid to upper lumbar area/right paraspinal region.  It gets worse when he tries to bear weight with his extremities or tries to turn his torso.  Other 12-point review of systems negative.      PHYSICAL EXAMINATION:    GENERAL:  Alert.  Oriented to time, place, and person.  Moderate distress.  VITAL SIGNS:  Temperature 97.8, pulse 52, respiratory rate 18, blood pressure 156/58, pulse ox 99% on room air.  HEENT:  Atraumatic.  Oropharynx clear, moist mucous membranes.  Ears, nose normal.  Eyes:  Anicteric sclerae.  NECK:  Supple.  No lymphadenopathy.  Trachea midline.  Full range of motion.  LUNGS:  Clear to auscultation bilaterally.  Normal respiratory effort.  HEART:  Regular rate and rhythm.  S1, S2 auscultated.  No murmur.  ABDOMEN:  Soft, nondistended, no tenderness.  Positive bowel sounds.  EXTREMITIES:  No peripheral edema, clubbing, or cyanosis.  MUSCULOSKELETAL:  Tenderness right paraspinal area around L2-3 level.  NEUROLOGIC:  Motor and sensory intact.    ASSESSMENT:    1.   Mechanical fall with new L3 compression fracture and possible worsening of known underlying L2 compression fracture.  2.   Hypertension.    PLAN:  The patient will be admitted to general medical floor.  Obtain MRI scan of the lumbar spine to determine acuity of L2-3 compression fractures, pain control, interventional radiology consult to evaluate the patient for kyphoplasty procedure, fall precautions.  Further recommendations to follow.    Dictated By Morro Boss MD  d: 04/05/2024 14:40:24  t: 04/05/2024 14:46:21  Job [id number]  FB/    cc: Yair Chavez MD

## 2024-04-05 NOTE — PLAN OF CARE
\"Genaro\" is from home with spouse. Uses RW at baseline. Morphine given for pain relief. L3 fracture. IR on consult for possible procedure. Holding blood thinners. SCDs on. Alert and oriented. Room air. Tolerating cardiac diet. NPO at midnight. Voiding with urinal. Plan for MRI (screening completed). Call light within reach. Bed alarm on.     Problem: Patient Centered Care  Goal: Patient preferences are identified and integrated in the patient's plan of care  Description: Interventions:  - What would you like us to know as we care for you?   - Provide timely, complete, and accurate information to patient/family  - Incorporate patient and family knowledge, values, beliefs, and cultural backgrounds into the planning and delivery of care  - Encourage patient/family to participate in care and decision-making at the level they choose  - Honor patient and family perspectives and choices  Outcome: Progressing

## 2024-04-05 NOTE — ED QUICK NOTES
Orders for admission, patient is aware of plan and ready to go upstairs. Any questions, please call ED RN Trisha at extension [phone].     Patient Covid vaccination status: Fully vaccinated     COVID Test Ordered in ED: None    COVID Suspicion at Admission: N/A    Running Infusions:  None    Mental Status/LOC at time of transport: AOx4    Other pertinent information:   CIWA score: N/A   NIH score:  N/A

## 2024-04-05 NOTE — ED PROVIDER NOTES
[facility]  Emergency Department Attending Note     Chief Complaint:   Chief Complaint   Patient presents with    Fall     HISTORY OF PRESENT ILLNESS:   Armando Forrester is a 83 year old male who presents to the ED presents for evaluation of low back pain after mechanical fall.  States he slipped and fell and landed on his butt.  He immediately had pain to the right low back.  No LOC, patient is unsure if he hit his head as he fell back.  He states ever since then he has not been able to stand because of the pain to the low back.  He denies any bowel or bladder incontinence or retention or saddle anesthesia.  No paralysis or paresthesia.     MEDICAL & SOCIAL HISTORY:   Past Medical History:   Diagnosis Date    Aortic atherosclerosis (HCC)     CT scan 6-16    BPH (benign prostatic hyperplasia) 09/2008    With abnormal PSA, Rx Proscar    Diverticulosis 2007    Glaucoma     Herpes zoster 2008    Left face    Hx of adenomatous colonic polyps 07/2022    Hypercholesterolemia     Hypertension 08/2011    Iron deficiency anemia 09/2008    Ischemic colitis (HCC) 12/2015    Recurrent 12-21    Osteoporosis 01/2002    T9 compression fracture, T score -3.8 spine and -0.9 hip, Rx Fosamax, repeat T score -2.5 spine and -1.0 hip 10-10, repeat T score -0.7 hip and -2.2 lumbar spine 3-16, Fosamax DC'd; L2 compression fracture 1-24    Peptic ulcer disease 1980    SCC (squamous cell carcinoma) 01/2017    Left temple    Subclinical hypothyroidism       Past Surgical History:   Procedure Laterality Date    APPENDECTOMY  1961    BACK SURGERY Left 12/2011    L4-L5 microdiscectomy    CATARACT Left 10/2010    CATARACT Right 12/2010    COLONOSCOPY  1/2016    INGUINAL HERNIA REPAIR Bilateral 1970    OTHER  January 2017    Excision SCCa left temple    SKIN SURGERY Right 10/28/2016    Excision epidermal inclusion cyst back    SPINAL FUSION  10/2011    L4-L5    TONSILLECTOMY  1968      Social History     Socioeconomic History    Marital  status:     Number of children: 2   Occupational History    Occupation: [employer]     Comment: part time    Occupation:  High teacher, language arts     Comment: retired   Tobacco Use    Smoking status: Former     Packs/day: 0.00     Years: 5.00     Additional pack years: 0.00     Total pack years: 0.00     Types: Cigarettes     Quit date: 1962     Years since quittin.1    Smokeless tobacco: Never   Vaping Use    Vaping Use: Never used   Substance and Sexual Activity    Alcohol use: Yes     Alcohol/week: 1.0 standard drink of alcohol     Types: 1 Glasses of wine per week     Comment: Occasional glass of wine    Drug use: No   Other Topics Concern    Pt has a pacemaker No    Pt has a defibrillator No    Reaction to local anesthetic No    Caffeine Concern Yes     Comment: coffee, 3cups/day      Allergies   Allergen Reactions    Clindamycin OTHER (SEE COMMENTS)     Angioedema    Lisinopril OTHER (SEE COMMENTS)     Angioedema      Current Outpatient Medications   Medication Sig Dispense Refill    traMADol 50 MG Oral Tab Take 1 tablet (50 mg total) by mouth every 6 (six) hours as needed for Pain. 30 tablet 0    finasteride 5 MG Oral Tab Take 1 tablet by mouth daily. 30 tablet 0    amLODIPine 10 MG Oral Tab Take 1 tablet (10 mg total) by mouth daily. 90 tablet 1    atorvastatin 20 MG Oral Tab Take 1 tablet (20 mg total) by mouth every evening. 90 tablet 3    latanoprost 0.005 % Ophthalmic Solution Place 1 drop into both eyes nightly.      Ascorbic Acid (VITAMIN C) 500 MG Oral Cap Take 1 tablet by mouth daily.      Multiple Vitamins-Minerals (PRESERVISION AREDS) Oral Tab Take 2 tablets by mouth daily.      Omega-3 Fatty Acids (FISH OIL OR) Take 1 capsule by mouth 3 (three) times daily.      Calcium Carbonate-Vitamin D (CALCIUM 600 + D OR) Take 1 tablet by mouth 2 (two) times daily.      Coenzyme Q10 (COQ10 OR) Take 1 capsule by mouth daily.      Probiotic Product (PROBIOTIC OR) Take 1 capsule by mouth  daily.      Boswellia Judah (BOSWELLIA OR) Take 2 tablets by mouth 3 (three) times daily.      Cholecalciferol (VITAMIN D3) 1000 UNITS Oral Cap Take  by mouth. take 1 daily            REVIEW OF SYSTEMS   A 10 point review of systems was completed and is negative except as listed in history of present illness      PHYSICAL EXAM   Vitals: /58   Pulse 52   Temp 97.8 °F (36.6 °C) (Oral)   Resp 18   Ht 170.2 cm (5' 7\")   Wt 61.2 kg   SpO2 99%   BMI 21.14 kg/m²   /58   Pulse 52   Temp 97.8 °F (36.6 °C) (Oral)   Resp 18   Ht 170.2 cm (5' 7\")   Wt 61.2 kg   SpO2 99%   BMI 21.14 kg/m²     General: A&Ox3, NAD  Constitutional: Well developed, well nourished, nontoxic  Head: atraumatic, normocephalic   Eyes: conjuctiva clear, no icterus, PERRL, EOMI, vision grossly normal  Ears: normal external appearance, no drainage  Nose:  Atraumatic, no swelling, no drainage, nares patent  Throat:  Moist pink mucous membranes, airway is patent  Neck:  Soft supple, no masses, no tracheal deviation, no stridor  Chest:  No bruising or abrasions, no tenderness, no deformity  Cardiac:  Regular rate and rhythm, no murmurs rubs or gallops.  Lung:  No distress, no retractions. Clear to auscultation bilaterally, no w/r/r  Abdomen:  Soft, nontender, nondistended, normal BS  Back: No stepoff/deformity to palpation to the mid and low lumbar back without any step-off or deformity normal bilateral lower extremity strength sensation deep tendon reflex unable to walk  Extremities: FROM all ext, no deformities, intact equal peripheral pulses, no cyanosis or edema peripheral pulses equal intact  Neuro: No facial droop, no slurred speech, moving all extremities freely, SILT to the bilateral upper and lower extremities  Psych: A&Ox3, normal affect, cooperative, calm  Skin: No rash, no petechiae/purpura, warm, dry      RESULTS  LABS:   Results for orders placed or performed during the hospital encounter of 04/05/24   Basic Metabolic  Panel (8)   Result Value Ref Range    Glucose 109 (H) 70 - 99 mg/dL    Sodium 136 136 - 145 mmol/L    Potassium 3.5 3.5 - 5.1 mmol/L    Chloride 106 98 - 112 mmol/L    CO2 26.0 21.0 - 32.0 mmol/L    Anion Gap 4 0 - 18 mmol/L    BUN 15 9 - 23 mg/dL    Creatinine 0.96 0.70 - 1.30 mg/dL    BUN/CREA Ratio 15.6 10.0 - 20.0    Calcium, Total 9.4 8.7 - 10.4 mg/dL    Calculated Osmolality 283 275 - 295 mOsm/kg    eGFR-Cr 78 >=60 mL/min/1.73m2   CBC W/ DIFFERENTIAL   Result Value Ref Range    WBC 7.6 4.0 - 11.0 x10(3) uL    RBC 4.17 3.80 - 5.80 x10(6)uL    HGB 12.3 (L) 13.0 - 17.5 g/dL    HCT 36.6 (L) 39.0 - 53.0 %    MCV 87.8 80.0 - 100.0 fL    MCH 29.5 26.0 - 34.0 pg    MCHC 33.6 31.0 - 37.0 g/dL    RDW-SD 44.4 35.1 - 46.3 fL    RDW 13.8 11.0 - 15.0 %    .0 150.0 - 450.0 10(3)uL    Neutrophil Absolute Prelim 5.46 1.50 - 7.70 x10 (3) uL    Neutrophil Absolute 5.46 1.50 - 7.70 x10(3) uL    Lymphocyte Absolute 1.33 1.00 - 4.00 x10(3) uL    Monocyte Absolute 0.46 0.10 - 1.00 x10(3) uL    Eosinophil Absolute 0.19 0.00 - 0.70 x10(3) uL    Basophil Absolute 0.05 0.00 - 0.20 x10(3) uL    Immature Granulocyte Absolute 0.07 0.00 - 1.00 x10(3) uL    Neutrophil % 72.2 %    Lymphocyte % 17.6 %    Monocyte % 6.1 %    Eosinophil % 2.5 %    Basophil % 0.7 %    Immature Granulocyte % 0.9 %         IMAGING: CT SPINE LUMBAR (CPT=72131)    Result Date: 4/5/2024  CONCLUSION:   Acute L3 vertebral body fracture with 50% loss of vertebral body height.  Subacute fracture of the L2 vertebral body with 50% loss of vertebral body height has progressed since 2/2/2024.  Scoliosis.  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  Intact fusion hardware of L4-L5.     Dictated by (CST): Karthik Bryant MD on 4/05/2024 at 2:01 PM     Finalized by (CST): Karthik Bryant MD on 4/05/2024 at 2:03 PM          CT ABDOMEN+PELVIS(CPT=74176)    Result Date:  4/5/2024  CONCLUSION:   L3 vertebral body fracture with 50% loss of vertebral body height is new since 2/2/2024.  L2 vertebral body fracture with 50% loss of vertebral body height has progressed since 2/2/2024.  Small hiatal hernia  Cholelithiasis without CT evidence of acute cholecystitis.  Bladder wall thickening likely secondary from an enlarged prostate.  Large amount of excessive stool seen throughout the colon suggestive of constipation. No obstruction.  Multiple other incidental findings as described in the body of the report.    Dictated by (CST): Karthik Bryant MD on 4/05/2024 at 1:50 PM     Finalized by (CST): Karthik Bryant MD on 4/05/2024 at 1:59 PM          CT BRAIN OR HEAD ([id number])    Result Date: 4/5/2024  CONCLUSION:  Mild chronic microvascular ischemic disease without acute intracranial abnormality.  Dictated by (CST): Karthik Bryant MD on 4/05/2024 at 1:41 PM     Finalized by (CST): Karthik Bryant MD on 4/05/2024 at 1:45 PM           I personally reviewed the radiology study and my finding were no acute intracranial process L3 fracture noted      Procedures:   Procedures     ED COURSE          Re-Evaluation: improved      Disposition & Plan:   Clinical Impression/Final Diagnosis:   1. Closed fracture of third lumbar vertebra, unspecified fracture morphology, initial encounter ([facility])        Medical Decision Making: Improved with analgesia in the emergency department still unable to ambulate L3 fracture noted discussed with interventional radiology will place on consult admit for further workup and management    Medical Decision Making  Amount and/or Complexity of Data Reviewed  Independent Historian: spouse  Labs: ordered. Decision-making details documented in ED Course.  Radiology: ordered and independent interpretation performed. Decision-making details documented in ED Course.    Risk  OTC drugs.  Prescription drug management.  Decision regarding hospitalization.        Disposition: Admit  There are no  disposition comments on file for this visit.     This note was generated in part using voice recognition dictation technology. The report was reviewed by this physician but still may have unintentional errors due to inherent limitations of voice recognition technology. All times are estimates.

## 2024-04-06 LAB
ANION GAP SERPL CALC-SCNC: 5 MMOL/L (ref 0–18)
BASOPHILS # BLD AUTO: 0.05 X10(3) UL (ref 0–0.2)
BASOPHILS NFR BLD AUTO: 0.8 %
BUN BLD-MCNC: 24 MG/DL (ref 9–23)
BUN/CREAT SERPL: 24.2 (ref 10–20)
CALCIUM BLD-MCNC: 9.1 MG/DL (ref 8.7–10.4)
CHLORIDE SERPL-SCNC: 105 MMOL/L (ref 98–112)
CO2 SERPL-SCNC: 29 MMOL/L (ref 21–32)
CREAT BLD-MCNC: 0.99 MG/DL
DEPRECATED RDW RBC AUTO: 44.4 FL (ref 35.1–46.3)
EGFRCR SERPLBLD CKD-EPI 2021: 76 ML/MIN/1.73M2 (ref 60–?)
EOSINOPHIL # BLD AUTO: 0.29 X10(3) UL (ref 0–0.7)
EOSINOPHIL NFR BLD AUTO: 4.5 %
ERYTHROCYTE [DISTWIDTH] IN BLOOD BY AUTOMATED COUNT: 13.8 % (ref 11–15)
GLUCOSE BLD-MCNC: 90 MG/DL (ref 70–99)
HCT VFR BLD AUTO: 35.1 %
HGB BLD-MCNC: 12.3 G/DL
IMM GRANULOCYTES # BLD AUTO: 0.02 X10(3) UL (ref 0–1)
IMM GRANULOCYTES NFR BLD: 0.3 %
LYMPHOCYTES # BLD AUTO: 1.05 X10(3) UL (ref 1–4)
LYMPHOCYTES NFR BLD AUTO: 16.5 %
MCH RBC QN AUTO: 30.8 PG (ref 26–34)
MCHC RBC AUTO-ENTMCNC: 35 G/DL (ref 31–37)
MCV RBC AUTO: 87.8 FL
MONOCYTES # BLD AUTO: 0.48 X10(3) UL (ref 0.1–1)
MONOCYTES NFR BLD AUTO: 7.5 %
NEUTROPHILS # BLD AUTO: 4.49 X10 (3) UL (ref 1.5–7.7)
NEUTROPHILS # BLD AUTO: 4.49 X10(3) UL (ref 1.5–7.7)
NEUTROPHILS NFR BLD AUTO: 70.4 %
OSMOLALITY SERPL CALC.SUM OF ELEC: 292 MOSM/KG (ref 275–295)
PLATELET # BLD AUTO: 205 10(3)UL (ref 150–450)
POTASSIUM SERPL-SCNC: 4 MMOL/L (ref 3.5–5.1)
RBC # BLD AUTO: 4 X10(6)UL
SODIUM SERPL-SCNC: 139 MMOL/L (ref 136–145)
WBC # BLD AUTO: 6.4 X10(3) UL (ref 4–11)

## 2024-04-06 PROCEDURE — 99233 SBSQ HOSP IP/OBS HIGH 50: CPT | Performed by: HOSPITALIST

## 2024-04-06 RX ORDER — AMLODIPINE BESYLATE 10 MG/1
10 TABLET ORAL DAILY
Status: DISCONTINUED | OUTPATIENT
Start: 2024-04-06 | End: 2024-04-10

## 2024-04-06 RX ORDER — ENOXAPARIN SODIUM 100 MG/ML
40 INJECTION SUBCUTANEOUS DAILY
Status: DISCONTINUED | OUTPATIENT
Start: 2024-04-06 | End: 2024-04-07

## 2024-04-06 RX ORDER — LATANOPROST 50 UG/ML
1 SOLUTION/ DROPS OPHTHALMIC NIGHTLY
Status: DISCONTINUED | OUTPATIENT
Start: 2024-04-06 | End: 2024-04-10

## 2024-04-06 RX ORDER — CYCLOBENZAPRINE HCL 10 MG
10 TABLET ORAL 3 TIMES DAILY PRN
Status: DISCONTINUED | OUTPATIENT
Start: 2024-04-06 | End: 2024-04-10

## 2024-04-06 RX ORDER — ATORVASTATIN CALCIUM 20 MG/1
20 TABLET, FILM COATED ORAL EVERY EVENING
Status: DISCONTINUED | OUTPATIENT
Start: 2024-04-06 | End: 2024-04-10

## 2024-04-06 NOTE — PROGRESS NOTES
[facility]  part of [facility]    Progress Note    Armando Forrester Patient Status:  Inpatient    1940 MRN [id number]   Location [facility] 4W/SW/SE Attending Chaitanya Naidu MD   Hosp Day # 1 PCP Josh Li MD     Chief Complaint:   Chief Complaint   Patient presents with    Fall       Subjective:   Armando Forrester is having pain when he gets up. Has bene laying in bed pain is 1-2/10 at this time. No F/C no SOB Has been using a walker at home since       Objective:   Objective:    Blood pressure (P) 155/68, pulse (P) 58, temperature 99 °F (37.2 °C), temperature source Oral, resp. rate (P) 18, height 5' 7\" (1.702 m), weight 130 lb 8 oz (59.2 kg), SpO2 (P) 94%.    Physical Exam:    General: No acute distress.   Respiratory: Clear to auscultation bilaterally. No wheezes. No rhonchi.  Cardiovascular: S1, S2. Regular rate and rhythm. No murmurs, rubs or gallops.   Abdomen: Soft, nontender, nondistended.  Positive bowel sounds. No rebound or guarding.  Neurologic: No focal neurological deficits.   Musculoskeletal: Moves all extremities.  Extremities: No edema.      Results:   Results:    Labs:  Recent Labs   Lab 24  1211 24  0421   WBC 7.6 6.4   HGB 12.3* 12.3*   MCV 87.8 87.8   .0 205.0       Recent Labs   Lab 24  1211 24  0421   * 90   BUN 15 24*   CREATSERUM 0.96 0.99   CA 9.4 9.1    139   K 3.5 4.0    105   CO2 26.0 29.0       Estimated Creatinine Clearance: 47.3 mL/min (based on SCr of 0.99 mg/dL).    No results for input(s): \"PTP\", \"INR\" in the last 168 hours.         Culture:  No results found for this visit on 24.    Cardiac  No results for input(s): \"TROP\", \"PBNP\" in the last 168 hours.      Imaging: Imaging data reviewed in [facility].  CT SPINE LUMBAR (CPT=72131)    Result Date: 2024  CONCLUSION:   Acute L3 vertebral body fracture with 50% loss of vertebral body height.  Subacute fracture of the L2 vertebral  body with 50% loss of vertebral body height has progressed since 2/2/2024.  Scoliosis.  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  Intact fusion hardware of L4-L5.     Dictated by (CST): Karthik Bryant MD on 4/05/2024 at 2:01 PM     Finalized by (CST): Karthik Bryant MD on 4/05/2024 at 2:03 PM          CT ABDOMEN+PELVIS(CPT=74176)    Result Date: 4/5/2024  CONCLUSION:   L3 vertebral body fracture with 50% loss of vertebral body height is new since 2/2/2024.  L2 vertebral body fracture with 50% loss of vertebral body height has progressed since 2/2/2024.  Small hiatal hernia  Cholelithiasis without CT evidence of acute cholecystitis.  Bladder wall thickening likely secondary from an enlarged prostate.  Large amount of excessive stool seen throughout the colon suggestive of constipation. No obstruction.  Multiple other incidental findings as described in the body of the report.    Dictated by (CST): Karthik Bryant MD on 4/05/2024 at 1:50 PM     Finalized by (CST): Karthik Bryant MD on 4/05/2024 at 1:59 PM          CT BRAIN OR HEAD ([id number])    Result Date: 4/5/2024  CONCLUSION:  Mild chronic microvascular ischemic disease without acute intracranial abnormality.  Dictated by (CST): Karthik Bryant MD on 4/05/2024 at 1:41 PM     Finalized by (CST): Karthik Bryant MD on 4/05/2024 at 1:45 PM           Medications:       Assessment and Plan:   Assessment & Plan:      Mechanical Fall   Acute L3 compression fracture, subacute L2 fracture  Intractable back pain   - MRI L spine   - IR on consult.   - pain control.   - Possible kyphoplasty Monday pending MRI results.   - bedrest.     Essential HTN   - norvasc    Hyperlipidemia  - statin     >55min spent, >50% spent counseling and coordinating care in the form of educating pt/family and d/w consultants and staff. Most of the time spent discussing the above plan.        Plan of care discussed with  patient or family at bedside.    Chaitanya Naidu MD  Hospitalist          Supplementary Documentation:     Quality:  DVT Prophylaxis: start lovenox   CODE status: Full  Dispo: per clinical course           Estimated date of discharge: TBD  Discharge is dependent on: clinical stability  At this point Mr. Forrester is expected to be discharge to: home        **Certification      PHYSICIAN Certification of Need for Inpatient Hospitalization - Initial Certification    Patient will require inpatient services that will reasonably be expected to span two midnight's based on the clinical documentation in H+P.   Based on patients current state of illness, I anticipate that, after discharge, patient will require TBD.

## 2024-04-06 NOTE — PLAN OF CARE
Patient Alert and Oriented x4. Morphine for pain. NPO since midnight. Plan for MRI today. Fall precautions in place.   Problem: Patient Centered Care  Goal: Patient preferences are identified and integrated in the patient's plan of care  Description: Interventions:  - What would you like us to know as we care for you?   - Provide timely, complete, and accurate information to patient/family  - Incorporate patient and family knowledge, values, beliefs, and cultural backgrounds into the planning and delivery of care  - Encourage patient/family to participate in care and decision-making at the level they choose  - Honor patient and family perspectives and choices  4/6/2024 0537 by Mekhi Chaparro, RN  Outcome: Progressing  4/6/2024 0211 by Mekhi Chaparro, RN  Outcome: Progressing

## 2024-04-06 NOTE — PROGRESS NOTES
RN called MRI for update. There are 15 cases in front of this pt. There is no guarantee MRI will be completed today.

## 2024-04-06 NOTE — PLAN OF CARE
\"Genaro\" is from home with spouse. Uses RW at baseline. Morphine and norco given for pain relief. L3 fracture. IR on consult for possible procedure but will need MRI first. Holding blood thinners. SCDs on. Alert and oriented. Room air. Tolerating cardiac diet. Voiding with urinal. Plan for MRI (screening completed) tomorrow 4/7/24. Call light within reach. Bed alarm on.          Problem: Patient Centered Care  Goal: Patient preferences are identified and integrated in the patient's plan of care  Description: Interventions:  - What would you like us to know as we care for you?   - Provide timely, complete, and accurate information to patient/family  - Incorporate patient and family knowledge, values, beliefs, and cultural backgrounds into the planning and delivery of care  - Encourage patient/family to participate in care and decision-making at the level they choose  - Honor patient and family perspectives and choices  Outcome: Progressing

## 2024-04-07 ENCOUNTER — APPOINTMENT (OUTPATIENT)
Dept: MRI IMAGING | Facility: HOSPITAL | Age: 84
End: 2024-04-07
Attending: HOSPITALIST
Payer: MEDICARE

## 2024-04-07 LAB
ANION GAP SERPL CALC-SCNC: 6 MMOL/L (ref 0–18)
BUN BLD-MCNC: 21 MG/DL (ref 9–23)
BUN/CREAT SERPL: 25.3 (ref 10–20)
CALCIUM BLD-MCNC: 9.1 MG/DL (ref 8.7–10.4)
CHLORIDE SERPL-SCNC: 105 MMOL/L (ref 98–112)
CO2 SERPL-SCNC: 25 MMOL/L (ref 21–32)
CREAT BLD-MCNC: 0.83 MG/DL
DEPRECATED RDW RBC AUTO: 45.1 FL (ref 35.1–46.3)
EGFRCR SERPLBLD CKD-EPI 2021: 87 ML/MIN/1.73M2 (ref 60–?)
ERYTHROCYTE [DISTWIDTH] IN BLOOD BY AUTOMATED COUNT: 13.9 % (ref 11–15)
GLUCOSE BLD-MCNC: 100 MG/DL (ref 70–99)
HCT VFR BLD AUTO: 35.4 %
HGB BLD-MCNC: 12.2 G/DL
MCH RBC QN AUTO: 30.2 PG (ref 26–34)
MCHC RBC AUTO-ENTMCNC: 34.5 G/DL (ref 31–37)
MCV RBC AUTO: 87.6 FL
OSMOLALITY SERPL CALC.SUM OF ELEC: 285 MOSM/KG (ref 275–295)
PLATELET # BLD AUTO: 200 10(3)UL (ref 150–450)
POTASSIUM SERPL-SCNC: 4 MMOL/L (ref 3.5–5.1)
RBC # BLD AUTO: 4.04 X10(6)UL
SODIUM SERPL-SCNC: 136 MMOL/L (ref 136–145)
WBC # BLD AUTO: 6.5 X10(3) UL (ref 4–11)

## 2024-04-07 PROCEDURE — 99233 SBSQ HOSP IP/OBS HIGH 50: CPT | Performed by: HOSPITALIST

## 2024-04-07 PROCEDURE — 72148 MRI LUMBAR SPINE W/O DYE: CPT | Performed by: HOSPITALIST

## 2024-04-07 RX ORDER — ENOXAPARIN SODIUM 100 MG/ML
40 INJECTION SUBCUTANEOUS DAILY
Status: ACTIVE | OUTPATIENT
Start: 2024-04-08 | End: 2024-04-09

## 2024-04-07 RX ORDER — DIPHENHYDRAMINE HCL 25 MG
25 CAPSULE ORAL EVERY 6 HOURS PRN
Status: DISCONTINUED | OUTPATIENT
Start: 2024-04-07 | End: 2024-04-10

## 2024-04-07 NOTE — PROGRESS NOTES
[facility]  part of [facility]    Progress Note    Armando Forrester Patient Status:  Inpatient    1940 MRN [id number]   Location [facility] 4W/SW/SE Attending Chaitanya Naidu MD   Hosp Day # 2 PCP Josh Li MD     Chief Complaint:   Chief Complaint   Patient presents with    Fall       Subjective:   Armando Forrester is doing the same. Pain is 4/10 when he lays on his back trying to stay in bed and lay flat to prevent pain. Hasn't gotten up. Had some nausea last night no vomiting     Objective:   Objective:    Blood pressure 143/64, pulse 53, temperature 97.9 °F (36.6 °C), temperature source Temporal, resp. rate 16, height 5' 7\" (1.702 m), weight 130 lb 8 oz (59.2 kg), SpO2 93%.    Physical Exam:    General: No acute distress.   Respiratory: Clear to auscultation bilaterally. No wheezes. No rhonchi.  Cardiovascular: S1, S2. Regular rate and rhythm. No murmurs, rubs or gallops.   Abdomen: Soft, nontender, nondistended.  Positive bowel sounds. No rebound or guarding.  Neurologic: No focal neurological deficits.   Musculoskeletal: Moves all extremities.  Extremities: No edema.      Results:   Results:    Labs:  Recent Labs   Lab 24  1211 24  0421 24  0523   WBC 7.6 6.4 6.5   HGB 12.3* 12.3* 12.2*   MCV 87.8 87.8 87.6   .0 205.0 200.0       Recent Labs   Lab 24  1211 24  0421 24  0523   * 90 100*   BUN 15 24* 21   CREATSERUM 0.96 0.99 0.83   CA 9.4 9.1 9.1    139 136   K 3.5 4.0 4.0    105 105   CO2 26.0 29.0 25.0       Estimated Creatinine Clearance: 56.5 mL/min (based on SCr of 0.83 mg/dL).    No results for input(s): \"PTP\", \"INR\" in the last 168 hours.         Culture:  No results found for this visit on 24.    Cardiac  No results for input(s): \"TROP\", \"PBNP\" in the last 168 hours.      Imaging: Imaging data reviewed in [facility].  CT SPINE LUMBAR (CPT=72131)    Result Date: 2024  CONCLUSION:   Acute L3  vertebral body fracture with 50% loss of vertebral body height.  Subacute fracture of the L2 vertebral body with 50% loss of vertebral body height has progressed since 2/2/2024.  Scoliosis.  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  Intact fusion hardware of L4-L5.     Dictated by (CST): Karthik Bryant MD on 4/05/2024 at 2:01 PM     Finalized by (CST): Karthik Bryant MD on 4/05/2024 at 2:03 PM          CT ABDOMEN+PELVIS(CPT=74176)    Result Date: 4/5/2024  CONCLUSION:   L3 vertebral body fracture with 50% loss of vertebral body height is new since 2/2/2024.  L2 vertebral body fracture with 50% loss of vertebral body height has progressed since 2/2/2024.  Small hiatal hernia  Cholelithiasis without CT evidence of acute cholecystitis.  Bladder wall thickening likely secondary from an enlarged prostate.  Large amount of excessive stool seen throughout the colon suggestive of constipation. No obstruction.  Multiple other incidental findings as described in the body of the report.    Dictated by (CST): Karthik Bryant MD on 4/05/2024 at 1:50 PM     Finalized by (CST): Karthik Bryant MD on 4/05/2024 at 1:59 PM          CT BRAIN OR HEAD ([id number])    Result Date: 4/5/2024  CONCLUSION:  Mild chronic microvascular ischemic disease without acute intracranial abnormality.  Dictated by (CST): Karthik Bryant MD on 4/05/2024 at 1:41 PM     Finalized by (CST): Karthik Bryant MD on 4/05/2024 at 1:45 PM           Medications:    amLODIPine  10 mg Oral Daily    atorvastatin  20 mg Oral QPM    latanoprost  1 drop Both Eyes Nightly    lidocaine-menthol  1 patch Transdermal Daily    enoxaparin  40 mg Subcutaneous Daily         Assessment and Plan:   Assessment & Plan:      Mechanical Fall   Acute L3 compression fracture, subacute L2 fracture  Intractable back pain   - MRI L spine   - IR on consult.   - pain control.   - Possible kyphoplasty Monday pending  MRI results.   - bedrest.   - NPO after MN. Hold evening heparin     Essential HTN   - norvasc    Hyperlipidemia  - statin     >55min spent, >50% spent counseling and coordinating care in the form of educating pt/family and d/w consultants and staff. Most of the time spent discussing the above plan.        Plan of care discussed with patient or family at bedside.    Chaitanya Naidu MD  Hospitalist          Supplementary Documentation:     Quality:  DVT Prophylaxis: start lovenox   CODE status: Full  Dispo: per clinical course           Estimated date of discharge: TBD  Discharge is dependent on: clinical stability  At this point Mr. Forrester is expected to be discharge to: home

## 2024-04-08 ENCOUNTER — APPOINTMENT (OUTPATIENT)
Dept: GENERAL RADIOLOGY | Facility: HOSPITAL | Age: 84
End: 2024-04-08
Attending: HOSPITALIST
Payer: MEDICARE

## 2024-04-08 ENCOUNTER — APPOINTMENT (OUTPATIENT)
Dept: INTERVENTIONAL RADIOLOGY/VASCULAR | Facility: HOSPITAL | Age: 84
End: 2024-04-08
Attending: NURSE PRACTITIONER
Payer: MEDICARE

## 2024-04-08 PROCEDURE — 71101 X-RAY EXAM UNILAT RIBS/CHEST: CPT | Performed by: HOSPITALIST

## 2024-04-08 PROCEDURE — 99233 SBSQ HOSP IP/OBS HIGH 50: CPT | Performed by: HOSPITALIST

## 2024-04-08 PROCEDURE — 0PS43ZZ REPOSITION THORACIC VERTEBRA, PERCUTANEOUS APPROACH: ICD-10-PCS | Performed by: RADIOLOGY

## 2024-04-08 PROCEDURE — 0QS03ZZ REPOSITION LUMBAR VERTEBRA, PERCUTANEOUS APPROACH: ICD-10-PCS | Performed by: RADIOLOGY

## 2024-04-08 PROCEDURE — 0PU43JZ SUPPLEMENT THORACIC VERTEBRA WITH SYNTHETIC SUBSTITUTE, PERCUTANEOUS APPROACH: ICD-10-PCS | Performed by: RADIOLOGY

## 2024-04-08 PROCEDURE — 0QU03JZ SUPPLEMENT LUMBAR VERTEBRA WITH SYNTHETIC SUBSTITUTE, PERCUTANEOUS APPROACH: ICD-10-PCS | Performed by: RADIOLOGY

## 2024-04-08 RX ORDER — MIDAZOLAM HYDROCHLORIDE 1 MG/ML
INJECTION INTRAMUSCULAR; INTRAVENOUS
Status: COMPLETED
Start: 2024-04-08 | End: 2024-04-08

## 2024-04-08 RX ORDER — CEFAZOLIN SODIUM/WATER 2 G/20 ML
SYRINGE (ML) INTRAVENOUS
Status: COMPLETED
Start: 2024-04-08 | End: 2024-04-08

## 2024-04-08 RX ORDER — LIDOCAINE HYDROCHLORIDE 20 MG/ML
INJECTION, SOLUTION INFILTRATION; PERINEURAL
Status: COMPLETED
Start: 2024-04-08 | End: 2024-04-08

## 2024-04-08 NOTE — PLAN OF CARE
Armando is alert and oriented x 4. He is voiding freely via the urinal. MRI was completed yesterday. He has been NPO since midnight for possible kyphoplasty today - there is no order/consent from IR. Lovenox to be held for possible procedure. He is on bedrest. Fall precautions are in place. Discharge plan is home pending medical clearance.

## 2024-04-08 NOTE — BRIEF PROCEDURE NOTE
[facility]  part of [facility]  Procedure Note    Armando Forrester Patient Status:  Inpatient    1940 MRN [id number]   Location [facility] INTERVENTIONAL SUITES Attending Chaitanya Naidu MD   Hosp Day # 3 PCP Josh Li MD     Procedure: Percutaneous cement kyphoplasty T12, L2, L3    Pre-Procedure Diagnosis: Acute to subacute osteoporotic compression fractures of T12, L2, L3    Post-Procedure Diagnosis: Same    Anesthesia:  Sedation    Findings: Percutaneous cement kyphoplasty of acute to subacute osteoporotic compression fractures T12, L2, L3    Specimens: 0    Blood Loss: Minimal      Complications:  None      Aj Avelar MD  2024

## 2024-04-08 NOTE — PROGRESS NOTES
[facility]  part of [facility]    Progress Note    Armando Forrester Patient Status:  Inpatient    1940 MRN [id number]   Location [facility] 4W/SW/SE Attending Chaitanya Naidu MD   Hosp Day # 3 PCP Josh Li MD     Chief Complaint:   Chief Complaint   Patient presents with    Fall       Subjective:   Armando Forrester is laying inbed has back pain. No F/C. No CP no SOB Plans for kyphoplasty   Objective:   Objective:    Blood pressure 135/61, pulse 57, temperature 98.4 °F (36.9 °C), temperature source Oral, resp. rate 18, height 5' 7\" (1.702 m), weight 130 lb 8 oz (59.2 kg), SpO2 97%.    Physical Exam:    General: No acute distress.   Respiratory: Clear to auscultation bilaterally. No wheezes. No rhonchi.  Cardiovascular: S1, S2. Regular rate and rhythm. No murmurs, rubs or gallops.   Abdomen: Soft, nontender, nondistended.  Positive bowel sounds. No rebound or guarding.  Neurologic: No focal neurological deficits.   Musculoskeletal: Moves all extremities.  Extremities: No edema.      Results:   Results:    Labs:  Recent Labs   Lab 24  1211 24  0421 24  0523   WBC 7.6 6.4 6.5   HGB 12.3* 12.3* 12.2*   MCV 87.8 87.8 87.6   .0 205.0 200.0       Recent Labs   Lab 24  1211 24  0421 24  0523   * 90 100*   BUN 15 24* 21   CREATSERUM 0.96 0.99 0.83   CA 9.4 9.1 9.1    139 136   K 3.5 4.0 4.0    105 105   CO2 26.0 29.0 25.0       Estimated Creatinine Clearance: 56.5 mL/min (based on SCr of 0.83 mg/dL).    No results for input(s): \"PTP\", \"INR\" in the last 168 hours.         Culture:  No results found for this visit on 24.    Cardiac  No results for input(s): \"TROP\", \"PBNP\" in the last 168 hours.      Imaging: Imaging data reviewed in Epic.  MRI SPINE LUMBAR (CPT=72148)    Result Date: 2024  CONCLUSION:   Acute fractures of L2 and L3 with 50% loss of vertebral body height.  Acute fracture of T12 without vertebral  body height loss.  Right sacral insufficiency fracture.  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    Dictated by (CST): Karthik Bryant MD on 4/07/2024 at 1:48 PM     Finalized by (CST): Karthik Bryant MD on 4/07/2024 at 1:52 PM           Medications:    enoxaparin  40 mg Subcutaneous Daily    amLODIPine  10 mg Oral Daily    atorvastatin  20 mg Oral QPM    latanoprost  1 drop Both Eyes Nightly    lidocaine-menthol  1 patch Transdermal Daily         Assessment and Plan:   Assessment & Plan:      Mechanical Fall   Acute L2 and L3 compression fracture  T12 acute compression fracture   Intractable back pain   - MRI L spine reviewed.  - IR on consult.   - pain control.   - Kyphoplasty today   - bedrest.   - NPO      Essential HTN   - norvasc    Hyperlipidemia  - statin     >55min spent, >50% spent counseling and coordinating care in the form of educating pt/family and d/w consultants and staff. Most of the time spent discussing the above plan.        Plan of care discussed with patient or family at bedside.    Chaitanya Naidu MD  Hospitalist          Supplementary Documentation:     Quality:  DVT Prophylaxis: start lovenox   CODE status: Full  Dispo: per clinical course           Estimated date of discharge: TBD  Discharge is dependent on: clinical stability  At this point Mr. Forrester is expected to be discharge to: home

## 2024-04-08 NOTE — PROGRESS NOTES
[facility]  part of [facility]  Progress Note      Armando Forrester Patient Status:  Inpatient    1940 MRN [id number]   Location [facility] 4W/SW/SE Attending Chaitanya Naidu MD   Hosp Day # 3 PCP Josh Li MD       Subjective:   Having more distal mid back pain today. On Friday his pain was mostly in his lower back radiating to his flank. Has not ambulated.    Objective:   Physical Exam:   General: Alert, orientated x3.  Cooperative.  No apparent distress.  Vital Signs:  Blood pressure 140/69, pulse 59, temperature 98.2 °F (36.8 °C), temperature source Oral, resp. rate 18, height 67\", weight 130 lb 8 oz (59.2 kg), SpO2 96%.  HEENT: Exam is unremarkable.  Lungs: Normal respiratory effort  Cardiac: Regular rate   Abdomen: No guarding  Extremities:  point tenderness over T 12, weak, required assistance turning in bed  Skin: Normal texture and turgor.      Results:     Recent Labs   Lab 24  1211 24  0421 24  0523   RBC 4.17 4.00 4.04   HGB 12.3* 12.3* 12.2*   HCT 36.6* 35.1* 35.4*   MCV 87.8 87.8 87.6   MCH 29.5 30.8 30.2   MCHC 33.6 35.0 34.5   RDW 13.8 13.8 13.9   NEPRELIM 5.46 4.49  --    WBC 7.6 6.4 6.5   .0 205.0 200.0       Recent Labs   Lab 24  1211 24  0421 24  0523   * 90 100*   BUN 15 24* 21   CREATSERUM 0.96 0.99 0.83   EGFRCR 78 76 87   CA 9.4 9.1 9.1    139 136   K 3.5 4.0 4.0    105 105   CO2 26.0 29.0 25.0     MRI SPINE LUMBAR (CPT=72148)    Result Date: 2024  CONCLUSION:   Acute fractures of L2 and L3 with 50% loss of vertebral body height.  Acute fracture of T12 without vertebral body height loss.  Right sacral insufficiency fracture.  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    Dictated by (CST): Karthik Bryant MD on 2024 at 1:48 PM     Finalized by (CST): Karthik Bryant MD on 2024 at 1:52 PM             Assessment  and Plan:   82 yo male with acute back pain  MRI 4/7/24 with Acute fractures of L2 and L3 with 50% loss of vertebral body height. Acute fracture of T12 without vertebral body height loss.     Discussed conservative management vs kyphoplasty of T 12, L2, L3 procedure with associated risks/benefits  Patient would like to proceed with kyphoplasty procedure today     RAUDEL Martel  4/8/2024  9:18 AM

## 2024-04-08 NOTE — PLAN OF CARE
Armando is s/p kyphoplasty today. Three sites at L2,L3, T12- all  sites w steri strips and gauze covered w one island dressing.Dry and intact. Bedrest x1 hours- bedrest till 6pm- patient aware.voids freely-no prn's this shift. Wife at bedside- scd's in place- wife at bedside. Planning home tomorrow  Problem: PAIN - ADULT  Goal: Verbalizes/displays adequate comfort level or patient's stated pain goal  Description: INTERVENTIONS:  - Encourage pt to monitor pain and request assistance  - Assess pain using appropriate pain scale  - Administer analgesics based on type and severity of pain and evaluate response  - Implement non-pharmacological measures as appropriate and evaluate response  - Consider cultural and social influences on pain and pain management  - Manage/alleviate anxiety  - Utilize distraction and/or relaxation techniques  - Monitor for opioid side effects  - Notify MD/LIP if interventions unsuccessful or patient reports new pain  - Anticipate increased pain with activity and pre-medicate as appropriate  Outcome: Progressing     Problem: RISK FOR INFECTION - ADULT  Goal: Absence of fever/infection during anticipated neutropenic period  Description: INTERVENTIONS  - Monitor WBC  - Administer growth factors as ordered  - Implement neutropenic guidelines  Outcome: Progressing     Problem: SAFETY ADULT - FALL  Goal: Free from fall injury  Description: INTERVENTIONS:  - Assess pt frequently for physical needs  - Identify cognitive and physical deficits and behaviors that affect risk of falls.  - [place] fall precautions as indicated by assessment.  - Educate pt/family on patient safety including physical limitations  - Instruct pt to call for assistance with activity based on assessment  - Modify environment to reduce risk of injury  - Provide assistive devices as appropriate  - Consider OT/PT consult to assist with strengthening/mobility  - Encourage toileting schedule  Outcome: Progressing     Problem:  DISCHARGE PLANNING  Goal: Discharge to home or other facility with appropriate resources  Description: INTERVENTIONS:  - Identify barriers to discharge w/pt and caregiver  - Include patient/family/discharge partner in discharge planning  - Arrange for needed discharge resources and transportation as appropriate  - Identify discharge learning needs (meds, wound care, etc)  - Arrange for interpreters to assist at discharge as needed  - Consider post-discharge preferences of patient/family/discharge partner  - Complete POLST form as appropriate  - Assess patient's ability to be responsible for managing their own health  - Refer to Case Management Department for coordinating discharge planning if the patient needs post-hospital services based on physician/LIP order or complex needs related to functional status, cognitive ability or social support system  Outcome: Progressing

## 2024-04-09 LAB
ANION GAP SERPL CALC-SCNC: 5 MMOL/L (ref 0–18)
BUN BLD-MCNC: 28 MG/DL (ref 9–23)
BUN/CREAT SERPL: 31.5 (ref 10–20)
CALCIUM BLD-MCNC: 9.1 MG/DL (ref 8.7–10.4)
CHLORIDE SERPL-SCNC: 105 MMOL/L (ref 98–112)
CO2 SERPL-SCNC: 28 MMOL/L (ref 21–32)
CREAT BLD-MCNC: 0.89 MG/DL
DEPRECATED RDW RBC AUTO: 47.2 FL (ref 35.1–46.3)
EGFRCR SERPLBLD CKD-EPI 2021: 85 ML/MIN/1.73M2 (ref 60–?)
ERYTHROCYTE [DISTWIDTH] IN BLOOD BY AUTOMATED COUNT: 13.9 % (ref 11–15)
GLUCOSE BLD-MCNC: 102 MG/DL (ref 70–99)
HCT VFR BLD AUTO: 37.3 %
HGB BLD-MCNC: 12 G/DL
MCH RBC QN AUTO: 29.6 PG (ref 26–34)
MCHC RBC AUTO-ENTMCNC: 32.2 G/DL (ref 31–37)
MCV RBC AUTO: 91.9 FL
OSMOLALITY SERPL CALC.SUM OF ELEC: 292 MOSM/KG (ref 275–295)
PLATELET # BLD AUTO: 208 10(3)UL (ref 150–450)
POTASSIUM SERPL-SCNC: 4.4 MMOL/L (ref 3.5–5.1)
RBC # BLD AUTO: 4.06 X10(6)UL
SODIUM SERPL-SCNC: 138 MMOL/L (ref 136–145)
WBC # BLD AUTO: 8.1 X10(3) UL (ref 4–11)

## 2024-04-09 PROCEDURE — 99233 SBSQ HOSP IP/OBS HIGH 50: CPT | Performed by: HOSPITALIST

## 2024-04-09 NOTE — PROGRESS NOTES
[facility]  part of [facility]    Progress Note    Armando Forrester Patient Status:  Inpatient    1940 MRN [id number]   Location [facility] 4W/SW/SE Attending Angelo Weems MD   Hosp Day # 4 PCP Josh Li MD       Subjective:     Back pain improved.  Pt was lightheaded when up.    Objective:   Blood pressure 134/59, pulse 58, temperature 98.2 °F (36.8 °C), temperature source Oral, resp. rate 18, height 5' 7\" (1.702 m), weight 130 lb 8 oz (59.2 kg), SpO2 97%.    Gen:   NAD.  A and O x 3  CV:   RRR, no m/g/r  Pulm:   CTA bilat  Abd:   +bs, soft, NT, ND  LE:   No c/c/e  Neuro:   nonfocal    Results:     Lab Results   Component Value Date    WBC 8.1 2024    HGB 12.0 (L) 2024    HCT 37.3 (L) 2024    .0 2024    CREATSERUM 0.89 2024    BUN 28 (H) 2024     2024    K 4.4 2024     2024    CO2 28.0 2024     (H) 2024    CA 9.1 2024    ALB 4.2 2024    ALKPHO 101 2024    BILT 0.6 2024    TP 7.2 2024    AST 28 2024    ALT 16 2024    T4F 1.0 2024    TSH 5.580 (H) 2024    TROP 0.01 2016    B12 370 2022       IR [facility] BEAM CT SPIN    Result Date: 2024  CONCLUSION:  1. [facility] beam CT acquisitions during the course of percutaneous cement kyphoplasty of L3, confirming proper needle trajectory and cement fill, without extravasation.    Dictated by (CST): Aj Avelar MD on 2024 at 10:27 AM     Finalized by (CST): Aj Avelar MD on 2024 at 10:32 AM          IR KYPHOPLASTY    Result Date: 2024  CONCLUSION: 1. Percutaneous cement kyphoplasty of T12, L2, and L3 vertebral body osteoporotic compression fractures.    Dictated by (CST): Aj Avelar MD on 2024 at 6:57 AM     Finalized by (CST): Aj Avelar MD on 2024 at 7:10 AM          XR RIBS WITH CHEST (3 VIEWS), LEFT  (CPT=71101)    Result Date:  4/8/2024  CONCLUSION:  1. Nondisplaced left anterior 8th rib fracture.  No pneumothorax. 2. Lesser incidental findings as above.    Dictated by (CST): Leobardo Harp MD on 4/08/2024 at 8:42 PM     Finalized by (CST): Leobardo Harp MD on 4/08/2024 at 8:46 PM               Assessment and Plan:     Mechanical Fall   Acute L2 and L3 compression fracture  T12 acute compression fracture   Intractable back pain   - MRI L spine reviewed.  - IR on consult.   - pain control.   - s/p T12, L2, L3 kyphoplasty   - PT/OT  - mobilize  - DC planning     Essential HTN   - norvasc     Hyperlipidemia  - statin     dvt proph:    SCDs      Code status:    Full       MDM:    High Angelo Lim MD  4/9/2024

## 2024-04-09 NOTE — PLAN OF CARE
POD:2. Patient is A&Ox4. RA. Saline locked. Scds for dvt prophylaxis Educated patient on the importance of using the incentive spirometer. Voiding via urinal. PRN Norco & tylenol for pain management. Call light within reach, frequent rounding. Safety measures in place. Plan for home with HH when medically clear.          Problem: Patient Centered Care  Goal: Patient preferences are identified and integrated in the patient's plan of care  Description: Interventions:  - What would you like us to know as we care for you?   - Provide timely, complete, and accurate information to patient/family  - Incorporate patient and family knowledge, values, beliefs, and cultural backgrounds into the planning and delivery of care  - Encourage patient/family to participate in care and decision-making at the level they choose  - Honor patient and family perspectives and choices  Outcome: Progressing     Problem: PAIN - ADULT  Goal: Verbalizes/displays adequate comfort level or patient's stated pain goal  Description: INTERVENTIONS:  - Encourage pt to monitor pain and request assistance  - Assess pain using appropriate pain scale  - Administer analgesics based on type and severity of pain and evaluate response  - Implement non-pharmacological measures as appropriate and evaluate response  - Consider cultural and social influences on pain and pain management  - Manage/alleviate anxiety  - Utilize distraction and/or relaxation techniques  - Monitor for opioid side effects  - Notify MD/LIP if interventions unsuccessful or patient reports new pain  - Anticipate increased pain with activity and pre-medicate as appropriate  Outcome: Progressing     Problem: RISK FOR INFECTION - ADULT  Goal: Absence of fever/infection during anticipated neutropenic period  Description: INTERVENTIONS  - Monitor WBC  - Administer growth factors as ordered  - Implement neutropenic guidelines  Outcome: Progressing     Problem: SAFETY ADULT - FALL  Goal: Free from  fall injury  Description: INTERVENTIONS:  - Assess pt frequently for physical needs  - Identify cognitive and physical deficits and behaviors that affect risk of falls.  - [place] fall precautions as indicated by assessment.  - Educate pt/family on patient safety including physical limitations  - Instruct pt to call for assistance with activity based on assessment  - Modify environment to reduce risk of injury  - Provide assistive devices as appropriate  - Consider OT/PT consult to assist with strengthening/mobility  - Encourage toileting schedule  Outcome: Progressing     Problem: DISCHARGE PLANNING  Goal: Discharge to home or other facility with appropriate resources  Description: INTERVENTIONS:  - Identify barriers to discharge w/pt and caregiver  - Include patient/family/discharge partner in discharge planning  - Arrange for needed discharge resources and transportation as appropriate  - Identify discharge learning needs (meds, wound care, etc)  - Arrange for interpreters to assist at discharge as needed  - Consider post-discharge preferences of patient/family/discharge partner  - Complete POLST form as appropriate  - Assess patient's ability to be responsible for managing their own health  - Refer to Case Management Department for coordinating discharge planning if the patient needs post-hospital services based on physician/LIP order or complex needs related to functional status, cognitive ability or social support system  Outcome: Progressing

## 2024-04-09 NOTE — CM/SW NOTE
04/09/24 1500   CM/SW Referral Data   Referral Source Social Work (self-referral)   Reason for Referral Discharge planning   Informant Patient   Medical Hx   Does patient have an established PCP? Yes  (Josh Li MD)   Patient Info   Patient's Current Mental Status at Time of Assessment Alert;Oriented   Patient's Home Environment House   Patient lives with Spouse/Significant other   Patient Status Prior to Admission   Independent with ADLs and Mobility Yes   Discharge Needs   Anticipated D/C needs Home health care     SW initiated self referral for discharge planning. Sw introduced self and role to patient. Pt is alert an oriented at time of assessment. Pt provided above information. Pt reports that he is independent, uses a walker. Anticipated therapy need: Home with Home Healthcare. Pt is agreeable. SW sent out HH referrals via aidin, placed f2f. SW will provide patient list when available.     Plan: Pending medical clearance, DC to Home with HH, f2f placed *list/choce    SW/CM to remain available for support and/or discharge planning.     Nanci York, MSW, LSW   x [phone]

## 2024-04-09 NOTE — PAYOR COMM NOTE
--------------  ADMISSION REVIEW     Payor: [employer]  Subscriber #:  [id number]  Authorization Number: [id number]    Admit date: 4/5/24  Admit time:  4:32 PM       REVIEW DOCUMENTATION:      [facility]  Emergency Department Attending Note     Chief Complaint:   Chief Complaint   Patient presents with    Fall     HISTORY OF PRESENT ILLNESS:   Armando Forrester is a 83 year old male who presents to the ED presents for evaluation of low back pain after mechanical fall.  States he slipped and fell and landed on his butt.  He immediately had pain to the right low back.  No LOC, patient is unsure if he hit his head as he fell back.  He states ever since then he has not been able to stand because of the pain to the low back.  He denies any bowel or bladder incontinence or retention or saddle anesthesia.  No paralysis or paresthesia.     MEDICAL & SOCIAL HISTORY:                  PHYSICAL EXAM   Vitals: /58   Pulse 52   Temp 97.8 °F (36.6 °C) (Oral)   Resp 18   Ht 170.2 cm (5' 7\")   Wt 61.2 kg   SpO2 99%   BMI 21.14 kg/m²   /58   Pulse 52   Temp 97.8 °F (36.6 °C) (Oral)   Resp 18   Ht 170.2 cm (5' 7\")   Wt 61.2 kg   SpO2 99%   BMI 21.14 kg/m²     General: A&Ox3, NAD  Constitutional: Well developed, well nourished, nontoxic  Head: atraumatic, normocephalic   Eyes: conjuctiva clear, no icterus, PERRL, EOMI, vision grossly normal  Ears: normal external appearance, no drainage  Nose:  Atraumatic, no swelling, no drainage, nares patent  Throat:  Moist pink mucous membranes, airway is patent  Neck:  Soft supple, no masses, no tracheal deviation, no stridor  Chest:  No bruising or abrasions, no tenderness, no deformity  Cardiac:  Regular rate and rhythm, no murmurs rubs or gallops.  Lung:  No distress, no retractions. Clear to auscultation bilaterally, no w/r/r  Abdomen:  Soft, nontender, nondistended, normal BS  Back: No stepoff/deformity to palpation to the mid and low lumbar back  without any step-off or deformity normal bilateral lower extremity strength sensation deep tendon reflex unable to walk  Extremities: FROM all ext, no deformities, intact equal peripheral pulses, no cyanosis or edema peripheral pulses equal intact  Neuro: No facial droop, no slurred speech, moving all extremities freely, SILT to the bilateral upper and lower extremities  Psych: A&Ox3, normal affect, cooperative, calm  Skin: No rash, no petechiae/purpura, warm, dry      RESULTS  LABS:   Results for orders placed or performed during the hospital encounter of 04/05/24   Basic Metabolic Panel (8)   Result Value Ref Range    Glucose 109 (H) 70 - 99 mg/dL    Sodium 136 136 - 145 mmol/L    Potassium 3.5 3.5 - 5.1 mmol/L    Chloride 106 98 - 112 mmol/L    CO2 26.0 21.0 - 32.0 mmol/L    Anion Gap 4 0 - 18 mmol/L    BUN 15 9 - 23 mg/dL    Creatinine 0.96 0.70 - 1.30 mg/dL    BUN/CREA Ratio 15.6 10.0 - 20.0    Calcium, Total 9.4 8.7 - 10.4 mg/dL    Calculated Osmolality 283 275 - 295 mOsm/kg    eGFR-Cr 78 >=60 mL/min/1.73m2   CBC W/ DIFFERENTIAL   Result Value Ref Range    WBC 7.6 4.0 - 11.0 x10(3) uL    RBC 4.17 3.80 - 5.80 x10(6)uL    HGB 12.3 (L) 13.0 - 17.5 g/dL    HCT 36.6 (L) 39.0 - 53.0 %    MCV 87.8 80.0 - 100.0 fL    MCH 29.5 26.0 - 34.0 pg    MCHC 33.6 31.0 - 37.0 g/dL    RDW-SD 44.4 35.1 - 46.3 fL    RDW 13.8 11.0 - 15.0 %    .0 150.0 - 450.0 10(3)uL    Neutrophil Absolute Prelim 5.46 1.50 - 7.70 x10 (3) uL    Neutrophil Absolute 5.46 1.50 - 7.70 x10(3) uL    Lymphocyte Absolute 1.33 1.00 - 4.00 x10(3) uL    Monocyte Absolute 0.46 0.10 - 1.00 x10(3) uL    Eosinophil Absolute 0.19 0.00 - 0.70 x10(3) uL    Basophil Absolute 0.05 0.00 - 0.20 x10(3) uL    Immature Granulocyte Absolute 0.07 0.00 - 1.00 x10(3) uL    Neutrophil % 72.2 %    Lymphocyte % 17.6 %    Monocyte % 6.1 %    Eosinophil % 2.5 %    Basophil % 0.7 %    Immature Granulocyte % 0.9 %         IMAGING: CT SPINE LUMBAR (CPT=72131)    Result Date:  4/5/2024  CONCLUSION:   Acute L3 vertebral body fracture with 50% loss of vertebral body height.  Subacute fracture of the L2 vertebral body with 50% loss of vertebral body height has progressed since 2/2/2024.  Scoliosis.  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  Intact fusion hardware of L4-L5.     Dictated by (CST): Karthik Bryant MD on 4/05/2024 at 2:01 PM     Finalized by (CST): Karthik Bryant MD on 4/05/2024 at 2:03 PM          CT ABDOMEN+PELVIS(CPT=74176)    Result Date: 4/5/2024  CONCLUSION:   L3 vertebral body fracture with 50% loss of vertebral body height is new since 2/2/2024.  L2 vertebral body fracture with 50% loss of vertebral body height has progressed since 2/2/2024.  Small hiatal hernia  Cholelithiasis without CT evidence of acute cholecystitis.  Bladder wall thickening likely secondary from an enlarged prostate.  Large amount of excessive stool seen throughout the colon suggestive of constipation. No obstruction.  Multiple other incidental findings as described in the body of the report.    Dictated by (CST): Karthik Bryant MD on 4/05/2024 at 1:50 PM     Finalized by (CST): Karthik Bryant MD on 4/05/2024 at 1:59 PM          CT BRAIN OR HEAD (10334)    Result Date: 4/5/2024  CONCLUSION:  Mild chronic microvascular ischemic disease without acute intracranial abnormality.  Dictated by (CST): Karthik Bryant MD on 4/05/2024 at 1:41 PM     Finalized by (CST): Karthik Bryant MD on 4/05/2024 at 1:45 PM           Iintracranial process L3 fracture noted   Disposition & Plan:     Signed by Yair Chavez MD on 4/5/2024  2:21 PM           HISTORY AND PHYSICAL      PHYSICAL EXAMINATION:    GENERAL:  Alert.  Oriented to time, place, and person.  Moderate distress.  VITAL SIGNS:  Temperature 97.8, pulse 52, respiratory rate 18, blood pressure 156/58, pulse ox 99% on room air.  HEENT:  Atraumatic.  Oropharynx clear, moist  mucous membranes.  Ears, nose normal.  Eyes:  Anicteric sclerae.  NECK:  Supple.  No lymphadenopathy.  Trachea midline.  Full range of motion.  LUNGS:  Clear to auscultation bilaterally.  Normal respiratory effort.  HEART:  Regular rate and rhythm.  S1, S2 auscultated.  No murmur.  ABDOMEN:  Soft, nondistended, no tenderness.  Positive bowel sounds.  EXTREMITIES:  No peripheral edema, clubbing, or cyanosis.  MUSCULOSKELETAL:  Tenderness right paraspinal area around L2-3 level.  NEUROLOGIC:  Motor and sensory intact.     ASSESSMENT:    1.       Mechanical fall with new L3 compression fracture and possible worsening of known underlying L2 compression fracture.  2.       Hypertension.     PLAN:  The patient will be admitted to general medical floor.  Obtain MRI scan of the lumbar spine to determine acuity of L2-3 compression fractures, pain control, interventional radiology consult to evaluate the patient for kyphoplasty procedure, fall precautions.    MRI SPINE      Acute fractures of L2 and L3 with 50% loss of vertebral body height.      Acute fracture of T12 without vertebral body height loss.      Right sacral insufficiency fracture.      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          4-6-24    Armando Forrester is having pain when he gets up. Has bene laying in bed pain is 1-2/10 at this time. No F/C no SOB Has been using a walker at home sincec Feb     Blood pressure (P) 155/68, pulse (P) 58, temperature 99 °F (37.2 °C), temperature source Oral, resp. rate (P) 18, height 5' 7\" (1.702 m), weight 130 lb 8 oz (59.2 kg), SpO2 (P) 94%.     Physical Exam:    General: No acute distress.   Respiratory: Clear to auscultation bilaterally. No wheezes. No rhonchi.  Cardiovascular: S1, S2. Regular rate and rhythm. No murmurs, rubs or gallops.   Abdomen: Soft, nontender, nondistended.  Positive bowel sounds. No rebound or guarding.  Neurologic: No focal  neurological deficits.   Musculoskeletal: Moves all extremities.  Extremities: No edema.     Recent Labs   Lab 04/05/24  1211 04/06/24  0421   WBC 7.6 6.4   HGB 12.3* 12.3*   MCV 87.8 87.8   .0 205.0              Recent Labs   Lab 04/05/24  1211 04/06/24  0421   * 90   BUN 15 24*   CREATSERUM 0.96 0.99   CA 9.4 9.1    139   K 3.5 4.0    105   CO2 26.0 29.0       Assessment and Plan:         Assessment & Plan:   Mechanical Fall   Acute L3 compression fracture, subacute L2 fracture  Intractable back pain   - MRI L spine   - IR on consult.   - pain control.   - Possible kyphoplasty Monday pending MRI results.   - bedrest.      Essential HTN   - norvasc     Hyperlipidemia  - statin      morphINE PF 4 MG/ML injection 4 mg  Dose: 4 mg  Freq: Once Route: IV  Start: 04/05/24 1420 End: 04/05/24 1439         1439 EH-Given            traMADol (Ultram) tab 50 mg  Dose: 50 mg  Freq: Once Route: OR  Start: 04/05/24 1247 End: 04/05/24 1250   Admin Instructions:   Max dose 400 mg/day                        MS X 1 5TH AND 6TH   ZOFRAN IV X 1 6TH

## 2024-04-09 NOTE — PAYOR COMM NOTE
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 Has not ambulated.     Objective:   Physical Exam:   General: Alert, orientated x3.  Cooperative.  No apparent distress.  Vital Signs:  Blood pressure 140/69, pulse 59, temperature 98.2 °F (36.8 °C), temperature source Oral, resp. rate 18, height 67\", weight 130 lb 8 oz (59.2 kg), SpO2 96%.  HEENT: Exam is unremarkable.  Lungs: Normal respiratory effort  Cardiac: Regular rate   Abdomen: No guarding  Extremities:  point tenderness over T 12, weak, required assistance turning in bed  Skin: Normal texture and turgor.        Results:            Recent Labs   Lab 04/05/24  1211 04/06/24  0421 04/07/24  0523   RBC 4.17 4.00 4.04   HGB 12.3* 12.3* 12.2*   HCT 36.6* 35.1* 35.4*   MCV 87.8 87.8 87.6   MCH 29.5 30.8 30.2   MCHC 33.6 35.0 34.5   RDW 13.8 13.8 13.9   NEPRELIM 5.46 4.49  --    WBC 7.6 6.4 6.5   .0 205.0 200.0               Recent Labs   Lab 04/05/24  1211 04/06/24  0421 04/07/24  0523   * 90 100*   BUN 15 24* 21   CREATSERUM 0.96 0.99 0.83   EGFRCR 78 76 87   CA 9.4 9.1 9.1    139 136   K 3.5 4.0 4.0    105 105   CO2 26.0 29.0 25.0      Assessment and Plan:   82 yo male with acute back pain  MRI 4/7/24 with Acute fractures of L2 and L3 with 50% loss of vertebral body height. Acute fracture of T12 without vertebral body height loss.      Discussed conservative management vs kyphoplasty of T 12, L2, L3 procedure with associated risks/benefits      MEDICATIONS ADMINISTERED IN  LAST 1 DAY:  amLODIPine (Norvasc) tab 10 mg       Date Action Dose Route User    4/9/2024 0926 Given 10 mg Oral Padmini Artis RN          atorvastatin (Lipitor) tab 20 mg       Date Action Dose Route User    4/8/2024 1804 Given 20 mg Oral Rylie King RN          HYDROcodone-acetaminophen (Norco) 5-325 MG per tab 1 tablet       Date Action Dose Route User    4/8/2024 1804 Given 1 tablet Oral Rylie King RN          HYDROcodone-acetaminophen (Norco) 5-325 MG per tab 2 tablet       Date Action Dose Route User    4/9/2024 0631 Given 2 tablet Oral Carmelita García RN    4/8/2024 2217 Given 2 tablet Oral Carmelita García RN          latanoprost (Xalatan) 0.005 % ophthalmic solution 1 drop       Date Action Dose Route User    4/8/2024 2217 Given 1 drop Both Eyes Carmelita García RN          lidocaine-menthol 4-1 % patch 1 patch       Date Action Dose Route User    4/8/2024 2217 Patch Applied 1 patch Transdermal (Left Lower Abdomen) Carmelita García RN            Vitals (last day)       Date/Time Temp Pulse Resp BP SpO2 Weight O2 Device O2 Flow Rate (L/min) Who    04/09/24 0745 97.6 °F (36.4 °C) 53 18 132/54 93 % -- None (Room air) -- KT    04/09/24 0600 97.8 °F (36.6 °C) 63 18 126/77 95 % -- None (Room air) -- DP    04/08/24 2348 98.4 °F (36.9 °C) 77 18 125/64 94 % -- -- -- DP    04/08/24 1220 98.4 °F (36.9 °C) 57 18 135/61 97 % -- None (Room air) -- NT    04/08/24 0407 98.2 °F (36.8 °C) 59 18 140/69 96 % -- None (Room air) -- DG

## 2024-04-09 NOTE — PLAN OF CARE
POD:1. Patient is A&Ox4. RA. Saline locked. Scds for dvt prophylaxis. Lidocaine patch on left upper abdomen for rib fx relief. Educated patient on the importance of using the incentive spirometer. Voiding via urinal. PRN [place] for pain management. Call light within reach, frequent rounding. Safety measures in place. Plan for PT/OT in am.       Problem: Patient Centered Care  Goal: Patient preferences are identified and integrated in the patient's plan of care  Description: Interventions:  - What would you like us to know as we care for you  - Provide timely, complete, and accurate information to patient/family  - Incorporate patient and family knowledge, values, beliefs, and cultural backgrounds into the planning and delivery of care  - Encourage patient/family to participate in care and decision-making at the level they choose  - Honor patient and family perspectives and choices  4/9/2024 0218 by Carmelita García RN  Outcome: Progressing  4/9/2024 0218 by Carmelita García RN  Outcome: Progressing     Problem: PAIN - ADULT  Goal: Verbalizes/displays adequate comfort level or patient's stated pain goal  Description: INTERVENTIONS:  - Encourage pt to monitor pain and request assistance  - Assess pain using appropriate pain scale  - Administer analgesics based on type and severity of pain and evaluate response  - Implement non-pharmacological measures as appropriate and evaluate response  - Consider cultural and social influences on pain and pain management  - Manage/alleviate anxiety  - Utilize distraction and/or relaxation techniques  - Monitor for opioid side effects  - Notify MD/LIP if interventions unsuccessful or patient reports new pain  - Anticipate increased pain with activity and pre-medicate as appropriate  4/9/2024 0218 by Carmelita García RN  Outcome: Progressing  4/9/2024 0218 by Carmelita García RN  Outcome: Progressing     Problem: RISK FOR INFECTION - ADULT  Goal: Absence of  fever/infection during anticipated neutropenic period  Description: INTERVENTIONS  - Monitor WBC  - Administer growth factors as ordered  - Implement neutropenic guidelines  4/9/2024 0218 by Carmelita García RN  Outcome: Progressing  4/9/2024 0218 by Carmelita García RN  Outcome: Progressing     Problem: SAFETY ADULT - FALL  Goal: Free from fall injury  Description: INTERVENTIONS:  - Assess pt frequently for physical needs  - Identify cognitive and physical deficits and behaviors that affect risk of falls.  - Wendel fall precautions as indicated by assessment.  - Educate pt/family on patient safety including physical limitations  - Instruct pt to call for assistance with activity based on assessment  - Modify environment to reduce risk of injury  - Provide assistive devices as appropriate  - Consider OT/PT consult to assist with strengthening/mobility  - Encourage toileting schedule  4/9/2024 0218 by Carmelita García RN  Outcome: Progressing  4/9/2024 0218 by Carmelita García RN  Outcome: Progressing     Problem: DISCHARGE PLANNING  Goal: Discharge to home or other facility with appropriate resources  Description: INTERVENTIONS:  - Identify barriers to discharge w/pt and caregiver  - Include patient/family/discharge partner in discharge planning  - Arrange for needed discharge resources and transportation as appropriate  - Identify discharge learning needs (meds, wound care, etc)  - Arrange for interpreters to assist at discharge as needed  - Consider post-discharge preferences of patient/family/discharge partner  - Complete POLST form as appropriate  - Assess patient's ability to be responsible for managing their own health  - Refer to Case Management Department for coordinating discharge planning if the patient needs post-hospital services based on physician/LIP order or complex needs related to functional status, cognitive ability or social support system  4/9/2024 0218 by Carmelita García  RN  Outcome: Progressing  4/9/2024 0218 by Carmelita García, RN  Outcome: Progressing

## 2024-04-09 NOTE — PHYSICAL THERAPY NOTE
PHYSICAL THERAPY EVALUATION - INPATIENT     Room Number: 429/429-A  Evaluation Date: 4/9/2024  Type of Evaluation: Initial   Physician Order: PT Eval and Treat    Presenting Problem: mechanical fall with L5 compression fx and left 8th rib fx, s/p kyphoplasty T12, L2, L3 on 4/8     Reason for Therapy: Mobility Dysfunction and Discharge Planning    PHYSICAL THERAPY ASSESSMENT   Patient is a 83 year old male admitted 4/5/2024 for mechanical fall with L5 compression fx and left 8th rib fx, s/p kyphoplasty T12, L2, L3.   Prior to admission, patient's baseline is independent with ADLs and functional mobility with occasional use of a cane.  Patient is currently functioning below baseline with bed mobility, transfers, gait, stair negotiation, standing prolonged periods, and performing household tasks.  Patient is requiring minimal assist as a result of the following impairments: decreased functional strength, pain, and medical status, dizziness. Physical Therapy will continue to follow for duration of hospitalization.    Patient will benefit from continued skilled PT Services at discharge to promote functional independence and safety with additional support and return home with home health PT.    PLAN  PT Treatment Plan: Bed mobility;Body mechanics;Endurance;Patient education;Energy conservation;Gait training;Strengthening;Transfer training;Stair training;Balance training  Rehab Potential : Good  Frequency (Obs): Daily    PHYSICAL THERAPY MEDICAL/SOCIAL HISTORY     Problem List  Principal Problem:    Closed fracture of third lumbar vertebra, unspecified fracture morphology, initial encounter ([facility])  Active Problems:    Hypokalemia    HOME SITUATION  Home Situation  Type of Home: House  Home Layout: Two level;Bed/bath upstairs;Able to live on main level  Stairs to Enter : 6  Railing: Yes  Stairs to Bedroom: 13  Railing: Yes  Lives With: Spouse  Drives: Yes  Patient Owned Equipment: Rolling walker;Cane  Patient Regularly Uses:  Glasses     Prior Level of [place]: independent, occasionally cane/rw    SUBJECTIVE  Agreeable to activity.     PHYSICAL THERAPY EXAMINATION   OBJECTIVE  Precautions: Spine;Bed/chair alarm  Fall Risk: High fall risk    WEIGHT BEARING RESTRICTION  Weight Bearing Restriction: None    PAIN ASSESSMENT  Ratin  Location: L rib  Management Techniques: Activity promotion;Body mechanics;Breathing techniques;Repositioning    COGNITION  Overall Cognitive Status:  WFL - within functional limits    RANGE OF MOTION AND STRENGTH ASSESSMENT  Upper extremity ROM and strength are within functional limits.  Lower extremity ROM is within functional limits.  Lower extremity strength is within functional limits.    BALANCE  Static Sitting: Good  Dynamic Sitting: Fair +  Static Standing: Not tested  Dynamic Standing: Not tested    ACTIVITY TOLERANCE  Pulse: 67  Heart Rate Source: Monitor  BP: 140/59  BP Location: Right arm  BP Method: Automatic  Patient Position: Sitting    O2 WALK  Oxygen Therapy  SPO2% on Room Air at Rest: 99    AM-PAC '6-Clicks' INPATIENT SHORT FORM - BASIC MOBILITY  How much difficulty does the patient currently have...  Patient Difficulty: Turning over in bed (including adjusting bedclothes, sheets and blankets)?: A Little   Patient Difficulty: Sitting down on and standing up from a chair with arms (e.g., wheelchair, bedside commode, etc.): A Little   Patient Difficulty: Moving from lying on back to sitting on the side of the bed?: A Little   How much help from another person does the patient currently need...   Help from Another: Moving to and from a bed to a chair (including a wheelchair)?: A Little   Help from Another: Need to walk in hospital room?: A Little   Help from Another: Climbing 3-5 steps with a railing?: A Lot     AM-PAC Score:  Raw Score: 17   Approx Degree of Impairment: 50.57%   Standardized Score (AM-PAC Scale): 42.13   CMS Modifier (G-Code): CK    FUNCTIONAL ABILITY STATUS  Functional  Mobility/Gait Assessment  Gait Assistance: Not tested  Rolling: contact guard assist  Supine to Sit: minimal assist  Sit to Supine: minimal assist    Exercise/Education Provided:  Bed mobility  Body mechanics  Energy conservation  Functional activity tolerated  Spine precautions, log rolling  Benefits of OOB mobility/up in chair/frequent walks    Additional Information: RN approved PT eval. Pt received resting in bed with wife at bedside. Introduced self and role. Educated on spine precautions, log rolling, benefits of oob mobility, transfer to chair, short, frequent walks, PT plan of care. Pt verbalized understanding. Pt demos bed mobility with VC and min A; tolerated static unsupported sitting at EOB with SBA. C/o dizziness and feeling \"clammy\"; VSS while sitting at EOB however pt declined further activity. Was able to sit for 10 minutes. Min A to return to supine. Encouraged to get up with nursing staff later today as able. Pt was left resting in bed with bed alarm on, ice applied for rib pain, needs within reach, handoff to RN complete.     The patient's Approx Degree of Impairment: 50.57% has been calculated based on documentation in the [facility] '6 clicks' Inpatient Basic Mobility Short Form.  Research supports that patients with this level of impairment may benefit from home with  PT.  Final disposition will be made by interdisciplinary medical team.    Patient End of Session: In bed;Needs met;Call light within reach;RN aware of session/findings;All patient questions and concerns addressed;Alarm set;Ice applied;SCDs in place;Family present    CURRENT GOALS  Goals to be met by: 4/16/24  Patient Goal Patient's self-stated goal is: go home   Goal #1 Patient is able to demonstrate supine - sit EOB @ level: supervision     Goal #1   Current Status    Goal #2 Patient is able to demonstrate transfers Sit to/from Stand at assistance level: supervision with walker - rolling or least restrictive assistive device       Goal #2  Current Status    Goal #3 Patient is able to ambulate 150 feet with assist device:  RW or least restrictive assistive device  at assistance level: supervision   Goal #3   Current Status    Goal #4 Patient will negotiate 6 stairs/one curb w/ assistive device and supervision   Goal #4   Current Status    Goal #5 Patient to demonstrate independence with home activity/exercise instructions provided to patient in preparation for discharge.   Goal #5   Current Status    Goal #6    Goal #6  Current Status      Patient Evaluation Complexity Level:  History Moderate - 1 or 2 personal factors and/or co-morbidities   Examination of body systems Moderate - addressing a total of 3 or more elements   Clinical Presentation  Moderate - Evolving   Clinical Decision Making  Moderate Complexity     Therapeutic Activity:  15 minutes

## 2024-04-09 NOTE — PROGRESS NOTES
[facility]  part of [facility]  Progress Note      Armando Forrester Patient Status:  Inpatient    1940 MRN [id number]   Location [facility] 4W/SW/SE Attending Angelo Weems MD   Hosp Day # 4 PCP Josh Li MD       Subjective:   Reports improved/nearly resolved back pain. He is now having left rib cage pain, found to have left anterior 8th rib fracture yesterday. Pain improved with norco. He has not ambulated yet.    Objective:   Kyphoplasty sites are intact, dressing removed, steri strips remain. No point tenderness.    Vital Signs:  Blood pressure 132/54, pulse 53, temperature 97.6 °F (36.4 °C), temperature source Oral, resp. rate 18, height 67\", weight 130 lb 8 oz (59.2 kg), SpO2 93%.    Input/Output:    Intake/Output Summary (Last 24 hours) at 2024 0847  Last data filed at 2024 0600  Gross per 24 hour   Intake 600 ml   Output 750 ml   Net -150 ml       Results:   Labs:  Lab Results   Component Value Date    WBC 8.1 2024    RBC 4.06 2024    HGB 12.0 2024    HCT 37.3 2024    MCV 91.9 2024    MCH 29.6 2024    MCHC 32.2 2024    RDW 13.9 2024    .0 2024     Recent Labs   Lab 24  0421 24  0523 24  0538   GLU 90 100* 102*   BUN 24* 21 28*   CREATSERUM 0.99 0.83 0.89   EGFRCR 76 87 85   CA 9.1 9.1 9.1    136 138   K 4.0 4.0 4.4    105 105   CO2 29.0 25.0 28.0     IR KYPHOPLASTY    Result Date: 2024  CONCLUSION: 1. Percutaneous cement kyphoplasty of T12, L2, and L3 vertebral body osteoporotic compression fractures.    Dictated by (CST): Aj Avelar MD on 2024 at 6:57 AM     Finalized by (CST): Aj Avelar MD on 2024 at 7:10 AM          XR RIBS WITH CHEST (3 VIEWS), LEFT  (CPT=71101)    Result Date: 2024  CONCLUSION:  1. Nondisplaced left anterior 8th rib fracture.  No pneumothorax. 2. Lesser incidental findings as above.    Dictated by (CST): Katiuska  MD Leobardo on 4/08/2024 at 8:42 PM     Finalized by (CST): Leobardo Harp MD on 4/08/2024 at 8:46 PM          MRI SPINE LUMBAR (CPT=72148)    Result Date: 4/7/2024  CONCLUSION:   Acute fractures of L2 and L3 with 50% loss of vertebral body height.  Acute fracture of T12 without vertebral body height loss.  Right sacral insufficiency fracture.  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    Dictated by (CST): Karthik Bryant MD on 4/07/2024 at 1:48 PM     Finalized by (CST): Karthik Bryant MD on 4/07/2024 at 1:52 PM             Assessment and Plan:   Day #1 s/p T12, L2, L3 kyphoplasty  Back pain improved. Having left rib cage pain, XR 4/8 with nondisplaced left anterior 8th rib fracture  Physical therapy evaluation today   F/u with IR in clinic in 2 weeks     RAUDEL Martel  4/9/2024  8:47 AM

## 2024-04-10 ENCOUNTER — APPOINTMENT (OUTPATIENT)
Dept: PHYSICAL THERAPY | Facility: HOSPITAL | Age: 84
End: 2024-04-10
Attending: INTERNAL MEDICINE
Payer: MEDICARE

## 2024-04-10 VITALS
HEART RATE: 61 BPM | OXYGEN SATURATION: 97 % | TEMPERATURE: 98 F | WEIGHT: 130.5 LBS | DIASTOLIC BLOOD PRESSURE: 61 MMHG | HEIGHT: 67 IN | RESPIRATION RATE: 18 BRPM | BODY MASS INDEX: 20.48 KG/M2 | SYSTOLIC BLOOD PRESSURE: 134 MMHG

## 2024-04-10 PROCEDURE — 99239 HOSP IP/OBS DSCHRG MGMT >30: CPT | Performed by: HOSPITALIST

## 2024-04-10 RX ORDER — HYDROCODONE BITARTRATE AND ACETAMINOPHEN 5; 325 MG/1; MG/1
1-2 TABLET ORAL EVERY 6 HOURS PRN
Qty: 15 TABLET | Refills: 0 | Status: SHIPPED | OUTPATIENT
Start: 2024-04-10

## 2024-04-10 RX ORDER — TRAMADOL HYDROCHLORIDE 50 MG/1
50 TABLET ORAL EVERY 6 HOURS PRN
Qty: 15 TABLET | Refills: 0 | Status: SHIPPED | OUTPATIENT
Start: 2024-04-10

## 2024-04-10 RX ORDER — CYCLOBENZAPRINE HCL 10 MG
10 TABLET ORAL 3 TIMES DAILY PRN
Qty: 30 TABLET | Refills: 0 | Status: SHIPPED | OUTPATIENT
Start: 2024-04-10 | End: 2024-04-30

## 2024-04-10 NOTE — PAYOR COMM NOTE
--------------  CONTINUED STAY REVIEW-----CLINICALS FOR 4/9     **As a reminder, Medicare Advantage plans are instructed to provide, at a minimum, the same coverage that a traditional Medicare beneficiary would receive. Beyond any doubt, a traditional Medicare recipient in this same situation would have received Medicare coverage for these inpatient medically necessary services. **         Payor: [employer]  Subscriber #:  [id number]  Authorization Number: [id number]    Admit date: 4/5/24  Admit time:  4:32 PM    Admitting Physician: Morro Boss MD  Attending Physician:  Angelo Weems MD  Primary Care Physician: Josh Li MD      4/9  Subjective:      Back pain improved.  Pt was lightheaded when up.     Objective:   Blood pressure 134/59, pulse 58, temperature 98.2 °F (36.8 °C), temperature source Oral, resp. rate 18, height 5' 7\" (1.702 m), weight 130 lb 8 oz (59.2 kg), SpO2 97%.     Gen:   NAD.  A and O x 3  CV:   RRR, no m/g/r  Pulm:   CTA bilat  Abd:   +bs, soft, NT, ND  LE:   No c/c/e  Neuro:   nonfocal     Results:            Lab Results   Component Value Date     WBC 8.1 04/09/2024     HGB 12.0 (L) 04/09/2024     HCT 37.3 (L) 04/09/2024     .0 04/09/2024     CREATSERUM 0.89 04/09/2024     BUN 28 (H) 04/09/2024      04/09/2024     K 4.4 04/09/2024      04/09/2024     CO2 28.0 04/09/2024      (H) 04/09/2024     CA 9.1 04/09/2024     ALB 4.2 03/27/2024     ALKPHO 101 03/27/2024     BILT 0.6 03/27/2024     TP 7.2 03/27/2024     AST 28 03/27/2024     ALT 16 03/27/2024     T4F 1.0 03/27/2024     TSH 5.580 (H) 03/27/2024     TROP 0.01 12/30/2016     B12 370 03/02/2022         IR CONE BEAM CT SPIN     Result Date: 4/9/2024  CONCLUSION:         1. Cone beam CT acquisitions during the course of percutaneous cement kyphoplasty of L3, confirming proper needle trajectory and cement fill, without extravasation.    Dictated by (CST): Aj Avelar MD on 4/09/2024 at 10:27 AM      Finalized by (CST): Aj Avelar MD on 4/09/2024 at 10:32 AM           IR KYPHOPLASTY     Result Date: 4/9/2024  CONCLUSION: 1. Percutaneous cement kyphoplasty of T12, L2, and L3 vertebral body osteoporotic compression fractures.    Dictated by (CST): Aj Avelar MD on 4/09/2024 at 6:57 AM     Finalized by (CST): Aj Avelar MD on 4/09/2024 at 7:10 AM           XR RIBS WITH CHEST (3 VIEWS), LEFT  (CPT=71101)     Result Date: 4/8/2024  CONCLUSION:         1. Nondisplaced left anterior 8th rib fracture.  No pneumothorax. 2. Lesser incidental findings as above.    Dictated by (CST): Leobardo Harp MD on 4/08/2024 at 8:42 PM     Finalized by (CST): Leobardo Harp MD on 4/08/2024 at 8:46 PM                Assessment and Plan:      Mechanical Fall   Acute L2 and L3 compression fracture  T12 acute compression fracture   Intractable back pain   - MRI L spine reviewed.  - IR on consult.   - pain control.   - s/p T12, L2, L3 kyphoplasty   - PT/OT  - mobilize  - DC planning     Essential HTN   - norvasc     Hyperlipidemia  - statin      dvt proph:    SCDs       Code status:    Full        MDM:    High Angelo Lim MD  4/9/2024              MEDICATIONS ADMINISTERED IN LAST 1 DAY:  acetaminophen (Tylenol Extra Strength) tab 500 mg       Date Action Dose Route User    4/9/2024 2247 Given 500 mg Oral Carmelita García RN          amLODIPine (Norvasc) tab 10 mg       Date Action Dose Route User    4/10/2024 0949 Given 10 mg Oral Padmini Artis RN          atorvastatin (Lipitor) tab 20 mg       Date Action Dose Route User    4/9/2024 1635 Given 20 mg Oral Padmini Artis RN          latanoprost (Xalatan) 0.005 % ophthalmic solution 1 drop       Date Action Dose Route User    4/9/2024 2247 Given 1 drop Both Eyes Carmelita García RN          lidocaine-menthol 4-1 % patch 1 patch       Date Action Dose Route User    4/10/2024 0949 Patch Applied 1 patch Transdermal (Left Lower Abdomen) Chandra  Padmini, ROSALINE          ondansetron (Zofran) 4 MG/2ML injection 4 mg       Date Action Dose Route User    4/9/2024 1632 Given 4 mg Intravenous Padmini Artis RN            Vitals (last day)       Date/Time Temp Pulse Resp BP SpO2 Weight O2 Device O2 Flow Rate (L/min) [facility]    04/10/24 0947 98.1 °F (36.7 °C) 59 18 138/67 95 % -- None (Room air) -- BB    04/10/24 0500 -- 56 18 115/52 95 % -- -- -- DP    04/09/24 2040 98.1 °F (36.7 °C) 58 18 138/66 95 % -- None (Room air) -- DP    04/09/24 1553 98.2 °F (36.8 °C) 58 18 134/59 97 % -- None (Room air) -- KT    04/09/24 1016 -- 67 -- 140/59 -- -- -- -- DT    04/09/24 0745 97.6 °F (36.4 °C) 53 18 132/54 93 % -- None (Room air) -- KT    04/09/24 0600 97.8 °F (36.6 °C) 63 18 126/77 95 % -- None (Room air) -- DP

## 2024-04-10 NOTE — PLAN OF CARE
POD:2. Patient is A&Ox4. RA. Saline locked. Scds for dvt prophylaxis Educated patient on the importance of using the incentive spirometer. Voiding via urinal. PRN Norco & tylenol for pain management. Call light within reach, frequent rounding. Safety measures in place. Plan for home with HH when medically clear.          Problem: Patient Centered Care  Goal: Patient preferences are identified and integrated in the patient's plan of care  Description: Interventions:  - What would you like us to know as we care for you?   - Provide timely, complete, and accurate information to patient/family  - Incorporate patient and family knowledge, values, beliefs, and cultural backgrounds into the planning and delivery of care  - Encourage patient/family to participate in care and decision-making at the level they choose  - Honor patient and family perspectives and choices  Outcome: Progressing     Problem: PAIN - ADULT  Goal: Verbalizes/displays adequate comfort level or patient's stated pain goal  Description: INTERVENTIONS:  - Encourage pt to monitor pain and request assistance  - Assess pain using appropriate pain scale  - Administer analgesics based on type and severity of pain and evaluate response  - Implement non-pharmacological measures as appropriate and evaluate response  - Consider cultural and social influences on pain and pain management  - Manage/alleviate anxiety  - Utilize distraction and/or relaxation techniques  - Monitor for opioid side effects  - Notify MD/LIP if interventions unsuccessful or patient reports new pain  - Anticipate increased pain with activity and pre-medicate as appropriate  Outcome: Progressing     Problem: RISK FOR INFECTION - ADULT  Goal: Absence of fever/infection during anticipated neutropenic period  Description: INTERVENTIONS  - Monitor WBC  - Administer growth factors as ordered  - Implement neutropenic guidelines  Outcome: Progressing     Problem: SAFETY ADULT - FALL  Goal: Free from  fall injury  Description: INTERVENTIONS:  - Assess pt frequently for physical needs  - Identify cognitive and physical deficits and behaviors that affect risk of falls.  - [place] fall precautions as indicated by assessment.  - Educate pt/family on patient safety including physical limitations  - Instruct pt to call for assistance with activity based on assessment  - Modify environment to reduce risk of injury  - Provide assistive devices as appropriate  - Consider OT/PT consult to assist with strengthening/mobility  - Encourage toileting schedule  Outcome: Progressing     Problem: DISCHARGE PLANNING  Goal: Discharge to home or other facility with appropriate resources  Description: INTERVENTIONS:  - Identify barriers to discharge w/pt and caregiver  - Include patient/family/discharge partner in discharge planning  - Arrange for needed discharge resources and transportation as appropriate  - Identify discharge learning needs (meds, wound care, etc)  - Arrange for interpreters to assist at discharge as needed  - Consider post-discharge preferences of patient/family/discharge partner  - Complete POLST form as appropriate  - Assess patient's ability to be responsible for managing their own health  - Refer to Case Management Department for coordinating discharge planning if the patient needs post-hospital services based on physician/LIP order or complex needs related to functional status, cognitive ability or social support system  Outcome: Progressing

## 2024-04-10 NOTE — PHYSICAL THERAPY NOTE
PHYSICAL THERAPY TREATMENT NOTE - INPATIENT     Room Number: 429/429-A       Presenting Problem: mechanical fall with L5 compression fx and left 8th rib fx, s/p kyphoplasty T12, L2, L3 on        Problem List  Principal Problem:    Closed fracture of third lumbar vertebra, unspecified fracture morphology, initial encounter ([facility])  Active Problems:    Hypokalemia      PHYSICAL THERAPY ASSESSMENT   Patient demonstrates good  progress this session, goals  remain in progress.    Patient continues to function below baseline with bed mobility, transfers, and gait.  Contributing factors to remaining limitations include decreased functional strength, decreased endurance/aerobic capacity, and pain.  Next session anticipate patient to progress bed mobility, transfers, and gait.  Physical Therapy will continue to follow patient for duration of hospitalization.    Patient continues to benefit from continued skilled PT services: For duration of hospitalization, however, given the patient is functioning near baseline level do not anticipate skilled therapy needs at discharge .    PLAN  PT Treatment Plan: Bed mobility;Body mechanics;Endurance;Patient education;Gait training  Frequency (Obs): Daily    SUBJECTIVE  Pt reports being ready for PT RX    OBJECTIVE  Precautions: Spine;Bed/chair alarm    WEIGHT BEARING RESTRICTION                PAIN ASSESSMENT   Ratin  Location: L rib  Management Techniques: Activity promotion;Body mechanics;Breathing techniques;Repositioning    BALANCE  Static Sitting: Good  Dynamic Sitting: Fair +  Static Standing: Not tested  Dynamic Standing: Not tested    ACTIVITY TOLERANCE                          O2 WALK       AM-PAC '6-Clicks' INPATIENT SHORT FORM - BASIC MOBILITY  How much difficulty does the patient currently have...  Patient Difficulty: Turning over in bed (including adjusting bedclothes, sheets and blankets)?: A Little   Patient Difficulty: Sitting down on and standing up from a chair  with arms (e.g., wheelchair, bedside commode, etc.): A Little   Patient Difficulty: Moving from lying on back to sitting on the side of the bed?: A Little   How much help from another person does the patient currently need...   Help from Another: Moving to and from a bed to a chair (including a wheelchair)?: A Little   Help from Another: Need to walk in hospital room?: A Little   Help from Another: Climbing 3-5 steps with a railing?: A Little     AM-PAC Score:  Raw Score: 18   Approx Degree of Impairment: 46.58%   Standardized Score (AM-PAC Scale): 43.63   CMS Modifier (G-Code): CK    FUNCTIONAL ABILITY STATUS  Functional Mobility/Gait Assessment  Gait Assistance: Contact guard assist  Distance (ft): 2 x 100  Assistive Device: Rolling walker  Pattern: Shuffle  Stairs: Stairs  How Many Stairs: 4  Device: 1 Rail  Assist: Contact guard assist  Pattern: Ascend and Descend  Rolling: minimal assist  Supine to Sit: minimal assist  Sit to Supine: minimal assist  Sit to Stand: minimal assist    Additional information: Per py request,pt seen BID.Min a for bed mobility and transfer.Spinal precautions reviewed;education.Pt amb 2 x 100 ft with RW and CGA.Provided cuing for gait pattern as well as for postural awareness.Navigated 4 stairs with CGA.There ex.Cooperative and motivated.    The patient's Approx Degree of Impairment: 46.58% has been calculated based on documentation in the [facility] '6 clicks' Inpatient Daily Activity Short Form.  Research supports that patients with this level of impairment may benefit from Home.  Final disposition will be made by interdisciplinary medical team.    THERAPEUTIC EXERCISES  Lower Extremity Ankle pumps  Glut sets  Quad sets     Position Supine       Patient End of Session: In bed;Call light within reach;RN aware of session/findings;All patient questions and concerns addressed    CURRENT GOALS   Patient Goal Patient's self-stated goal is: go home   Goal #1 Patient is able to demonstrate supine  - sit EOB @ level: supervision     Goal #1   Current Status Min a   Goal #2 Patient is able to demonstrate transfers Sit to/from Stand at assistance level: supervision with walker - rolling or least restrictive assistive device      Goal #2  Current Status Min a   Goal #3 Patient is able to ambulate 150 feet with assist device: RW or least restrictive assistive device at assistance level: supervision   Goal #3   Current Status Pt amb 2 x 100 ft with RW and CGA   Goal #4 Patient will negotiate 6 stairs/one curb w/ assistive device and supervision   Goal #4   Current Status Navigated 4 stairs with CGA   Goal #5 Patient to demonstrate independence with home activity/exercise instructions provided to patient in preparation for discharge.   Goal #5   Current Status In progress   Goal #6      Gait Training: 15 minutes  Therapeutic Activity: 15 minutes  Neuromuscular Re-education:  minutes  Therapeutic Exercise:  minutes  Canalith Repositioning:  minutes  Manual Therapy:  minutes  Can add/delete any of these

## 2024-04-10 NOTE — CM/SW NOTE
Social work met with the patient and his spouse and spoke with the patient's daughter Juany via phone regarding discharge planning.    The patient and his spouse initially wanted [facility] but the patient is more appropriate for HH PT per the therapy recommendation.    Social work explained HH and the patient and his spouse are agreeable to [facility].    Social work [facility] and notified liaison.    *[facility] referral sent as backup per patient's daughter's request.   Patient's daughter was informed that insurance will most likely deny NURY based on notes they will receive.    SW/CM to remain available for support and/or discharge planning.     Joana Rivera MSW, LSW  Discharge Planner [phone]

## 2024-04-10 NOTE — DISCHARGE INSTRUCTIONS
HOME HEALTH:  Sometimes managing your health at home requires assistance.  The [facility] team has recognized your preference to use [facility].  They can be reached by phone at [phone].  The fax number for your reference is [phone].  A representative from the home health agency will contact you or your family to schedule your first visit.

## 2024-04-10 NOTE — DISCHARGE SUMMARY
[facility]  part of [facility]    Discharge Summary    Armando Forrester Patient Status:  Inpatient    1940 MRN [id number]   Location [facility] 4W/SW/SE Attending Angelo Weems MD   Hosp Day # 5 PCP Josh Li MD     Date of Admission: 2024 Disposition:   Home with [facility]    Date of Discharge:  2024     Admitting Diagnosis:   Closed fracture of third lumbar vertebra, unspecified fracture morphology, initial encounter ([facility]) [S32.039A]    Hospital Discharge Diagnoses:    Mechanical Fall   Acute L2 and L3 compression fracture  T12 acute compression fracture   Intractable back pain   Essential HTN    Hyperlipidemia  Osteoporosis       Problem List:     Patient Active Problem List   Diagnosis    Hypertension    Osteoporosis    Iron deficiency anemia    BPH (benign prostatic hyperplasia)    Hypercholesterolemia    Glaucoma    History of ischemic colitis    History of skin cancer    Aortic atherosclerosis (HCC)    Hyperglycemia    Colitis    LLQ abdominal pain    Hx of adenomatous colonic polyps    Subclinical hypothyroidism    Hypokalemia    Closed fracture of third lumbar vertebra, unspecified fracture morphology, initial encounter ([facility])       Physical Exam:      /61 (BP Location: Right arm)   Pulse 61   Temp 98.3 °F (36.8 °C) (Oral)   Resp 18   Ht 5' 7\" (1.702 m)   Wt 130 lb 8 oz (59.2 kg)   SpO2 97%   BMI 20.44 kg/m²     Gen:  NAD.  A and O x  3  CV:   RRR.  No m/g/r  Pulm:   CTA bilat  Abd:   +bs, soft, NT, ND  LE:  No c/c/e  Neuro:  nonfocal      Reason for Admission:   Intractable back pain after mechanical fall.     HISTORY OF PRESENT ILLNESS:  The patient is an 83-year-old  male who presented to the emergency department for evaluation of intractable back pain after a mechanical fall at home.  CBC and chemistry unremarkable.  CT scan of the abdomen and pelvis showed no acute intra-abdominal findings.  CT scan of the brain was negative.  CT  scan of the lumbar spine showed L3 vertebral compression fracture with 50% loss of body height.  There is subacute fracture of L2 with 50% loss of vertebral body height which has progressed since February 2024.  The patient will be admitted to hospital for pain control and further evaluation.    Hospital Course:     Mechanical Fall   Acute L2 and L3 compression fracture  T12 acute compression fracture   Osteoporosis  Intractable back pain  - improved  Back pain much improved after T12, L2, L3 kyphoplasty 4/8/2024 by Dr. Avelar.  Pt still need assistance with ADLs such as transfers and bathing and dressing.  - MRI L spine reviewed.  - IR was on consult.   - pain control. Norco and then tramadol.  - referral to endocrine for osteoporosis  - PT/OT  - mobilize  - Home with [facility]     Essential HTN   - norvasc     Hyperlipidemia  - statin      dvt proph:    SCDs       Code status:    Full          Consultations:   IR    Discharge Condition:  Good    Discharge Medications:      Discharge Medications        CHANGE how you take these medications        Instructions Prescription details   cyclobenzaprine 10 MG Tabs  Commonly known as: Flexeril  What changed: Another medication with the same name was removed. Continue taking this medication, and follow the directions you see here.      Take 1 tablet (10 mg total) by mouth 3 (three) times daily as needed for Muscle spasms.   Stop taking on: April 30, 2024  Quantity: 30 tablet  Refills: 0     HYDROcodone-acetaminophen 5-325 MG Tabs  Commonly known as: Norco  What changed: how much to take      Take 1-2 tablets by mouth every 6 (six) hours as needed.   Quantity: 15 tablet  Refills: 0            CONTINUE taking these medications        Instructions Prescription details   amLODIPine 10 MG Tabs  Commonly known as: Norvasc      Take 1 tablet (10 mg total) by mouth daily.   Quantity: 90 tablet  Refills: 1     atorvastatin 20 MG Tabs  Commonly known as: Lipitor      Take 1 tablet (20 mg  total) by mouth every evening.   Quantity: 90 tablet  Refills: 3     BOSWELLIA OR      Take 2 tablets by mouth 3 (three) times daily.   Refills: 0     CALCIUM 600 + D OR      Take 1 tablet by mouth 2 (two) times daily.   Refills: 0     COQ10 OR      Take 1 capsule by mouth daily.   Refills: 0     FISH OIL OR      Take 1 capsule by mouth 3 (three) times daily.   Refills: 0     latanoprost 0.005 % Soln  Commonly known as: Xalatan      Place 1 drop into both eyes nightly.   Refills: 0     PreserVision AREDS Tabs      Take 2 tablets by mouth daily.   Refills: 0     PROBIOTIC OR      Take 1 capsule by mouth daily.   Refills: 0     traMADol 50 MG Tabs  Commonly known as: Ultram      Take 1 tablet (50 mg total) by mouth every 6 (six) hours as needed for Pain.   Quantity: 15 tablet  Refills: 0     Vitamin C 500 MG Caps      Take 1 tablet by mouth daily.   Refills: 0     Vitamin D3 25 MCG (1000 UT) Caps  Generic drug: cholecalciferol      Take  by mouth. take 1 daily   Refills: 0            STOP taking these medications      ibuprofen 600 MG Tabs  Commonly known as: Motrin                  Where to Get Your Medications        These medications were sent to [id number] - [place], [place] [address] [facility] [phone], [phone]  [address] [place] [address]      Phone: [phone]   cyclobenzaprine 10 MG Tabs  HYDROcodone-acetaminophen 5-325 MG Tabs  traMADol 50 MG Tabs         Follow up Visits:     Follow-up Information       Phu Ocasio APRN Follow up in 2 week(s).    Specialties: Nurse Practitioner, INTERVENTIONAL, RADIOLOGY  Contact information:  [address]  [address]  [facility] [id number] [phone]               Josh Li MD. Schedule an appointment as soon as possible for a visit in 1 week(s).    Specialty: Internal Medicine  Contact information:  [address] [place] [id number] [phone]               Lori Enrique MD. Schedule an appointment as soon as possible for a visit.     Specialty: ENDOCRINOLOGY  Why: For endocrinology.  Contact information:  [address]  [address]  [facility] [id number] [phone]                             Hospital Discharge Diagnoses:  Vertebral compression fractures    Lace+ Score: 67  59-90 High Risk  29-58 Medium Risk  0-28   Low Risk.    TCM Follow-Up Recommendation:  LACE > 58: High Risk of readmission after discharge from the hospital.    >35 minutes spent preparing this discharge.    Angelo Lim MD  4/10/2024  3:58 PM

## 2024-04-10 NOTE — PLAN OF CARE
Patient AOX4. Room air. General diet, tolerated well. No complaints of nausea. SCDS for dvt prophylaxis. Voiding freely. BM this afternoon. No complaints of pain. Discharging home. Cleared for discharge by Mds. Discharging with [facility]. Discharge instructions given at bedside.     Problem: Patient Centered Care  Goal: Patient preferences are identified and integrated in the patient's plan of care  Description: Interventions:  - What would you like us to know as we care for you? Lives across the street.   - Provide timely, complete, and accurate information to patient/family  - Incorporate patient and family knowledge, values, beliefs, and cultural backgrounds into the planning and delivery of care  - Encourage patient/family to participate in care and decision-making at the level they choose  - Honor patient and family perspectives and choices  Outcome: Adequate for Discharge     Problem: PAIN - ADULT  Goal: Verbalizes/displays adequate comfort level or patient's stated pain goal  Description: INTERVENTIONS:  - Encourage pt to monitor pain and request assistance  - Assess pain using appropriate pain scale  - Administer analgesics based on type and severity of pain and evaluate response  - Implement non-pharmacological measures as appropriate and evaluate response  - Consider cultural and social influences on pain and pain management  - Manage/alleviate anxiety  - Utilize distraction and/or relaxation techniques  - Monitor for opioid side effects  - Notify MD/LIP if interventions unsuccessful or patient reports new pain  - Anticipate increased pain with activity and pre-medicate as appropriate  Outcome: Adequate for Discharge     Problem: RISK FOR INFECTION - ADULT  Goal: Absence of fever/infection during anticipated neutropenic period  Description: INTERVENTIONS  - Monitor WBC  - Administer growth factors as ordered  - Implement neutropenic guidelines  Outcome: Adequate for Discharge     Problem: SAFETY ADULT -  FALL  Goal: Free from fall injury  Description: INTERVENTIONS:  - Assess pt frequently for physical needs  - Identify cognitive and physical deficits and behaviors that affect risk of falls.  - [place] fall precautions as indicated by assessment.  - Educate pt/family on patient safety including physical limitations  - Instruct pt to call for assistance with activity based on assessment  - Modify environment to reduce risk of injury  - Provide assistive devices as appropriate  - Consider OT/PT consult to assist with strengthening/mobility  - Encourage toileting schedule  Outcome: Adequate for Discharge     Problem: DISCHARGE PLANNING  Goal: Discharge to home or other facility with appropriate resources  Description: INTERVENTIONS:  - Identify barriers to discharge w/pt and caregiver  - Include patient/family/discharge partner in discharge planning  - Arrange for needed discharge resources and transportation as appropriate  - Identify discharge learning needs (meds, wound care, etc)  - Arrange for interpreters to assist at discharge as needed  - Consider post-discharge preferences of patient/family/discharge partner  - Complete POLST form as appropriate  - Assess patient's ability to be responsible for managing their own health  - Refer to Case Management Department for coordinating discharge planning if the patient needs post-hospital services based on physician/LIP order or complex needs related to functional status, cognitive ability or social support system  Outcome: Adequate for Discharge

## 2024-04-10 NOTE — PLAN OF CARE
Patient AOX4. Room air. General diet, tolerated well. SCDS for dvt prophylaxis. Voiding via urinal. PRN norco given for pain management. Call light within reach.     Problem: Patient Centered Care  Goal: Patient preferences are identified and integrated in the patient's plan of care  Description: Interventions:  - What would you like us to know as we care for you?   - Provide timely, complete, and accurate information to patient/family  - Incorporate patient and family knowledge, values, beliefs, and cultural backgrounds into the planning and delivery of care  - Encourage patient/family to participate in care and decision-making at the level they choose  - Honor patient and family perspectives and choices  Outcome: Progressing     Problem: PAIN - ADULT  Goal: Verbalizes/displays adequate comfort level or patient's stated pain goal  Description: INTERVENTIONS:  - Encourage pt to monitor pain and request assistance  - Assess pain using appropriate pain scale  - Administer analgesics based on type and severity of pain and evaluate response  - Implement non-pharmacological measures as appropriate and evaluate response  - Consider cultural and social influences on pain and pain management  - Manage/alleviate anxiety  - Utilize distraction and/or relaxation techniques  - Monitor for opioid side effects  - Notify MD/LIP if interventions unsuccessful or patient reports new pain  - Anticipate increased pain with activity and pre-medicate as appropriate  Outcome: Progressing     Problem: RISK FOR INFECTION - ADULT  Goal: Absence of fever/infection during anticipated neutropenic period  Description: INTERVENTIONS  - Monitor WBC  - Administer growth factors as ordered  - Implement neutropenic guidelines  Outcome: Progressing     Problem: SAFETY ADULT - FALL  Goal: Free from fall injury  Description: INTERVENTIONS:  - Assess pt frequently for physical needs  - Identify cognitive and physical deficits and behaviors that affect  risk of falls.  - [place] fall precautions as indicated by assessment.  - Educate pt/family on patient safety including physical limitations  - Instruct pt to call for assistance with activity based on assessment  - Modify environment to reduce risk of injury  - Provide assistive devices as appropriate  - Consider OT/PT consult to assist with strengthening/mobility  - Encourage toileting schedule  Outcome: Progressing     Problem: DISCHARGE PLANNING  Goal: Discharge to home or other facility with appropriate resources  Description: INTERVENTIONS:  - Identify barriers to discharge w/pt and caregiver  - Include patient/family/discharge partner in discharge planning  - Arrange for needed discharge resources and transportation as appropriate  - Identify discharge learning needs (meds, wound care, etc)  - Arrange for interpreters to assist at discharge as needed  - Consider post-discharge preferences of patient/family/discharge partner  - Complete POLST form as appropriate  - Assess patient's ability to be responsible for managing their own health  - Refer to Case Management Department for coordinating discharge planning if the patient needs post-hospital services based on physician/LIP order or complex needs related to functional status, cognitive ability or social support system  Outcome: Progressing

## 2024-04-10 NOTE — PHYSICAL THERAPY NOTE
PHYSICAL THERAPY TREATMENT NOTE - INPATIENT     Room Number: 429/429-A       Presenting Problem: mechanical fall with L5 compression fx and left 8th rib fx, s/p kyphoplasty T12, L2, L3 on        Problem List  Principal Problem:    Closed fracture of third lumbar vertebra, unspecified fracture morphology, initial encounter ([facility])  Active Problems:    Hypokalemia      PHYSICAL THERAPY ASSESSMENT   Patient demonstrates good  progress this session, goals  remain in progress.    Patient continues to function below baseline with bed mobility, transfers, and gait.  Contributing factors to remaining limitations include decreased functional strength, decreased endurance/aerobic capacity, and pain.  Next session anticipate patient to progress bed mobility, transfers, and gait.  Physical Therapy will continue to follow patient for duration of hospitalization.    Patient continues to benefit from continued skilled PT services: For duration of hospitalization, however, given the patient is functioning near baseline level do not anticipate skilled therapy needs at discharge .    PLAN  PT Treatment Plan: Bed mobility;Endurance;Patient education  Frequency (Obs): Daily    SUBJECTIVE  Pt reports being ready for PT RX    OBJECTIVE  Precautions: Spine;Bed/chair alarm    WEIGHT BEARING RESTRICTION                PAIN ASSESSMENT   Ratin  Location: L rib  Management Techniques: Activity promotion;Body mechanics;Breathing techniques;Repositioning    BALANCE  Static Sitting: Good  Dynamic Sitting: Fair +  Static Standing: Not tested  Dynamic Standing: Not tested    ACTIVITY TOLERANCE                          O2 WALK       AM-PAC '6-Clicks' INPATIENT SHORT FORM - BASIC MOBILITY  How much difficulty does the patient currently have...  Patient Difficulty: Turning over in bed (including adjusting bedclothes, sheets and blankets)?: A Little   Patient Difficulty: Sitting down on and standing up from a chair with arms (e.g., wheelchair,  bedside commode, etc.): A Little   Patient Difficulty: Moving from lying on back to sitting on the side of the bed?: A Little   How much help from another person does the patient currently need...   Help from Another: Moving to and from a bed to a chair (including a wheelchair)?: A Little   Help from Another: Need to walk in hospital room?: A Little   Help from Another: Climbing 3-5 steps with a railing?: A Lot     AM-PAC Score:  Raw Score: 17   Approx Degree of Impairment: 50.57%   Standardized Score (AM-PAC Scale): 42.13   CMS Modifier (G-Code): CK    FUNCTIONAL ABILITY STATUS  Functional Mobility/Gait Assessment  Gait Assistance: Contact guard assist  Distance (ft): 2 x 50  Assistive Device: Rolling walker  Pattern: Shuffle  Rolling: minimal assist  Supine to Sit: minimal assist  Sit to Supine: minimal assist  Sit to Stand: minimal assist    Additional information: Pt seen daily.Min a for bed mobility and transfer.Spinal precautions reviewed;education.Ot amb 2 x 50 ft with RW and CGA.There ex.    The patient's Approx Degree of Impairment: 50.57% has been calculated based on documentation in the [facility] '6 clicks' Inpatient Daily Activity Short Form.  Research supports that patients with this level of impairment may benefit from Home.  Final disposition will be made by interdisciplinary medical team.    THERAPEUTIC EXERCISES  Lower Extremity Ankle pumps  Glut sets  Quad sets     Position Supine       Patient End of Session: In bed;Call light within reach;RN aware of session/findings;All patient questions and concerns addressed    CURRENT GOALS   Patient Goal Patient's self-stated goal is: go home   Goal #1 Patient is able to demonstrate supine - sit EOB @ level: supervision     Goal #1   Current Status Min a   Goal #2 Patient is able to demonstrate transfers Sit to/from Stand at assistance level: supervision with walker - rolling or least restrictive assistive device      Goal #2  Current Status Min a   Goal #3  Patient is able to ambulate 150 feet with assist device: RW or least restrictive assistive device at assistance level: supervision   Goal #3   Current Status Pt amb 2 x 50 ft with RW and CGA   Goal #4 Patient will negotiate 6 stairs/one curb w/ assistive device and supervision   Goal #4   Current Status NT   Goal #5 Patient to demonstrate independence with home activity/exercise instructions provided to patient in preparation for discharge.   Goal #5   Current Status In progress   Goal #6      Gait Training: 15 minutes  Therapeutic Activity: 15 minutes  Neuromuscular Re-education:  minutes  Therapeutic Exercise:  minutes  [place] Repositioning:  minutes  Manual Therapy:  minutes  Can add/delete any of these

## 2024-04-10 NOTE — PROGRESS NOTES
[facility]  part of [facility]  Progress Note      Armando Forrester Patient Status:  Inpatient    1940 MRN [id number]   Location [facility] 4W/SW/SE Attending Angelo Weems MD   Hosp Day # 5 PCP Josh Li MD       Subjective:   In bed. Was not able to ambulate with physical therapy yesterday because he became dizzy when he sat at the edge of the bed. Reports resolved back pain.     Objective:   Kyphoplasty sites with intact steri strips, no redness/drainage/swelling. No point tenderness.    Vital Signs:  Blood pressure 115/52, pulse 56, temperature 98.1 °F (36.7 °C), temperature source Oral, resp. rate 18, height 67\", weight 130 lb 8 oz (59.2 kg), SpO2 95%.    Input/Output:    Intake/Output Summary (Last 24 hours) at 4/10/2024 0924  Last data filed at 4/10/2024 0500  Gross per 24 hour   Intake 500 ml   Output 700 ml   Net -200 ml       Results:     Recent Labs   Lab 24  1211 24  04224  0524  0538   RBC 4.17 4.00 4.04 4.06   HGB 12.3* 12.3* 12.2* 12.0*   HCT 36.6* 35.1* 35.4* 37.3*   MCV 87.8 87.8 87.6 91.9   MCH 29.5 30.8 30.2 29.6   MCHC 33.6 35.0 34.5 32.2   RDW 13.8 13.8 13.9 13.9   NEPRELIM 5.46 4.49  --   --    WBC 7.6 6.4 6.5 8.1   .0 205.0 200.0 208.0       Recent Labs   Lab 24  0421 24  0524  0538   GLU 90 100* 102*   BUN 24* 21 28*   CREATSERUM 0.99 0.83 0.89   EGFRCR 76 87 85   CA 9.1 9.1 9.1    136 138   K 4.0 4.0 4.4    105 105   CO2 29.0 25.0 28.0         Assessment and Plan:   Day #2 s/p T 12, L 2, L 3 kyphoplasty  Sites intact, back pain resolved, however he was not able to ambulate with therapy yesterday due to dizziness.   F/u with IR in clinic in 2 weeks    RAUDEL Martel  4/10/2024  9:24 AM

## 2024-04-10 NOTE — HOME CARE LIAISON
[facility] Spoke w/ patient's spouse who is agreeable with [facility]. Contact information placed on AVS.

## 2024-04-11 ENCOUNTER — TELEPHONE (OUTPATIENT)
Dept: INTERNAL MEDICINE CLINIC | Facility: CLINIC | Age: 84
End: 2024-04-11

## 2024-04-11 ENCOUNTER — PATIENT OUTREACH (OUTPATIENT)
Dept: CASE MANAGEMENT | Age: 84
End: 2024-04-11

## 2024-04-11 DIAGNOSIS — S32.039A CLOSED FRACTURE OF THIRD LUMBAR VERTEBRA, UNSPECIFIED FRACTURE MORPHOLOGY, INITIAL ENCOUNTER (HCC): ICD-10-CM

## 2024-04-11 DIAGNOSIS — Z02.9 ENCOUNTERS FOR UNSPECIFIED ADMINISTRATIVE PURPOSE: Primary | ICD-10-CM

## 2024-04-11 DIAGNOSIS — Z98.890 S/P KYPHOPLASTY: ICD-10-CM

## 2024-04-11 PROCEDURE — 1111F DSCHRG MED/CURRENT MED MERGE: CPT

## 2024-04-11 NOTE — TELEPHONE ENCOUNTER
Spoke to patient for TCM today.  Patient does not have an appointment scheduled at this time.  TCM appointment recommended by 4/17/2024 as patient is a High risk for readmission.  Please advise.    NCM Attempted to schedule PCP office TCM appointment with patient. Patient states he will call to schedule his TCM appointment as he needs to check his calendar because he has a lot of appointments coming up.     BOOK BY DATE (last date for TCM): 4/24/2024    Clinical staff:  Please Notify Dr Li of the above and then please call patient to try to get him to schedule a TCM appointment, within the TCM timeframe.     Thank you!

## 2024-04-11 NOTE — PROGRESS NOTES
Initial Post Discharge Follow Up   Discharge Date: 4/10/24  Contact Date: 4/11/2024    Consent Verification:  Assessment Completed With: Patient  HIPAA Verified?  Yes    Discharge Dx:   Close fracture of third lumbar vertebra, unspecified fracture morphology, initial encounter  S/P: Percutaneous cement kyphoplasty of T12, L2, and L3 vertebral body osteoporotic compression fractures     General:   How have you been since your discharge from the hospital? Patient states that he is struggling to get up and was trying to get up, get washed up and get dressed. Patient states that he was only able to get to the bathroom and comb his hair and get his shirt on before he had to go sit down. Patient denies fever/chills, no shortness of breath, no chest pain, no pain radiating from chest to neck, jaw, shoulders, arms or upper back. Patient denies abdominal pain, no nausea/vomiting. Patient denies any redness, swelling drainage from around dressings. Patient denies numbness/tingling in legs, no loss of bladder or bowel, no difficulty urinating. NCM instructed patient to change positions frequently, walk as tolerated, rest when needed, stay hydrated and continue to take up to ten deep breaths an hour while awake, or if watching television take a deep breath with every commercial. NCM reviewed discharge instructions, medications, S&S of infection/blood clots with patient, he verbalized understanding of these. Patient denies any further questions or needs at this time.   Do you have any pain since discharge?  Yes  Where: patient describes the pain as an ache in there lower back to the right   Rating on pain scale 1-10, 10 being the worst pain you have ever experienced, what is current pain: not rated  Alleviating factors: Rest  Aggravating factors: getting up  Is the pain manageable at home? Yes  When you were leaving the hospital were your discharge instructions reviewed with you? Yes  Do you have any questions about your  discharge instructions?  No  Before leaving the hospital was your diagnoses explained to you? Yes  Do you have any questions about your diagnoses? No  Are you able to perform normal daily activities of living as you have prior to your hospital stay (dressing, bathing, ambulating to the bathroom, etc)? yes  If No, What are some barriers or concerns?  Patient reports he gets tired faster, needs to rest while performing ADL's.  (NCM) Was patient given a different diet per AVS? no    Medications:   STOP taking:  ibuprofen 600 MG Tabs (Motrin)  Review your updated medication list below.  Current Outpatient Medications   Medication Sig Dispense Refill    traMADol 50 MG Oral Tab Take 1 tablet (50 mg total) by mouth every 6 (six) hours as needed for Pain. 15 tablet 0    HYDROcodone-acetaminophen 5-325 MG Oral Tab Take 1-2 tablets by mouth every 6 (six) hours as needed. 15 tablet 0    cyclobenzaprine 10 MG Oral Tab Take 1 tablet (10 mg total) by mouth 3 (three) times daily as needed for Muscle spasms. 30 tablet 0    amLODIPine 10 MG Oral Tab Take 1 tablet (10 mg total) by mouth daily. 90 tablet 1    atorvastatin 20 MG Oral Tab Take 1 tablet (20 mg total) by mouth every evening. 90 tablet 3    latanoprost 0.005 % Ophthalmic Solution Place 1 drop into both eyes nightly.      Ascorbic Acid (VITAMIN C) 500 MG Oral Cap Take 1 tablet by mouth daily.      Multiple Vitamins-Minerals (PRESERVISION AREDS) Oral Tab Take 2 tablets by mouth daily.      Omega-3 Fatty Acids (FISH OIL OR) Take 1 capsule by mouth 3 (three) times daily.      Calcium Carbonate-Vitamin D (CALCIUM 600 + D OR) Take 1 tablet by mouth 2 (two) times daily.      Coenzyme Q10 (COQ10 OR) Take 1 capsule by mouth daily.      Probiotic Product (PROBIOTIC OR) Take 1 capsule by mouth daily.      Boswellia Judah (BOSWELLIA OR) Take 2 tablets by mouth 3 (three) times daily.      Cholecalciferol (VITAMIN D3) 1000 UNITS Oral Cap Take  by mouth. take 1 daily       Were there  any changes to your current medication(s) noted on the AVS? Yes  If so, were these medication changes discussed with you prior to leaving the hospital? Yes  Let's go over your medications together to make sure we are not missing anything. Medications Reviewed  Are there any reasons that keep you from taking your medication as prescribed? No  Are you having any concerns with constipation? No, patient reports he was finally able to have a BM.    Discharge medications reviewed/discussed/and reconciled against outpatient medications with patient.  Any changes or updates to medications sent to PCP.  Patient Acknowledged     Referrals/orders at D/C:  Referrals/orders placed at D/C? yes  What services:   Home health, PT, and OT   (If HH was ordered) Has HH been set up?  Yes    If Yes: With Whom: [facility]    DME ordered at D/C? No      Discharge orders, AVS reviewed and discussed with patient. Any changes or updates to orders sent to PCP.  Patient Acknowledged      SDOH:   Transportation:   Transportation Needs: No Transportation Needs (4/5/2024)    Transportation Needs     Lack of Transportation: No       Financial Strain:  .  Financial Resource Strain: Low Risk  (12/6/2021)    Received from [facility]    Overall Financial Resource Strain ([facility])     Difficulty of Paying Living Expenses: Not very hard       Diagnosis specifics:   Discharge instructions for Kyphoplasty  Call 911 right away if you have any of the following:   Chest pain  Severe back pain  Shortness of breath    Call your healthcare provider right away if you have any of the following:   Redness, swelling, drainage, or warmth around the incision sites gets worse  Severe pain at the incision sites  Weakness, numbness, or tingling in your legs  Loss of bowel or bladder control  Difficulty urinating  Fever of  100.4° F ( 38°C) or higher, or as directed by your healthcare provider  Shaking chills    Follow up appointments:      Your appointments        Date & Time Appointment Department (Center)    Apr 22, 2024 2:00 PM CDT X-ray Bone Densit[facility] (Dexa) [facility]    It is recommended that you wear a 2 piece outfit that does not contain any metal such as snaps and zippers, etc.  Attention women: Underwire bras will need to be removed prior to the scan.    If you have the report from a prior DEXA scan performed elsewhere, please bring the report with you to your appointment.       Apr 23, 2024 2:00 PM CDT Consult with Maria R Yanes MD [facility] ([facility])        Sep 25, 2024 1:00 PM CDT Follow Up Visit with Josh Li MD [facility] ([facility]  [address] [address]  [phone] [facility]  [address] [id number] [phone] [facility]  [address] [id number] [phone]          TCC  Was TCC ordered: No      PCP (If no TCC appointment)  Does patient already have a PCP appointment scheduled? No  NCM Attempted to schedule PCP office TCM appointment with patient.   If no appointment scheduled: Explain, Patient states he will call to schedule his TCM appointment as he needs to check his calendar because he has a lot of appointments coming up. Message sent to MD's office.    Specialist    Does the patient have any other follow up appointment(s) needing to be scheduled? Yes  If yes: NCM reviewed upcoming specialist appointment with patient: Yes  Does the patient need assistance scheduling appointment(s): No  Patient reports he will schedule his HFU  appointments with Phu Ocasio NP in IR and with Dr Enrique, Endocrinologist.    Is there any reason as to why you cannot make your appointment(s)?  No     Needs post D/C:   Now that you are home, are there any needs or concerns you need addressed before your next visit with your PCP?  (DME, meds, questions, etc.): No    Interventions by NCM:   NCM reviewed medications, discharge instructions, S&S of infection/blood clots. Patient instructed to report any new or worsening symptoms, when to call the doctor and when to call 911. Patient verbalized understanding of these. NCM instructed patient to call PCP with any questions or needs, he states he will.  NC sent a copy of discharge instructions for Kyphoplasty to patient via My Chart.      CCM referral placed:    No    BOOK BY DATE: 4/24/2024

## 2024-04-11 NOTE — PAYOR COMM NOTE
--------------  DISCHARGE REVIEW    Payor: [employer]  Subscriber #:  [id number]  Authorization Number: [id number]    Admit date: 24  Admit time:   4:32 PM  Discharge Date: 4/10/2024  6:29 PM     Admitting Physician: Morro Boss MD  Attending Physician:  No att. providers found  Primary Care Physician: Josh Li MD          Discharge Summary Notes        Discharge Summary signed by Angelo Weems MD at 2024  9:31 AM       Author: Angelo Weems MD Specialty: HOSPITALIST, HOSPITALIST Author Type: Physician    Filed: 2024  9:31 AM Date of Service: 4/10/2024  3:57 PM Status: Addendum    : Angelo Weems MD (Physician)    Related Notes: Original Note by Angelo Weems MD (Physician) filed at 4/10/2024  4:04 PM           [facility]  part of [facility]    Discharge Summary    Armando Forrester Patient Status:  Inpatient    1940 MRN [id number]   Location [facility] 4W/SW/SE Attending Angelo Weems MD   Hosp Day # 5 PCP Josh Li MD     Date of Admission: 2024 Disposition:   Home with [facility]    Date of Discharge:  2024     Admitting Diagnosis:   Closed fracture of third lumbar vertebra, unspecified fracture morphology, initial encounter (formerly [facility]) [S32.039A]    Hospital Discharge Diagnoses:    Mechanical Fall   Acute L2 and L3 compression fracture  T12 acute compression fracture   Intractable back pain   Essential HTN    Hyperlipidemia  Osteoporosis       Problem List:     Patient Active Problem List   Diagnosis    Hypertension    Osteoporosis    Iron deficiency anemia    BPH (benign prostatic hyperplasia)    Hypercholesterolemia    Glaucoma    History of ischemic colitis    History of skin cancer    Aortic atherosclerosis (HCC)    Hyperglycemia    Colitis    LLQ abdominal pain    Hx of adenomatous colonic polyps    Subclinical hypothyroidism    Hypokalemia    Closed fracture of third lumbar vertebra, unspecified fracture  morphology, initial encounter (Formerly [facility])       Physical Exam:      /61 (BP Location: Right arm)   Pulse 61   Temp 98.3 °F (36.8 °C) (Oral)   Resp 18   Ht 5' 7\" (1.702 m)   Wt 130 lb 8 oz (59.2 kg)   SpO2 97%   BMI 20.44 kg/m²     Gen:  NAD.  A and O x  3  CV:   RRR.  No m/g/r  Pulm:   CTA bilat  Abd:   +bs, soft, NT, ND  LE:  No c/c/e  Neuro:  nonfocal      Reason for Admission:   Intractable back pain after mechanical fall.     HISTORY OF PRESENT ILLNESS:  The patient is an 83-year-old  male who presented to the emergency department for evaluation of intractable back pain after a mechanical fall at home.  CBC and chemistry unremarkable.  CT scan of the abdomen and pelvis showed no acute intra-abdominal findings.  CT scan of the brain was negative.  CT scan of the lumbar spine showed L3 vertebral compression fracture with 50% loss of body height.  There is subacute fracture of L2 with 50% loss of vertebral body height which has progressed since February 2024.  The patient will be admitted to hospital for pain control and further evaluation.    Hospital Course:     Mechanical Fall   Acute L2 and L3 compression fracture  T12 acute compression fracture   Osteoporosis  Intractable back pain  - improved  Back pain much improved after T12, L2, L3 kyphoplasty 4/8/2024 by Dr. Avelar.  Pt still need assistance with ADLs such as transfers and bathing and dressing.  - MRI L spine reviewed.  - IR was on consult.   - pain control. Norco and then tramadol.  - referral to endocrine for osteoporosis  - PT/OT  - mobilize  - Home with [facility]     Essential HTN   - norvasc     Hyperlipidemia  - statin      dvt proph:    SCDs       Code status:    Full          Consultations:   IR    Discharge Condition:  Good    Discharge Medications:      Discharge Medications        CHANGE how you take these medications        Instructions Prescription details   cyclobenzaprine 10 MG Tabs  Commonly known as: Flexeril  What changed:  Another medication with the same name was removed. Continue taking this medication, and follow the directions you see here.      Take 1 tablet (10 mg total) by mouth 3 (three) times daily as needed for Muscle spasms.   Stop taking on: April 30, 2024  Quantity: 30 tablet  Refills: 0     HYDROcodone-acetaminophen 5-325 MG Tabs  Commonly known as: Norco  What changed: how much to take      Take 1-2 tablets by mouth every 6 (six) hours as needed.   Quantity: 15 tablet  Refills: 0            CONTINUE taking these medications        Instructions Prescription details   amLODIPine 10 MG Tabs  Commonly known as: Norvasc      Take 1 tablet (10 mg total) by mouth daily.   Quantity: 90 tablet  Refills: 1     atorvastatin 20 MG Tabs  Commonly known as: Lipitor      Take 1 tablet (20 mg total) by mouth every evening.   Quantity: 90 tablet  Refills: 3     BOSWELLIA OR      Take 2 tablets by mouth 3 (three) times daily.   Refills: 0     CALCIUM 600 + D OR      Take 1 tablet by mouth 2 (two) times daily.   Refills: 0     COQ10 OR      Take 1 capsule by mouth daily.   Refills: 0     FISH OIL OR      Take 1 capsule by mouth 3 (three) times daily.   Refills: 0     latanoprost 0.005 % Soln  Commonly known as: Xalatan      Place 1 drop into both eyes nightly.   Refills: 0     PreserVision AREDS Tabs      Take 2 tablets by mouth daily.   Refills: 0     PROBIOTIC OR      Take 1 capsule by mouth daily.   Refills: 0     traMADol 50 MG Tabs  Commonly known as: Ultram      Take 1 tablet (50 mg total) by mouth every 6 (six) hours as needed for Pain.   Quantity: 15 tablet  Refills: 0     Vitamin C 500 MG Caps      Take 1 tablet by mouth daily.   Refills: 0     Vitamin D3 25 MCG (1000 UT) Caps  Generic drug: cholecalciferol      Take  by mouth. take 1 daily   Refills: 0            STOP taking these medications      ibuprofen 600 MG Tabs  Commonly known as: Motrin                  Where to Get Your Medications        These medications were sent to  [id number] - [place], [place] - [address] [phone], [phone]  [address], [facility] [address]      Phone: [phone]   cyclobenzaprine 10 MG Tabs  HYDROcodone-acetaminophen 5-325 MG Tabs  traMADol 50 MG Tabs         Follow up Visits:     Follow-up Information       Phu Ocasio APRN Follow up in 2 week(s).    Specialties: Nurse Practitioner, INTERVENTIONAL, RADIOLOGY  Contact information:  [address]  [address]  [place] [address]  [phone]               Josh Li MD. Schedule an appointment as soon as possible for a visit in 1 week(s).    Specialty: Internal Medicine  Contact information:  [address] [place] [address]  [phone]               Lori Enrique MD. Schedule an appointment as soon as possible for a visit.    Specialty: ENDOCRINOLOGY  Why: For endocrinology.  Contact information:  [address] [facility] [address]  [place] [address]  [phone]                             Hospital Discharge Diagnoses:  Vertebral compression fractures    Lace+ Score: 67  59-90 High Risk  29-58 Medium Risk  0-28   Low Risk.    TCM Follow-Up Recommendation:  LACE > 58: High Risk of readmission after discharge from the hospital.    >35 minutes spent preparing this discharge.    Angelo Lim MD  4/10/2024  3:58 PM     Electronically signed by Angelo Weems MD on 4/11/2024  9:31 AM         REVIEWER COMMENTS

## 2024-04-11 NOTE — TELEPHONE ENCOUNTER
Saida, would like to inform the doctor that home care started today for the patient. Pt will have nursing, physical therapy and occupational therapy.

## 2024-04-15 ENCOUNTER — OFFICE VISIT (OUTPATIENT)
Dept: INTERNAL MEDICINE CLINIC | Facility: CLINIC | Age: 84
End: 2024-04-15
Payer: MEDICARE

## 2024-04-15 ENCOUNTER — TELEPHONE (OUTPATIENT)
Dept: INTERNAL MEDICINE CLINIC | Facility: CLINIC | Age: 84
End: 2024-04-15

## 2024-04-15 VITALS
BODY MASS INDEX: 20.76 KG/M2 | SYSTOLIC BLOOD PRESSURE: 134 MMHG | OXYGEN SATURATION: 98 % | DIASTOLIC BLOOD PRESSURE: 71 MMHG | HEIGHT: 67 IN | WEIGHT: 132.25 LBS | HEART RATE: 55 BPM

## 2024-04-15 DIAGNOSIS — I10 PRIMARY HYPERTENSION: ICD-10-CM

## 2024-04-15 DIAGNOSIS — M81.0 OSTEOPOROSIS, UNSPECIFIED OSTEOPOROSIS TYPE, UNSPECIFIED PATHOLOGICAL FRACTURE PRESENCE: ICD-10-CM

## 2024-04-15 DIAGNOSIS — S32.000D COMPRESSION FRACTURE OF LUMBAR VERTEBRA WITH ROUTINE HEALING, UNSPECIFIED LUMBAR VERTEBRAL LEVEL, SUBSEQUENT ENCOUNTER: Primary | ICD-10-CM

## 2024-04-15 NOTE — TELEPHONE ENCOUNTER
I saw the patient and his wife this afternoon.  They are not interested in assisted living at this time, preferring to remain at home.  They are receiving Home Health, and  evaluation will be arranged through Home Health.  Daughter should have further discussion with her parents about assisted living

## 2024-04-15 NOTE — TELEPHONE ENCOUNTER
Pt daughter calling (not on ANGELINE) and states  pt  has OV today with Dr Li and she can't come due to being out of state but she is concerned about pt cognitive level, \"he has issues with his memory and my mom has severe mental illness and she is his caretaker so we are hoping that Dr Li can give my dad a referral to  because I think he should be in assistive living or something like that\"  Per daughter pt is resistant but daughter feels like her mom can't provide good care.       Dr Guillermo Geronimo.      Pt daughter was advised since she is not on ANGELINE we are unable to provide information and pt daughter verb understanding.

## 2024-04-15 NOTE — TELEPHONE ENCOUNTER
Patient contacted.  Notified him that his daughter called requesting information.  He will be speaking with her tonight and will inform her of his status.  He will also come in to the office to complete ANGELINE to include his children.  States he forgot to do it while in office today.  No further action.

## 2024-04-15 NOTE — PATIENT INSTRUCTIONS
Continue Home Health services with physical and Occupational Therapy, and  evaluation  Continue current medications  Continue to use a walker regularly  Await upcoming appointments  Return visit in 6 weeks

## 2024-04-15 NOTE — PROGRESS NOTES
Subjective:   Armando Forrester is an 83 year old male who presents this afternoon, accompanied by his wife, for hospital follow up.   He was discharged from [facility] to Home Health Care Services  Admission Date: 4/5/24   Discharge Date: 4/10/24  Hospital Discharge Diagnosis: Multiple vertebral compression fractures, osteoporosis     Interactive contact within 2 business days post discharge first initiated on Date: 4/11/2024    During the visit, the following was completed:  Obtained and reviewed discharge summary, continuity of care documents, Hospitalist notes, and IR notes  Reviewed Labs (CBC, CMP), CT radiology results, X-Ray radiology results, and Operative reports: Surgery    HPI: He was hospitalized after a fall on April 5.  He was using his walker and walking to the bathroom when he experienced increased low back pain which made getting up off the toilet very difficult.  He finally was able to get up and was walking back to his bedroom when he lost his balance and fell.  No symptoms prior to his fall other than low back pain.  No lightheadedness or dizziness.  No palpitations or chest pain.  No shortness of breath.  He did not hit his head or lose consciousness.  Paramedics were called and he was taken to [facility] and admitted.  BMP normal except glucose 109.  CBC with hemoglobin 12.3 hematocrit 36.6, otherwise normal.  CT scan brain without acute findings.  CT lumbar spine revealed an acute L3 vertebral compression fracture with subacute L2 vertebral compression fracture with changes of prior lumbar fusion L4-L5.  CT abdomen and pelvis revealed L2 and L3 compression fractures but no other abdominal findings.  MRI lumbar spine revealed compression fractures of T12, L2 and L3, along with a right sacral insufficiency fracture.  X-ray left ribs revealed a nondisplaced left anterior eighth rib fracture.  While hospitalized he was seen by Interventional Radiology and underwent kyphoplasty of T12, L2 and  L3.  He was discharged home on April 11.    He presents this afternoon accompanied by his wife for hospital follow-up.  He has ongoing pain in his lower back which is improving.  He continues to use his walker.  Back pain does not radiate and he has no leg pain numbness tingling or weakness.  He has no rib pain, and no shortness of breath.  He has not been using hydrocodone, but is taking tramadol 2 pills daily with improvement in his back pain.    Medications reviewed and reconciled, as listed below.  He is receiving Home Health with nursing visits, physical therapy and Occupational Therapy.  DEXA scan scheduled April 22.  Consultation with Endocrinology scheduled April 23, and IR follow-up scheduled April 24.    Discussed Triage note from earlier today as patient's daughter, who lives out of state, called.  Neither he nor his wife have interest currently in assisted living, preferring to remain in their home.  Agree with a medical social worker, and this will be arranged through Home Health.    History/Other:   Current Medications:  Medication Reconciliation:  I am aware of an inpatient discharge within the last 30 days.  The discharge medication list has been reconciled with the patient's current medication list and reviewed by me.  See medication list for additions of new medication, and changes to current doses of medications and discontinued medications.  Outpatient Medications Marked as Taking for the 4/15/24 encounter (Office Visit) with Josh Li MD   Medication Sig    traMADol 50 MG Oral Tab Take 1 tablet (50 mg total) by mouth every 6 (six) hours as needed for Pain.    amLODIPine 10 MG Oral Tab Take 1 tablet (10 mg total) by mouth daily.    atorvastatin 20 MG Oral Tab Take 1 tablet (20 mg total) by mouth every evening.    latanoprost 0.005 % Ophthalmic Solution Place 1 drop into both eyes nightly.    Ascorbic Acid (VITAMIN C) 500 MG Oral Cap Take 1 tablet by mouth daily.    Multiple  Vitamins-Minerals (PRESERVISION AREDS) Oral Tab Take 2 tablets by mouth daily.    Omega-3 Fatty Acids (FISH OIL OR) Take 1 capsule by mouth 3 (three) times daily.    Calcium Carbonate-Vitamin D (CALCIUM 600 + D OR) Take 1 tablet by mouth 2 (two) times daily.    Coenzyme Q10 (COQ10 OR) Take 1 capsule by mouth daily.    Probiotic Product (PROBIOTIC OR) Take 1 capsule by mouth daily.    Boswellia Judah (BOSWELLIA OR) Take 2 tablets by mouth 3 (three) times daily.    Cholecalciferol (VITAMIN D3) 1000 UNITS Oral Cap Take  by mouth. take 1 daily       Objective:   No LMP for male patient.  Estimated body mass index is 20.71 kg/m² as calculated from the following:    Height as of this encounter: 5' 7\" (1.702 m).    Weight as of this encounter: 132 lb 4 oz (60 kg).   /71 (BP Location: Left arm, Patient Position: Sitting)   Pulse 55   Ht 5' 7\" (1.702 m)   Wt 132 lb 4 oz (60 kg)   SpO2 98%   BMI 20.71 kg/m²      EXAM:   GENERAL: Pleasant male appearing well in no distress  /71 (BP Location: Left arm, Patient Position: Sitting)   Pulse 55   Ht 5' 7\" (1.702 m)   Wt 132 lb 4 oz (60 kg)   SpO2 98%   BMI 20.71 kg/m²   LUNGS: Resonant to percussion and clear to auscultation  BACK: No thoracic or lumbar spinal tenderness  CARDIAC: Rhythm regular S1 S2 normal without murmur or edema  ABDOMEN: Bowel sounds normal soft nontender   NEURO: Reflexes 1-2+ bilateral patellar and bilateral Achilles.  Strength 5/5 bilateral lower extremities without weakness    Assessment & Plan:   1. Compression fracture of lumbar vertebra with routine healing, unspecified lumbar vertebral level, subsequent encounter (Primary)   Recent T12, L2 and L3 vertebral compression fracture status post kyphoplasty  Continue current medications  Reinforced regular use of walker  Home physical and Occupational Therapy   evaluation through Home Health  Await upcoming appointments for repeat DEXA scan and consultation with  Endocrinology  Return visit in 6 weeks for recheck    2. Osteoporosis, unspecified osteoporosis type, unspecified pathological fracture presence  Await DEXA scan and appointment with Endocrinology    3. Primary hypertension  Well-controlled        No follow-ups on file.

## 2024-04-16 ENCOUNTER — TELEPHONE (OUTPATIENT)
Dept: INTERNAL MEDICINE CLINIC | Facility: CLINIC | Age: 84
End: 2024-04-16

## 2024-04-16 NOTE — TELEPHONE ENCOUNTER
Spoke with Monet ESPINOZA at [facility],  verified  She stated OT eval was already completed on 24, she will treat pt once every other week for at least until beginning of 2024.      YAHAIRA

## 2024-04-17 ENCOUNTER — APPOINTMENT (OUTPATIENT)
Dept: PHYSICAL THERAPY | Facility: HOSPITAL | Age: 84
End: 2024-04-17
Attending: INTERNAL MEDICINE
Payer: MEDICARE

## 2024-04-18 RX ORDER — AMLODIPINE BESYLATE 10 MG/1
10 TABLET ORAL DAILY
Qty: 90 TABLET | Refills: 3 | Status: SHIPPED | OUTPATIENT
Start: 2024-04-18

## 2024-04-18 NOTE — TELEPHONE ENCOUNTER
Refill passed per [facility] protocol.    Requested Prescriptions   Pending Prescriptions Disp Refills    AMLODIPINE 10 MG Oral Tab [Pharmacy Med Name: Amlodipine Besylate 10 Mg Tab Cipl] 90 tablet 0     Sig: TAKE ONE TABLET BY MOUTH ONE TIME DAILY       Hypertension Medications Protocol Passed - 4/17/2024 10:19 AM        Passed - CMP or BMP in past 12 months        Passed - Last BP reading less than 140/90     BP Readings from Last 1 Encounters:   04/15/24 134/71               Passed - In person appointment or virtual visit in the past 12 mos or appointment in next 3 mos     Recent Outpatient Visits              3 days ago Compression fracture of lumbar vertebra with routine healing, unspecified lumbar vertebral level, subsequent encounter    [facility] Josh Li MD    Office Visit    3 weeks ago Annual physical exam    [facility] Josh Li MD    Office Visit    1 month ago Primary osteoarthritis of right hip    [facility] Kannan Gutierrez, DO    Office Visit    1 month ago     [facility] Prosper Howell PT    Office Visit    1 month ago     [facility]    [facility] Visit          Future Appointments         Provider Department Appt Notes    In 4 days [facility] DEXA [address] [facility]     In 5 days Maria R Yanes MD [facility] Osteoporosis with Bone Fractures    In 1 month Josh Li MD [facility] 6W FU    In 5 months Josh Li MD [facility] 6M FU                    Passed - EGFRCR or GFRNAA > 50     GFR Evaluation  EGFRCR: 85 , resulted on 4/9/2024               Recent Outpatient Visits              3 days ago Compression  fracture of lumbar vertebra with routine healing, unspecified lumbar vertebral level, subsequent encounter    [facility] Josh Li MD    Office Visit    3 weeks ago Annual physical exam    [facility] Josh Li MD    Office Visit    1 month ago Primary osteoarthritis of right hip    [facility] Kannan Gutierrez DO    Office Visit    1 month ago     [facility] Prosper Howell, PT    Office Visit    1 month ago     [facility] Prosper Howell PT    Office Visit            Future Appointments         Provider Department Appt Notes    In 4 days [facility] DEXA [address] [facility]     In 5 days Maria R Yanes MD [facility] Osteoporosis with Bone Fractures    In 1 month Josh Li MD [facility] 6W FU    In 5 months Josh Li MD [facility] 6M FU

## 2024-04-19 ENCOUNTER — TELEPHONE (OUTPATIENT)
Dept: INTERNAL MEDICINE CLINIC | Facility: CLINIC | Age: 84
End: 2024-04-19

## 2024-04-19 NOTE — TELEPHONE ENCOUNTER
Amanda from [facility] called to request an order for social work to visit patient at home is being requested by the daughters.

## 2024-04-22 ENCOUNTER — HOSPITAL ENCOUNTER (OUTPATIENT)
Dept: BONE DENSITY | Facility: HOSPITAL | Age: 84
Discharge: HOME | End: 2024-04-22
Attending: INTERNAL MEDICINE
Payer: MEDICARE

## 2024-04-22 DIAGNOSIS — M81.0 OSTEOPOROSIS, UNSPECIFIED OSTEOPOROSIS TYPE, UNSPECIFIED PATHOLOGICAL FRACTURE PRESENCE: ICD-10-CM

## 2024-04-22 PROCEDURE — 77080 DXA BONE DENSITY AXIAL: CPT | Performed by: INTERNAL MEDICINE

## 2024-04-23 ENCOUNTER — OFFICE VISIT (OUTPATIENT)
Facility: LOCATION | Age: 84
End: 2024-04-23
Payer: MEDICARE

## 2024-04-23 VITALS
BODY MASS INDEX: 19.93 KG/M2 | WEIGHT: 127 LBS | HEART RATE: 71 BPM | DIASTOLIC BLOOD PRESSURE: 62 MMHG | OXYGEN SATURATION: 98 % | HEIGHT: 67 IN | SYSTOLIC BLOOD PRESSURE: 122 MMHG

## 2024-04-23 DIAGNOSIS — E78.00 HYPERCHOLESTEROLEMIA: ICD-10-CM

## 2024-04-23 DIAGNOSIS — S32.020A COMPRESSION FRACTURE OF L2 VERTEBRA, INITIAL ENCOUNTER (HCC): ICD-10-CM

## 2024-04-23 DIAGNOSIS — E03.8 SUBCLINICAL HYPOTHYROIDISM: ICD-10-CM

## 2024-04-23 DIAGNOSIS — S32.039A CLOSED FRACTURE OF THIRD LUMBAR VERTEBRA, UNSPECIFIED FRACTURE MORPHOLOGY, INITIAL ENCOUNTER (HCC): ICD-10-CM

## 2024-04-23 DIAGNOSIS — M80.00XS OSTEOPOROSIS WITH CURRENT PATHOLOGICAL FRACTURE, UNSPECIFIED OSTEOPOROSIS TYPE, SEQUELA: Primary | ICD-10-CM

## 2024-04-23 DIAGNOSIS — Z99.89 USES WALKER: ICD-10-CM

## 2024-04-23 DIAGNOSIS — I70.0 AORTIC ATHEROSCLEROSIS (HCC): ICD-10-CM

## 2024-04-23 DIAGNOSIS — I10 PRIMARY HYPERTENSION: ICD-10-CM

## 2024-04-23 PROCEDURE — 99215 OFFICE O/P EST HI 40 MIN: CPT | Performed by: INTERNAL MEDICINE

## 2024-04-23 NOTE — TELEPHONE ENCOUNTER
Refill Request    Medication request: traMADol 50 MG Oral Tab. Take 1 tablet (50 mg total) by mouth every 6 (six) hours as needed for Pain.      LOV:3/11/2024 Kannan Gutierrez DO   Due back to clinic per last office note:  MRI of the left lumbar spine and hip and follow-up after   NOV: Visit date not found      [place]: 4/10/2024 #15 tab (3 days)    Urine drug screen (if applicable): none  Pain contract: None    LOV plan (if weaning or changing medications): Start tramadol 50 mg as needed for pain

## 2024-04-23 NOTE — PROGRESS NOTES
New Patient Evaluation - History and Physical    CONSULT - Reason for Visit:   osteoporosis with L2 and L3 compression fractures    Requesting Physician:   ..Josh Li MD    CHIEF COMPLAINT:    Chief Complaint   Patient presents with    Consult     Osteoporosis   Pt report a fall in April this year with 3 fractures  No major work          HISTORY OF PRESENT ILLNESS:   Armando Forrester is a 83 year old male who presents with  osteoporosis with L2 and L3 compression fractures     Was seen with his wife   They both use walkers.   Getting PT at home now.   Osteoporosis, was Dx in 2024  Had DXA scan in 2024 showed osteopenia   Fragility fracture yes L2 and L3    Height loss yes ~ 3 inches   Denied risk factors: steroid use, alcohol abuse, liver disease, kidney disease, hyperthyroid, seizure medications.   Family history of osteoporosis or fragility fracture: no    Patient is on fall precaution. Using a walker.   Patient is taking Vitamin D and calcium 500 bid   Educated the patient to do wt-bearing exercise, but avoid falls.    1/2024, had lower back pain the day after shoveling wet snow. He thinks he had a fracture then.  Feb 2024 CT showed new compression fracture in L2   4/2024 CT ADRENALS: Unremarkable  Fell on 4/5/2024   CT showed Acute L3 vertebral body fracture with 50% loss of vertebral body height.  Subacute fracture of the L2 vertebral body with 50% loss of vertebral body height has progressed since 2/2/2024.         Lab Results   Component Value Date    A1C 5.7 (H) 03/02/2022          ASSESSMENT AND PLAN:    83 year old male who presents with  osteoporosis with L2 and L3 compression fractures   Pt needs work up for secondary osteoporosis   We need dental clearance , labs and 24 hr urine.   D/w pt fall precautions. Getting PT so will work on fall prevention, core muscles and balance.     Plan   Fasting AM labs - water is ok   24 hr urine collection   Dental clearance  to start Reclast   RTC in 3-4 weeks      Take vitamin D3 2000 international unit a day and calcium 600 mg twice a day or 3 servings of dairy a day   Do weight bearing exercise   Follow fall precautions   educational material     https://www.[employer]/contents/medicines-for-osteoporosis-the-basics?search=osteoporosis&source=search_result&selectedTitle=2~150&usage_type=default&display_rank=2    https://www.[employer]/contents/osteoporosis-the-basics?search=osteoporosis&source=search_result&selectedTitle=1~150&usage_type=default&display_rank=1    https://www.[employer]/contents/zoledronic-acid-patient-drug-information?search=zoledronic%20acid&source=panel_search_result&selectedTitle=1~148&usage_type=panel&kp_tab=drug_patient&display_rank=1    We discussed diagnosis, treatment options, fall risk, long term complications and side effect.   Discussed options oral bisphosphonate vs infusion bisphosphonate vs Denosumab   Side effect from osteoporosis meds   Hypocalcemia: Adequately supplement calcium and vitamin D during  Osteonecrosis of the Jaw: Monitor for symptoms.   Atypical Femoral Fracture: Evaluate new or unusual thigh, hip, or groin         How to Collect the 24-hour Urine Specimen  Decide on a day to do the test.  On the day of the test, patient empty bladder (urinate or pee) in the toilet right after waking up. Flush the urine down the toilet.  The test begins now with the bladder empty. Write this date and the start time on the storage container’s label.  For the next 24 hours, patient will need to pee into a collection container every time they go to the bathroom. Females can use a toilet hat. Males can use a plastic urinal or pee right into the large storage container. If you do not have a toilet hat or urinal at home, you may use some other clean plastic container- or get them from labs   Before using the plastic container for the first time, wash it with dish soap and then rinse at least 10 times with tap water. Allow it to air dry  completely.  Do not let feces (poop) mix with the urine or else the test will need to be restarted.  Pour the urine into the large storage container and close the lid tightly. Be very careful not to spill any urine.  If using a collection container, rinse it with water only. Put it back by the toilet to remind you to use it the next time. Allow it to air dry completely.  Put the large storage container in the refrigerator ( or in put ice around the container and place in larger container). The urine must be kept cool at all times. If you do not have space in the refrigerator, you can store it in a cooler on top of Add more ice as needed to keep the urine cold.  Each time patient pees during the day and night, follow steps to collect urine.  The next day (close to the same time that you started on the first day), patient needs to pee into the collection container one last time. Add it to the large storage container. This ends the 24-hour collection.  Write the date and time of this last urine collection on the label.  Attach a list of all medicines, including over-the-counter medicines, vitamins and herbal remedies, patient took during the 24-hour urine collection.    Take the urine to a Laboratory (Lab) Service Center as soon as possible, within 24 hours after ending the collection. Keep the urine cool.  Make sure the urine does not freeze for these tests: amylase, arylsulfatase, immunoelectrophoresis, micro-albumin, pregnanetriol, protein or uric acid.  You will need to start the test over if any of the urine spilled, you forgot to save some or it has poop in it. If you must restart the test, it is okay to use the same collection and storage containers. Pour out the urine, clean the containers well and allow them to air dry. Then follow       PAST MEDICAL HISTORY:   Past Medical History:    Aortic atherosclerosis (HCC)    CT scan 6-16    BPH (benign prostatic hyperplasia)    With abnormal PSA, Rx Proscar     Diverticulosis    Glaucoma    Herpes zoster    Left face    Hx of adenomatous colonic polyps    Hypercholesterolemia    Hypertension    Iron deficiency anemia    Ischemic colitis (HCC)    Recurrent 12-21    Osteoporosis    T9 compression fracture, T score -3.8 spine and -0.9 hip, Rx Fosamax; repeat T score -2.5 spine and -1.0 hip 10-10, repeat T score -0.7 hip and -2.2 lumbar spine 3-16, Fosamax DC'd; L2 compression fracture 1-24; T12 L2 L3 compression fracture 4-24    Peptic ulcer disease    SCC (squamous cell carcinoma)    Left temple    Subclinical hypothyroidism       PAST SURGICAL HISTORY:   Past Surgical History:   Procedure Laterality Date    Appendectomy  1961    Back surgery Left 12/2011    L4-L5 microdiscectomy    Cataract Left 10/2010    Cataract Right 12/2010    Colonoscopy  01/2016    Inguinal hernia repair Bilateral 1970    Ir kyphoplasty  04/08/2024    T12, L2, L3    Other  01/2017    Excision SCCa left temple    Skin surgery Right 10/28/2016    Excision epidermal inclusion cyst back    Spinal fusion  10/2011    L4-L5    Tonsillectomy  1968       CURRENT MEDICATIONS:     amLODIPine 10 MG Oral Tab Take 1 tablet (10 mg total) by mouth daily. 90 tablet 3    traMADol 50 MG Oral Tab Take 1 tablet (50 mg total) by mouth every 6 (six) hours as needed for Pain. 15 tablet 0    cyclobenzaprine 10 MG Oral Tab Take 1 tablet (10 mg total) by mouth 3 (three) times daily as needed for Muscle spasms. 30 tablet 0    atorvastatin 20 MG Oral Tab Take 1 tablet (20 mg total) by mouth every evening. 90 tablet 3    latanoprost 0.005 % Ophthalmic Solution Place 1 drop into both eyes nightly.      Ascorbic Acid (VITAMIN C) 500 MG Oral Cap Take 1 tablet by mouth daily.      Multiple Vitamins-Minerals (PRESERVISION AREDS) Oral Tab Take 2 tablets by mouth daily.      Omega-3 Fatty Acids (FISH OIL OR) Take 1 capsule by mouth 3 (three) times daily.      Calcium Carbonate-Vitamin D (CALCIUM 600 + D OR) Take 1 tablet by mouth 2  (two) times daily.      Coenzyme Q10 (COQ10 OR) Take 1 capsule by mouth daily.      Probiotic Product (PROBIOTIC OR) Take 1 capsule by mouth daily.      Boswellia Judah (BOSWELLIA OR) Take 2 tablets by mouth 3 (three) times daily.      Cholecalciferol (VITAMIN D3) 1000 UNITS Oral Cap Take  by mouth. take 1 daily         ALLERGIES:  Allergies   Allergen Reactions    Clindamycin OTHER (SEE COMMENTS)     Angioedema    Lisinopril OTHER (SEE COMMENTS)     Angioedema       SOCIAL HISTORY:    Social History     Socioeconomic History    Marital status:     Number of children: 2   Occupational History    Occupation:      Comment: part time    Occupation:  High teacher, [employer]     Comment: retired   Tobacco Use    Smoking status: Former     Current packs/day: 0.00     Types: Cigarettes     Quit date: 1957     Years since quittin.2    Smokeless tobacco: Never   Vaping Use    Vaping status: Never Used   Substance and Sexual Activity    Alcohol use: Yes     Alcohol/week: 1.0 standard drink of alcohol     Types: 1 Glasses of wine per week     Comment: Occasional glass of wine    Drug use: No   Other Topics Concern    Pt has a pacemaker No    Pt has a defibrillator No    Reaction to local anesthetic No    Caffeine Concern Yes     Comment: coffee, 3cups/day       FAMILY HISTORY:   Family History   Problem Relation Age of Onset    Heart Disease Father         CHF age 68    Hypertension Mother     Other (Kidney Cancer[other]) Brother         REVIEW OF SYSTEMS:  All negative other than HPI    PHYSICAL EXAM:   Height: 5' 7\" (170.2 cm) (1317)  Weight: 127 lb (57.6 kg) (1317)  BSA (Calculated - sq m): 1.67 sq meters (1317)  Pulse: 71 (1317)  BP: 122/62 (1317)  Temp: --  Do Not Use - Resp Rate: --  SpO2: 98 % (1317)    Body mass index is 19.89 kg/m².  Uses walker   CONSTITUTIONAL:  Awake and alert. Age appropriate, good hygiene not in acute distress. Well-nourished and  well developed. no acute distress   PSYCH:   Orientated to time, place, person & situation, Normal mood and affect, memory intact, normal insight and judgment, cooperative  Neuro: speech is clear. Awake, alert, no aphasia, no facial asymmetry, no nuchal rigidity  EYES:  No proptosis, no ptosis, conjunctiva normal  ENT:  Normocephalic, atraumatic  Eye: EOMI, normal lids, no discharge, no conjunctival erythema. No exophthalmos/proptosis, Ptosis negative   No rhinorrhea, moist oral mucosa  Neck: full range of motion  Neck/Thyroid: neck inspection: normal, No scar, No goiter   LUNGS:  No acute respiratory distress, non-labored respiration. Speaking full sentences  CARDIOVASCULAR:  regular rate   ABDOMEN:  No abdominal pain.   SKIN:  no bruising or bleeding, no rashes and no lesions, Skin is dry, no obvious rashes or lesions  EXTREMITIES: no gross abnormality   MSK: Moves extremities spontaneously. full range of motion in all major joints      DATA:     Pertinent data reviewed   Latest Reference Range & Units 04/09/24 05:38   ANION GAP 0 - 18 mmol/L 5   CALCULATED OSMOLALITY 275 - 295 mOsm/kg 292      Latest Reference Range & Units 04/09/24 05:38   EGFR >=60 mL/min/1.73m2 85      Latest Reference Range & Units Most Recent   VITAMIN D, 25-OH, TOTAL 30.0 - 100.0 ng/mL 47.1  3/2/22 07:33      Latest Reference Range & Units Most Recent   T4,Free (Direct) 0.8 - 1.7 ng/dL 1.0  3/27/24 07:58   TSH 0.550 - 4.780 mIU/mL 5.580 (H)  3/27/24 07:58      DXA 2024CONCLUSION:   1. Scans of the lumbar spine, left forearm and left hip are consistent with osteopenia, which according to World Health Organization criteria place the patient at a mild-to-moderately increased risk for fracture.      2. Compared to the prior study, bone mineral density has decreased in the left hip.  No prior comparison studies of the left forearm are available.  Please note that assessment for change in bone mineral density in the lumbar spine is limited as the    patient underwent interval kyphoplasties at L1, L3 and L4 since the 2016 DEXA scan.      3. Based on left femoral neck bone mineral density, the FRAX 10 year probability of a major osteoporotic fracture is 9.0% and the 10 year probability of a hip fracture is 3.3%.      No results for input(s): \"TSH\", \"T4F\", \"T3F\", \"THYP\" in the last 72 hours.  XR DEXA BONE DENSITOMETRY (CPT=77080)    Result Date: 4/22/2024  CONCLUSION:  1. Scans of the lumbar spine, left forearm and left hip are consistent with osteopenia, which according to World Health Organization criteria place the patient at a mild-to-moderately increased risk for fracture.  2. Compared to the prior study, bone mineral density has decreased in the left hip.  No prior comparison studies of the left forearm are available.  Please note that assessment for change in bone mineral density in the lumbar spine is limited as the patient underwent interval kyphoplasties at L1, L3 and L4 since the 2016 DEXA scan.  3. Based on left femoral neck bone mineral density, the FRAX 10 year probability of a major osteoporotic fracture is 9.0% and the 10 year probability of a hip fracture is 3.3%.     Dictated by (CST): Leobardo Harp MD on 4/22/2024 at 3:59 PM     Finalized by (CST): Leobardo Harp MD on 4/22/2024 at 4:04 PM           Orders Placed This Encounter   Procedures    Creatinine and Cortisol, Urine    Calcium, 24Hr Urine    PTH, Intact    Vitamin D    Comp Metabolic Panel (14)    Magnesium    Phosphorus    Vitamin D, 1,25 Dihydroxy    Monoclonal Protein Study    CORTISOL - AM    TSH W Reflex To Free T4    Prolactin    PSA Total, Screen    Testosterone,Total and Weakly Bound w/ SHBG    ACTH, Plasma    Bence Marinelli protein, 24 Hour Urine Panel     Orders Placed This Encounter    Creatinine and Cortisol, Urine     Standing Status:   Future     Standing Expiration Date:   4/23/2025     Order Specific Question:   Release to patient     Answer:   Immediate     Calcium, 24Hr Urine     Standing Status:   Future     Standing Expiration Date:   4/23/2025     Order Specific Question:   Release to patient     Answer:   Immediate    PTH, Intact    Vitamin D     Standing Status:   Future     Standing Expiration Date:   4/23/2025     Order Specific Question:   Please pick the scenario that best fits the purpose for ordering this test     Answer:   General Screening/Vit D deficiency (25-Hydroxy)     Order Specific Question:   Release to patient     Answer:   Immediate    Comp Metabolic Panel (14)     Order Specific Question:   Release to patient     Answer:   Immediate    Magnesium     Order Specific Question:   Release to patient     Answer:   Immediate    Phosphorus     Order Specific Question:   Release to patient     Answer:   Immediate    Vitamin D, 1,25 Dihydroxy     Order Specific Question:   Release to patient     Answer:   Immediate    Monoclonal Protein Study     Standing Status:   Future     Standing Expiration Date:   4/23/2025     Order Specific Question:   Release to patient     Answer:   Immediate    CORTISOL - AM     Order Specific Question:   Release to patient     Answer:   Immediate    TSH W Reflex To Free T4     Order Specific Question:   Release to patient     Answer:   Immediate    Prolactin     Order Specific Question:   Release to patient     Answer:   Immediate    PSA Total, Screen     Standing Status:   Future     Standing Expiration Date:   4/23/2025     Order Specific Question:   Release to patient     Answer:   Immediate    Testosterone,Total and Weakly Bound w/ SHBG     Order Specific Question:   Release to patient     Answer:   Immediate    ACTH, Plasma     Order Specific Question:   Release to patient     Answer:   Immediate    Bence Marinelli protein, 24 Hour Urine Panel     Standing Status:   Future     Standing Expiration Date:   4/23/2025     Order Specific Question:   Collection Interval 24 hour collection     Answer:   YES     Order Specific  Question:   Release to patient     Answer:   Immediate          This is a specialized patient consultation in endocrinology and required comprehensive review of prior records, as well as current evaluation, with time required for consideration of complex endocrine issues and consultation. For this visit, I personally interviewed the patient, and family member if accompanied, performed the pertinent parts of the history and physical examination. ROS included screening for appropriate endocrine conditions.   Today's diagnosis and plan were reviewed in detail with the patient who states understanding and agrees with plan. I discussed with the patient possible diagnosis, differential diagnosis, need for work up, treatment options, alternatives and side effects.     Please see note for details about time spent which includes:   · pre-visit preparation  · reviewing records  · face to face time with the patient   · timely documentation of the encounter  · ordering medications/tests  · communication with care team  · care coordination    I appreciate the opportunity to be part of your patient's medical care and will keep you, as the referring and primary physicians, informed about the care of your patient. Please feel free to contact me should you have any questions.    The 21st Century Cures Act makes medical notes like these available to patients in the interest of transparency. Please be advised this is a medical document. Medical documents are intended to carry relevant information, facts as evident, and the clinical opinion of the practitioner. The medical note is intended as peer to peer communication and may appear blunt or direct. It is written in medical language and may contain abbreviations or verbiage that are unfamiliar.   Maria R Yanes MD

## 2024-04-23 NOTE — PATIENT INSTRUCTIONS
Fasting AM labs - water is ok   24 hr urine collection   Dental clearance  to start Reclast   RTC in 3-4 weeks     Take vitamin D3 2000 international unit a day and calcium 600 mg twice a day or 3 servings of dairy a day   Do weight bearing exercise   Follow fall precautions   educational material     https://www.[employer]/contents/medicines-for-osteoporosis-the-basics?search=osteoporosis&source=search_result&selectedTitle=2~150&usage_type=default&display_rank=2    https://www.[employer]/contents/osteoporosis-the-basics?search=osteoporosis&source=search_result&selectedTitle=1~150&usage_type=default&display_rank=1    https://www.[employer]ia/zoledronic-acid-patient-drug-information?search=zoledronic%20acid&source=panel_search_result&selectedTitle=1~148&usage_type=panel&kp_tab=drug_patient&display_rank=1    We discussed diagnosis, treatment options, fall risk, long term complications and side effect.   Discussed options oral bisphosphonate vs infusion bisphosphonate vs Denosumab   Side effect from osteoporosis meds   Hypocalcemia: Adequately supplement calcium and vitamin D during  Osteonecrosis of the Jaw: Monitor for symptoms.   Atypical Femoral Fracture: Evaluate new or unusual thigh, hip, or groin         How to Collect the 24-hour Urine Specimen  Decide on a day to do the test.  On the day of the test, patient empty bladder (urinate or pee) in the toilet right after waking up. Flush the urine down the toilet.  The test begins now with the bladder empty. Write this date and the start time on the storage container’s label.  For the next 24 hours, patient will need to pee into a collection container every time they go to the bathroom. Females can use a toilet hat. Males can use a plastic urinal or pee right into the large storage container. If you do not have a toilet hat or urinal at home, you may use some other clean plastic container- or get them from labs   Before using the plastic container for  the first time, wash it with dish soap and then rinse at least 10 times with tap water. Allow it to air dry completely.  Do not let feces (poop) mix with the urine or else the test will need to be restarted.  Pour the urine into the large storage container and close the lid tightly. Be very careful not to spill any urine.  If using a collection container, rinse it with water only. Put it back by the toilet to remind you to use it the next time. Allow it to air dry completely.  Put the large storage container in the refrigerator ( or in put ice around the container and place in larger container). The urine must be kept cool at all times. If you do not have space in the refrigerator, you can store it in a cooler on top of Add more ice as needed to keep the urine cold.  Each time patient pees during the day and night, follow steps to collect urine.  The next day (close to the same time that you started on the first day), patient needs to pee into the collection container one last time. Add it to the large storage container. This ends the 24-hour collection.  Write the date and time of this last urine collection on the label.  Attach a list of all medicines, including over-the-counter medicines, vitamins and herbal remedies, patient took during the 24-hour urine collection.    Take the urine to a Laboratory (Lab) Service Center as soon as possible, within 24 hours after ending the collection. Keep the urine cool.  Make sure the urine does not freeze for these tests: amylase, arylsulfatase, immunoelectrophoresis, micro-albumin, pregnanetriol, protein or uric acid.  You will need to start the test over if any of the urine spilled, you forgot to save some or it has poop in it. If you must restart the test, it is okay to use the same collection and storage containers. Pour out the urine, clean the containers well and allow them to air dry. Then follow

## 2024-04-27 ENCOUNTER — LAB ENCOUNTER (OUTPATIENT)
Dept: LAB | Facility: HOSPITAL | Age: 84
End: 2024-04-27
Attending: INTERNAL MEDICINE
Payer: MEDICARE

## 2024-04-27 DIAGNOSIS — S32.039A CLOSED FRACTURE OF THIRD LUMBAR VERTEBRA, UNSPECIFIED FRACTURE MORPHOLOGY, INITIAL ENCOUNTER (HCC): ICD-10-CM

## 2024-04-27 DIAGNOSIS — I10 PRIMARY HYPERTENSION: ICD-10-CM

## 2024-04-27 DIAGNOSIS — E03.8 SUBCLINICAL HYPOTHYROIDISM: ICD-10-CM

## 2024-04-27 DIAGNOSIS — M80.00XS AGE-RELATED OSTEOPOROSIS WITH CURRENT PATHOLOGICAL FRACTURE, SEQUELA: ICD-10-CM

## 2024-04-27 DIAGNOSIS — M80.00XS OSTEOPOROSIS WITH CURRENT PATHOLOGICAL FRACTURE, UNSPECIFIED OSTEOPOROSIS TYPE, SEQUELA: ICD-10-CM

## 2024-04-27 DIAGNOSIS — I70.0 AORTIC ATHEROSCLEROSIS (HCC): ICD-10-CM

## 2024-04-27 DIAGNOSIS — I70.0 ATHEROSCLEROSIS OF AORTA (HCC): ICD-10-CM

## 2024-04-27 DIAGNOSIS — S32.039A: Primary | ICD-10-CM

## 2024-04-27 DIAGNOSIS — E03.8 SECONDARY HYPOTHYROIDISM: ICD-10-CM

## 2024-04-27 DIAGNOSIS — E78.00 HYPERCHOLESTEROLEMIA: ICD-10-CM

## 2024-04-27 DIAGNOSIS — I10 ESSENTIAL HYPERTENSION, MALIGNANT: ICD-10-CM

## 2024-04-27 DIAGNOSIS — E78.00 PURE HYPERCHOLESTEROLEMIA: ICD-10-CM

## 2024-04-27 LAB
ALBUMIN SERPL-MCNC: 4.3 G/DL (ref 3.2–4.8)
ALBUMIN/GLOB SERPL: 1.4 {RATIO} (ref 1–2)
ALP LIVER SERPL-CCNC: 120 U/L
ALT SERPL-CCNC: 16 U/L
ANION GAP SERPL CALC-SCNC: 6 MMOL/L (ref 0–18)
AST SERPL-CCNC: 28 U/L (ref ?–34)
BILIRUB SERPL-MCNC: 0.7 MG/DL (ref 0.2–1.1)
BUN BLD-MCNC: 30 MG/DL (ref 9–23)
BUN/CREAT SERPL: 28.6 (ref 10–20)
CALCIUM 24H UR-SRATE: 281 MG/24HR (ref 100–300)
CALCIUM BLD-MCNC: 9.6 MG/DL (ref 8.7–10.4)
CHLORIDE SERPL-SCNC: 105 MMOL/L (ref 98–112)
CO2 SERPL-SCNC: 31 MMOL/L (ref 21–32)
COMPLEXED PSA SERPL-MCNC: 3.22 NG/ML (ref ?–4)
CORTIS SERPL-MCNC: 19 UG/DL
CREAT BLD-MCNC: 1.05 MG/DL
EGFRCR SERPLBLD CKD-EPI 2021: 70 ML/MIN/1.73M2 (ref 60–?)
FASTING STATUS PATIENT QL REPORTED: YES
GLOBULIN PLAS-MCNC: 3.1 G/DL (ref 2.8–4.4)
GLUCOSE BLD-MCNC: 94 MG/DL (ref 70–99)
M PROTEIN 24H UR ELPH-MRATE: 237.6 MG/24 HR (ref ?–149.1)
MAGNESIUM SERPL-MCNC: 2.4 MG/DL (ref 1.6–2.6)
OSMOLALITY SERPL CALC.SUM OF ELEC: 300 MOSM/KG (ref 275–295)
PHOSPHATE SERPL-MCNC: 2.9 MG/DL (ref 2.4–5.1)
POTASSIUM SERPL-SCNC: 3.7 MMOL/L (ref 3.5–5.1)
PROLACTIN SERPL-MCNC: 8.7 NG/ML
PROT SERPL-MCNC: 7.4 G/DL (ref 5.7–8.2)
PTH-INTACT SERPL-MCNC: 40.1 PG/ML (ref 18.5–88)
SODIUM SERPL-SCNC: 142 MMOL/L (ref 136–145)
SPECIMEN VOL UR: 1650 ML
SPECIMEN VOL UR: 1650 ML
TSI SER-ACNC: 4.77 MIU/ML (ref 0.55–4.78)
VIT D+METAB SERPL-MCNC: 63.8 NG/ML (ref 30–100)

## 2024-04-27 PROCEDURE — 84156 ASSAY OF PROTEIN URINE: CPT

## 2024-04-27 PROCEDURE — 83521 IG LIGHT CHAINS FREE EACH: CPT

## 2024-04-27 PROCEDURE — 80053 COMPREHEN METABOLIC PANEL: CPT | Performed by: INTERNAL MEDICINE

## 2024-04-27 PROCEDURE — 83970 ASSAY OF PARATHORMONE: CPT | Performed by: INTERNAL MEDICINE

## 2024-04-27 PROCEDURE — 86335 IMMUNFIX E-PHORSIS/URINE/CSF: CPT

## 2024-04-27 PROCEDURE — 82533 TOTAL CORTISOL: CPT | Performed by: INTERNAL MEDICINE

## 2024-04-27 PROCEDURE — 82530 CORTISOL FREE: CPT

## 2024-04-27 PROCEDURE — 84146 ASSAY OF PROLACTIN: CPT | Performed by: INTERNAL MEDICINE

## 2024-04-27 PROCEDURE — 86334 IMMUNOFIX E-PHORESIS SERUM: CPT

## 2024-04-27 PROCEDURE — 36415 COLL VENOUS BLD VENIPUNCTURE: CPT

## 2024-04-27 PROCEDURE — 84100 ASSAY OF PHOSPHORUS: CPT | Performed by: INTERNAL MEDICINE

## 2024-04-27 PROCEDURE — 82340 ASSAY OF CALCIUM IN URINE: CPT

## 2024-04-27 PROCEDURE — 84166 PROTEIN E-PHORESIS/URINE/CSF: CPT

## 2024-04-27 PROCEDURE — 82652 VIT D 1 25-DIHYDROXY: CPT

## 2024-04-27 PROCEDURE — 83735 ASSAY OF MAGNESIUM: CPT | Performed by: INTERNAL MEDICINE

## 2024-04-27 PROCEDURE — 84165 PROTEIN E-PHORESIS SERUM: CPT

## 2024-04-27 PROCEDURE — 84443 ASSAY THYROID STIM HORMONE: CPT | Performed by: INTERNAL MEDICINE

## 2024-04-27 PROCEDURE — 82306 VITAMIN D 25 HYDROXY: CPT

## 2024-04-27 PROCEDURE — 84410 TESTOSTERONE BIOAVAILABLE: CPT

## 2024-04-29 LAB — VIT D 1,25 DI-OH: 32.2 PG/ML

## 2024-04-29 RX ORDER — TRAMADOL HYDROCHLORIDE 50 MG/1
50 TABLET ORAL EVERY 6 HOURS PRN
Qty: 30 TABLET | Refills: 0 | Status: SHIPPED | OUTPATIENT
Start: 2024-04-29

## 2024-04-30 LAB
ALBUMIN SERPL ELPH-MCNC: 3.73 G/DL (ref 3.75–5.21)
ALBUMIN/GLOB SERPL: 1.14 {RATIO} (ref 1–2)
ALPHA1 GLOB SERPL ELPH-MCNC: 0.39 G/DL (ref 0.19–0.46)
ALPHA2 GLOB SERPL ELPH-MCNC: 0.89 G/DL (ref 0.48–1.05)
B-GLOBULIN SERPL ELPH-MCNC: 0.85 G/DL (ref 0.68–1.23)
GAMMA GLOB SERPL ELPH-MCNC: 1.13 G/DL (ref 0.62–1.7)
KAPPA LC FREE SER-MCNC: 3.54 MG/DL (ref 0.33–1.94)
KAPPA LC FREE/LAMBDA FREE SER NEPH: 1.65 {RATIO} (ref 0.26–1.65)
LAMBDA LC FREE SERPL-MCNC: 2.14 MG/DL (ref 0.57–2.63)
PROT SERPL-MCNC: 7 G/DL (ref 5.7–8.2)

## 2024-05-01 LAB
SEX HORM BIND GLOB: 57.2 NMOL/L
TESTOST % FREE+WEAK BND: 9.2 %
TESTOST FREE+WEAK BND: 40.3 NG/DL
TESTOSTERONE TOT /MS: 438.5 NG/DL

## 2024-05-03 LAB
CORTISOL FREE 24H UR: 20 UG/24 HR
CORTISOL FREE UR: 12 UG/L
CREAT 24H UR: 1115 MG/24 HR
CREAT UR: 67.6 MG/DL

## 2024-05-07 ENCOUNTER — TELEPHONE (OUTPATIENT)
Dept: INTERNAL MEDICINE CLINIC | Facility: CLINIC | Age: 84
End: 2024-05-07

## 2024-05-07 ENCOUNTER — TELEPHONE (OUTPATIENT)
Dept: ENDOCRINOLOGY CLINIC | Facility: CLINIC | Age: 84
End: 2024-05-07

## 2024-05-07 DIAGNOSIS — M80.00XS OSTEOPOROSIS WITH CURRENT PATHOLOGICAL FRACTURE, UNSPECIFIED OSTEOPOROSIS TYPE, SEQUELA: Primary | ICD-10-CM

## 2024-05-07 NOTE — TELEPHONE ENCOUNTER
Patient states his Dentist faxed a surgical clearance form to the office yesterday please follow up

## 2024-05-07 NOTE — TELEPHONE ENCOUNTER
Nadia with [facility] letting provider know that  evaluation has been rescheduled to next week due to scheduling conflict.

## 2024-05-09 ENCOUNTER — TELEPHONE (OUTPATIENT)
Facility: CLINIC | Age: 84
End: 2024-05-09

## 2024-05-09 NOTE — TELEPHONE ENCOUNTER
Dr. Yanes,     Signed dental clearance was placed in your folder. Pt has also completed labs. Please advise.

## 2024-05-09 NOTE — TELEPHONE ENCOUNTER
PT came in to office to drop off a clearance letter for Maria R Ludwig . Pt can be reached at [phone] you have any questions . Letter will be in office in mail box thanks

## 2024-05-13 RX ORDER — ZOLEDRONIC ACID 5 MG/100ML
5 INJECTION, SOLUTION INTRAVENOUS ONCE
Qty: 100 ML | Refills: 0 | Status: SHIPPED | OUTPATIENT
Start: 2024-05-13 | End: 2024-05-13

## 2024-05-13 NOTE — TELEPHONE ENCOUNTER
Reclast forms sent to infusion center, clinical documentation included.    Confirmation received and placed in triage room.

## 2024-05-15 PROBLEM — M81.0 SENILE OSTEOPOROSIS: Status: ACTIVE | Noted: 2024-05-15

## 2024-05-15 RX ORDER — ZOLEDRONIC ACID 5 MG/100ML
5 INJECTION, SOLUTION INTRAVENOUS ONCE
OUTPATIENT
Start: 2024-05-15

## 2024-05-16 ENCOUNTER — TELEPHONE (OUTPATIENT)
Dept: INTERNAL MEDICINE CLINIC | Facility: CLINIC | Age: 84
End: 2024-05-16

## 2024-05-16 DIAGNOSIS — S32.020A COMPRESSION FRACTURE OF L2 VERTEBRA, INITIAL ENCOUNTER (HCC): ICD-10-CM

## 2024-05-16 RX ORDER — TRAMADOL HYDROCHLORIDE 50 MG/1
50 TABLET ORAL EVERY 6 HOURS PRN
Qty: 30 TABLET | Refills: 0 | Status: SHIPPED | OUTPATIENT
Start: 2024-05-16

## 2024-05-16 NOTE — TELEPHONE ENCOUNTER
Refill Request    Medication request: traMADol 50 MG Oral Tab.  Take 1 tablet (50 mg total) by mouth every 6 (six) hours as needed for Pain.      LOV:3/11/2024 Kannan Gutierrez DO   Due back to clinic per last office note:  MRI of the left lumbar spine and hip and follow-up after   NOV: Visit date not found      [place]: 4/29/2024 #30 (7 days)    Urine drug screen (if applicable): none  Pain contract: none    LOV plan (if weaning or changing medications): Start tramadol 50 mg as needed for pain

## 2024-05-16 NOTE — TELEPHONE ENCOUNTER
YAHAIRA Francis-- patient will have social work home health appointment next week. Thank you.    Spoke to Spoke to Nadia  with [facility] (name and  of patient verified).  She is calling to report patient will have appointment next week, not this week, due to scheduling conflict.

## 2024-05-21 ENCOUNTER — OFFICE VISIT (OUTPATIENT)
Facility: LOCATION | Age: 84
End: 2024-05-21

## 2024-05-21 VITALS
HEIGHT: 67 IN | BODY MASS INDEX: 20.25 KG/M2 | WEIGHT: 129 LBS | SYSTOLIC BLOOD PRESSURE: 130 MMHG | OXYGEN SATURATION: 97 % | DIASTOLIC BLOOD PRESSURE: 58 MMHG | HEART RATE: 72 BPM

## 2024-05-21 DIAGNOSIS — S32.039A CLOSED FRACTURE OF THIRD LUMBAR VERTEBRA, UNSPECIFIED FRACTURE MORPHOLOGY, INITIAL ENCOUNTER (HCC): ICD-10-CM

## 2024-05-21 DIAGNOSIS — I70.0 AORTIC ATHEROSCLEROSIS (HCC): ICD-10-CM

## 2024-05-21 DIAGNOSIS — M80.00XS OSTEOPOROSIS WITH CURRENT PATHOLOGICAL FRACTURE, UNSPECIFIED OSTEOPOROSIS TYPE, SEQUELA: Primary | ICD-10-CM

## 2024-05-21 DIAGNOSIS — I10 PRIMARY HYPERTENSION: ICD-10-CM

## 2024-05-21 DIAGNOSIS — Z99.89 USES WALKER: ICD-10-CM

## 2024-05-21 DIAGNOSIS — E78.00 HYPERCHOLESTEROLEMIA: ICD-10-CM

## 2024-05-21 DIAGNOSIS — E03.8 SUBCLINICAL HYPOTHYROIDISM: ICD-10-CM

## 2024-05-21 PROCEDURE — 99214 OFFICE O/P EST MOD 30 MIN: CPT | Performed by: INTERNAL MEDICINE

## 2024-05-21 NOTE — PATIENT INSTRUCTIONS
Will schedule Reclast   RTC in 1 year   Refer to PT     Take vitamin D3 2000 international unit a day and calcium 600 mg twice a day or 3 servings of dairy a day   Do weight bearing exercise   Follow fall precautions       Side effect from osteoporosis meds   Hypocalcemia: Adequately supplement calcium and vitamin D during  Osteonecrosis of the Jaw: Monitor for symptoms.   Atypical Femoral Fracture: Evaluate new or unusual thigh, hip, or groin

## 2024-05-21 NOTE — PROGRESS NOTES
Reason for Visit:   osteoporosis with L2 and L3 compression fractures    Requesting Physician:   ..Josh Li MD    CHIEF COMPLAINT:    Chief Complaint   Patient presents with    Osteoporosis     F/u        HISTORY OF PRESENT ILLNESS:   Armando Forrester is a 83 year old male who presents with  osteoporosis with L2 and L3 compression fractures     Was seen with his wife  and daughter  Obtained dental clearance and we reviewed. Rx was sent and will schedule his infusion. D/w Rn and seems infusion center now takes > 14 days     Takes Vit D and Ca  They both use walkers.   Finished PT at home now. Asked for a new referral    Osteoporosis, was Dx in 2024  Had DXA scan in 2024 showed osteopenia   Fragility fracture yes L2 and L3    Height loss yes ~ 3 inches   Denied risk factors: steroid use, alcohol abuse, liver disease, kidney disease, hyperthyroid, seizure medications.   Family history of osteoporosis or fragility fracture: no    Patient is on fall precaution. Using a walker.   Patient is taking Vitamin D and calcium 500 bid   Educated the patient to do wt-bearing exercise, but avoid falls.    1/2024, had lower back pain the day after shoveling wet snow. He thinks he had a fracture then.  Feb 2024 CT showed new compression fracture in L2   4/2024 CT ADRENALS: Unremarkable  Fell on 4/5/2024   CT showed Acute L3 vertebral body fracture with 50% loss of vertebral body height.  Subacute fracture of the L2 vertebral body with 50% loss of vertebral body height has progressed since 2/2/2024.              ASSESSMENT AND PLAN:    83 year old male who presents with  osteoporosis with L2 and L3 compression fractures   We did w/u for secondary osteoporosis which came back negative.   D/w pt, his wife and daughter in length today again.    We have dental clearance    D/w pt fall precautions.     Plan   Will schedule Reclast   RTC in 1 year   Refer to PT   Take vitamin D3 2000 international unit a day and calcium 600 mg twice  a day or 3 servings of dairy a day   Do weight bearing exercise   Follow fall precautions       Side effect from osteoporosis meds   Hypocalcemia: Adequately supplement calcium and vitamin D during  Osteonecrosis of the Jaw: Monitor for symptoms.   Atypical Femoral Fracture: Evaluate new or unusual thigh, hip, or groin    PAST MEDICAL HISTORY:   Past Medical History:    Aortic atherosclerosis (HCC)    CT scan 6-16    BPH (benign prostatic hyperplasia)    With abnormal PSA, Rx Proscar    Diverticulosis    Glaucoma    Herpes zoster    Left face    Hx of adenomatous colonic polyps    Hypercholesterolemia    Hypertension    Iron deficiency anemia    Ischemic colitis (HCC)    Recurrent 12-21    Osteoporosis    T9 compression fracture, T score -3.8 spine and -0.9 hip, Rx Fosamax; repeat T score -2.5 spine and -1.0 hip 10-10, repeat T score -0.7 hip and -2.2 lumbar spine 3-16, Fosamax DC'd; L2 compression fracture 1-24; T12 L2 L3 compression fracture 4-24    Peptic ulcer disease    SCC (squamous cell carcinoma)    Left temple    Subclinical hypothyroidism       PAST SURGICAL HISTORY:   Past Surgical History:   Procedure Laterality Date    Appendectomy  1961    Back surgery Left 12/2011    L4-L5 microdiscectomy    Cataract Left 10/2010    Cataract Right 12/2010    Colonoscopy  01/2016    Inguinal hernia repair Bilateral 1970    Ir kyphoplasty  04/08/2024    T12, L2, L3    Other  01/2017    Excision SCCa left temple    Skin surgery Right 10/28/2016    Excision epidermal inclusion cyst back    Spinal fusion  10/2011    L4-L5    Tonsillectomy  1968       CURRENT MEDICATIONS:     TRAMADOL 50 MG Oral Tab Take 1 tablet (50 mg total) by mouth every 6 (six) hours as needed for Pain. 30 tablet 0    amLODIPine 10 MG Oral Tab Take 1 tablet (10 mg total) by mouth daily. 90 tablet 3    HYDROcodone-acetaminophen 5-325 MG Oral Tab Take 1-2 tablets by mouth every 6 (six) hours as needed. 15 tablet 0    atorvastatin 20 MG Oral Tab Take 1  tablet (20 mg total) by mouth every evening. 90 tablet 3    latanoprost 0.005 % Ophthalmic Solution Place 1 drop into both eyes nightly.      Ascorbic Acid (VITAMIN C) 500 MG Oral Cap Take 1 tablet by mouth daily.      Multiple Vitamins-Minerals (PRESERVISION AREDS) Oral Tab Take 2 tablets by mouth daily.      Omega-3 Fatty Acids (FISH OIL OR) Take 1 capsule by mouth 3 (three) times daily.      Calcium Carbonate-Vitamin D (CALCIUM 600 + D OR) Take 1 tablet by mouth 2 (two) times daily.      Coenzyme Q10 (COQ10 OR) Take 1 capsule by mouth daily.      Probiotic Product (PROBIOTIC OR) Take 1 capsule by mouth daily.      Boswellia Judah (BOSWELLIA OR) Take 2 tablets by mouth 3 (three) times daily.      Cholecalciferol (VITAMIN D3) 1000 UNITS Oral Cap Take  by mouth. take 1 daily         ALLERGIES:  Allergies   Allergen Reactions    Clindamycin OTHER (SEE COMMENTS)     Angioedema    Lisinopril OTHER (SEE COMMENTS)     Angioedema       SOCIAL HISTORY:    Social History     Socioeconomic History    Marital status:     Number of children: 2   Occupational History    Occupation: [employer]     Comment: part time    Occupation: [employer] teacher, [employer]     Comment: retired   Tobacco Use    Smoking status: Former     Current packs/day: 0.00     Types: Cigarettes     Quit date: 1957     Years since quittin.3    Smokeless tobacco: Never   Vaping Use    Vaping status: Never Used   Substance and Sexual Activity    Alcohol use: Yes     Alcohol/week: 1.0 standard drink of alcohol     Types: 1 Glasses of wine per week     Comment: Occasional glass of wine    Drug use: No   Other Topics Concern    Pt has a pacemaker No    Pt has a defibrillator No    Reaction to local anesthetic No    Caffeine Concern Yes     Comment: coffee, 3cups/day       FAMILY HISTORY:   Family History   Problem Relation Age of Onset    Heart Disease Father         CHF age 68    Hypertension Mother     Other (Kidney Cancer[other]) Brother          PHYSICAL EXAM:   Height: 5' 7\" (170.2 cm) (05/21 1400)  Weight: 129 lb (58.5 kg) (05/21 1400)  BSA (Calculated - sq m): 1.68 sq meters (05/21 1400)  Pulse: 72 (05/21 1400)  BP: 130/58 (05/21 1400)  Temp: --  Do Not Use - Resp Rate: --  SpO2: 97 % (05/21 1400)    Body mass index is 20.2 kg/m².  Uses walker   CONSTITUTIONAL:  Awake and alert. Age appropriate, good hygiene not in acute distress. Well-nourished and well developed. no acute distress   PSYCH:   Orientated to time, place, person & situation, Normal mood and affect, memory intact, normal insight and judgment, cooperative  Neuro: speech is clear. Awake, alert, no aphasia, no facial asymmetry, no nuchal rigidity  EYES:  No proptosis, no ptosis, conjunctiva normal  ENT:  Normocephalic, atraumatic       DATA:     Pertinent data   Latest Reference Range & Units 04/27/24 08:35   VITAMIN D, 25-OH, TOTAL 30.0 - 100.0 ng/mL 63.8        4/27/2024 SPEP   Essentially normal.    No evidence of monoclonal proteins identified.      Latest Reference Range & Units 04/27/24 08:35   Vit D 1,25 di-OH 24.8 - 81.5 pg/mL 32.2      Latest Reference Range & Units 04/27/24 08:35   TSH 0.550 - 4.780 mIU/mL 4.772   PTH INTACT 18.5 - 88.0 pg/mL 40.1   Cortisol ug/dL 19.0   Prolactin 2.1 - 17.7 ng/mL 8.7      Latest Reference Range & Units 04/27/24 08:35   CALCIUM 8.7 - 10.4 mg/dL 9.6   BUN/CREATININE RATIO 10.0 - 20.0  28.6 (H)   EGFR >=60 mL/min/1.73m2 70       Latest Reference Range & Units 04/27/24 08:39   PROTEIN URINE CALC <149.1 mg/24 hr 237.6 (H)   Urine Volume 24Hr mL  mL 1,650  1,650   CALCIUM URINE CALC 100 - 300 mg/24hr 281   Creatinine, Urine Not Estab. mg/dL 67.6       Latest Reference Range & Units 04/27/24 08:39   URINE BENCE MARINELLI PROTEIN 24HR  24-hour urine concentrated 50X is negative for Free Monoclonal Light Chains (Bence Marinelli Protein).     Cortisol Free 24h Ur 3 - 49 ug/24 hr 20   Cortisol Free Ur Undefined ug/L 12   Creat 24h Ur 1000 - 2000 mg/24 hr 1115       Latest Reference Range & Units 04/27/24 08:35   Sex Horm Bind Glob 19.3 - 76.4 nmol/L 57.2   Testost % Free+Weak Bnd 9.0 - 46.0 % 9.2   Testost Free+Weak Bnd 40.0 - 250.0 ng/dL 40.3   Testosterone Tot LC/.0 - 916.0 ng/dL 438.5      Latest Reference Range & Units 04/09/24 05:38   EGFR >=60 mL/min/1.73m2 85      Latest Reference Range & Units Most Recent   VITAMIN D, 25-OH, TOTAL 30.0 - 100.0 ng/mL 47.1  3/2/22 07:33      Latest Reference Range & Units Most Recent   T4,Free (Direct) 0.8 - 1.7 ng/dL 1.0  3/27/24 07:58   TSH 0.550 - 4.780 mIU/mL 5.580 (H)  3/27/24 07:58      DXA 2024CONCLUSION:   1. Scans of the lumbar spine, left forearm and left hip are consistent with osteopenia, which according to World Health Organization criteria place the patient at a mild-to-moderately increased risk for fracture.      2. Compared to the prior study, bone mineral density has decreased in the left hip.  No prior comparison studies of the left forearm are available.  Please note that assessment for change in bone mineral density in the lumbar spine is limited as the   patient underwent interval kyphoplasties at L1, L3 and L4 since the 2016 DEXA scan.      3. Based on left femoral neck bone mineral density, the FRAX 10 year probability of a major osteoporotic fracture is 9.0% and the 10 year probability of a hip fracture is 3.3%.      No results for input(s): \"TSH\", \"T4F\", \"T3F\", \"THYP\" in the last 72 hours.  No results found.    No orders of the defined types were placed in this encounter.    No orders of the defined types were placed in this encounter.         This is a specialized patient consultation in endocrinology and required comprehensive review of prior records, as well as current evaluation, with time required for consideration of complex endocrine issues and consultation. For this visit, I personally interviewed the patient, and family member if accompanied, performed the pertinent parts of the history and  physical examination. ROS included screening for appropriate endocrine conditions.   Today's diagnosis and plan were reviewed in detail with the patient who states understanding and agrees with plan. I discussed with the patient possible diagnosis, differential diagnosis, need for work up, treatment options, alternatives and side effects.     Please see note for details about time spent which includes:   · pre-visit preparation  · reviewing records  · face to face time with the patient   · timely documentation of the encounter  · ordering medications/tests  · communication with care team  · care coordination    I appreciate the opportunity to be part of your patient's medical care and will keep you, as the referring and primary physicians, informed about the care of your patient. Please feel free to contact me should you have any questions.    The 21st Century Cures Act makes medical notes like these available to patients in the interest of transparency. Please be advised this is a medical document. Medical documents are intended to carry relevant information, facts as evident, and the clinical opinion of the practitioner. The medical note is intended as peer to peer communication and may appear blunt or direct. It is written in medical language and may contain abbreviations or verbiage that are unfamiliar.   Maria R Yanes MD

## 2024-05-23 DIAGNOSIS — M81.0 SENILE OSTEOPOROSIS: Primary | ICD-10-CM

## 2024-05-23 RX ORDER — ZOLEDRONIC ACID 5 MG/100ML
5 INJECTION, SOLUTION INTRAVENOUS ONCE
OUTPATIENT
Start: 2024-05-23

## 2024-05-23 RX ORDER — ATORVASTATIN CALCIUM 20 MG/1
20 TABLET, FILM COATED ORAL EVERY EVENING
Qty: 90 TABLET | Refills: 3 | Status: SHIPPED | OUTPATIENT
Start: 2024-05-23

## 2024-05-23 NOTE — TELEPHONE ENCOUNTER
Refill passed per [facility] protocols.    Requested Prescriptions   Pending Prescriptions Disp Refills    ATORVASTATIN 20 MG Oral Tab [Pharmacy Med Name: Atorvastatin Calcium 20 Mg Tab Nort] 90 tablet 0     Sig: Take 1 tablet  by mouth every evening.       Cholesterol Medication Protocol Passed - 5/21/2024  9:32 AM        Passed - ALT < 80     Lab Results   Component Value Date    ALT 16 04/27/2024             Passed - ALT resulted within past year        Passed - Lipid panel within past 12 months     Lab Results   Component Value Date    CHOLEST 155 03/27/2024    TRIG 59 03/27/2024    HDL 63 (H) 03/27/2024    LDL 80 03/27/2024    VLDL 9 03/27/2024    NONHDLC 92 03/27/2024             Passed - In person appointment or virtual visit in the past 12 mos or appointment in next 3 mos     Recent Outpatient Visits              2 days ago Osteoporosis with current pathological fracture, unspecified osteoporosis type, [facility] Maria R Yanes MD    Office Visit    1 month ago Osteoporosis with current pathological fracture, unspecified osteoporosis type, [facility] Maria R Yanes MD    Office Visit    1 month ago Compression fracture of lumbar vertebra with routine healing, unspecified lumbar vertebral level, subsequent encounter    [facility] Josh Li MD    Office Visit    1 month ago Annual physical exam    [facility] Josh Li MD    Office Visit    2 months ago Primary osteoarthritis of right hip    [facility] Kannan Gutierrez DO    Office Visit          Future Appointments         Provider Department Appt Notes    In 5 days Josh Li MD [facility] 6W FU    In 1 week EM CC INFRN 1 [facility]  [facility] - Infusion RECLAST-PIV-SL *outpt labs*    In 2 weeks Enzo Gaviria MD [facility], [facility]     In 1 month Mamta Kamara DPM [facility] toe nail trimming    In 4 months Josh Li MD [facility], [facility] 6M FU    In 1 year Maria R Yanes MD [facility]

## 2024-05-24 ENCOUNTER — TELEPHONE (OUTPATIENT)
Dept: INTERNAL MEDICINE CLINIC | Facility: CLINIC | Age: 84
End: 2024-05-24

## 2024-05-24 NOTE — TELEPHONE ENCOUNTER
YAHAIRA Francis.     Spoke to Nadia  from  (name and  of patient verified). She is calling to report social work evaluation was completed today. Referrals provided for: meals on wheels, caregivers, transportation, and life alert.

## 2024-05-28 ENCOUNTER — OFFICE VISIT (OUTPATIENT)
Dept: INTERNAL MEDICINE CLINIC | Facility: CLINIC | Age: 84
End: 2024-05-28

## 2024-05-28 VITALS
HEART RATE: 62 BPM | HEIGHT: 67 IN | DIASTOLIC BLOOD PRESSURE: 52 MMHG | WEIGHT: 132 LBS | SYSTOLIC BLOOD PRESSURE: 126 MMHG | BODY MASS INDEX: 20.72 KG/M2

## 2024-05-28 DIAGNOSIS — M81.0 OSTEOPOROSIS, UNSPECIFIED OSTEOPOROSIS TYPE, UNSPECIFIED PATHOLOGICAL FRACTURE PRESENCE: Primary | ICD-10-CM

## 2024-05-28 DIAGNOSIS — I10 PRIMARY HYPERTENSION: ICD-10-CM

## 2024-05-28 PROCEDURE — G2211 COMPLEX E/M VISIT ADD ON: HCPCS | Performed by: INTERNAL MEDICINE

## 2024-05-28 PROCEDURE — 99214 OFFICE O/P EST MOD 30 MIN: CPT | Performed by: INTERNAL MEDICINE

## 2024-05-28 NOTE — PATIENT INSTRUCTIONS
Your blood pressure today was good at 126/52  Begin physical therapy as planned  You may taper and stop tramadol  Continue other medications  Return visit in 6 months

## 2024-05-28 NOTE — PROGRESS NOTES
Armando Forrester is a 83 year old male.   Chief Complaint   Patient presents with    Follow - Up     6 week f/u    Forgetful     HPI:   Genaro presents this afternoon for follow-up of osteoporosis with recent vertebral compression fractures and hypertension.    Since his last visit with me, he saw Endocrinology.  Workup for secondary causes of osteoporosis was normal.  Repeat DEXA scan last month with T-score -1.6 femoral neck and +2.5 lumbar spine (possibly abnormally high because of kyphoplasty).  Treatment with Reclast was recommended and is being arranged.  Low back pain is improving, but still occurs with prolonged walking, relieved with rest.  He is taking tramadol 1 tablet twice daily with uncertain relief.  Physical therapy will begin soon.  He uses a cane occasionally to walk.  He did notice \"brain fog\" with occasional forgetfulness when he was in acute pain with his recent vertebral compression fractures, but now he is feeling better.    BP checks typically 120-140/60-70s.  No headaches.  No lightheadedness or dizziness.  No palpitations or chest pain.  No shortness of breath.    Medications reviewed, as listed below.  Appointment scheduled with Dr. Gaviria of Geriatrics in June.  Current Outpatient Medications   Medication Sig Dispense Refill    atorvastatin 20 MG Oral Tab Take 1 tablet (20 mg total) by mouth every evening. 90 tablet 3    TRAMADOL 50 MG Oral Tab Take 1 tablet (50 mg total) by mouth every 6 (six) hours as needed for Pain. 30 tablet 0    amLODIPine 10 MG Oral Tab Take 1 tablet (10 mg total) by mouth daily. 90 tablet 3    latanoprost 0.005 % Ophthalmic Solution Place 1 drop into both eyes nightly.      Ascorbic Acid (VITAMIN C) 500 MG Oral Cap Take 1 tablet by mouth daily.      Multiple Vitamins-Minerals (PRESERVISION AREDS) Oral Tab Take 2 tablets by mouth daily.      Omega-3 Fatty Acids (FISH OIL OR) Take 1 capsule by mouth 3 (three) times daily.      Calcium Carbonate-Vitamin D (CALCIUM 600  + D OR) Take 1 tablet by mouth 2 (two) times daily.      Coenzyme Q10 (COQ10 OR) Take 1 capsule by mouth daily.      Probiotic Product (PROBIOTIC OR) Take 1 capsule by mouth daily.      Boswellia Judah (BOSWELLIA OR) Take 2 tablets by mouth 3 (three) times daily.      Cholecalciferol (VITAMIN D3) 1000 UNITS Oral Cap Take  by mouth. take 1 daily       Allergies   Allergen Reactions    Clindamycin OTHER (SEE COMMENTS)     Angioedema    Lisinopril OTHER (SEE COMMENTS)     Angioedema      Past Medical History:    Aortic atherosclerosis (HCC)    CT scan 6-16    BPH (benign prostatic hyperplasia)    With abnormal PSA, Rx Proscar    Diverticulosis    Glaucoma    Herpes zoster    Left face    Hx of adenomatous colonic polyps    Hypercholesterolemia    Hypertension    Iron deficiency anemia    Ischemic colitis (HCC)    Recurrent 12-21    Osteoporosis    T9 compression fracture, T score -3.8 spine and -0.9 hip, Rx Fosamax; repeat T score -2.5 spine and -1.0 hip 10-10, repeat T score -0.7 hip and -2.2 lumbar spine 3-16, Fosamax DC'd; L2 compression fracture 1-24; T12 L2 L3 compression fracture 4-24; DEXA 4-24 with T-score -1.6 femoral neck and +2.5 lumbar spine    Peptic ulcer disease    SCC (squamous cell carcinoma)    Left temple    Subclinical hypothyroidism     Past Surgical History:   Procedure Laterality Date    Appendectomy  1961    Back surgery Left 12/2011    L4-L5 microdiscectomy    Cataract Left 10/2010    Cataract Right 12/2010    Colonoscopy  01/2016    Inguinal hernia repair Bilateral 1970    Ir kyphoplasty  04/08/2024    T12, L2, L3    Other  01/2017    Excision SCCa left temple    Skin surgery Right 10/28/2016    Excision epidermal inclusion cyst back    Spinal fusion  10/2011    L4-L5    Tonsillectomy  1968      Social History:  Social History     Socioeconomic History    Marital status:     Number of children: 2   Occupational History    Occupation:      Comment: part time    Occupation:   High teacher, language arts     Comment: retired   Tobacco Use    Smoking status: Former     Current packs/day: 0.00     Types: Cigarettes     Quit date: 1957     Years since quittin.3    Smokeless tobacco: Never   Vaping Use    Vaping status: Never Used   Substance and Sexual Activity    Alcohol use: Yes     Alcohol/week: 1.0 standard drink of alcohol     Types: 1 Glasses of wine per week     Comment: Occasional glass of wine    Drug use: No   Other Topics Concern    Pt has a pacemaker No    Pt has a defibrillator No    Reaction to local anesthetic No    Caffeine Concern Yes     Comment: coffee, 3cups/day     Social Determinants of Health     Financial Resource Strain: Low Risk  (2021)    [facility]    Overall Financial Resource Strain (CARDIA)     Difficulty of Paying Living Expenses: Not very hard   Food Insecurity: No Food Insecurity (2024)    Food Insecurity     Food Insecurity: Never true   Transportation Needs: No Transportation Needs (2024)    Transportation Needs     Lack of Transportation: No   Housing Stability: Low Risk  (2024)    Housing Stability     Housing Instability: No        EXAM:   GENERAL: Pleasant male appearing well in no distress  /52   Pulse 62   Ht 5' 7\" (1.702 m)   Wt 132 lb (59.9 kg)   BMI 20.67 kg/m²   LUNGS: Resonant to percussion and clear to auscultation  CARDIAC: Rhythm regular S1 S2 normal without murmur   ABDOMEN: Bowel sounds normal soft nontender       ASSESSMENT AND PLAN:   1. Osteoporosis, unspecified osteoporosis type, unspecified pathological fracture presence  Recovering from recent vertebral compression fractures treated with kyphoplasty  Begin physical therapy as planned  Reclast infusions to begin soon  Discussed possibly tapering and discontinuing tramadol    2. Primary hypertension  Well-controlled  Continue current medication  Return visit in 6 months      The patient indicates  understanding of these issues and agrees to the plan.  The patient is asked to return in 6 months.    Josh Li MD  5/28/2024  2:22 PM

## 2024-05-29 ENCOUNTER — LAB ENCOUNTER (OUTPATIENT)
Dept: LAB | Facility: HOSPITAL | Age: 84
End: 2024-05-29
Attending: INTERNAL MEDICINE
Payer: MEDICARE

## 2024-05-29 DIAGNOSIS — M81.0 SENILE OSTEOPOROSIS: ICD-10-CM

## 2024-05-29 LAB
ALBUMIN SERPL-MCNC: 4.3 G/DL (ref 3.2–4.8)
CALCIUM BLD-MCNC: 9.4 MG/DL (ref 8.7–10.4)
CREAT BLD-MCNC: 0.98 MG/DL
EGFRCR SERPLBLD CKD-EPI 2021: 77 ML/MIN/1.73M2 (ref 60–?)

## 2024-05-29 PROCEDURE — 36415 COLL VENOUS BLD VENIPUNCTURE: CPT

## 2024-05-29 PROCEDURE — 82310 ASSAY OF CALCIUM: CPT

## 2024-05-29 PROCEDURE — 82565 ASSAY OF CREATININE: CPT

## 2024-05-29 PROCEDURE — 82040 ASSAY OF SERUM ALBUMIN: CPT

## 2024-05-31 ENCOUNTER — APPOINTMENT (OUTPATIENT)
Dept: CT IMAGING | Facility: HOSPITAL | Age: 84
End: 2024-05-31
Attending: EMERGENCY MEDICINE
Payer: MEDICARE

## 2024-05-31 ENCOUNTER — TELEPHONE (OUTPATIENT)
Dept: PHYSICAL MEDICINE AND REHAB | Facility: CLINIC | Age: 84
End: 2024-05-31

## 2024-05-31 ENCOUNTER — HOSPITAL ENCOUNTER (EMERGENCY)
Facility: HOSPITAL | Age: 84
Discharge: HOME | End: 2024-05-31
Attending: EMERGENCY MEDICINE
Payer: MEDICARE

## 2024-05-31 VITALS
WEIGHT: 132 LBS | RESPIRATION RATE: 20 BRPM | TEMPERATURE: 97 F | HEART RATE: 51 BPM | BODY MASS INDEX: 21.21 KG/M2 | HEIGHT: 66 IN | DIASTOLIC BLOOD PRESSURE: 56 MMHG | OXYGEN SATURATION: 99 % | SYSTOLIC BLOOD PRESSURE: 143 MMHG

## 2024-05-31 DIAGNOSIS — M54.50 BACK PAIN, LUMBOSACRAL: Primary | ICD-10-CM

## 2024-05-31 LAB
BILIRUB UR QL: NEGATIVE
GLUCOSE UR-MCNC: NORMAL MG/DL
HGB UR QL STRIP.AUTO: NEGATIVE
KETONES UR-MCNC: NEGATIVE MG/DL
LEUKOCYTE ESTERASE UR QL STRIP.AUTO: NEGATIVE
NITRITE UR QL STRIP.AUTO: NEGATIVE
PH UR: 7.5 [PH] (ref 5–8)
PROT UR-MCNC: NEGATIVE MG/DL
SP GR UR STRIP: 1.01 (ref 1–1.03)
UROBILINOGEN UR STRIP-ACNC: NORMAL

## 2024-05-31 PROCEDURE — 99284 EMERGENCY DEPT VISIT MOD MDM: CPT

## 2024-05-31 PROCEDURE — 96374 THER/PROPH/DIAG INJ IV PUSH: CPT

## 2024-05-31 PROCEDURE — 81001 URINALYSIS AUTO W/SCOPE: CPT | Performed by: EMERGENCY MEDICINE

## 2024-05-31 PROCEDURE — 74176 CT ABD & PELVIS W/O CONTRAST: CPT | Performed by: EMERGENCY MEDICINE

## 2024-05-31 RX ORDER — NAPROXEN 500 MG/1
500 TABLET ORAL 2 TIMES DAILY PRN
Qty: 14 TABLET | Refills: 0 | Status: SHIPPED | OUTPATIENT
Start: 2024-05-31 | End: 2024-06-07

## 2024-05-31 RX ORDER — KETOROLAC TROMETHAMINE 15 MG/ML
15 INJECTION, SOLUTION INTRAMUSCULAR; INTRAVENOUS ONCE
Status: COMPLETED | OUTPATIENT
Start: 2024-05-31 | End: 2024-05-31

## 2024-05-31 NOTE — TELEPHONE ENCOUNTER
Patient's wife, Joana called spine navigator, Lubna, who's number she had from her own spine center referral, stating that her  was in so much pain he was struggling to sit up or get dressed. She was asking for the number to Dr. Gutierrez's office and stated that she thought they needed to call 911 and go to the ER. Spine navigator attempted to contact [facility] RN triage, office closed at this time. Encouraged patient to go to ER if in need of emergent care. Patient and wife stated that they would call 911. Encouraged patient to book a follow-up appointment with Dr. Gutierrez after discharge from hospital.     Routed to [facility] RN pool for any further follow-up.

## 2024-05-31 NOTE — ED PROVIDER NOTES
Patient Seen in: [facility] Emergency Department      History     Chief Complaint   Patient presents with    Back Pain     Stated Complaint: BACK PAIN    Subjective:   HPI    Patient is an 83-year-old gentleman with a history of compression fractures in his spine.  He underwent kyphoplasty in April.  He discontinued his tramadol this week we discussed it was causing some mild forgetfulness and constipation.  He states today he has pain in his right flank.  It is worse with movement.  Recalls no new injury or trauma.  No radiation down his extremities no weakness numbness or tingling.    Objective:   Past Medical History:    Aortic atherosclerosis (HCC)    CT scan 6-16    BPH (benign prostatic hyperplasia)    With abnormal PSA, Rx Proscar    Diverticulosis    Glaucoma    Herpes zoster    Left face    Hx of adenomatous colonic polyps    Hypercholesterolemia    Hypertension    Iron deficiency anemia    Ischemic colitis (HCC)    Recurrent 12-21    Osteoporosis    T9 compression fracture, T score -3.8 spine and -0.9 hip, Rx Fosamax; repeat T score -2.5 spine and -1.0 hip 10-10, repeat T score -0.7 hip and -2.2 lumbar spine 3-16, Fosamax DC'd; L2 compression fracture 1-24; T12 L2 L3 compression fracture 4-24; DEXA 4-24 with T-score -1.6 femoral neck and +2.5 lumbar spine    Peptic ulcer disease    SCC (squamous cell carcinoma)    Left temple    Subclinical hypothyroidism              Past Surgical History:   Procedure Laterality Date    Appendectomy  1961    Back surgery Left 12/2011    L4-L5 microdiscectomy    Cataract Left 10/2010    Cataract Right 12/2010    Colonoscopy  01/2016    Inguinal hernia repair Bilateral 1970    Ir kyphoplasty  04/08/2024    T12, L2, L3    Other  01/2017    Excision SCCa left temple    Skin surgery Right 10/28/2016    Excision epidermal inclusion cyst back    Spinal fusion  10/2011    L4-L5    Tonsillectomy  1968                Social History     Socioeconomic History    Marital  status:     Number of children: 2   Occupational History    Occupation: [place]     Comment: part time    Occupation:  High teacher, language arts     Comment: retired   Tobacco Use    Smoking status: Former     Current packs/day: 0.00     Types: Cigarettes     Quit date: 1957     Years since quittin.3    Smokeless tobacco: Never   Vaping Use    Vaping status: Never Used   Substance and Sexual Activity    Alcohol use: Yes     Alcohol/week: 1.0 standard drink of alcohol     Types: 1 Glasses of wine per week     Comment: Occasional glass of wine    Drug use: No   Other Topics Concern    Pt has a pacemaker No    Pt has a defibrillator No    Reaction to local anesthetic No    Caffeine Concern Yes     Comment: coffee, 3cups/day     Social Determinants of Health     Financial Resource Strain: Low Risk  (2021)    [facility]    Overall Financial Resource Strain ([facility])     Difficulty of Paying Living Expenses: Not very hard   Food Insecurity: No Food Insecurity (2024)    Food Insecurity     Food Insecurity: Never true   Transportation Needs: No Transportation Needs (2024)    Transportation Needs     Lack of Transportation: No   Housing Stability: Low Risk  (2024)    Housing Stability     Housing Instability: No              Review of Systems    Positive for stated complaint: BACK PAIN  Other systems are as noted in HPI.  Constitutional and vital signs reviewed.      All other systems reviewed and negative except as noted above.    Physical Exam     ED Triage Vitals [24 1611]   BP (!) 185/73   Pulse 60   Resp 20   Temp 97.4 °F (36.3 °C)   Temp src Oral   SpO2 99 %   O2 Device None (Room air)       Current Vitals:   Vital Signs  BP: (!) 185/73  Pulse: 60  Resp: 20  Temp: 97.4 °F (36.3 °C)  Temp src: Oral  MAP (mmHg): (!) 104    Oxygen Therapy  SpO2: 99 %  O2 Device: None (Room air)            Physical Exam    Constitutional: Oriented to  person, place, and time. Appears well-developed and well-nourished.   HEENT:   Head: Normocephalic and atraumatic.   Right Ear: External ear normal.   Left Ear: External ear normal.   Nose: Nose normal.   Mouth/Throat: Oropharynx is clear and moist.   Eyes: Conjunctivae and EOM are normal. Pupils are equal, round, and reactive to light.   Neck: Neck supple.   Cardiovascular: Normal rate, regular rhythm, normal heart sounds and intact distal pulses.    Pulmonary/Chest: Effort normal and breath sounds normal. No respiratory distress.   Abdominal: Soft. Bowel sounds are normal. Exhibits no distension and no mass. There is no tenderness.  He does have right-sided flank tenderness in the CVA region.  No midline back tenderness there is no rebound and no guarding.   Musculoskeletal: Normal range of motion. Exhibits no edema or tenderness.   Lymphadenopathy: No cervical adenopathy.   Neurological: Alert and oriented to person, place, and time. Normal reflexes. No cranial nerve deficit. No motor os sensory defecits noted Coordination normal.   Skin: Skin is warm and dry.   Psychiatric: Normal mood and affect. Behavior is normal. Judgment and thought content normal.   Nursing note and vitals reviewed.        ED Course     Labs Reviewed   URINALYSIS WITH CULTURE REFLEX - Abnormal; Notable for the following components:       Result Value    Clarity Urine Turbid (*)     All other components within normal limits                  CT ABDOMEN+PELVIS KIDNEYSTONE 2D RNDR(NO IV,NO ORAL)(CPT=74176)    Result Date: 5/31/2024  CONCLUSION:   1. New acute fracture involving the L4 vertebral body with associated mild superior vertebral body height loss.  No definitive involvement of the posterior elements. 2. Redemonstrated postoperative changes from prior posterior instrumentation and fusion at L4-L5.  The bilateral pedicular screws at L4 are now in close proximity to the superior endplate. 3. Multiple chronic compression fracture  deformities with post kyphoplasty changes involving the visualized spine. 4. Large volume of colonic stool burden, which can be seen in the setting of constipation. 5. Cholelithiasis without evidence of acute cholecystitis or biliary ductal dilatation. 6. Mild circumferential bladder wall thickening, which is secondary to chronic outlet obstruction or cystitis.  There are additional layering calculi in the dependent portion of the bladder. 7. No hydronephrosis or renal calculus. 8. Prostatomegaly. 9. Lesser incidental findings described above.     Dictated by (CST): Jose Croft MD on 5/31/2024 at 6:09 PM     Finalized by (CST): Jose Croft MD on 5/31/2024 at 6:25 PM             MDM      Use of independent historian:     I personally reviewed and interpreted the images : No hydronephrosis seen on CT.    CT ABDOMEN+PELVIS KIDNEYSTONE 2D RNDR(NO IV,NO ORAL)(CPT=74176)    Result Date: 5/31/2024  CONCLUSION:   1. New acute fracture involving the L4 vertebral body with associated mild superior vertebral body height loss.  No definitive involvement of the posterior elements. 2. Redemonstrated postoperative changes from prior posterior instrumentation and fusion at L4-L5.  The bilateral pedicular screws at L4 are now in close proximity to the superior endplate. 3. Multiple chronic compression fracture deformities with post kyphoplasty changes involving the visualized spine. 4. Large volume of colonic stool burden, which can be seen in the setting of constipation. 5. Cholelithiasis without evidence of acute cholecystitis or biliary ductal dilatation. 6. Mild circumferential bladder wall thickening, which is secondary to chronic outlet obstruction or cystitis.  There are additional layering calculi in the dependent portion of the bladder. 7. No hydronephrosis or renal calculus. 8. Prostatomegaly. 9. Lesser incidental findings described above.     Dictated by (CST): Jose Croft MD on 5/31/2024 at 6:09 PM      Finalized by (CST): Jose Croft MD on 5/31/2024 at 6:25 PM           Vitals:    05/31/24 1611   BP: (!) 185/73   Pulse: 60   Resp: 20   Temp: 97.4 °F (36.3 °C)   TempSrc: Oral   SpO2: 99%   Weight: 59.9 kg   Height: 167.6 cm (5' 6\")     *I personally reviewed and interpreted all ED vitals.    Pulse Ox: 99%, Room air, Normal         Differential Diagnosis/ Diagnostic Considerations: Low back pain likely musculoskeletal consider renal colic consider nephrolithiasis doubt recurrent compression fraction    Medical Record Review: I personally reviewed available prior medical records for any recent pertinent discharge summaries, testing, and procedures and reviewed those reports and found office visit 528 Dr. Li notes reviewed..    Complicating Factors: The patient already has osteoporosis recent vertebral compression fracture hypertension which contribute to the complexity of this ED evaluation.    Social determinants of health:    Prescription drug management:      Shared Decision Making:    ED Course: Urinalysis unremarkable CT findings shared with patient.  Patient had relief of his pain with Toradol in the ED will discharge home.    Discussion of management with other healthcare providers:    Condition upon leaving the department: Stable                                     Medical Decision Making      Disposition and Plan     Clinical Impression:  1. Back pain, lumbosacral         Disposition:  Discharge  5/31/2024  6:30 pm    Follow-up:  Josh Li MD  [address] [place] [id number]  [phone]    Follow up in 3 day(s)      We recommend that you schedule follow up care with a primary care provider within the next three months to obtain basic health screening including reassessment of your blood pressure.      Medications Prescribed:  Current Discharge Medication List        START taking these medications    Details   naproxen 500 MG Oral Tab Take 1 tablet (500 mg total) by mouth 2 (two) times  daily as needed.  Qty: 14 tablet, Refills: 0

## 2024-05-31 NOTE — ED INITIAL ASSESSMENT (HPI)
PT to ED via stretcher, walked to bed from stretcher. Reporting R flank pain 1/10 since this morning. Hx of multiple back fx, has not taken any medications for pain. Denies urinary symptoms.

## 2024-06-03 ENCOUNTER — TELEPHONE (OUTPATIENT)
Dept: INTERNAL MEDICINE CLINIC | Facility: CLINIC | Age: 84
End: 2024-06-03

## 2024-06-03 NOTE — TELEPHONE ENCOUNTER
Spoke to patient and relayed Dr. Li's message below. Patient verbalized understanding and had no further questions.

## 2024-06-03 NOTE — TELEPHONE ENCOUNTER
Dr. Li:    Patient calling back stating he cannot make it to the appointment today, did reschedule for Monday 6/10    Patient has a question per AVS:    Would like to know if can take flexeril 10 mg three times a day as needed for muscle spasms-last filled 4/10/24-has enough medication for 7 days      [id number] - [place], [place] - [address] [phone], [phone]  [address]  [place] [address]  Phone: [phone] Fax: [phone]

## 2024-06-03 NOTE — TELEPHONE ENCOUNTER
Condition update:  Patient seen at ED on 5/31/2024 for severe right sided pain and back pain.  Was given cyclobenzaprine and naproxen.  Not sure if he will make it in today for follow up.  Advised we can change to video visit if necessary.  He will wait and see, and call back early afternoon if he wants to change to video visit.

## 2024-06-03 NOTE — TELEPHONE ENCOUNTER
He can take cyclobenzaprine 10 mg 3 times daily as needed, but he should watch out for drowsiness

## 2024-06-04 NOTE — TELEPHONE ENCOUNTER
S/W patient.  Patient states his right side moved from lower back- to right side of rib cage.  Patient reports it is so sore and affecting his mobility.      CT Abdomen & Pelvis Kidneystone 2 D on 5/31/2024    ER placed patient on Naproxen-   Was recommended to ask Dr. Li about ability to take Flexeril- Dr. Li gave approval . Patient states he had some at home.   Patient has infusion on 6/05/2024- ([facility])    Pain is located to right side. CT scan performed at ER visit. ER MD advised most likely a muscle spasm. Had some old cyclobenzaprine and started taking yesterday and taking TID-patient reports has not felt too much difference yet, but he is groggy from the cyclobenzaprine.  This RN educated patient not to drive and be cautious while on the medication to avoid any falls.  This RN educated patient on cyclobenzaprine s/e & works as muscle relaxant, while the naproxen is NSAID to reduce inflammation and that together the hope is will get him feeling better.     Patient would like to continue with the naproxen and cyclobenzaprine prior to getting any additional recommendations from Dr. Gutierrez.     Patient has been in contact with PCP office.  Scheduled with Dr. Gaviria on 6/10/2024.

## 2024-06-05 ENCOUNTER — OFFICE VISIT (OUTPATIENT)
Dept: HEMATOLOGY/ONCOLOGY | Facility: HOSPITAL | Age: 84
End: 2024-06-05
Attending: INTERNAL MEDICINE
Payer: MEDICARE

## 2024-06-05 VITALS
OXYGEN SATURATION: 100 % | SYSTOLIC BLOOD PRESSURE: 139 MMHG | DIASTOLIC BLOOD PRESSURE: 53 MMHG | TEMPERATURE: 98 F | RESPIRATION RATE: 18 BRPM | HEART RATE: 60 BPM

## 2024-06-05 DIAGNOSIS — M81.0 SENILE OSTEOPOROSIS: Primary | ICD-10-CM

## 2024-06-05 PROCEDURE — 96365 THER/PROPH/DIAG IV INF INIT: CPT

## 2024-06-05 RX ORDER — ZOLEDRONIC ACID 5 MG/100ML
INJECTION, SOLUTION INTRAVENOUS
Status: COMPLETED
Start: 2024-06-05 | End: 2024-06-05

## 2024-06-05 RX ORDER — ZOLEDRONIC ACID 5 MG/100ML
5 INJECTION, SOLUTION INTRAVENOUS ONCE
Status: COMPLETED | OUTPATIENT
Start: 2024-06-05 | End: 2024-06-05

## 2024-06-05 RX ORDER — ZOLEDRONIC ACID 5 MG/100ML
5 INJECTION, SOLUTION INTRAVENOUS ONCE
Status: CANCELLED | OUTPATIENT
Start: 2024-06-05

## 2024-06-05 RX ADMIN — ZOLEDRONIC ACID 5 MG: 5 INJECTION, SOLUTION INTRAVENOUS at 16:17:00

## 2024-06-05 NOTE — PROGRESS NOTES
Patient here for Reclast infusion. Patient denies any issues or concerns. Denies recent or upcoming invasive dental work. Labs reviewed. Reviewed plan of care including length of infusion and possible side effects. Encouraged PO hydration and rest following infusion.      Ordering MD: Maria R Yanes  Order Exp: One dose ordered     Patient tolerated infusion without difficulty or complaint. No s/s of adverse reaction noted. Reviewed next apt date/time: 5/23 3pm MD follow-up with Dr. Yanes    Education Record  Learner:  Patient  Disease / Diagnosis: osteoporosis  Barriers / Limitations:  None  Method:  Discussion  General Topics:  Medication, Side effects and symptom management, and Plan of care reviewed  Outcome:  Shows understanding

## 2024-06-10 ENCOUNTER — OFFICE VISIT (OUTPATIENT)
Dept: INTERNAL MEDICINE CLINIC | Facility: CLINIC | Age: 84
End: 2024-06-10
Payer: MEDICARE

## 2024-06-10 VITALS
SYSTOLIC BLOOD PRESSURE: 137 MMHG | HEART RATE: 76 BPM | DIASTOLIC BLOOD PRESSURE: 77 MMHG | BODY MASS INDEX: 21.47 KG/M2 | HEIGHT: 66 IN | WEIGHT: 133.63 LBS

## 2024-06-10 VITALS
HEIGHT: 66 IN | SYSTOLIC BLOOD PRESSURE: 118 MMHG | OXYGEN SATURATION: 99 % | HEART RATE: 63 BPM | DIASTOLIC BLOOD PRESSURE: 60 MMHG | WEIGHT: 131 LBS | BODY MASS INDEX: 21.05 KG/M2

## 2024-06-10 DIAGNOSIS — I10 PRIMARY HYPERTENSION: Primary | ICD-10-CM

## 2024-06-10 DIAGNOSIS — S32.000D COMPRESSION FRACTURE OF LUMBAR VERTEBRA WITH ROUTINE HEALING, UNSPECIFIED LUMBAR VERTEBRAL LEVEL, SUBSEQUENT ENCOUNTER: Primary | ICD-10-CM

## 2024-06-10 PROCEDURE — 99213 OFFICE O/P EST LOW 20 MIN: CPT | Performed by: INTERNAL MEDICINE

## 2024-06-10 PROCEDURE — G2211 COMPLEX E/M VISIT ADD ON: HCPCS | Performed by: INTERNAL MEDICINE

## 2024-06-10 NOTE — PROGRESS NOTES
Armando Forrester is a 83 year old male.   Chief Complaint   Patient presents with    ER F/U     Patient went on 5/31/24 to [facility] ER for back pain       HPI:   Genaro presents this afternoon for ER follow-up, requesting a referral to Pain Management.    After his recent visit with me late last month, low back pain shifted to his right lower back and was severe.  He went to [place] ER on May 31 where CT scan revealed a new L4 compression fracture.  He received Toradol and was prescribed naproxen and discharged home.  He has been taking naproxen without significant relief.  Pain is worse with prolonged standing and certain movement, but he has no pain when lying in bed at night.  No antecedent injury or trauma.  No leg pain numbness tingling or weakness.  He is interested in alternative treatments, mentioning injections and acupuncture, and requests a referral to Pain Management.  He has not yet started physical therapy, but did receive his first Reclast infusion.  Current Outpatient Medications   Medication Sig Dispense Refill    atorvastatin 20 MG Oral Tab Take 1 tablet (20 mg total) by mouth every evening. 90 tablet 3    amLODIPine 10 MG Oral Tab Take 1 tablet (10 mg total) by mouth daily. 90 tablet 3    latanoprost 0.005 % Ophthalmic Solution Place 1 drop into both eyes nightly.      Ascorbic Acid (VITAMIN C) 500 MG Oral Cap Take 1 tablet by mouth daily.      Multiple Vitamins-Minerals (PRESERVISION AREDS) Oral Tab Take 2 tablets by mouth daily.      Omega-3 Fatty Acids (FISH OIL OR) Take 1 capsule by mouth 3 (three) times daily.      Calcium Carbonate-Vitamin D (CALCIUM 600 + D OR) Take 1 tablet by mouth 2 (two) times daily.      Coenzyme Q10 (COQ10 OR) Take 1 capsule by mouth daily.      Probiotic Product (PROBIOTIC OR) Take 1 capsule by mouth daily.      Boswellia Judah (BOSWELLIA OR) Take 2 tablets by mouth 3 (three) times daily.      Cholecalciferol (VITAMIN D3) 1000 UNITS Oral Cap Take  by mouth. take 1 daily        Allergies   Allergen Reactions    Clindamycin OTHER (SEE COMMENTS)     Angioedema    Lisinopril OTHER (SEE COMMENTS)     Angioedema      Past Medical History:    Aortic atherosclerosis (HCC)    CT scan 6-16    BPH (benign prostatic hyperplasia)    With abnormal PSA, Rx Proscar    Diverticulosis    Glaucoma    Herpes zoster    Left face    Hx of adenomatous colonic polyps    Hypercholesterolemia    Hypertension    Iron deficiency anemia    Ischemic colitis (HCC)    Recurrent 12-21    Osteoporosis    T9 compression fracture, T score -3.8 spine and -0.9 hip, Rx Fosamax; repeat T score -2.5 spine and -1.0 hip 10-10, repeat T score -0.7 hip and -2.2 lumbar spine 3-16, Fosamax DC'd; L2 compression fracture 1-24; T12 L2 L3 compression fracture 4-24; DEXA -24 with T-score -1.6 femoral neck and +2.5 lumbar spine    Peptic ulcer disease    SCC (squamous cell carcinoma)    Left temple    Subclinical hypothyroidism      Social History:  Social History     Socioeconomic History    Marital status:     Number of children: 2   Occupational History    Occupation: [place]     Comment: part time    Occupation:  High teacher, [employer] arts     Comment: retired   Tobacco Use    Smoking status: Former     Current packs/day: 0.00     Types: Cigarettes     Quit date: 1957     Years since quittin.3    Smokeless tobacco: Never   Vaping Use    Vaping status: Never Used   Substance and Sexual Activity    Alcohol use: Yes     Alcohol/week: 1.0 standard drink of alcohol     Types: 1 Glasses of wine per week     Comment: Occasional glass of wine    Drug use: No   Other Topics Concern    Pt has a pacemaker No    Pt has a defibrillator No    Reaction to local anesthetic No    Caffeine Concern Yes     Comment: coffee, 3cups/day     Social Determinants of Health     Financial Resource Strain: Low Risk  (2021)    [facility]    Overall Financial Resource Strain ([facility])      Difficulty of Paying Living Expenses: Not very hard   Food Insecurity: No Food Insecurity (4/5/2024)    Food Insecurity     Food Insecurity: Never true   Transportation Needs: No Transportation Needs (4/5/2024)    Transportation Needs     Lack of Transportation: No   Housing Stability: Low Risk  (4/5/2024)    Housing Stability     Housing Instability: No          EXAM:   GENERAL: Pleasant male appearing well in no distress, moving comfortably  /77 (BP Location: Right arm)   Pulse 76   Ht 5' 6\" (1.676 m)   Wt 133 lb 9.6 oz (60.6 kg)   BMI 21.56 kg/m²   BACK: No spinal, paraspinal muscle or CVA tenderness. No muscle spasm  EXTREMITIES: Straight leg raising negative bilaterally  NEURO: Reflexes 1+ bilateral patellar and 1+ bilateral Achilles. Strength 5/5 bilateral lower extremities without weakness       ASSESSMENT AND PLAN:   1. Compression fracture of lumbar vertebra with routine healing, unspecified lumbar vertebral level, subsequent encounter  Referral to Pain Management provided.  He will schedule  - Pain Management Referral - [place] Location    The patient indicates understanding of these issues and agrees to the plan.    Josh Li MD  6/10/2024  4:43 PM

## 2024-06-19 ENCOUNTER — TELEPHONE (OUTPATIENT)
Dept: INTERNAL MEDICINE CLINIC | Facility: CLINIC | Age: 84
End: 2024-06-19

## 2024-06-19 NOTE — TELEPHONE ENCOUNTER
Pts daughter called requesting a referral for acupuncture , for chronic back pain  To please let her know when referral is ready

## 2024-06-19 NOTE — TELEPHONE ENCOUNTER
Spoke with Juany (HIPAA authorized) daughter who stated she is looking for a new Acupunturist since her father's prior one retired. RN inquiring about name, address, and phone number of new Acupunturist since she stated her father's insurance does require a referral.     Daughter will call back with this information to start a referral.

## 2024-06-21 ENCOUNTER — HOSPITAL ENCOUNTER (OUTPATIENT)
Dept: GENERAL RADIOLOGY | Facility: HOSPITAL | Age: 84
Discharge: HOME | End: 2024-06-21
Attending: STUDENT IN AN ORGANIZED HEALTH CARE EDUCATION/TRAINING PROGRAM

## 2024-06-21 ENCOUNTER — OFFICE VISIT (OUTPATIENT)
Dept: PAIN CLINIC | Facility: HOSPITAL | Age: 84
End: 2024-06-21
Attending: INTERNAL MEDICINE

## 2024-06-21 VITALS
HEART RATE: 59 BPM | SYSTOLIC BLOOD PRESSURE: 122 MMHG | DIASTOLIC BLOOD PRESSURE: 63 MMHG | OXYGEN SATURATION: 98 % | BODY MASS INDEX: 21 KG/M2 | WEIGHT: 130 LBS

## 2024-06-21 DIAGNOSIS — S32.000D COMPRESSION FRACTURE OF LUMBAR VERTEBRA WITH ROUTINE HEALING, UNSPECIFIED LUMBAR VERTEBRAL LEVEL, SUBSEQUENT ENCOUNTER: ICD-10-CM

## 2024-06-21 DIAGNOSIS — M54.16 LUMBAR RADICULOPATHY: ICD-10-CM

## 2024-06-21 DIAGNOSIS — S32.000D COMPRESSION FRACTURE OF LUMBAR VERTEBRA WITH ROUTINE HEALING, UNSPECIFIED LUMBAR VERTEBRAL LEVEL, SUBSEQUENT ENCOUNTER: Primary | ICD-10-CM

## 2024-06-21 PROCEDURE — 99202 OFFICE O/P NEW SF 15 MIN: CPT

## 2024-06-21 PROCEDURE — 72110 X-RAY EXAM L-2 SPINE 4/>VWS: CPT | Performed by: STUDENT IN AN ORGANIZED HEALTH CARE EDUCATION/TRAINING PROGRAM

## 2024-06-21 NOTE — PATIENT INSTRUCTIONS
Refill policies:    Allow 2-3 business days for refills; controlled substances may take longer.  Contact your pharmacy at least 5 days prior to running out of medication and have them send an electronic request or submit request through the “request refill” option in your [employer] account.  Refills are not addressed on weekends; covering physicians do not authorize routine medications on weekends.  No narcotics or controlled substances are refilled after noon on Fridays or by on call physicians.  By law, narcotics must be electronically prescribed.  A 30 day supply with no refills is the maximum allowed.  If your prescription is due for a refill, you may be due for a follow up appointment.  To best provide you care, patients receiving routine medications need to be seen at least once a year.  Patients receiving narcotic/controlled substance medications need to be seen at least once every 3 months.  In the event that your preferred pharmacy does not have the requested medication in stock (e.g. Backordered), it is your responsibility to find another pharmacy that has the requested medication available.  We will gladly send a new prescription to that pharmacy at your request.    Scheduling Tests:    If your physician has ordered radiology tests such as MRI or CT scans, please contact Central Scheduling at [phone] right away to schedule the test.  Once scheduled, the [facility] Centralized Referral Team will work with your insurance carrier to obtain pre-certification or prior authorization.  Depending on your insurance carrier, approval may take 3-10 days.  It is highly recommended patients assure they have received an authorization before having a test performed.  If test is done without insurance authorization, patient may be responsible for the entire amount billed.      Precertification and Prior Authorizations:  If your physician has recommended that you have a procedure or additional testing performed the [facility]  Centralized Referral Team will contact your insurance carrier to obtain pre-certification or prior authorization.    You are strongly encouraged to contact your insurance carrier to verify that your procedure/test has been approved and is a COVERED benefit.  Although the [facility] Centralized Referral Team does its due diligence, the insurance carrier gives the disclaimer that \"Although the procedure is authorized, this does not guarantee payment.\"    Ultimately the patient is responsible for payment.   Thank you for your understanding in this matter.  Paperwork Completion:  If you require FMLA or disability paperwork for your recovery, please make sure to either drop it off or have it faxed to our office at [phone]. Be sure the form has your name and date of birth on it.  The form will be faxed to our Forms Department and they will complete it for you.  There is a 25$ fee for all forms that need to be filled out.  Please be aware there is a 10-14 day turnaround time.  You will need to sign a release of information (ANGELINE) form if your paperwork does not come with one.  You may call the Forms Department with any questions at [phone].  Their fax number is [phone].

## 2024-06-21 NOTE — PROGRESS NOTES
Patient presents in office today with reported pain in lower back     Current pain level reported = 4/10    Last reported dose of NA      Narcotic Contract renewal New patient     Patient was shoveling heavy wet snow in January 2024 and fractured L2.  fell on 04.05.24 causing additional fractures.     Pain increases with bending and twisting.       New patient- referred by Josh Li MD back pain

## 2024-06-21 NOTE — CHRONIC PAIN
Interventional Pain Medicine  New Patient Note    Subjective:     Referring Physician: Josh Li MD  [address] [place] [id number]     Record Review: I personally reviewed *** records    Chief Complaint: New Patient (New patient- referred by Josh Li MD back pain)     History of Present Illness:  Armando Forrester is a 83 year old male presents for New Patient (New patient- referred by Josh Li MD back pain).     Location: ***  Severity: ***/10  Descriptors: ***  Aggravating Factors: ***  Reliving Factors: ***  Associated Symptoms: ***    Functional Goals of Treatment: ***    Previous HUY: ***  Current HUY: ***  PHQ9: ***    Current Medication:   No outpatient medications have been marked as taking for the 6/21/24 encounter (Office Visit) with Bryce Wilder MD.       Previous Pain Medications:  ***    Previous Non-Interventional Treatment:  ***    Previous Interventions/Consults:   ***    Previous Interventional Injections:  ***    Other Medical History  Past Medical History:    Aortic atherosclerosis (HCC)    CT scan 6-16    BPH (benign prostatic hyperplasia)    With abnormal PSA, Rx Proscar    Diverticulosis    Glaucoma    Herpes zoster    Left face    Hx of adenomatous colonic polyps    Hypercholesterolemia    Hypertension    Iron deficiency anemia    Ischemic colitis (HCC)    Recurrent 12-21    Osteoporosis    T9 compression fracture, T score -3.8 spine and -0.9 hip, Rx Fosamax; repeat T score -2.5 spine and -1.0 hip 10-10, repeat T score -0.7 hip and -2.2 lumbar spine 3-16, Fosamax DC'd; L2 compression fracture 1-24; T12 L2 L3 compression fracture 4-24; DEXA 4-24 with T-score -1.6 femoral neck and +2.5 lumbar spine    Peptic ulcer disease    SCC (squamous cell carcinoma)    Left temple    Subclinical hypothyroidism       Diabetes: ***      A1C: ***  Anticoagulation: ***    Work Status:  ***    Review of Systems:  CONSTITUTIONAL: denies fevers, chills  HEENT: denies  swallowing difficulties, sore throat  CARDIOVASCULAR: denies chest pain, palpitations, syncope  RESPIRATORY: denies shortness of breath, cough, wheezing  GI: denies change in bowel habits, nausea, vomiting  : denies change in bladder function, frequency, dysuria  SKIN: denies rash, skin changes  MSK: Per HPI  NEURO: Per HPI  PSYCH: Per HPI      General Physical Exam:    Constitutional  Oriented to person, place, and time. Appears well-developed and   well-nourished  Head    Normocephalic and atraumatic.  Eyes    Pupils are equal, round, and reactive to light.  Neck    Neck supple  Cardiovascular  Minimal to no peripheral edema, intact distal pulses  Pulmonary/Chest  Effort normal, no shortness of breath noted  Neurological   Alert and oriented to person, place, and time  Skin    Skin is warm and dry  Psychiatric   Normal mood and affect, behavior and judgment    Neurologic & Musculoskeletal Exam    Cervical    Region Exam Right  (+/-) Left  (+/-)   Cervical Musculature  Tender w/ Palpation - -   Cervical Facets Pain w/ Extension - -   Upper Extremity  Spurling's - -   Upper Extremity Bakody's - -       Sensation Right Left   Neck Normal Normal   C5: Shoulder Normal Normal   C6: Thumb, radial aspect of hand/forearm (Radial Nerve) Normal Normal   C7: Long finger (Median Nerve) Normal Normal   C8: Little finger, ulnar aspect of hand/forearm (Ulnar n.) Normal Normal   T1; Medial forearm/arm Normal Normal         Motor Strength Right Left   C5: Shoulder abduction (Deltoid) 5/5 5/5   C5: Elbow flexion (Biceps, Brachialis) 5/5 5/5   C6: Wrist extension (ECRB, ECRL) 5/5 5/5   C7: Elbow extension (Triceps) 5/5 5/5   C8: Finger flexion ( strength) 5/5 5/5   T1: Finger abduction  5/5 5/5       Reflexes Right Left   C5: Biceps 2/4 2/4   C6: Brachioradialis  2/4 2/4   C7: Triceps 2/4 2/4   Chow's Absent Absent   Clonus Absent Absent       Lumbar    Region Exam Right (+/-) Left  (+/-)   Lumbar Musculature Tender w/  palpation - -   Lumbar Facet Pain w/ extension - -         Lower Extremity SLR - -   Lower Extremity Crossed SLR - -       Sensation Right Left   L2: Proximal Anterior Thigh Normal Normal   L3: Mid Anterior Thigh Normal Normal   L4: Medial leg/foot, great toe (Saphenous n.) Normal Normal   L5: Dorsum of mid foot Normal Normal   S1: Lateral leg/foot, little toe, back of leg (Sural n.) Normal Normal       Motor Strength Right Left   L2:  5/5 5/5   L3:  5/5 5/5   L4:  5/5 5/5   L5:  5/5 5/5   S1:  5/5 5/5       Reflexes Right Left   L4: Patellar 2/4 2/4   S1: Achilles 2/4 2/4   Babinski Absent Absent   Clonus Absent Absent       Sacroiliac Joint    Exam Right Left   Adrienne's Finger (PSIS) - -   MADHAV - -   Gaenslen's - -   Compression - -   Distraction - -       Hip    Exam Right Left   FADIR - -   Greater Trochanter tenderness - -   Piriformis tenderness - -       Knee    Exam Right Left   Palpation tender - -   Effusion - -   ACL/PCL - -   Valgus/Varus - -   Gilma's - -       Shoulder    Exam Right Left   Palpation tender - -   Range of Motion - -   Neer's - -   Hawkin's - -   Speed's - -   Empty Can - -       Imaging:        Assessment & Plan:  Armando Forrester is a 83 year old male

## 2024-06-24 ENCOUNTER — TELEPHONE (OUTPATIENT)
Dept: INTERNAL MEDICINE CLINIC | Facility: CLINIC | Age: 84
End: 2024-06-24

## 2024-06-24 NOTE — TELEPHONE ENCOUNTER
Daughter of pt above is calling wanting to know if dr. Gaviria does the anton cognitive assessment also know as moca. Pt has appointment to see dr. Gaviria tomorrow.      Please call and advise

## 2024-06-24 NOTE — TELEPHONE ENCOUNTER
Left a message on unidentified voice mail for Joana (HIPAA authorized) daughter that [employer] message will be sent.

## 2024-06-25 ENCOUNTER — OFFICE VISIT (OUTPATIENT)
Dept: INTERNAL MEDICINE CLINIC | Facility: CLINIC | Age: 84
End: 2024-06-25

## 2024-06-25 VITALS
DIASTOLIC BLOOD PRESSURE: 54 MMHG | BODY MASS INDEX: 20.57 KG/M2 | SYSTOLIC BLOOD PRESSURE: 114 MMHG | WEIGHT: 128 LBS | HEIGHT: 66 IN | HEART RATE: 75 BPM | OXYGEN SATURATION: 97 %

## 2024-06-25 DIAGNOSIS — M81.0 OSTEOPOROSIS, UNSPECIFIED OSTEOPOROSIS TYPE, UNSPECIFIED PATHOLOGICAL FRACTURE PRESENCE: ICD-10-CM

## 2024-06-25 DIAGNOSIS — R41.3 MEMORY IMPAIRMENT: ICD-10-CM

## 2024-06-25 DIAGNOSIS — I10 PRIMARY HYPERTENSION: ICD-10-CM

## 2024-06-25 DIAGNOSIS — E03.8 SUBCLINICAL HYPOTHYROIDISM: ICD-10-CM

## 2024-06-25 DIAGNOSIS — E78.00 HYPERCHOLESTEROLEMIA: ICD-10-CM

## 2024-06-25 DIAGNOSIS — S32.000D COMPRESSION FRACTURE OF LUMBAR VERTEBRA WITH ROUTINE HEALING, UNSPECIFIED LUMBAR VERTEBRAL LEVEL, SUBSEQUENT ENCOUNTER: Primary | ICD-10-CM

## 2024-06-25 PROCEDURE — 99215 OFFICE O/P EST HI 40 MIN: CPT | Performed by: INTERNAL MEDICINE

## 2024-06-25 NOTE — PROGRESS NOTES
Armando Forrester male 83 year old    Here  with  wife  - has some concerns about  memory  -  he  drives pays  text, he was seeing Dr. Li and is now going to establish care with me.  I reviewed his past medical history in detail.    Biggest issue is back problem - has new  compression fx -  Dr Wilder  pain doc  ,  Dr Yanes  endo  - s/p  infusion  of reclast      Discussed recent  labs  ok       Reviewed  endo  w/u status post Reclast      Bp good  - on amlodipine        On statin  for  primary prevention      On chart review has slightly elevated TSH has no obvious hypothyroid symptoms     Chief Complaint   Patient presents with    Hypertension, questionable memory issues, daughter called the office concerned wondering if we did MoCA testing, compression fractures of spine status post kyphoplasty and has new lesion.,           Patient Active Problem List   Diagnosis    Hypertension    Osteoporosis    Iron deficiency anemia    BPH (benign prostatic hyperplasia)    Hypercholesterolemia    Glaucoma    History of ischemic colitis    History of skin cancer    Aortic atherosclerosis (HCC)    Hyperglycemia    Colitis    LLQ abdominal pain    Hx of adenomatous colonic polyps    Subclinical hypothyroidism    Hypokalemia    Closed fracture of third lumbar vertebra, unspecified fracture morphology, initial encounter ([facility])    Uses walker    Senile osteoporosis          Current Outpatient Medications on File Prior to Visit   Medication Sig Dispense Refill    atorvastatin 20 MG Oral Tab Take 1 tablet (20 mg total) by mouth every evening. 90 tablet 3    amLODIPine 10 MG Oral Tab Take 1 tablet (10 mg total) by mouth daily. 90 tablet 3    latanoprost 0.005 % Ophthalmic Solution Place 1 drop into both eyes nightly.      Ascorbic Acid (VITAMIN C) 500 MG Oral Cap Take 1 tablet by mouth daily.      Multiple Vitamins-Minerals (PRESERVISION AREDS) Oral Tab Take 2 tablets by mouth daily.      Omega-3 Fatty Acids (FISH OIL OR) Take 1  capsule by mouth 3 (three) times daily.      Calcium Carbonate-Vitamin D (CALCIUM 600 + D OR) Take 1 tablet by mouth 2 (two) times daily.      Coenzyme Q10 (COQ10 OR) Take 1 capsule by mouth daily.      Probiotic Product (PROBIOTIC OR) Take 1 capsule by mouth daily.      Boswellia Judah (BOSWELLIA OR) Take 2 tablets by mouth 3 (three) times daily.      Cholecalciferol (VITAMIN D3) 1000 UNITS Oral Cap Take  by mouth. take 1 daily       No current facility-administered medications on file prior to visit.          Vitals:    06/25/24 1518   BP: 114/54   Pulse: 75   VITALSBody mass index is 20.66 kg/m².    Pertinent findings on the physical exam; did well with  subtracting  7s-   3/3  recall   knew president and  -  knew  [place] sentence-   Cor reg  chest clear   - has walker  slow  moving         stiff back   Armando was seen today for hypertension.    Diagnoses and all orders for this visit:    Compression fracture of lumbar vertebra with routine healing, unspecified lumbar vertebral level, subsequent encounter    Primary hypertension    Osteoporosis, unspecified osteoporosis type, unspecified pathological fracture presence    Hypercholesterolemia    Subclinical hypothyroidism    Memory impairment         His blood pressure is at goal.    Is on atorvastatin for elevated cholesterol for primary prevention has no cardiac or stroke symptoms.    His back problem is his biggest issue.  Status post Reclast explained that it might take time to kick in.  Will follow with endocrinology.    I ordered TSH before his next visit.    I was not impressed with his memory issue.  He answered all questions appropriately he text he does the bills.  If family wants to pursue anything I would recommend neuropsych testing.    This note was prepared using Dragon [employer] voice recognition dictation software and as a result, errors may occur. When identified, these errors have been corrected. While every attempt is made to correct  errors during dictation, discrepancies may still exist

## 2024-06-26 ENCOUNTER — TELEPHONE (OUTPATIENT)
Dept: PAIN CLINIC | Facility: HOSPITAL | Age: 84
End: 2024-06-26

## 2024-06-26 NOTE — TELEPHONE ENCOUNTER
's address is not generating for [place] via [employer] portal -   Call [employer] to see if prior authorization is required [phone]

## 2024-06-27 ENCOUNTER — HOSPITAL ENCOUNTER (OUTPATIENT)
Dept: MRI IMAGING | Age: 84
Discharge: HOME | End: 2024-06-27
Attending: STUDENT IN AN ORGANIZED HEALTH CARE EDUCATION/TRAINING PROGRAM

## 2024-06-27 DIAGNOSIS — S32.000D COMPRESSION FRACTURE OF LUMBAR VERTEBRA WITH ROUTINE HEALING, UNSPECIFIED LUMBAR VERTEBRAL LEVEL, SUBSEQUENT ENCOUNTER: ICD-10-CM

## 2024-06-27 PROCEDURE — 72146 MRI CHEST SPINE W/O DYE: CPT | Performed by: STUDENT IN AN ORGANIZED HEALTH CARE EDUCATION/TRAINING PROGRAM

## 2024-06-27 NOTE — TELEPHONE ENCOUNTER
Prior authorization request completed for: Bilateral Transforaminal LESI L2   Authorization #[id number]  Pre-D: Not required   Exclusions/Restrictions: Based on medical necessity  Covered Benefit: Based on medical necessity  Authorization dates: 06/27/2024 - 12/24/2024  CPT codes approved: 69790  Number of visits/dates of service approved: 1  Physician: Meño  Location: [facility]      Patient can be scheduled.

## 2024-06-28 DIAGNOSIS — M54.16 LUMBAR RADICULOPATHY: Primary | ICD-10-CM

## 2024-06-28 NOTE — TELEPHONE ENCOUNTER
Spoke with Armando (Patient)    Procedure scheduled with  for 07/09/2024 - [facility]  Case placed and signed.  Post procedure follow up scheduled with  for 07/18/2024 at 11:30AM    Armando had questions and concerns in regards to the procedure, I assisted as much as I could and had offered Armando that I have our Nurse Practitioner Estrellita Martinez reach out to him to help ease and clarify some more of his concerns that I am unable too. Armando was thankful for the help and is looking forward to Estrellita's call.

## 2024-07-03 ENCOUNTER — TELEPHONE (OUTPATIENT)
Dept: PAIN CLINIC | Facility: HOSPITAL | Age: 84
End: 2024-07-03

## 2024-07-03 NOTE — TELEPHONE ENCOUNTER
Patient and daughter called asking if besides the medications mentioned to him, any others such as pain medications or stool softer should be stopped. Patient navigator was inquired about it, including NSAIDS, and she said that no, unless otherwise directly told by the doctor. Patient and daughter were informed of such. They stated understanding.

## 2024-07-06 DIAGNOSIS — S32.020A COMPRESSION FRACTURE OF L2 VERTEBRA, INITIAL ENCOUNTER (HCC): ICD-10-CM

## 2024-07-08 RX ORDER — TRAMADOL HYDROCHLORIDE 50 MG/1
50 TABLET ORAL EVERY 6 HOURS PRN
Qty: 15 TABLET | Refills: 0 | Status: SHIPPED | OUTPATIENT
Start: 2024-07-08

## 2024-07-08 NOTE — TELEPHONE ENCOUNTER
Refill Request    Medication request: traMADol 50 MG Oral Tab.  Take 1 tablet (50 mg total) by mouth every 6 (six) hours as needed for Pain.       LOV:3/11/2024 Kannan Gutierrez DO   Due back to clinic per last office note:  MRI of the left lumbar spine and hip and follow-up after   NOV: Visit date not found      [place]: 05/16/2024 #30 (7 days)    Urine drug screen (if applicable): none  Pain contract: None    LOV plan (if weaning or changing medications): Start tramadol 50 mg as needed for pain     MCM sent to schedule a follow up appointment

## 2024-07-18 ENCOUNTER — OFFICE VISIT (OUTPATIENT)
Dept: PAIN CLINIC | Facility: HOSPITAL | Age: 84
End: 2024-07-18
Attending: STUDENT IN AN ORGANIZED HEALTH CARE EDUCATION/TRAINING PROGRAM
Payer: MEDICARE

## 2024-07-18 VITALS — SYSTOLIC BLOOD PRESSURE: 112 MMHG | HEART RATE: 58 BPM | DIASTOLIC BLOOD PRESSURE: 62 MMHG | OXYGEN SATURATION: 96 %

## 2024-07-18 DIAGNOSIS — S22.000A COMPRESSION FRACTURE OF BODY OF THORACIC VERTEBRA (HCC): Primary | ICD-10-CM

## 2024-07-18 PROCEDURE — 99211 OFF/OP EST MAY X REQ PHY/QHP: CPT

## 2024-07-18 RX ORDER — HYDROCODONE BITARTRATE AND ACETAMINOPHEN 5; 325 MG/1; MG/1
1 TABLET ORAL EVERY 12 HOURS
Qty: 60 TABLET | Refills: 0 | Status: SHIPPED | OUTPATIENT
Start: 2024-07-18 | End: 2024-08-17

## 2024-07-18 NOTE — PROGRESS NOTES
Last procedure: 07.09.24-Bilateral TLESI L2     Percentage of relief obtained: 20%    Duration of relief: constant     Current Pain Score: 3/10    Narcotic Contract Exp: NA    Pain increases with activity

## 2024-07-18 NOTE — PATIENT INSTRUCTIONS
Refill policies:    Allow 2-3 business days for refills; controlled substances may take longer.  Contact your pharmacy at least 5 days prior to running out of medication and have them send an electronic request or submit request through the “request refill” option in your [employer] account.  Refills are not addressed on weekends; covering physicians do not authorize routine medications on weekends.  No narcotics or controlled substances are refilled after noon on Fridays or by on call physicians.  By law, narcotics must be electronically prescribed.  A 30 day supply with no refills is the maximum allowed.  If your prescription is due for a refill, you may be due for a follow up appointment.  To best provide you care, patients receiving routine medications need to be seen at least once a year.  Patients receiving narcotic/controlled substance medications need to be seen at least once every 3 months.  In the event that your preferred pharmacy does not have the requested medication in stock (e.g. Backordered), it is your responsibility to find another pharmacy that has the requested medication available.  We will gladly send a new prescription to that pharmacy at your request.    Scheduling Tests:    If your physician has ordered radiology tests such as MRI or CT scans, please contact Central Scheduling at [phone] right away to schedule the test.  Once scheduled, the [facility] Centralized Referral Team will work with your insurance carrier to obtain pre-certification or prior authorization.  Depending on your insurance carrier, approval may take 3-10 days.  It is highly recommended patients assure they have received an authorization before having a test performed.  If test is done without insurance authorization, patient may be responsible for the entire amount billed.      Precertification and Prior Authorizations:  If your physician has recommended that you have a procedure or additional testing performed the [facility]  Centralized Referral Team will contact your insurance carrier to obtain pre-certification or prior authorization.    You are strongly encouraged to contact your insurance carrier to verify that your procedure/test has been approved and is a COVERED benefit.  Although the [facility] Centralized Referral Team does its due diligence, the insurance carrier gives the disclaimer that \"Although the procedure is authorized, this does not guarantee payment.\"    Ultimately the patient is responsible for payment.   Thank you for your understanding in this matter.  Paperwork Completion:  If you require FMLA or disability paperwork for your recovery, please make sure to either drop it off or have it faxed to our office at [phone]. Be sure the form has your name and date of birth on it.  The form will be faxed to our Forms Department and they will complete it for you.  There is a 25$ fee for all forms that need to be filled out.  Please be aware there is a 10-14 day turnaround time.  You will need to sign a release of information (ANGELINE) form if your paperwork does not come with one.  You may call the Forms Department with any questions at [phone].  Their fax number is [phone].

## 2024-07-19 ENCOUNTER — OFFICE VISIT (OUTPATIENT)
Facility: CLINIC | Age: 84
End: 2024-07-19
Payer: MEDICARE

## 2024-07-19 VITALS
DIASTOLIC BLOOD PRESSURE: 70 MMHG | WEIGHT: 128 LBS | SYSTOLIC BLOOD PRESSURE: 139 MMHG | HEIGHT: 65.98 IN | HEART RATE: 57 BPM | BODY MASS INDEX: 20.57 KG/M2

## 2024-07-19 DIAGNOSIS — E03.8 SUBCLINICAL HYPOTHYROIDISM: ICD-10-CM

## 2024-07-19 DIAGNOSIS — Z99.89 USES WALKER: ICD-10-CM

## 2024-07-19 DIAGNOSIS — E78.00 HYPERCHOLESTEROLEMIA: ICD-10-CM

## 2024-07-19 DIAGNOSIS — I70.0 AORTIC ATHEROSCLEROSIS (HCC): ICD-10-CM

## 2024-07-19 DIAGNOSIS — I10 PRIMARY HYPERTENSION: ICD-10-CM

## 2024-07-19 DIAGNOSIS — S32.039A CLOSED FRACTURE OF THIRD LUMBAR VERTEBRA, UNSPECIFIED FRACTURE MORPHOLOGY, INITIAL ENCOUNTER (HCC): ICD-10-CM

## 2024-07-19 DIAGNOSIS — M80.00XS OSTEOPOROSIS WITH CURRENT PATHOLOGICAL FRACTURE, UNSPECIFIED OSTEOPOROSIS TYPE, SEQUELA: Primary | ICD-10-CM

## 2024-07-19 PROCEDURE — 99214 OFFICE O/P EST MOD 30 MIN: CPT | Performed by: INTERNAL MEDICINE

## 2024-07-19 RX ORDER — CALCIUM/D3/MAG/K2/MIN/HERB 326 333-32 MG
TABLET ORAL
COMMUNITY

## 2024-07-19 NOTE — PROGRESS NOTES
Reason for Visit:   osteoporosis with L2 and L3 compression fractures    Requesting Physician:   ..Enzo Gaviria MD    CHIEF COMPLAINT:    Chief Complaint   Patient presents with    Osteoporosis     Pt is in for a Osteoporosis follow up        HISTORY OF PRESENT ILLNESS:   Armando Forrester is a 83 year old male who presents with  osteoporosis with L2 and L3 compression fractures       last Reclast 5/13/2024  Had cortisone shot in 7/2024  Has not started PT yet   On vit D/ca/fall precautions  He has a list of Q we discussed   Daughter is a doctor so rec' few supplements   using walkers.       Osteoporosis, was Dx in 2024  Had DXA scan in 2024 showed osteopenia   Fragility fracture yes L2 and L3    Height loss yes ~ 3 inches   Denied risk factors: steroid use, alcohol abuse, liver disease, kidney disease, hyperthyroid, seizure medications.   Family history of osteoporosis or fragility fracture: no    Patient is on fall precaution. Using a walker.   Patient is taking Vitamin D and calcium 500 bid   Educated the patient to do wt-bearing exercise, but avoid falls.    1/2024, had lower back pain the day after shoveling wet snow. He thinks he had a fracture then.  Feb 2024 CT showed new compression fracture in L2   4/2024 CT ADRENALS: Unremarkable  Fell on 4/5/2024   CT showed Acute L3 vertebral body fracture with 50% loss of vertebral body height.  Subacute fracture of the L2 vertebral body with 50% loss of vertebral body height has progressed since 2/2/2024.              ASSESSMENT AND PLAN:    83 year old  male who presents with  osteoporosis with L2 and L3 compression fractures   We did w/u for secondary osteoporosis which came back negative.    D/w pt fall precautions.   last Reclast 5/13/2024  Plan   RTC in 1 year and labs before  Refer to PT   Take vitamin D3 2000 international unit a day and calcium 600 mg twice a day or 3 servings of dairy a day   Do weight bearing exercise   Follow fall precautions       Side  effect from osteoporosis meds   Hypocalcemia: Adequately supplement calcium and vitamin D during  Osteonecrosis of the Jaw: Monitor for symptoms.   Atypical Femoral Fracture: Evaluate new or unusual thigh, hip, or groin    PAST MEDICAL HISTORY:   Past Medical History:    Aortic atherosclerosis (HCC)    CT scan 6-16    BPH (benign prostatic hyperplasia)    With abnormal PSA, Rx Proscar    Diverticulosis    Glaucoma    Herpes zoster    Left face    High blood pressure    High cholesterol    Hx of adenomatous colonic polyps    Hypercholesterolemia    Hypertension    Iron deficiency anemia    Ischemic colitis (HCC)    Recurrent 12-21    Osteoporosis    T9 compression fracture, T score -3.8 spine and -0.9 hip, Rx Fosamax; repeat T score -2.5 spine and -1.0 hip 10-10, repeat T score -0.7 hip and -2.2 lumbar spine 3-16, Fosamax DC'd; L2 compression fracture 1-24; T12 L2 L3 compression fracture 4-24; DEXA 4-24 with T-score -1.6 femoral neck and +2.5 lumbar spine    Peptic ulcer disease    SCC (squamous cell carcinoma)    Left temple    Subclinical hypothyroidism       PAST SURGICAL HISTORY:   Past Surgical History:   Procedure Laterality Date    Appendectomy  1961    Back surgery Left 12/2011    L4-L5 microdiscectomy    Cataract Left 10/2010    Cataract Right 12/2010    Colonoscopy  01/2016    Inguinal hernia repair Bilateral 1970    Ir kyphoplasty  04/08/2024    T12, L2, L3    Other  01/2017    Excision SCCa left temple    Skin surgery Right 10/28/2016    Excision epidermal inclusion cyst back    Spinal fusion  10/2011    L4-L5    Tonsillectomy  1968       CURRENT MEDICATIONS:     Multiple Vitamins-Minerals (ALGAE BASED CALCIUM) Oral Tab Take by mouth. Taking 2 tablets once at bedtime      Multiple Vitamins-Minerals (PRESERVISION AREDS) Oral Tab Take 2 tablets by mouth daily.      Calcium Carbonate-Vitamin D (CALCIUM 600 + D OR) Take 1 tablet by mouth 2 (two) times daily.         ALLERGIES:  Allergies   Allergen Reactions     Clindamycin OTHER (SEE COMMENTS)     Angioedema    Lisinopril OTHER (SEE COMMENTS)     Angioedema       SOCIAL HISTORY:    Social History     Socioeconomic History    Marital status:     Number of children: 2   Occupational History    Occupation: [employer]     Comment: part time    Occupation:  High teacher, language arts     Comment: retired   Tobacco Use    Smoking status: Former     Current packs/day: 0.00     Types: Cigarettes     Quit date: 1957     Years since quittin.4    Smokeless tobacco: Never   Vaping Use    Vaping status: Never Used   Substance and Sexual Activity    Alcohol use: Yes     Alcohol/week: 1.0 standard drink of alcohol     Types: 1 Glasses of wine per week     Comment: Occasional glass of wine    Drug use: No   Other Topics Concern    Pt has a pacemaker No    Pt has a defibrillator No    Reaction to local anesthetic No    Caffeine Concern Yes     Comment: coffee, 3cups/day    Exercise No       FAMILY HISTORY:   Family History   Problem Relation Age of Onset    Heart Disease Father         CHF age 68    Hypertension Mother     Other (Kidney Cancer[other]) Brother         PHYSICAL EXAM:   Height: 5' 5.98\" (167.6 cm) (747)  Weight: 128 lb (58.1 kg) (747)  BSA (Calculated - sq m): 1.65 sq meters (747)  Pulse: 57 (747)  BP: 139/70 (747)  Temp: --  Do Not Use - Resp Rate: --  SpO2: --    Body mass index is 20.67 kg/m².  Uses walker   CONSTITUTIONAL:  Awake and alert. Age appropriate, good hygiene not in acute distress. Well-nourished and well developed. no acute distress   PSYCH:   Orientated to time, place, person & situation, Normal mood and affect, memory intact, normal insight and judgment, cooperative  Neuro: speech is clear. Awake, alert, no aphasia, no facial asymmetry, no nuchal rigidity  EYES:  No proptosis, no ptosis, conjunctiva normal  ENT:  Normocephalic, atraumatic       DATA:     Pertinent data   Latest Reference Range & Units  04/27/24 08:35   VITAMIN D, 25-OH, TOTAL 30.0 - 100.0 ng/mL 63.8        4/27/2024 SPEP   Essentially normal.    No evidence of monoclonal proteins identified.      Latest Reference Range & Units 04/27/24 08:35   Vit D 1,25 di-OH 24.8 - 81.5 pg/mL 32.2      Latest Reference Range & Units 04/27/24 08:35   TSH 0.550 - 4.780 mIU/mL 4.772   PTH INTACT 18.5 - 88.0 pg/mL 40.1   Cortisol ug/dL 19.0   Prolactin 2.1 - 17.7 ng/mL 8.7      Latest Reference Range & Units 04/27/24 08:35   CALCIUM 8.7 - 10.4 mg/dL 9.6   BUN/CREATININE RATIO 10.0 - 20.0  28.6 (H)   EGFR >=60 mL/min/1.73m2 70       Latest Reference Range & Units 04/27/24 08:39   PROTEIN URINE CALC <149.1 mg/24 hr 237.6 (H)   Urine Volume 24Hr mL  mL 1,650  1,650   CALCIUM URINE CALC 100 - 300 mg/24hr 281   Creatinine, Urine Not Estab. mg/dL 67.6       Latest Reference Range & Units 04/27/24 08:39   URINE BENCE MARINELLI PROTEIN 24HR  24-hour urine concentrated 50X is negative for Free Monoclonal Light Chains (Bence Marinelli Protein).     Cortisol Free 24h Ur 3 - 49 ug/24 hr 20   Cortisol Free Ur Undefined ug/L 12   Creat 24h Ur 1000 - 2000 mg/24 hr 1115      Latest Reference Range & Units 04/27/24 08:35   Sex Horm Bind Glob 19.3 - 76.4 nmol/L 57.2   Testost % Free+Weak Bnd 9.0 - 46.0 % 9.2   Testost Free+Weak Bnd 40.0 - 250.0 ng/dL 40.3   Testosterone Tot LC/.0 - 916.0 ng/dL 438.5      Latest Reference Range & Units 04/09/24 05:38   EGFR >=60 mL/min/1.73m2 85      Latest Reference Range & Units Most Recent   VITAMIN D, 25-OH, TOTAL 30.0 - 100.0 ng/mL 47.1  3/2/22 07:33      Latest Reference Range & Units Most Recent   T4,Free (Direct) 0.8 - 1.7 ng/dL 1.0  3/27/24 07:58   TSH 0.550 - 4.780 mIU/mL 5.580 (H)  3/27/24 07:58      DXA 2024CONCLUSION:   1. Scans of the lumbar spine, left forearm and left hip are consistent with osteopenia, which according to World Health Organization criteria place the patient at a mild-to-moderately increased risk for fracture.      2.  Compared to the prior study, bone mineral density has decreased in the left hip.  No prior comparison studies of the left forearm are available.  Please note that assessment for change in bone mineral density in the lumbar spine is limited as the   patient underwent interval kyphoplasties at L1, L3 and L4 since the 2016 DEXA scan.      3. Based on left femoral neck bone mineral density, the FRAX 10 year probability of a major osteoporotic fracture is 9.0% and the 10 year probability of a hip fracture is 3.3%.      No results for input(s): \"TSH\", \"T4F\", \"T3F\", \"THYP\" in the last 72 hours.  No results found.    Orders Placed This Encounter   Procedures    TSH W Reflex To Free T4    Comp Metabolic Panel [E]     Orders Placed This Encounter    TSH W Reflex To Free T4     Standing Status:   Future     Number of Occurrences:   1     Standing Expiration Date:   7/19/2025    Comp Metabolic Panel [E]     Standing Status:   Future     Number of Occurrences:   1     Standing Expiration Date:   7/19/2025     Order Specific Question:   Release to patient     Answer:   Immediate    Multiple Vitamins-Minerals (ALGAE BASED CALCIUM) Oral Tab     Sig: Take by mouth. Taking 2 tablets once at bedtime          This is a specialized patient consultation in endocrinology and required comprehensive review of prior records, as well as current evaluation, with time required for consideration of complex endocrine issues and consultation. For this visit, I personally interviewed the patient, and family member if accompanied, performed the pertinent parts of the history and physical examination. ROS included screening for appropriate endocrine conditions.   Today's diagnosis and plan were reviewed in detail with the patient who states understanding and agrees with plan. I discussed with the patient possible diagnosis, differential diagnosis, need for work up, treatment options, alternatives and side effects.     Please see note for details  about time spent which includes:   · pre-visit preparation  · reviewing records  · face to face time with the patient   · timely documentation of the encounter  · ordering medications/tests  · communication with care team  · care coordination    I appreciate the opportunity to be part of your patient's medical care and will keep you, as the referring and primary physicians, informed about the care of your patient. Please feel free to contact me should you have any questions.    The 21st Century Cures Act makes medical notes like these available to patients in the interest of transparency. Please be advised this is a medical document. Medical documents are intended to carry relevant information, facts as evident, and the clinical opinion of the practitioner. The medical note is intended as peer to peer communication and may appear blunt or direct. It is written in medical language and may contain abbreviations or verbiage that are unfamiliar.   Maria R Yanes MD

## 2024-07-19 NOTE — PATIENT INSTRUCTIONS
RTC in 1 year and labs before  Refer to PT   Take vitamin D3 2000 international unit a day and calcium 600 mg twice a day or 3 servings of dairy a day   Do weight bearing exercise   Follow fall precautions

## 2024-07-19 NOTE — CHRONIC PAIN
Interventional Pain Medicine  New Patient Note    Subjective:     Interval History:  Patient is seen today for follow up after TFESI reporting minimal pain relief. HE continues to experience back and right flank pain aggravated with walking and activity.     Chief Complaint: Follow - Up (07.08.24-Bilateral TLESI L2)     History of Present Illness:  Armando Forrester is a 83 year old male presents for Follow - Up (07.08.24-Bilateral TLESI L2). He was recently discharged from the hospital after presenting with worsening low back pain and found to have multiple compression fractures treated with kyphoplasty with IR service. He started feeling better but shortly after discharge he reports he had a fairly abrupt return of his back pain. Pain radiates to hips and upper thigh.  Patient is also following up with interval medicine and endocrinology and undergoing Reclast therapy for osteoporosis.       Current Medication:    HYDROcodone-acetaminophen 5-325 MG Oral Tab Take 1 tablet by mouth every 12 (twelve) hours. 60 tablet 0    atorvastatin 20 MG Oral Tab Take 1 tablet (20 mg total) by mouth every evening. 90 tablet 3    amLODIPine 10 MG Oral Tab Take 1 tablet (10 mg total) by mouth daily. 90 tablet 3    latanoprost 0.005 % Ophthalmic Solution Place 1 drop into both eyes nightly.      Ascorbic Acid (VITAMIN C) 500 MG Oral Cap Take 1 tablet by mouth daily.      Multiple Vitamins-Minerals (PRESERVISION AREDS) Oral Tab Take 2 tablets by mouth daily.      Omega-3 Fatty Acids (FISH OIL OR) Take 1 capsule by mouth 3 (three) times daily.      Calcium Carbonate-Vitamin D (CALCIUM 600 + D OR) Take 1 tablet by mouth 2 (two) times daily.      Coenzyme Q10 (COQ10 OR) Take 1 capsule by mouth daily.      Probiotic Product (PROBIOTIC OR) Take 1 capsule by mouth daily.      Boswellia Judah (BOSWELLIA OR) Take 2 tablets by mouth 3 (three) times daily.      Cholecalciferol (VITAMIN D3) 1000 UNITS Oral Cap Take  by mouth. take 1 daily            Other Medical History  Past Medical History:    Aortic atherosclerosis (HCC)    CT scan 6-16    BPH (benign prostatic hyperplasia)    With abnormal PSA, Rx Proscar    Diverticulosis    Glaucoma    Herpes zoster    Left face    High blood pressure    High cholesterol    Hx of adenomatous colonic polyps    Hypercholesterolemia    Hypertension    Iron deficiency anemia    Ischemic colitis (HCC)    Recurrent 12-21    Osteoporosis    T9 compression fracture, T score -3.8 spine and -0.9 hip, Rx Fosamax; repeat T score -2.5 spine and -1.0 hip 10-10, repeat T score -0.7 hip and -2.2 lumbar spine 3-16, Fosamax DC'd; L2 compression fracture 1-24; T12 L2 L3 compression fracture 4-24; DEXA 4-24 with T-score -1.6 femoral neck and +2.5 lumbar spine    Peptic ulcer disease    SCC (squamous cell carcinoma)    Left temple    Subclinical hypothyroidism       Review of Systems:  CONSTITUTIONAL: denies fevers, chills  HEENT: denies swallowing difficulties, sore throat  CARDIOVASCULAR: denies chest pain, palpitations, syncope  RESPIRATORY: denies shortness of breath, cough, wheezing  GI: denies change in bowel habits, nausea, vomiting  : denies change in bladder function, frequency, dysuria  SKIN: denies rash, skin changes  MSK: Per HPI  NEURO: Per HPI  PSYCH: Per HPI      General Physical Exam:    Constitutional  Oriented to person, place, and time. Appears well-developed and   well-nourished  Head    Normocephalic and atraumatic.  Eyes    Pupils are equal, round, and reactive to light.  Neck    Neck supple  Cardiovascular  Minimal to no peripheral edema, intact distal pulses  Pulmonary/Chest  Effort normal, no shortness of breath noted  Neurological   Alert and oriented to person, place, and time  Skin    Skin is warm and dry  Psychiatric   Normal mood and affect, behavior and judgment    Neurologic & Musculoskeletal Exam    - significant scoliosis  - upper lumbar and lower thoracic mid point tenderness noted.      Lumbar    Region Exam Right (+/-) Left  (+/-)   Lumbar Musculature Tender w/ palpation - -   Lumbar Facet Pain w/ extension - -         Lower Extremity SLR + -   Lower Extremity Crossed SLR - -       Sensation Right Left   L2: Proximal Anterior Thigh Normal Normal   L3: Mid Anterior Thigh Normal Normal   L4: Medial leg/foot, great toe (Saphenous n.) Normal Normal   L5: Dorsum of mid foot Normal Normal   S1: Lateral leg/foot, little toe, back of leg (Sural n.) Normal Normal       Motor Strength Right Left   L2:  5/5 5/5   L3:  5/5 5/5   L4:  5/5 5/5   L5:  5/5 5/5   S1:  5/5 5/5       Reflexes Right Left   L4: Patellar 2/4 2/4   S1: Achilles 2/4 2/4   Babinski Absent Absent   Clonus Absent Absent     Hip    Exam Right Left   FADIR - -   Greater Trochanter tenderness - -   Piriformis tenderness - -       Knee    Exam Right Left   Palpation tender - -   Effusion - -   ACL/PCL - -   Valgus/Varus - -   Gilma's - -         Imaging:  MRI Thoracic spine  1. Note is made of transitional lumbosacral anatomy with 6 non-rib-bearing lumbar type vertebral bodies, which are numbered L1-L6 for the purposes of this exam.   2. Acute or subacute moderate T11 vertebral body compression fracture.  There is 4-5 mm retropulsion of superior fracture fragments, which result in ventral thecal sac effacement and minor spinal canal stenosis.  Also noted is a small amount of liquified    hematoma along the anterior aspect of the T11 vertebral body.  Note that this was referred to as the T10 vertebral body on prior lumbar radiographs (which did not account for aforementioned transitional lumbosacral anatomy).   3. Late subacute to chronic L1 vertebral body compression fracture with kyphoplasty at this level.   4. Other mild-to-moderate chronic compression fractures at the T7, T9, and T10 vertebrae.   5. Moderate to severe S-shaped thoracolumbar scoliosis.   6. Multilevel thoracic spine degenerative changes as detailed.  Resultant bilateral  neural foraminal stenoses at T10-T11 and T11-T12 as described.   7. Normal caliber and signal characteristics of the thoracic spinal cord.   8. Trace dependent bilateral pleural effusions.     Assessment & Plan:  Armando Forrester is a 83 year old male with back and leg pain     #Thoracic compression fracture   - Discussed the different options of management including conservative management and interventional T11 kyphoplasty. Patient elected to proceed with kyphoplasty. Explained the procedure in details including intended outcome, risks and complications.   - Will obtain CT scan thoracic spine for procedural planning   - Patient Will follow up with Dr Yanes for management of osteoporosis       Comprehensive analgesic plan was formulated. Conservative vs. Aggressive measures were discussed at length including pharmacotherapy (eg. Anti- inflammatories, muscle relaxants, neuropathic medications, oral steroids, analgesics), injections, and further testing. Risks and benefits of all options were discussed at length to patients satisfaction during a comprehensive interactive discussion. All questions were answered during extended questions and answer session. Patient agreeable to discussion plan. Greater than 50% of the time was spent with counseling (nature of discussion centered around pain, therapy, and treatment options), face to face time, time spent reviewing data, obtaining patient information and discussing the care with the patients health care providers.     Time spent: 30    Bryce Wilder MD, 07/18/24, 10:25 PM

## 2024-07-26 DIAGNOSIS — S32.020A COMPRESSION FRACTURE OF L2 VERTEBRA, INITIAL ENCOUNTER (HCC): ICD-10-CM

## 2024-07-29 ENCOUNTER — OFFICE VISIT (OUTPATIENT)
Dept: PODIATRY CLINIC | Facility: CLINIC | Age: 84
End: 2024-07-29
Payer: MEDICARE

## 2024-07-29 DIAGNOSIS — G60.9 IDIOPATHIC POLYNEUROPATHY: Primary | ICD-10-CM

## 2024-07-29 DIAGNOSIS — B35.1 ONYCHOMYCOSIS: ICD-10-CM

## 2024-07-29 PROCEDURE — 99213 OFFICE O/P EST LOW 20 MIN: CPT | Performed by: STUDENT IN AN ORGANIZED HEALTH CARE EDUCATION/TRAINING PROGRAM

## 2024-07-29 NOTE — PROGRESS NOTES
[facility] Podiatry  Progress Note      Armando Forrester is a 83 year old male.   Chief Complaint   Patient presents with    Toenail Care     New pt- nail trim and foot check -denies pain- was seen by Dr. Castellanos on 12/18/2023             HPI:     Patient is a pleasant 83-year-old male presents to clinic for bilateral foot evaluation.  Patient has difficulty trimming his own toenails due to back surgery.    Allergies: Clindamycin and Lisinopril    Current Outpatient Medications   Medication Sig Dispense Refill    Multiple Vitamins-Minerals (ALGAE BASED CALCIUM) Oral Tab Take by mouth. Taking 2 tablets once at bedtime      HYDROcodone-acetaminophen 5-325 MG Oral Tab Take 1 tablet by mouth every 12 (twelve) hours. 60 tablet 0    atorvastatin 20 MG Oral Tab Take 1 tablet (20 mg total) by mouth every evening. 90 tablet 3    amLODIPine 10 MG Oral Tab Take 1 tablet (10 mg total) by mouth daily. 90 tablet 3    latanoprost 0.005 % Ophthalmic Solution Place 1 drop into both eyes nightly.      Ascorbic Acid (VITAMIN C) 500 MG Oral Cap Take 1 tablet by mouth daily.      Multiple Vitamins-Minerals (PRESERVISION AREDS) Oral Tab Take 2 tablets by mouth daily.      Omega-3 Fatty Acids (FISH OIL OR) Take 1 capsule by mouth 3 (three) times daily.      Calcium Carbonate-Vitamin D (CALCIUM 600 + D OR) Take 1 tablet by mouth 2 (two) times daily.      Coenzyme Q10 (COQ10 OR) Take 1 capsule by mouth daily.      Probiotic Product (PROBIOTIC OR) Take 1 capsule by mouth daily.      Boswellia Judah (BOSWELLIA OR) Take 2 tablets by mouth 3 (three) times daily.      Cholecalciferol (VITAMIN D3) 1000 UNITS Oral Cap Take  by mouth. take 1 daily        Past Medical History:    Aortic atherosclerosis (HCC)    CT scan 6-16    BPH (benign prostatic hyperplasia)    With abnormal PSA, Rx Proscar    Diverticulosis    Glaucoma    Herpes zoster    Left face    High blood pressure    High cholesterol    Hx of adenomatous colonic polyps     Hypercholesterolemia    Hypertension    Iron deficiency anemia    Ischemic colitis (HCC)    Recurrent 12-21    Osteoporosis    T9 compression fracture, T score -3.8 spine and -0.9 hip, Rx Fosamax; repeat T score -2.5 spine and -1.0 hip 10-10, repeat T score -0.7 hip and -2.2 lumbar spine 3-16, Fosamax DC'd; L2 compression fracture 1-24; T12 L2 L3 compression fracture 4-24; DEXA 4-24 with T-score -1.6 femoral neck and +2.5 lumbar spine    Peptic ulcer disease    SCC (squamous cell carcinoma)    Left temple    Subclinical hypothyroidism      Past Surgical History:   Procedure Laterality Date    Appendectomy      Back surgery Left 2011    L4-L5 microdiscectomy    Cataract Left 10/2010    Cataract Right 2010    Colonoscopy  2016    Inguinal hernia repair Bilateral 1970    Ir kyphoplasty  2024    T12, L2, L3    Other  2017    Excision SCCa left temple    Skin surgery Right 10/28/2016    Excision epidermal inclusion cyst back    Spinal fusion  10/2011    L4-L5    Tonsillectomy  1968      Family History   Problem Relation Age of Onset    Heart Disease Father         CHF age 68    Hypertension Mother     Other (Kidney Cancer[other]) Brother       Social History     Socioeconomic History    Marital status:     Number of children: 2   Occupational History    Occupation: [employer]     Comment: part time    Occupation:  High teacher, [employer]     Comment: retired   Tobacco Use    Smoking status: Former     Current packs/day: 0.00     Types: Cigarettes     Quit date: 1957     Years since quittin.4    Smokeless tobacco: Never   Vaping Use    Vaping status: Never Used   Substance and Sexual Activity    Alcohol use: Yes     Alcohol/week: 1.0 standard drink of alcohol     Types: 1 Glasses of wine per week     Comment: Occasional glass of wine    Drug use: No   Other Topics Concern    Pt has a pacemaker No    Pt has a defibrillator No    Reaction to local anesthetic No    Caffeine Concern  Yes     Comment: coffee, 3cups/day    Exercise No           REVIEW OF SYSTEMS:     Denies nause, fever, chills  No calf pain  Denies chest pain or SOB      EXAM:   There were no vitals taken for this visit.  GENERAL: well developed, well nourished, in no apparent distress  EXTREMITIES:   1. Integument: Normal skin temperature and turgor.Toenails x10 are elongated, thickened and discolored with subungal derbi.     2. Vascular: Dorsalis pedis two out of four bilateral and posterior tibial pulses two out of   four bilateral, capillary refill normal.   3. Musculoskeletal: All muscle groups are graded 5 out of 5 in the foot and ankle.   4. Neurological: Normal sharp dull sensation; reflexes normal.             ASSESSMENT AND PLAN:   There are no diagnoses linked to this encounter.    Plan:       -Patient examined, chart history reviewed.  -Discussed importance of proper pedal hygiene, regular foot checks, and tight glucose control.  -Sharply debrided nails x10 with a sterile nail nipper achieving a 20% reduction in thickness and length, without incident.   -Ambulate with supportive shoes and inserts and avoid walking barefoot.  -Educated patient on acute signs of infection advised patient to seek immediate medical attention if symptoms arise.    RTC in December for nail care    The patient indicates understanding of these issues and agrees to the plan.        Mamta Kamara DPM

## 2024-07-29 NOTE — TELEPHONE ENCOUNTER
Refill Request    Medication request: TRAMADOL 50 MG Oral Tab.  TAKE ONE TABLET BY MOUTH EVERY SIX HOURS AS NEEDED FOR PAIN      LOV:3/11/2024 Kannan Gutierrez, DO   Due back to clinic per last office note:  MRI of the left lumbar spine and hip and follow-up after   NOV: Visit date not found      [place]: 7/8/2024 #15  (3 days)    Urine drug screen (if applicable): none  Pain contract: None    LOV plan (if weaning or changing medications): Start tramadol 50 mg as needed for pain

## 2024-08-01 RX ORDER — TRAMADOL HYDROCHLORIDE 50 MG/1
50 TABLET ORAL EVERY 6 HOURS PRN
Qty: 15 TABLET | Refills: 0 | Status: SHIPPED | OUTPATIENT
Start: 2024-08-01

## 2024-08-08 ENCOUNTER — HOSPITAL ENCOUNTER (OUTPATIENT)
Dept: CT IMAGING | Facility: HOSPITAL | Age: 84
Discharge: HOME | End: 2024-08-08
Attending: STUDENT IN AN ORGANIZED HEALTH CARE EDUCATION/TRAINING PROGRAM
Payer: MEDICARE

## 2024-08-08 DIAGNOSIS — S22.000A COMPRESSION FRACTURE OF BODY OF THORACIC VERTEBRA (HCC): ICD-10-CM

## 2024-08-08 PROCEDURE — 72128 CT CHEST SPINE W/O DYE: CPT | Performed by: STUDENT IN AN ORGANIZED HEALTH CARE EDUCATION/TRAINING PROGRAM

## 2024-08-09 ENCOUNTER — TELEPHONE (OUTPATIENT)
Dept: PAIN CLINIC | Facility: HOSPITAL | Age: 84
End: 2024-08-09

## 2024-08-09 NOTE — TELEPHONE ENCOUNTER
Patient called in today to make an appointment to see Dr. Wilder after he completed his CT scan ordered by the .    Appointment is made for 8/19/20-24

## 2024-08-15 ENCOUNTER — OFFICE VISIT (OUTPATIENT)
Dept: PAIN CLINIC | Facility: HOSPITAL | Age: 84
End: 2024-08-15
Attending: STUDENT IN AN ORGANIZED HEALTH CARE EDUCATION/TRAINING PROGRAM
Payer: MEDICARE

## 2024-08-15 VITALS
BODY MASS INDEX: 21 KG/M2 | OXYGEN SATURATION: 98 % | HEART RATE: 54 BPM | WEIGHT: 128 LBS | DIASTOLIC BLOOD PRESSURE: 61 MMHG | SYSTOLIC BLOOD PRESSURE: 127 MMHG

## 2024-08-15 DIAGNOSIS — S22.000A COMPRESSION FRACTURE OF BODY OF THORACIC VERTEBRA (HCC): Primary | ICD-10-CM

## 2024-08-15 PROCEDURE — 99211 OFF/OP EST MAY X REQ PHY/QHP: CPT

## 2024-08-15 NOTE — CHRONIC PAIN
Interventional Pain Medicine  Follow up visit     Subjective:     Interval History:  Patient is seen today for follow up after TFESI reporting minimal pain relief. He continues to experience back and right flank pain aggravated with walking and activity.  We arranged a CT scan thoracic spine which showed worsening compression deformity at T11.     Chief Complaint: Follow - Up (Imaging review )     History of Present Illness:  Armando Forrester is a 83 year old male presents for Follow - Up (Imaging review ). He was recently discharged from the hospital after presenting with worsening low back pain and found to have multiple compression fractures treated with kyphoplasty with IR service. He started feeling better but shortly after discharge he reports he had a fairly abrupt return of his back pain. Pain radiates to hips and upper thigh.  Patient is also following up with interval medicine and endocrinology and undergoing Reclast therapy for osteoporosis.       Current Medication:    TRAMADOL 50 MG Oral Tab TAKE ONE TABLET BY MOUTH EVERY SIX HOURS AS NEEDED FOR PAIN 15 tablet 0    Multiple Vitamins-Minerals (ALGAE BASED CALCIUM) Oral Tab Take by mouth. Taking 2 tablets once at bedtime      HYDROcodone-acetaminophen 5-325 MG Oral Tab Take 1 tablet by mouth every 12 (twelve) hours. 60 tablet 0    atorvastatin 20 MG Oral Tab Take 1 tablet (20 mg total) by mouth every evening. 90 tablet 3    amLODIPine 10 MG Oral Tab Take 1 tablet (10 mg total) by mouth daily. 90 tablet 3    latanoprost 0.005 % Ophthalmic Solution Place 1 drop into both eyes nightly.      Ascorbic Acid (VITAMIN C) 500 MG Oral Cap Take 1 tablet by mouth daily.      Multiple Vitamins-Minerals (PRESERVISION AREDS) Oral Tab Take 2 tablets by mouth daily.      Omega-3 Fatty Acids (FISH OIL OR) Take 1 capsule by mouth 3 (three) times daily.      Calcium Carbonate-Vitamin D (CALCIUM 600 + D OR) Take 1 tablet by mouth 2 (two) times daily.      Coenzyme Q10  (COQ10 OR) Take 1 capsule by mouth daily.      Probiotic Product (PROBIOTIC OR) Take 1 capsule by mouth daily.      Boswellia Judah (BOSWELLIA OR) Take 2 tablets by mouth 3 (three) times daily.      Cholecalciferol (VITAMIN D3) 1000 UNITS Oral Cap Take  by mouth. take 1 daily           Other Medical History  Past Medical History:    Aortic atherosclerosis (HCC)    CT scan 6-16    BPH (benign prostatic hyperplasia)    With abnormal PSA, Rx Proscar    Diverticulosis    Glaucoma    Herpes zoster    Left face    High blood pressure    High cholesterol    Hx of adenomatous colonic polyps    Hypercholesterolemia    Hypertension    Iron deficiency anemia    Ischemic colitis (HCC)    Recurrent 12-21    Osteoporosis    T9 compression fracture, T score -3.8 spine and -0.9 hip, Rx Fosamax; repeat T score -2.5 spine and -1.0 hip 10-10, repeat T score -0.7 hip and -2.2 lumbar spine 3-16, Fosamax DC'd; L2 compression fracture 1-24; T12 L2 L3 compression fracture 4-24; DEXA 4-24 with T-score -1.6 femoral neck and +2.5 lumbar spine    Peptic ulcer disease    SCC (squamous cell carcinoma)    Left temple    Subclinical hypothyroidism       Review of Systems:  CONSTITUTIONAL: denies fevers, chills  HEENT: denies swallowing difficulties, sore throat  CARDIOVASCULAR: denies chest pain, palpitations, syncope  RESPIRATORY: denies shortness of breath, cough, wheezing  GI: denies change in bowel habits, nausea, vomiting  : denies change in bladder function, frequency, dysuria  SKIN: denies rash, skin changes  MSK: Per HPI  NEURO: Per HPI  PSYCH: Per HPI      General Physical Exam:    Constitutional  Oriented to person, place, and time. Appears well-developed and   well-nourished  Head    Normocephalic and atraumatic.  Eyes    Pupils are equal, round, and reactive to light.  Neck    Neck supple  Cardiovascular  Minimal to no peripheral edema, intact distal pulses  Pulmonary/Chest  Effort normal, no shortness of breath noted  Neurological    Alert and oriented to person, place, and time  Skin    Skin is warm and dry  Psychiatric   Normal mood and affect, behavior and judgment    Neurologic & Musculoskeletal Exam    - significant scoliosis  - upper lumbar and lower thoracic mid point tenderness noted.     Lumbar    Region Exam Right (+/-) Left  (+/-)   Lumbar Musculature Tender w/ palpation - -   Lumbar Facet Pain w/ extension - -         Lower Extremity SLR + -   Lower Extremity Crossed SLR - -       Sensation Right Left   L2: Proximal Anterior Thigh Normal Normal   L3: Mid Anterior Thigh Normal Normal   L4: Medial leg/foot, great toe (Saphenous n.) Normal Normal   L5: Dorsum of mid foot Normal Normal   S1: Lateral leg/foot, little toe, back of leg (Sural n.) Normal Normal       Motor Strength Right Left   L2:  5/5 5/5   L3:  5/5 5/5   L4:  5/5 5/5   L5:  5/5 5/5   S1:  5/5 5/5       Reflexes Right Left   L4: Patellar 2/4 2/4   S1: Achilles 2/4 2/4   Babinski Absent Absent   Clonus Absent Absent     Hip    Exam Right Left   FADIR - -   Greater Trochanter tenderness - -   Piriformis tenderness - -       Knee    Exam Right Left   Palpation tender - -   Effusion - -   ACL/PCL - -   Valgus/Varus - -   Gilma's - -         Imaging:  MRI Thoracic spine  1. Note is made of transitional lumbosacral anatomy with 6 non-rib-bearing lumbar type vertebral bodies, which are numbered L1-L6 for the purposes of this exam.   2. Acute or subacute moderate T11 vertebral body compression fracture.  There is 4-5 mm retropulsion of superior fracture fragments, which result in ventral thecal sac effacement and minor spinal canal stenosis.  Also noted is a small amount of liquified    hematoma along the anterior aspect of the T11 vertebral body.  Note that this was referred to as the T10 vertebral body on prior lumbar radiographs (which did not account for aforementioned transitional lumbosacral anatomy).   3. Late subacute to chronic L1 vertebral body compression fracture  with kyphoplasty at this level.   4. Other mild-to-moderate chronic compression fractures at the T7, T9, and T10 vertebrae.   5. Moderate to severe S-shaped thoracolumbar scoliosis.   6. Multilevel thoracic spine degenerative changes as detailed.  Resultant bilateral neural foraminal stenoses at T10-T11 and T11-T12 as described.   7. Normal caliber and signal characteristics of the thoracic spinal cord.   8. Trace dependent bilateral pleural effusions.     Assessment & Plan:  Armando Forrester is a 83 year old male with back and leg pain     #Thoracic compression fracture   - Discussed the different options of management including conservative management and interventional T11 kyphoplasty. Patient elected to proceed with kyphoplasty. Explained the procedure in details including intended outcome, risks and complications.   - Will proceed with T11 kythoplasty   - Patient Will follow up with Dr Yanes for management of osteoporosis       Comprehensive analgesic plan was formulated. Conservative vs. Aggressive measures were discussed at length including pharmacotherapy (eg. Anti- inflammatories, muscle relaxants, neuropathic medications, oral steroids, analgesics), injections, and further testing. Risks and benefits of all options were discussed at length to patients satisfaction during a comprehensive interactive discussion. All questions were answered during extended questions and answer session. Patient agreeable to discussion plan. Greater than 50% of the time was spent with counseling (nature of discussion centered around pain, therapy, and treatment options), face to face time, time spent reviewing data, obtaining patient information and discussing the care with the patients health care providers.     Time spent: 30    Bryce Wilder MD, 08/16/24, 9:59 AM

## 2024-08-15 NOTE — PATIENT INSTRUCTIONS
Refill policies:    Allow 2-3 business days for refills; controlled substances may take longer.  Contact your pharmacy at least 5 days prior to running out of medication and have them send an electronic request or submit request through the “request refill” option in your [employer] account.  Refills are not addressed on weekends; covering physicians do not authorize routine medications on weekends.  No narcotics or controlled substances are refilled after noon on Fridays or by on call physicians.  By law, narcotics must be electronically prescribed.  A 30 day supply with no refills is the maximum allowed.  If your prescription is due for a refill, you may be due for a follow up appointment.  To best provide you care, patients receiving routine medications need to be seen at least once a year.  Patients receiving narcotic/controlled substance medications need to be seen at least once every 3 months.  In the event that your preferred pharmacy does not have the requested medication in stock (e.g. Backordered), it is your responsibility to find another pharmacy that has the requested medication available.  We will gladly send a new prescription to that pharmacy at your request.    Scheduling Tests:    If your physician has ordered radiology tests such as MRI or CT scans, please contact Central Scheduling at [phone] right away to schedule the test.  Once scheduled, the [facility] Centralized Referral Team will work with your insurance carrier to obtain pre-certification or prior authorization.  Depending on your insurance carrier, approval may take 3-10 days.  It is highly recommended patients assure they have received an authorization before having a test performed.  If test is done without insurance authorization, patient may be responsible for the entire amount billed.      Precertification and Prior Authorizations:  If your physician has recommended that you have a procedure or additional testing performed the [facility]  Centralized Referral Team will contact your insurance carrier to obtain pre-certification or prior authorization.    You are strongly encouraged to contact your insurance carrier to verify that your procedure/test has been approved and is a COVERED benefit.  Although the [facility] Centralized Referral Team does its due diligence, the insurance carrier gives the disclaimer that \"Although the procedure is authorized, this does not guarantee payment.\"    Ultimately the patient is responsible for payment.   Thank you for your understanding in this matter.  Paperwork Completion:  If you require FMLA or disability paperwork for your recovery, please make sure to either drop it off or have it faxed to our office at [phone]. Be sure the form has your name and date of birth on it.  The form will be faxed to our Forms Department and they will complete it for you.  There is a 25$ fee for all forms that need to be filled out.  Please be aware there is a 10-14 day turnaround time.  You will need to sign a release of information (ANGELINE) form if your paperwork does not come with one.  You may call the Forms Department with any questions at [phone].  Their fax number is [phone].

## 2024-08-15 NOTE — PROGRESS NOTES
Patient presents in office today with reported pain in his lower back      Current pain level reported = 4/10      Last reported dose of half a Norco        Narcotic Contract renewal 08.15.25         Pain increases with bending and twisting.

## 2024-08-19 ENCOUNTER — TELEPHONE (OUTPATIENT)
Dept: PAIN CLINIC | Facility: HOSPITAL | Age: 84
End: 2024-08-19

## 2024-08-19 NOTE — TELEPHONE ENCOUNTER
Order Questions    Question Answer Comment   Anesthesia Type MAC    Provider Fam    Location [facility]    Procedure Other (please add comment) T11 Kyphoplasty   CPT (Hit enter after each entry) KYPHOPLASTY, THORACIC, 1 LEVEL    Medical clearance requested (will send to Pain Navigator) No    Patient has Medicare coverage? No

## 2024-08-19 NOTE — TELEPHONE ENCOUNTER
Prior Authorization for T11 Kyphoplasty  initiated with Joo HERRON at [employer] Pre Cert Dept ([phone]).  CPT codes: 43367  Request for injection pending review, pending reference #[id number]. Clinical notes and [employer] Pre Cert Form faxed to [phone], confirmation received.     Total Call Time : 16:05

## 2024-08-23 ENCOUNTER — TELEPHONE (OUTPATIENT)
Dept: PAIN CLINIC | Facility: HOSPITAL | Age: 84
End: 2024-08-23

## 2024-08-23 NOTE — TELEPHONE ENCOUNTER
Patient called in today because he had a telephone visit with the Dr. At 8am but hasn't received an call. I informed patient that Dr will ensure follow up with him by the end of the day.

## 2024-09-05 DIAGNOSIS — S32.020A COMPRESSION FRACTURE OF L2 VERTEBRA, INITIAL ENCOUNTER (HCC): ICD-10-CM

## 2024-09-06 NOTE — TELEPHONE ENCOUNTER
Refill Request    Medication request: TRAMADOL 50 MG Oral Tab. TAKE ONE TABLET BY MOUTH EVERY SIX HOURS AS NEEDED FOR PAIN      LOV:3/11/2024 Kannan Gutierrez, DO   Due back to clinic per last office note:  MRI of the left lumbar spine and hip and follow-up after   NOV: Visit date not found      [place]: 8/24/24 #15 (3 days)    Urine drug screen (if applicable): none  Pain contract: none    LOV plan (if weaning or changing medications): Start tramadol 50 mg as needed for pain

## 2024-09-09 RX ORDER — TRAMADOL HYDROCHLORIDE 50 MG/1
50 TABLET ORAL EVERY 6 HOURS PRN
Qty: 15 TABLET | Refills: 0 | Status: SHIPPED | OUTPATIENT
Start: 2024-09-09

## 2024-09-27 ENCOUNTER — TELEPHONE (OUTPATIENT)
Dept: PHYSICAL THERAPY | Facility: HOSPITAL | Age: 84
End: 2024-09-27

## 2024-09-30 ENCOUNTER — OFFICE VISIT (OUTPATIENT)
Dept: PHYSICAL THERAPY | Facility: HOSPITAL | Age: 84
End: 2024-09-30
Attending: INTERNAL MEDICINE
Payer: MEDICARE

## 2024-09-30 DIAGNOSIS — E78.00 HYPERCHOLESTEROLEMIA: ICD-10-CM

## 2024-09-30 DIAGNOSIS — I10 PRIMARY HYPERTENSION: ICD-10-CM

## 2024-09-30 DIAGNOSIS — E03.8 SUBCLINICAL HYPOTHYROIDISM: ICD-10-CM

## 2024-09-30 DIAGNOSIS — S32.039A CLOSED FRACTURE OF THIRD LUMBAR VERTEBRA, UNSPECIFIED FRACTURE MORPHOLOGY, INITIAL ENCOUNTER (HCC): ICD-10-CM

## 2024-09-30 DIAGNOSIS — Z99.89 USES WALKER: ICD-10-CM

## 2024-09-30 DIAGNOSIS — I70.0 AORTIC ATHEROSCLEROSIS (HCC): ICD-10-CM

## 2024-09-30 DIAGNOSIS — M80.00XS OSTEOPOROSIS WITH CURRENT PATHOLOGICAL FRACTURE, UNSPECIFIED OSTEOPOROSIS TYPE, SEQUELA: Primary | ICD-10-CM

## 2024-09-30 PROCEDURE — 97161 PT EVAL LOW COMPLEX 20 MIN: CPT

## 2024-09-30 PROCEDURE — 97110 THERAPEUTIC EXERCISES: CPT

## 2024-09-30 NOTE — PROGRESS NOTES
SPINE EVALUATION:     Diagnosis:     Osteoporosis with current pathological fracture, unspecified osteoporosis type, sequela (M80.00XS)  Closed fracture of third lumbar vertebra, unspecified fracture morphology, initial encounter ([facility]) (S32.440A)      Referring Provider: Maria R Yanes  Date of Evaluation:    9/30/2024    Precautions:  Fall risk, current fx T11, osteoporosis,  Next MD visit:   none scheduled  Date of Surgery: n/a     PATIENT SUMMARY   Armando Forrester is a 83 year old male who presents to therapy today with complaints of LBP, into R flank; some thoracic pain. Pain began back in January of this year. Was in PT earlier d/t compression fracture (likely from shoveling snow). History of compression fractures with kyphoplasty (chronic T12, L2, L3), fusion L4/5, current acute fx at T11. Subacute fx T7, T9, T10, L1.  Lying down (for approx 10-15 mins) relieves pain, sitting helps but not as much as lying down. Worse as the day goes on and with prolonged standing. As pain gets worse he also gets nausea (with prolonged standing taking longer shower, shaving). Denies symptoms after eating. Denies dizziness. No numbness/tingling.     Reports he does not have interest in eating his normal foods, has lower appetite. Reports he is not eating as much and is trying to eat smaller portions. Has lost weight since January.    CT Aug 2024:  CONCLUSION:   1. Subacute-appearing moderate to severe compression fracture at T11, which demonstrates mild progressive height loss since comparison June, 2024 thoracic spine MR. There is sclerosis at the T11 vertebral body and a dominant obliquely oriented fracture   line remains visible.  Please correlate for any interval trauma.  Mild 3-4 mm retropulsion of superior fracture fragments at T11 is similar in comparison to prior and results in mild ventral thecal sac effacement.  Mild surrounding paraspinous edema is   likely reactive.   2. No other acute thoracic spine  compression fractures.  Stable chronic mild-to-moderate compression fractures at the T7, T9, T10, and L1 vertebrae.  There are kyphoplasty changes at L1.   3. Moderate to severe dextroscoliosis of the thoracic spine.   4. Mild-to-moderate multilevel thoracic spine degenerative changes with multilevel bilateral neural foraminal stenoses as detailed.   5. Small noncalcified right upper lobe lung nodules, which measure up to 5 mm.  There are also low-grade reticulonodular opacities in the left lower lobe, which suggest small airways infection/bronchiolitis.  These findings were not present on prior   chest CT 2005. Therefore, consider a 6 month surveillance chest CT to assess stability.   6. Multi-vessel coronary artery calcification.   7. Small retrocardiac hiatal hernia.   8. Lesser incidental findings as above.        Xray June 2024:  Findings and impression:   Moderate scoliosis.  Moderate to large volume diffuse colonic stool    Normal sagittal alignment.  Posterior rods and inter pedicular screws L4-5    Mild compression fracture at T12, L2, L3 have undergone kyphoplasty    Age indeterminate mild-to-moderate compression fracture at T10, without kyphoplasty    Flexion and extension views were attempted but no flexion and no extension were achieved      Pt describes pain level current 4/10, at best 2/10 (lying down), at worst 8/10 (prolonged standing).   Description: pressure, aching  Current functional limitations include standing > 10 mins, walking, showering and washing hair (standing), .     Armando describes prior level of function indep, uses walking or SPC. Pt goals include decrease pain.  Past medical history was reviewed with Armando. Significant findings include   Past Medical History:    Aortic atherosclerosis (HCC)    CT scan 6-16    BPH (benign prostatic hyperplasia)    With abnormal PSA, Rx Proscar    Diverticulosis    Glaucoma    Herpes zoster    Left face    High blood pressure    High cholesterol     Hx of adenomatous colonic polyps    Hypercholesterolemia    Hypertension    Iron deficiency anemia    Ischemic colitis (HCC)    Recurrent 12-21    Osteoporosis    T9 compression fracture, T score -3.8 spine and -0.9 hip, Rx Fosamax; repeat T score -2.5 spine and -1.0 hip 10-10, repeat T score -0.7 hip and -2.2 lumbar spine 3-16, Fosamax DC'd; L2 compression fracture 1-24; T12 L2 L3 compression fracture 4-24; DEXA 4-24 with T-score -1.6 femoral neck and +2.5 lumbar spine    Peptic ulcer disease    SCC (squamous cell carcinoma)    Left temple    Subclinical hypothyroidism     Past Surgical History:   Procedure Laterality Date    Appendectomy  1961    Back surgery Left 12/2011    L4-L5 microdiscectomy    Cataract Left 10/2010    Cataract Right 12/2010    Colonoscopy  01/2016    Inguinal hernia repair Bilateral 1970    Ir kyphoplasty  04/08/2024    T12, L2, L3    Other  01/2017    Excision SCCa left temple    Skin surgery Right 10/28/2016    Excision epidermal inclusion cyst back    Spinal fusion  10/2011    L4-L5    Tonsillectomy  1968       Pt denies diplopia, dysarthria, dysphasia, dizziness, drop attacks, bowel/bladder changes, saddle anesthesia, and JERSON LE N/T.    ASSESSMENT  Armando presents to physical therapy evaluation with primary c/o LBP into R flank. The results of the objective tests and measures show scoliosis and kyphosis deformities, decreased core and prox RLE strength, decreased balance.  Functional deficits include but are not limited to standing, walking, prolonged sitting.  Signs and symptoms are consistent with referring diagnoses. Pt and PT discussed evaluation findings, pathology, POC and HEP.  Pt voiced understanding and performs HEP correctly without reported pain. Skilled Physical Therapy is medically necessary to address the above impairments and reach functional goals.     OBJECTIVE:   Observation/Posture: thoracic kyphosis, L scoliosis (severe)  Sitting: elevated R hip with crossed RLE  over LLE  Supine: elevated R malleolus, symmetrical ASIS and iliac crest, trunk lean L  Standing: elevated R iliac crest, L lateral trunk lean, depressed R rib, sev scoliosis       Trunk AROM: (* denotes performed with pain)  Flexion: 25%*  Rotation: R 75%*; L 25%*    Accessory motion: hypomobile spine (baseline d/t scoliosis)  Palpation: TTP mid-thoracic, R lumbar paraspinals    Strength: (* denotes performed with pain)  LE     Hip abduction: R 3+/5; L 4-/5     Flexibility:   LE   Lumbar paraspinals: significant tightness R w/ scoliosis       Heel slide:  R valgus    Gait: pt ambulates on level ground with SPC, flexed posture.  Balance: SLS R 3s, L 5s    Today’s Treatment and Response:   Pt education was provided on exam findings, treatment diagnosis, treatment plan, expectations, and prognosis. Pt was also provided recommendations for postural corrections and ergonomics  Patient was instructed in and issued a HEP for: neutral sitting posture, lumbar support, R open book, TA heel slide (neutral mechanics R)    Charges: PT Eval Low Complexity, 2TE      Total Timed Treatment: 25 min     Total Treatment Time: 55 min     PLAN OF CARE:    Goals: (to be met in 8 visits)   Pt will improve transversus abdominis recruitment to perform proper isometric contraction without requiring verbal or tactile cuing to promote advancement of therex   Pt will demonstrate good understanding of proper posture and body mechanics to decrease pain and improve spinal safety   Pt will have decreased paraspinal mm tension to tolerate standing >20 minutes for work and home activities   Pt will be independent and compliant with comprehensive HEP to maintain progress achieved in PT       Frequency / Duration: Patient will be seen for 1-2 x/week or a total of 8 visits over a 90 day period. Treatment will include: Gait training, Manual Therapy, Neuromuscular Re-education, Therapeutic Activities, Therapeutic Exercise, and Home Exercise Program  instruction    Education or treatment limitation: chronicity of symptoms  Rehab Potential:fair    Oswestry Disability Index Score  Score: 46 % (9/23/2024 11:42 AM)      Patient/Family/Caregiver was advised of these findings, precautions, and treatment options and has agreed to actively participate in planning and for this course of care.    Thank you for your referral. Please co-sign or sign and return this letter via fax as soon as possible to [phone]. If you have any questions, please contact me at Dept: [phone]    Sincerely,  Electronically signed by therapist: Heather Brunner, PT    Physician's certification required: Yes  I certify the need for these services furnished under this plan of treatment and while under my care.    X___________________________________________________ Date____________________    Certification From: 9/30/2024  To:12/29/2024

## 2024-10-05 NOTE — TELEPHONE ENCOUNTER
Refill passed per [facility] protocol   Requested Prescriptions   Pending Prescriptions Disp Refills    ATORVASTATIN 20 MG Oral Tab [Pharmacy Med Name: Atorvastatin Calcium 20 Mg Tab Nort] 90 tablet 0     Sig: TAKE ONE TABLET BY MOUTH IN THE EVENING       Cholesterol Medication Protocol Passed - 5/4/2023  8:51 AM        Passed - ALT in past 12 months        Passed - LDL in past 12 months        Passed - Last ALT < 80     Lab Results   Component Value Date    ALT 36 03/18/2023             Passed - Last LDL < 130     Lab Results   Component Value Date    LDL 56 03/18/2023               Passed - In person appointment or virtual visit in the past 12 mos or appointment in next 3 mos     Recent Outpatient Visits              3 weeks ago AK (actinic keratosis)    Xiomara Guy [place] Marybelle Najjar, MD    Office Visit    1 month ago Annual physical exam    Sun Scott MD    Office Visit    2 months ago Idiopathic polyneuropathy    [facility], [address], [facility] [place]    Office Visit    5 months ago Benign prostatic hyperplasia with urinary frequency    [facility], [address], Danielito Valderrama MD    Office Visit    5 months ago Pain in toes of both feet    Sami August [place] DPM    Office Visit          Future Appointments         Provider Department Appt Notes    In 4 weeks Angelina Lloyd DPM [facility], [address], [place] 3[facility] nail care    In 4 months Raimundo Balderas MD [facility], [address] [place] [address]    In 5 months Marybelle Najjar, [facility]                 AMLODIPINE 5 MG Oral Tab [place] Med Name: Amlodipine Besylate 5 Mg Tab Unic] 90 tablet 0     Sig: TAKE ONE TABLET BY MOUTH ONE TIME DAILY       Hypertensive Medications Protocol Passed - 5/4/2023  8:51 AM        Passed - In person appointment in the past 12 or next 3 months     Recent Outpatient Visits              3 weeks ago AK (actinic keratosis)    Jose Adkins Daleen Kind, MD    Office Visit    1 month ago Annual physical exam    Rockey Grist, B[place], Verner Poe, MD    Office Visit    2 months ago Idiopathic polyneuropathy    [facility], [address], [place], [place]    Office Visit    5 months ago Benign prostatic hyperplasia with urinary frequency    [facility], [address], Deandre Olson MD    Office Visit    5 months ago Pain in toes of both feet    [address], [address], [place]    Office Visit          Future Appointments         Provider Department Appt Notes    In 4 weeks Derrick De La Garza DPM [facility], [address], [place] 3M nail care    In 4 months Lata Herzog MD [address], [facility]    In 5 months Conchita Laureano MD [address], [address] [facility] follow up               Passed - Last BP reading less than 140/90     BP Readings from Last 1 Encounters:  03/17/23 : 134/58                Passed - CMP or BMP in past 6 months     Recent Results (from the past 4392 hour(s))   COMP METABOLIC PANEL (14)    Collection Time: 03/18/23  9:31 AM   Result Value Ref Range    Glucose 94 70 - 99 mg/dL    Sodium 142 136 - 145 mmol/L    Potassium 4.0 3.5 - 5.1 mmol/L    Chloride 106 98 - 112 mmol/L    CO2 32.0 21.0 - 32.0 mmol/L    Anion Gap 4 0 - 18 mmol/L    BUN 20 (H) 7 - 18 mg/dL    Creatinine 1.02 0.70 - 1.30 mg/dL    BUN/CREA Ratio 19.6 10.0 - 20.0    Calcium, Total 9.0 8.5 - 10.1 mg/dL    Calculated Osmolality 296 (H) 275 - 295 mOsm/kg eGFR-Cr 73 >=60 mL/min/1.73m2    ALT 36 16 - 61 U/L    AST 32 15 - 37 U/L    Alkaline Phosphatase 74 45 - 117 U/L    Bilirubin, Total 0.4 0.1 - 2.0 mg/dL    Total Protein 7.2 6.4 - 8.2 g/dL    Albumin 3.3 (L) 3.4 - 5.0 g/dL    Globulin  3.9 2.8 - 4.4 g/dL    A/G Ratio 0.8 (L) 1.0 - 2.0    Patient Fasting for CMP? Yes      *Note: Due to a large number of results and/or encounters for the requested time period, some results have not been displayed. A complete set of results can be found in Results Review.                  Passed - In person appointment or virtual visit in the past 6 months     Recent Outpatient Visits              3 weeks ago AK (actinic keratosis)    Karo Herrera [place] MD    Office Visit    1 month ago Annual physical exam    Rama Estrada MD    Office Visit    2 months ago Idiopathic polyneuropathy    [facility], [address], [place] Castell[place], [place]    Office Visit    5 months ago Benign prostatic hyperplasia with urinary frequency    [facility], [address] [facility], Saira Hancock MD    Office Visit    5 months ago Pain in toes of both feet    [facility], [address], [place] Maegan Wilkins DPM    Office Visit          Future Appointments         Provider Department Appt Notes    In 4 weeks Marion Nance DPM [facility], [address], [place] 3M nail care    In 4 months Roscoe Shah MD [address], [address] [facility]    In 5 months Ilene Gonzalez MD [address], [address] [facility]               Passed [facility] or GFRNAA > 50     GFR Evaluation  EGFRCR: 73 , resulted on 3/18/2023 17.4

## 2024-10-08 ENCOUNTER — APPOINTMENT (OUTPATIENT)
Dept: PHYSICAL THERAPY | Facility: HOSPITAL | Age: 84
End: 2024-10-08
Attending: INTERNAL MEDICINE
Payer: MEDICARE

## 2024-10-10 ENCOUNTER — APPOINTMENT (OUTPATIENT)
Dept: PHYSICAL THERAPY | Facility: HOSPITAL | Age: 84
End: 2024-10-10
Attending: INTERNAL MEDICINE
Payer: MEDICARE

## 2024-10-14 ENCOUNTER — OFFICE VISIT (OUTPATIENT)
Dept: PHYSICAL THERAPY | Facility: HOSPITAL | Age: 84
End: 2024-10-14
Attending: INTERNAL MEDICINE
Payer: MEDICARE

## 2024-10-14 PROCEDURE — 97140 MANUAL THERAPY 1/> REGIONS: CPT

## 2024-10-14 PROCEDURE — 97110 THERAPEUTIC EXERCISES: CPT

## 2024-10-14 NOTE — PROGRESS NOTES
Diagnosis:     Osteoporosis with current pathological fracture, unspecified osteoporosis type, sequela (M80.00XS)  Closed fracture of third lumbar vertebra, unspecified fracture morphology, initial encounter ([facility]) (S32.719A)      Referring Provider: Maria R Yanes  Date of Evaluation:    9/30/2024    Precautions:  Fall risk, current fx T11, osteoporosis,  Next MD visit:   none scheduled  Date of Surgery: n/a     Insurance Primary/Secondary: AETNA MCR / N/A     # Auth Visits: na            Subjective: Pain with standing and walking continues    Pain: 0/10 (supine); 2/10 (walk from car)      Objective:   At IE:  Observation/Posture: thoracic kyphosis, L scoliosis (severe)  Sitting: elevated R hip with crossed RLE over LLE  Supine: elevated R malleolus, symmetrical ASIS and iliac crest, trunk lean L  Standing: elevated R iliac crest, L lateral trunk lean, depressed R rib, sev scoliosis       Trunk AROM: (* denotes performed with pain)  Flexion: 25%*  Rotation: R 75%*; L 25%*    Accessory motion: hypomobile spine (baseline d/t scoliosis)  Palpation: TTP mid-thoracic, R lumbar paraspinals    Strength: (* denotes performed with pain)  LE     Hip abduction: R 3+/5; L 4-/5     Flexibility:   LE   Lumbar paraspinals: significant tightness R w/ scoliosis       Heel slide:  R valgus    Gait: pt ambulates on level ground with SPC, flexed posture.  Balance: SLS R 3s, L 5s      Assessment: R scoliosis of lumbar spine likely contributing to pain and core weakness. Gentle strengthening and stretching today tolerated well with no pain. Cuing for neutral pelvic alignment with bridges.       Goals:   Goals: (to be met in 8 visits)   Pt will improve transversus abdominis recruitment to perform proper isometric contraction without requiring verbal or tactile cuing to promote advancement of therex   Pt will demonstrate good understanding of proper posture and body mechanics to decrease pain and improve spinal safety   Pt will have  decreased paraspinal mm tension to tolerate standing >20 minutes for work and home activities   Pt will be independent and compliant with comprehensive HEP to maintain progress achieved in PT     Plan: Patient will be seen for 1-2 x/week or a total of 8 visits over a 90 day period. Treatment will include: Gait training, Manual Therapy, Neuromuscular Re-education, Therapeutic Activities, Therapeutic Exercise, and Home Exercise Program instruction  Date: 10/14/2024  TX#: 2/8 Date:                 TX#: 3/ Date:                 TX#: 4/ Date:                 TX#: 5/ Date:   Tx#: 6/   MT: 20 mins  - gentle R lumbar gapping in s/l  - gentle lumbar rot Gr I-II  - STM lumbar paraspinals       TE: 24 mins  - hip hike iso in L s/l, 10x10\"  - R QL stretch over EOB (ant)  - hip IR iso over EOB, 10x5\" R  - open book R, x15  - mermaid in R s/l, 2x10 L  - abd bridges with ring, 2x10                       HEP: neutral sitting posture, lumbar support, R open book, TA heel slide (neutral mechanics R)  10/14/24: TA heel slides B, bridges (optional)    Charges: 1MT, 2TE       Total Timed Treatment: 44 min  Total Treatment Time: 44 min

## 2024-10-16 ENCOUNTER — OFFICE VISIT (OUTPATIENT)
Dept: PHYSICAL THERAPY | Facility: HOSPITAL | Age: 84
End: 2024-10-16
Attending: INTERNAL MEDICINE
Payer: MEDICARE

## 2024-10-16 PROCEDURE — 97110 THERAPEUTIC EXERCISES: CPT

## 2024-10-16 PROCEDURE — 97140 MANUAL THERAPY 1/> REGIONS: CPT

## 2024-10-16 NOTE — PROGRESS NOTES
Diagnosis:     Osteoporosis with current pathological fracture, unspecified osteoporosis type, sequela (M80.00XS)  Closed fracture of third lumbar vertebra, unspecified fracture morphology, initial encounter ([facility]) (S32.655A)      Referring Provider: Maria R Yanes  Date of Evaluation:    9/30/2024    Precautions:  Fall risk, current fx T11, osteoporosis,  Next MD visit:   none scheduled  Date of Surgery: n/a     Insurance Primary/Secondary: AETNA MCR / N/A     # Auth Visits: na            Subjective: Feeling more pain this morning; reports woke up with more pain than usual today.    Pain: 5/10      Objective:   At IE:  Observation/Posture: thoracic kyphosis, L scoliosis (severe)  Sitting: elevated R hip with crossed RLE over LLE  Supine: elevated R malleolus, symmetrical ASIS and iliac crest, trunk lean L  Standing: elevated R iliac crest, L lateral trunk lean, depressed R rib, sev scoliosis       Trunk AROM: (* denotes performed with pain)  Flexion: 25%*  Rotation: R 75%*; L 25%*    Accessory motion: hypomobile spine (baseline d/t scoliosis)  Palpation: TTP mid-thoracic, R lumbar paraspinals    Strength: (* denotes performed with pain)  LE     Hip abduction: R 3+/5; L 4-/5     Flexibility:   LE   Lumbar paraspinals: significant tightness R w/ scoliosis       Heel slide:  R valgus    Gait: pt ambulates on level ground with SPC, flexed posture.  Balance: SLS R 3s, L 5s      Assessment: Progressed core stability today and introduced seated position without pain. Patient reports pain decreases with supine position and stretches today.     Goals:   Goals: (to be met in 8 visits)   Pt will improve transversus abdominis recruitment to perform proper isometric contraction without requiring verbal or tactile cuing to promote advancement of therex   Pt will demonstrate good understanding of proper posture and body mechanics to decrease pain and improve spinal safety   Pt will have decreased paraspinal mm tension to  tolerate standing >20 minutes for work and home activities   Pt will be independent and compliant with comprehensive HEP to maintain progress achieved in PT     Plan: Patient will be seen for 1-2 x/week or a total of 8 visits over a 90 day period. Treatment will include: Gait training, Manual Therapy, Neuromuscular Re-education, Therapeutic Activities, Therapeutic Exercise, and Home Exercise Program instruction  Next session: continue core stability; consider beginning standing  Date: 10/14/2024  TX#: 2/8 Date:  10/16/24               TX#: 3/8 Date:                 TX#: 4/ Date:                 TX#: 5/ Date:   Tx#: 6/   MT: 20 mins  - gentle R lumbar gapping in s/l  - gentle lumbar rot Gr I-II  - STM lumbar paraspinals MT: 15 mins  - traction with SB  - gentle lumbar PA   - gentle paraspinal STM      TE: 24 mins  - hip hike iso in L s/l, 10x10\"  - R QL stretch over EOB (ant)  - hip IR iso over EOB, 10x5\" R  - open book R, x15  - mermaid in R s/l, 2x10 L  - abd bridges with ring, 2x10   TE: 30 mins  - DKTC on SB, x20  - abd bridges, GTB, 2x10  - R hip hikes, x10 with PT resistance  - clamshell, GTB, x10 R/L  - mermaid L, 2x20  - seated SB press down, 2x10  - FF SB roll out, x10                      HEP: neutral sitting posture, lumbar support, R open book, TA heel slide (neutral mechanics R)  10/14/24: TA heel slides B, bridges (optional)  10/16/24: GTB for abd bridges, BKFO (GTB), SB press down (seated)    Charges: 1MT, 2TE       Total Timed Treatment: 45 min  Total Treatment Time: 45 min

## 2024-10-21 ENCOUNTER — OFFICE VISIT (OUTPATIENT)
Dept: PHYSICAL THERAPY | Facility: HOSPITAL | Age: 84
End: 2024-10-21
Attending: INTERNAL MEDICINE
Payer: MEDICARE

## 2024-10-21 PROCEDURE — 97140 MANUAL THERAPY 1/> REGIONS: CPT

## 2024-10-21 PROCEDURE — 97112 NEUROMUSCULAR REEDUCATION: CPT

## 2024-10-21 PROCEDURE — 97110 THERAPEUTIC EXERCISES: CPT

## 2024-10-21 NOTE — PROGRESS NOTES
Diagnosis:     Osteoporosis with current pathological fracture, unspecified osteoporosis type, sequela (M80.00XS)  Closed fracture of third lumbar vertebra, unspecified fracture morphology, initial encounter ([facility]) (S32.104A)      Referring Provider: Maria R Yanes  Date of Evaluation:    9/30/2024    Precautions:  Fall risk, current fx T11, osteoporosis,  Next MD visit:   none scheduled  Date of Surgery: n/a     Insurance Primary/Secondary: AETNA [facility] / N/A     # Auth Visits: na            Subjective: \"Very achy\" today. Having some more pain in his spine. Muscle pain on the R continues with resolution but reports he is feeling pain in the center of his spine now. Wonders if bridges contribute to pain.    Pain: 3-4/10 (center and L spine)      Objective:   At IE:  Observation/Posture: thoracic kyphosis, L scoliosis (severe)  Sitting: elevated R hip with crossed RLE over LLE  Supine: elevated R malleolus, symmetrical ASIS and iliac crest, trunk lean L  Standing: elevated R iliac crest, L lateral trunk lean, depressed R rib, sev scoliosis       Trunk AROM: (* denotes performed with pain)  Flexion: 25%*  Rotation: R 75%*; L 25%*    Accessory motion: hypomobile spine (baseline d/t scoliosis)  Palpation: TTP mid-thoracic, R lumbar paraspinals    Strength: (* denotes performed with pain)  LE     Hip abduction: R 3+/5; L 4-/5     Flexibility:   LE   Lumbar paraspinals: significant tightness R w/ scoliosis       Heel slide:  R valgus    Gait: pt ambulates on level ground with SPC, flexed posture.  Balance: SLS R 3s, L 5s      Assessment: Decreased pain with bridges after cuing to engage TA. Good alignment with LEs today without cuing needed. Initiated beginning standing exercises with fatigue. Core weakness contributes to increased stress through lumbar spine and pain. Pt would benefit from continued strengthening to improve upright tolerance needed for walking and standing.     Goals:   Goals: (to be met in 8 visits)   Pt  will improve transversus abdominis recruitment to perform proper isometric contraction without requiring verbal or tactile cuing to promote advancement of therex   Pt will demonstrate good understanding of proper posture and body mechanics to decrease pain and improve spinal safety   Pt will have decreased paraspinal mm tension to tolerate standing >20 minutes for work and home activities   Pt will be independent and compliant with comprehensive HEP to maintain progress achieved in PT     Plan: Patient will be seen for 1-2 x/week or a total of 8 visits over a 90 day period. Treatment will include: Gait training, Manual Therapy, Neuromuscular Re-education, Therapeutic Activities, Therapeutic Exercise, and Home Exercise Program instruction  Next session: continue core stability; continue standing  Date: 10/14/2024  TX#: 2/8 Date:  10/16/24               TX#: 3/8 Date:  10/21/24               TX#: 4/8 Date:                 TX#: 5/ Date:   Tx#: 6/   MT: 20 mins  - gentle R lumbar gapping in s/l  - gentle lumbar rot Gr I-II  - STM lumbar paraspinals MT: 15 mins  - traction with SB  - gentle lumbar PA   - gentle paraspinal STM MT: 10 mins  - gentle STM L paraspinals  - gentle lumbar PA Gr II     TE: 24 mins  - hip hike iso in L s/l, 10x10\"  - R QL stretch over EOB (ant)  - hip IR iso over EOB, 10x5\" R  - open book R, x15  - mermaid in R s/l, 2x10 L  - abd bridges with ring, 2x10   TE: 30 mins  - DKTC on SB, x20  - abd bridges, GTB, 2x10  - R hip hikes, x10 with PT resistance  - clamshell, GTB, x10 R/L  - mermaid L, 2x20  - seated SB press down, 2x10  - FF SB roll out, x10   TE: 30 mins  - SKTC on ball, TA activation, x20 R/L  - s/l forearm/knee plank/hip lift, x10 R/L - more challenging on R side*L  - standing SB press down (C/R/L), x20 ea  - stand hip ext glider, x10 R/L, BUE on // bar       NR: 13 mins  - bridges with TA, 3x10 (2nd set with ball at knees, 3rd set with ring at knees)  - review TA activation               HEP: neutral sitting posture, lumbar support, R open book, TA heel slide (neutral mechanics R)  10/14/24: TA heel slides B, bridges (optional)  10/16/24: GTB for abd bridges, BKFO (GTB), SB press down (seated) - 10/21/24: review TA with bridges    Charges: 1MT, 2TE, 1NR       Total Timed Treatment: 53 min  Total Treatment Time: 53 min

## 2024-10-23 ENCOUNTER — OFFICE VISIT (OUTPATIENT)
Dept: PHYSICAL THERAPY | Facility: HOSPITAL | Age: 84
End: 2024-10-23
Attending: INTERNAL MEDICINE
Payer: MEDICARE

## 2024-10-23 PROCEDURE — 97112 NEUROMUSCULAR REEDUCATION: CPT

## 2024-10-23 PROCEDURE — 97140 MANUAL THERAPY 1/> REGIONS: CPT

## 2024-10-23 PROCEDURE — 97110 THERAPEUTIC EXERCISES: CPT

## 2024-10-23 NOTE — PROGRESS NOTES
Diagnosis:     Osteoporosis with current pathological fracture, unspecified osteoporosis type, sequela (M80.00XS)  Closed fracture of third lumbar vertebra, unspecified fracture morphology, initial encounter ([facility]) (S32.779A)      Referring Provider: Maria R Yanes  Date of Evaluation:    9/30/2024    Precautions:  Fall risk, current fx T11, osteoporosis,  Next MD visit:   none scheduled  Date of Surgery: n/a     Insurance Primary/Secondary: AETNA [facility] / N/A     # Auth Visits: na            Subjective: Was in a little more pain this morning, had a long day yesterday/morning taking his wife to urgent care and ER. No more muscle pain on L or R but pain in center of spine is present.    Pain: 3-4/10 (center spine)      Objective:   At IE:  Observation/Posture: thoracic kyphosis, L scoliosis (severe)  Sitting: elevated R hip with crossed RLE over LLE  Supine: elevated R malleolus, symmetrical ASIS and iliac crest, trunk lean L  Standing: elevated R iliac crest, L lateral trunk lean, depressed R rib, sev scoliosis       Trunk AROM: (* denotes performed with pain)  Flexion: 25%*  Rotation: R 75%*; L 25%*    Accessory motion: hypomobile spine (baseline d/t scoliosis)  Palpation: TTP mid-thoracic, R lumbar paraspinals    Strength: (* denotes performed with pain)  LE     Hip abduction: R 3+/5; L 4-/5     Flexibility:   LE   Lumbar paraspinals: significant tightness R w/ scoliosis       Heel slide:  R valgus    Gait: pt ambulates on level ground with SPC, flexed posture.  Balance: SLS R 3s, L 5s      Assessment: Added standing exercises to HEP to continue to work on spinal stability against gravity. Pt tolerates increased standing before needing a seated rest break. . Pt would benefit from continued strengthening to improve upright tolerance needed for walking and standing.     Goals:   Goals: (to be met in 8 visits)   Pt will improve transversus abdominis recruitment to perform proper isometric contraction without requiring  verbal or tactile cuing to promote advancement of therex   Pt will demonstrate good understanding of proper posture and body mechanics to decrease pain and improve spinal safety   Pt will have decreased paraspinal mm tension to tolerate standing >20 minutes for work and home activities   Pt will be independent and compliant with comprehensive HEP to maintain progress achieved in PT     Plan: Patient will be seen for 1-2 x/week or a total of 8 visits over a 90 day period. Treatment will include: Gait training, Manual Therapy, Neuromuscular Re-education, Therapeutic Activities, Therapeutic Exercise, and Home Exercise Program instruction  Next session: continue core stability; continue standing; progress HEP as tolerated  Date: 10/14/2024  TX#: 2/8 Date:  10/16/24               TX#: 3/8 Date:  10/21/24               TX#: 4/8 Date:10/23/24                 TX#: 5/8 Date:   Tx#: 6/   MT: 20 mins  - gentle R lumbar gapping in s/l  - gentle lumbar rot Gr I-II  - STM lumbar paraspinals MT: 15 mins  - traction with SB  - gentle lumbar PA   - gentle paraspinal STM MT: 10 mins  - gentle STM L paraspinals  - gentle lumbar PA Gr II MT: 15 mins  - gentle STM paraspinals  - lower lumbar PA Gr II for pain gaiting    TE: 24 mins  - hip hike iso in L s/l, 10x10\"  - R QL stretch over EOB (ant)  - hip IR iso over EOB, 10x5\" R  - open book R, x15  - mermaid in R s/l, 2x10 L  - abd bridges with ring, 2x10   TE: 30 mins  - DKTC on SB, x20  - abd bridges, GTB, 2x10  - R hip hikes, x10 with PT resistance  - clamshell, GTB, x10 R/L  - mermaid L, 2x20  - seated SB press down, 2x10  - FF SB roll out, x10   TE: 30 mins  - SKTC on ball, TA activation, x20 R/L  - s/l forearm/knee plank/hip lift, x10 R/L - more challenging on R side*L  - standing SB press down (C/R/L), x20 ea  - stand hip ext glider, x10 R/L, BUE on // bar TE: 25 mins  - mermaid, x20 L  - s/l hip abd, 2# at knee, 2x10 R/L  - open book R, x10  - seated SB press down, x10  - stand SB  press down (C/R/L), x20 ea        NR: 13 mins  - bridges with TA, 3x10 (2nd set with ball at knees, 3rd set with ring at knees)  - review TA activation   NR: 13 mins  - stand hip ext glider, 2x10 R/L, BUE - cuing for TA  - stand pallof, YTB, x20 R/L - more challenging w/ resistance to R  - seated SB march, x20           HEP: neutral sitting posture, lumbar support, R open book, TA heel slide (neutral mechanics R)  10/14/24: TA heel slides B, bridges (optional)  10/16/24: GTB for abd bridges, BKFO (GTB), SB press down (seated) - 10/21/24: review TA with bridges  10/23/24: con't GTB bridges, BKFO; progress SB press down to standing, add standing pallof press (YTB)  Charges: 1MT, 2TE, 1NR       Total Timed Treatment: 53 min  Total Treatment Time: 53 min

## 2024-10-25 ENCOUNTER — OFFICE VISIT (OUTPATIENT)
Dept: INTERNAL MEDICINE CLINIC | Facility: CLINIC | Age: 84
End: 2024-10-25
Payer: MEDICARE

## 2024-10-25 VITALS
DIASTOLIC BLOOD PRESSURE: 50 MMHG | RESPIRATION RATE: 18 BRPM | SYSTOLIC BLOOD PRESSURE: 128 MMHG | BODY MASS INDEX: 21.29 KG/M2 | HEART RATE: 59 BPM | WEIGHT: 127.81 LBS | OXYGEN SATURATION: 95 % | HEIGHT: 65 IN

## 2024-10-25 DIAGNOSIS — S32.000D COMPRESSION FRACTURE OF LUMBAR VERTEBRA WITH ROUTINE HEALING, UNSPECIFIED LUMBAR VERTEBRAL LEVEL, SUBSEQUENT ENCOUNTER: Primary | ICD-10-CM

## 2024-10-25 DIAGNOSIS — I10 PRIMARY HYPERTENSION: ICD-10-CM

## 2024-10-25 DIAGNOSIS — R41.3 MEMORY IMPAIRMENT: ICD-10-CM

## 2024-10-25 PROCEDURE — G2211 COMPLEX E/M VISIT ADD ON: HCPCS | Performed by: INTERNAL MEDICINE

## 2024-10-25 PROCEDURE — 99214 OFFICE O/P EST MOD 30 MIN: CPT | Performed by: INTERNAL MEDICINE

## 2024-10-25 RX ORDER — AMLODIPINE BESYLATE 10 MG/1
10 TABLET ORAL DAILY
Qty: 90 TABLET | Refills: 3 | Status: SHIPPED | OUTPATIENT
Start: 2024-10-25

## 2024-10-25 NOTE — PROGRESS NOTES
Armando Forrester male 83 year old         Chief Complaint   Patient presents with    Follow - Up     C/o back pain   -  had  compression fracture  for T11 -  he thought  had  kyphoplasty -  but  was denied  by medicare - had previous  L1 in past      We called wife  - for info -she was unclear.  The whole encounter made it obvious that he has some significant memory impairment.  However still functioning at high levels does not seem to have any decrease in ADLs.  Is driving.  Discussed that his daughter who is a physician is now working at [facility] and requested B12 and B6 levels.      Tramadol for pain  prn seems to help does not cause significant impairment    On reclalst -last injection was may 24 -for her osteoporosis      Has BPH but denies any significant symptoms at the present time    Blood pressure is controlled with amlodipine      Review of systems no falls.  He denies any neurological symptoms chest pain shortness of breath GI issues.    Patient Active Problem List   Diagnosis    Hypertension    Osteoporosis    Iron deficiency anemia    BPH (benign prostatic hyperplasia)    Hypercholesterolemia    Glaucoma    History of ischemic colitis    History of skin cancer    Aortic atherosclerosis (HCC)    Hyperglycemia    Colitis    LLQ abdominal pain    Hx of adenomatous colonic polyps    Subclinical hypothyroidism    Hypokalemia    Closed fracture of third lumbar vertebra, unspecified fracture morphology, initial encounter ([facility])    Uses walker    Senile osteoporosis    Acute bronchitis    Anemia    Benign neoplasm of scalp and skin of neck    Benign neoplasm of skin of face    Cervicalgia    Dizziness    Herpes zoster    Elevated prostate specific antigen (PSA)    Impetigo    Backache    Lumbar radiculopathy    Orthostatic hypotension    Sebaceous cyst          Medications Ordered Prior to Encounter[1]       Vitals:    10/25/24 1342   BP: 128/50   Pulse: 59   Resp: 18   VITALSBody mass index is  21.27 kg/m².    Pertinent findings on the physical exam; he does not appear in any acute distress.  He does endorse some lower back pain but states is not horrible cor is regular lungs clear cognition seems good memory is questionable based on above.  No focal deficits    Armando was seen today for follow - up.    Diagnoses and all orders for this visit:    Compression fracture of lumbar vertebra with routine healing, unspecified lumbar vertebral level, subsequent encounter  -     Comp Metabolic Panel (14)    Memory impairment  -     VITAMIN B6 [926] [Q]  -     Vitamin B12 [E]  -     Comp Metabolic Panel (14)    Primary hypertension  -     Comp Metabolic Panel (14)  -     amLODIPine 10 MG Oral Tab; Take 1 tablet (10 mg total) by mouth daily.         We discussed his compression fracture and osteoporosis and pain.  He has a anesthesiologist pain doctor who has prescribed the tramadol in the past.  I told him he probably has a contract with that pain doctor and therefore I would defer treatment to him.  If for some reason he cannot get it I would do so.  Recommend to continue yearly Prolia shots along with vitamin D and calcium.    His blood pressure is currently controlled keep on present medications.    Memory seems concerning-I reviewed my last note and I was not overly impressed with any memory deficit- next visit we will do more extensive probing- will check B12 levels has had thyroid checked in the past.            This note was prepared using Dragon Medical voice recognition dictation software and as a result, errors may occur. When identified, these errors have been corrected. While every attempt is made to correct errors during dictation, discrepancies may still exist            [1]   Current Outpatient Medications on File Prior to Visit   Medication Sig Dispense Refill    TRAMADOL 50 MG Oral Tab TAKE ONE TABLET BY MOUTH EVERY SIX HOURS AS NEEDED FOR PAIN 15 tablet 0    Multiple Vitamins-Minerals (ALGAE BASED  CALCIUM) Oral Tab Take by mouth. Taking 2 tablets once at bedtime      atorvastatin 20 MG Oral Tab Take 1 tablet (20 mg total) by mouth every evening. 90 tablet 3    latanoprost 0.005 % Ophthalmic Solution Place 1 drop into both eyes nightly.      Ascorbic Acid (VITAMIN C) 500 MG Oral Cap Take 1 tablet by mouth daily.      Multiple Vitamins-Minerals (PRESERVISION AREDS) Oral Tab Take 2 tablets by mouth daily.      Omega-3 Fatty Acids (FISH OIL OR) Take 1 capsule by mouth 3 (three) times daily.      Calcium Carbonate-Vitamin D (CALCIUM 600 + D OR) Take 1 tablet by mouth 2 (two) times daily.      Coenzyme Q10 (COQ10 OR) Take 1 capsule by mouth daily.      Probiotic Product (PROBIOTIC OR) Take 1 capsule by mouth daily.      Boswellia Judah (BOSWELLIA OR) Take 2 tablets by mouth 3 (three) times daily.      Cholecalciferol (VITAMIN D3) 1000 UNITS Oral Cap Take  by mouth. take 1 daily       No current facility-administered medications on file prior to visit.

## 2024-10-26 ENCOUNTER — LAB ENCOUNTER (OUTPATIENT)
Dept: LAB | Facility: HOSPITAL | Age: 84
End: 2024-10-26
Attending: INTERNAL MEDICINE
Payer: MEDICARE

## 2024-10-26 DIAGNOSIS — S32.020A COMPRESSION FRACTURE OF L2 VERTEBRA, INITIAL ENCOUNTER (HCC): ICD-10-CM

## 2024-10-26 DIAGNOSIS — R41.3 MEMORY IMPAIRMENT: Primary | ICD-10-CM

## 2024-10-26 LAB
ALBUMIN SERPL-MCNC: 4.8 G/DL (ref 3.2–4.8)
ALBUMIN/GLOB SERPL: 1.7 {RATIO} (ref 1–2)
ALP LIVER SERPL-CCNC: 97 U/L
ALT SERPL-CCNC: 41 U/L
ANION GAP SERPL CALC-SCNC: 6 MMOL/L (ref 0–18)
AST SERPL-CCNC: 37 U/L (ref ?–34)
BILIRUB SERPL-MCNC: 0.6 MG/DL (ref 0.2–1.1)
BUN BLD-MCNC: 33 MG/DL (ref 9–23)
BUN/CREAT SERPL: 24.6 (ref 10–20)
CALCIUM BLD-MCNC: 10.2 MG/DL (ref 8.7–10.4)
CHLORIDE SERPL-SCNC: 105 MMOL/L (ref 98–112)
CO2 SERPL-SCNC: 32 MMOL/L (ref 21–32)
CREAT BLD-MCNC: 1.34 MG/DL
EGFRCR SERPLBLD CKD-EPI 2021: 53 ML/MIN/1.73M2 (ref 60–?)
FASTING STATUS PATIENT QL REPORTED: YES
GLOBULIN PLAS-MCNC: 2.8 G/DL (ref 2–3.5)
GLUCOSE BLD-MCNC: 98 MG/DL (ref 70–99)
OSMOLALITY SERPL CALC.SUM OF ELEC: 303 MOSM/KG (ref 275–295)
POTASSIUM SERPL-SCNC: 4.9 MMOL/L (ref 3.5–5.1)
PROT SERPL-MCNC: 7.6 G/DL (ref 5.7–8.2)
SODIUM SERPL-SCNC: 143 MMOL/L (ref 136–145)
VIT B12 SERPL-MCNC: 1285 PG/ML (ref 211–911)

## 2024-10-26 PROCEDURE — 36415 COLL VENOUS BLD VENIPUNCTURE: CPT

## 2024-10-26 PROCEDURE — 80053 COMPREHEN METABOLIC PANEL: CPT | Performed by: INTERNAL MEDICINE

## 2024-10-26 PROCEDURE — 82607 VITAMIN B-12: CPT | Performed by: INTERNAL MEDICINE

## 2024-10-26 PROCEDURE — 84207 ASSAY OF VITAMIN B-6: CPT

## 2024-10-28 ENCOUNTER — OFFICE VISIT (OUTPATIENT)
Dept: PHYSICAL THERAPY | Facility: HOSPITAL | Age: 84
End: 2024-10-28
Attending: INTERNAL MEDICINE
Payer: MEDICARE

## 2024-10-28 DIAGNOSIS — N17.9 ACUTE KIDNEY INJURY (HCC): Primary | ICD-10-CM

## 2024-10-28 PROCEDURE — 97112 NEUROMUSCULAR REEDUCATION: CPT

## 2024-10-28 PROCEDURE — 97140 MANUAL THERAPY 1/> REGIONS: CPT

## 2024-10-28 PROCEDURE — 97110 THERAPEUTIC EXERCISES: CPT

## 2024-10-28 RX ORDER — TRAMADOL HYDROCHLORIDE 50 MG/1
50 TABLET ORAL EVERY 6 HOURS PRN
Qty: 15 TABLET | Refills: 0 | Status: SHIPPED | OUTPATIENT
Start: 2024-10-28

## 2024-10-28 NOTE — PROGRESS NOTES
Diagnosis:     Osteoporosis with current pathological fracture, unspecified osteoporosis type, sequela (M80.00XS)  Closed fracture of third lumbar vertebra, unspecified fracture morphology, initial encounter ([facility]) (S32.990A)      Referring Provider: Maria R Yanes  Date of Evaluation:    9/30/2024    Precautions:  Fall risk, current fx T11, osteoporosis,  Next MD visit:   none scheduled  Date of Surgery: n/a     Insurance Primary/Secondary: AETNA [facility] / N/A     # Auth Visits: na            Subjective: Feels that his is getting better; woke up today without pain. Bridges are still difficulty but no longer lingering pain. Has been able to help his wife with her nightly routine again.    Pain: 2/10 (L of spine (\"muscle\"))      Objective:     At IE:  Observation/Posture: thoracic kyphosis, L scoliosis (severe)  Sitting: elevated R hip with crossed RLE over LLE  Supine: elevated R malleolus, symmetrical ASIS and iliac crest, trunk lean L  Standing: elevated R iliac crest, L lateral trunk lean, depressed R rib, sev scoliosis     Trunk AROM: (* denotes performed with pain)  Flexion: 25%*  Rotation: R 75%*; L 25%*    Accessory motion: hypomobile spine (baseline d/t scoliosis)  Palpation: TTP mid-thoracic, R lumbar paraspinals    Strength: (* denotes performed with pain)  LE     Hip abduction: R 3+/5; L 4-/5     Flexibility:   LE   Lumbar paraspinals: significant tightness R w/ scoliosis       Heel slide:  R valgus    Gait: pt ambulates on level ground with SPC, flexed posture.  Balance: SLS R 3s, L 5s      Assessment: Fatigues with standing exercises. Decreased severity of symptoms today per pt report. Reinforced HEP for core strengthening with good pt understanding.     Goals:   Goals: (to be met in 8 visits)   Pt will improve transversus abdominis recruitment to perform proper isometric contraction without requiring verbal or tactile cuing to promote advancement of therex   Pt will demonstrate good understanding of  proper posture and body mechanics to decrease pain and improve spinal safety   Pt will have decreased paraspinal mm tension to tolerate standing >20 minutes for work and home activities   Pt will be independent and compliant with comprehensive HEP to maintain progress achieved in PT     Plan: Patient will be seen for 1-2 x/week or a total of 8 visits over a 90 day period. Treatment will include: Gait training, Manual Therapy, Neuromuscular Re-education, Therapeutic Activities, Therapeutic Exercise, and Home Exercise Program instruction  Next session: continue core stability imelda in standing  Date: 10/14/2024  TX#: 2/8 Date:  10/16/24               TX#: 3/8 Date:  10/21/24               TX#: 4/8 Date:10/23/24                 TX#: 5/8 Date: 10/28/24  Tx#: 6/8   MT: 20 mins  - gentle R lumbar gapping in s/l  - gentle lumbar rot Gr I-II  - STM lumbar paraspinals MT: 15 mins  - traction with SB  - gentle lumbar PA   - gentle paraspinal STM MT: 10 mins  - gentle STM L paraspinals  - gentle lumbar PA Gr II MT: 15 mins  - gentle STM paraspinals  - lower lumbar PA Gr II for pain gaiting MT: 15 mins  - STM L paraspinals  - gentle lumbar mobs, L rib springing   TE: 24 mins  - hip hike iso in L s/l, 10x10\"  - R QL stretch over EOB (ant)  - hip IR iso over EOB, 10x5\" R  - open book R, x15  - mermaid in R s/l, 2x10 L  - abd bridges with ring, 2x10   TE: 30 mins  - DKTC on SB, x20  - abd bridges, GTB, 2x10  - R hip hikes, x10 with PT resistance  - clamshell, GTB, x10 R/L  - mermaid L, 2x20  - seated SB press down, 2x10  - FF SB roll out, x10   TE: 30 mins  - SKTC on ball, TA activation, x20 R/L  - s/l forearm/knee plank/hip lift, x10 R/L - more challenging on R side*L  - standing SB press down (C/R/L), x20 ea  - stand hip ext glider, x10 R/L, BUE on // bar TE: 25 mins  - mermaid, x20 L  - s/l hip abd, 2# at knee, 2x10 R/L  - open book R, x10  - seated SB press down, x10  - stand SB press down (C/R/L), x20 ea   TE: 23 mins  -  NuStep, L3, x5' - UE and LE  - stand SB press down, x20 ea (C/R/L)  - FF SB roll, x10 (better)  - s/l hip abd, 2# at knee, x20 R/L     NR: 13 mins  - bridges with TA, 3x10 (2nd set with ball at knees, 3rd set with ring at knees)  - review TA activation   NR: 13 mins  - stand hip ext glider, 2x10 R/L, BUE - cuing for TA  - stand pallof, YTB, x20 R/L - more challenging w/ resistance to R  - seated SB march, x20 NR: 15 mins  - pallof press, RTB, x20 R/L  - stand airex + sh ext, RTB (core stab)  - stand hip ext, 2x10 R/L, 1-2UE support          HEP: neutral sitting posture, lumbar support, R open book, TA heel slide (neutral mechanics R)  10/14/24: TA heel slides B, bridges (optional)  10/16/24: GTB for abd bridges, BKFO (GTB), SB press down (seated) - 10/21/24: review TA with bridges  10/23/24: con't GTB bridges, BKFO; progress SB press down to standing, add standing pallof press (YTB)  Charges: 1MT, 2TE, 1NR       Total Timed Treatment: 53 min  Total Treatment Time: 53 min

## 2024-10-28 NOTE — TELEPHONE ENCOUNTER
Refill Request    Medication request: TRAMADOL 50 MG Oral Tab. TAKE ONE TABLET BY MOUTH EVERY SIX HOURS AS NEEDED FOR PAIN      LOV:3/11/2024 Kannan Gutierrez, DO   Due back to clinic per last office note:  MRI of the left lumbar spine and hip and follow-up after   NOV: Visit date not found      [place] ref[place]: 09/09/24 #15 (4 days)    Urine drug screen (if applicable): none  Pain contract: 08/15/24    LOV plan (if weaning or changing medications): Start tramadol 50 mg as needed for pain

## 2024-10-30 ENCOUNTER — OFFICE VISIT (OUTPATIENT)
Dept: PHYSICAL THERAPY | Facility: HOSPITAL | Age: 84
End: 2024-10-30
Attending: INTERNAL MEDICINE
Payer: MEDICARE

## 2024-10-30 ENCOUNTER — OFFICE VISIT (OUTPATIENT)
Dept: DERMATOLOGY CLINIC | Facility: CLINIC | Age: 84
End: 2024-10-30
Payer: MEDICARE

## 2024-10-30 DIAGNOSIS — L57.0 AK (ACTINIC KERATOSIS): Primary | ICD-10-CM

## 2024-10-30 DIAGNOSIS — L82.1 SEBORRHEIC KERATOSES: ICD-10-CM

## 2024-10-30 DIAGNOSIS — Z08 ENCOUNTER FOR FOLLOW-UP SURVEILLANCE OF SKIN CANCER: ICD-10-CM

## 2024-10-30 DIAGNOSIS — Z85.828 ENCOUNTER FOR FOLLOW-UP SURVEILLANCE OF SKIN CANCER: ICD-10-CM

## 2024-10-30 DIAGNOSIS — D23.9 BENIGN NEOPLASM OF SKIN, UNSPECIFIED LOCATION: ICD-10-CM

## 2024-10-30 DIAGNOSIS — L81.4 LENTIGO: ICD-10-CM

## 2024-10-30 DIAGNOSIS — D22.9 MULTIPLE NEVI: ICD-10-CM

## 2024-10-30 PROCEDURE — 1159F MED LIST DOCD IN RCRD: CPT | Performed by: DERMATOLOGY

## 2024-10-30 PROCEDURE — 97140 MANUAL THERAPY 1/> REGIONS: CPT

## 2024-10-30 PROCEDURE — 17000 DESTRUCT PREMALG LESION: CPT | Performed by: DERMATOLOGY

## 2024-10-30 PROCEDURE — 97112 NEUROMUSCULAR REEDUCATION: CPT

## 2024-10-30 PROCEDURE — 97110 THERAPEUTIC EXERCISES: CPT

## 2024-10-30 PROCEDURE — 1160F RVW MEDS BY RX/DR IN RCRD: CPT | Performed by: DERMATOLOGY

## 2024-10-30 PROCEDURE — 99213 OFFICE O/P EST LOW 20 MIN: CPT | Performed by: DERMATOLOGY

## 2024-10-30 NOTE — PROGRESS NOTES
Diagnosis:     Osteoporosis with current pathological fracture, unspecified osteoporosis type, sequela (M80.00XS)  Closed fracture of third lumbar vertebra, unspecified fracture morphology, initial encounter ([facility]) (S32.223A)      Referring Provider: Maria R Yanes  Date of Evaluation:    9/30/2024    Precautions:  Fall risk, current fx T11, osteoporosis,  Next MD visit:   none scheduled  Date of Surgery: n/a     Insurance Primary/Secondary: AETNA [facility] / N/A     # Auth Visits: na            Subjective: Did bridges yesterday and had a lot of pain, could not finish his HEP.     Pain: 5/10 (L low back)      Objective:   Significant pain at L lumbar spine and paraspinals    -------------  At IE:  Observation/Posture: thoracic kyphosis, L scoliosis (severe)  Sitting: elevated R hip with crossed RLE over LLE  Supine: elevated R malleolus, symmetrical ASIS and iliac crest, trunk lean L  Standing: elevated R iliac crest, L lateral trunk lean, depressed R rib, sev scoliosis     Trunk AROM: (* denotes performed with pain)  Flexion: 25%*  Rotation: R 75%*; L 25%*    Accessory motion: hypomobile spine (baseline d/t scoliosis)  Palpation: TTP mid-thoracic, R lumbar paraspinals    Strength: (* denotes performed with pain)  LE     Hip abduction: R 3+/5; L 4-/5     Flexibility:   LE   Lumbar paraspinals: significant tightness R w/ scoliosis       Heel slide:  R valgus    Gait: pt ambulates on level ground with SPC, flexed posture.  Balance: SLS R 3s, L 5s      Assessment: Mild decrease in pain intensity with palliative manual techniques. Patient does not tolerate standing exercises today with pain in his L spine. Previous compression fractures and significant scoliosis likely contribute to pain; significant time spent with education today to reinforce HEP without pain. Patient verbalizes understanding. Will reassess at next session; may recommend returning to MD if pain does not improve.     Goals:   Goals: (to be met in 8 visits)    Pt will improve transversus abdominis recruitment to perform proper isometric contraction without requiring verbal or tactile cuing to promote advancement of therex   Pt will demonstrate good understanding of proper posture and body mechanics to decrease pain and improve spinal safety   Pt will have decreased paraspinal mm tension to tolerate standing >20 minutes for work and home activities   Pt will be independent and compliant with comprehensive HEP to maintain progress achieved in PT     Plan: Patient will be seen for 1-2 x/week or a total of 8 visits over a 90 day period. Treatment will include: Gait training, Manual Therapy, Neuromuscular Re-education, Therapeutic Activities, Therapeutic Exercise, and Home Exercise Program instruction  Next session: reassess  Date: 10/14/2024  TX#: 2/8 Date:  10/16/24               TX#: 3/8 Date:  10/21/24               TX#: 4/8 Date:10/23/24                 TX#: 5/8 Date: 10/28/24  Tx#: 6/8 Date: 10/30/24  Tx: 7/8   MT: 20 mins  - gentle R lumbar gapping in s/l  - gentle lumbar rot Gr I-II  - STM lumbar paraspinals MT: 15 mins  - traction with SB  - gentle lumbar PA   - gentle paraspinal STM MT: 10 mins  - gentle STM L paraspinals  - gentle lumbar PA Gr II MT: 15 mins  - gentle STM paraspinals  - lower lumbar PA Gr II for pain gaiting MT: 15 mins  - STM L paraspinals  - gentle lumbar mobs, L rib springing MT: 15 mins  - STM L paraspinals  - gentle lumbar mob L, PA Gr II   TE: 24 mins  - hip hike iso in L s/l, 10x10\"  - R QL stretch over EOB (ant)  - hip IR iso over EOB, 10x5\" R  - open book R, x15  - mermaid in R s/l, 2x10 L  - abd bridges with ring, 2x10   TE: 30 mins  - DKTC on SB, x20  - abd bridges, GTB, 2x10  - R hip hikes, x10 with PT resistance  - clamshell, GTB, x10 R/L  - mermaid L, 2x20  - seated SB press down, 2x10  - FF SB roll out, x10   TE: 30 mins  - SKTC on ball, TA activation, x20 R/L  - s/l forearm/knee plank/hip lift, x10 R/L - more challenging on R  side*L  - standing SB press down (C/R/L), x20 ea  - stand hip ext glider, x10 R/L, BUE on // bar TE: 25 mins  - mermaid, x20 L  - s/l hip abd, 2# at knee, 2x10 R/L  - open book R, x10  - seated SB press down, x10  - stand SB press down (C/R/L), x20 ea   TE: 23 mins  - NuStep, L3, x5' - UE and LE  - stand SB press down, x20 ea (C/R/L)  - FF SB roll, x10 (better)  - s/l hip abd, 2# at knee, x20 R/L TE: 15 mins  - passive L HF stretch in s/l  - clamshell, x20 L  - trial pallof press, RTB, x6 R/L - d/c d/t pain  - lateral stepping, d/c d/t pain     NR: 13 mins  - bridges with TA, 3x10 (2nd set with ball at knees, 3rd set with ring at knees)  - review TA activation   NR: 13 mins  - stand hip ext glider, 2x10 R/L, BUE - cuing for TA  - stand pallof, YTB, x20 R/L - more challenging w/ resistance to R  - seated SB march, x20 NR: 15 mins  - pallof press, RTB, x20 R/L  - stand airex + sh ext, RTB (core stab)  - stand hip ext, 2x10 R/L, 1-2UE support NR: 8 mins  - seated SB press down, 2x10  - heel slides, x15 R/L             HEP: neutral sitting posture, lumbar support, R open book, TA heel slide (neutral mechanics R)  10/14/24: TA heel slides B, bridges (optional)  10/16/24: GTB for abd bridges, BKFO (GTB), SB press down (seated) - 10/21/24: review TA with bridges  10/23/24: con't GTB bridges, BKFO; progress SB press down to standing, add standing pallof press (YTB)  Charges: 1MT, 1TE, 1NR       Total Timed Treatment: 38 min  Total Treatment Time: 38 min

## 2024-11-05 LAB — VITAMIN B6: 79.2 UG/L

## 2024-11-06 ENCOUNTER — OFFICE VISIT (OUTPATIENT)
Dept: PHYSICAL THERAPY | Facility: HOSPITAL | Age: 84
End: 2024-11-06
Attending: INTERNAL MEDICINE
Payer: MEDICARE

## 2024-11-06 PROCEDURE — 97110 THERAPEUTIC EXERCISES: CPT

## 2024-11-06 NOTE — PROGRESS NOTES
Discharge Summary  Pt has attended 8 visits in Physical Therapy.     Diagnosis:     Osteoporosis with current pathological fracture, unspecified osteoporosis type, sequela (M80.00XS)  Closed fracture of third lumbar vertebra, unspecified fracture morphology, initial encounter ([facility]) (S32.039A)      Referring Provider: Maria R Yanes  Date of Evaluation:    9/30/2024    Precautions:  Fall risk, current fx T11, osteoporosis,  Next MD visit:   none scheduled  Date of Surgery: n/a     Insurance Primary/Secondary: AETNA [facility] / N/A     # Auth Visits: na            Subjective: No new complaints.  Overall feeling better but pain does continue to come and go.  Feels pressure on his low back all the time.    Pain: 1-2/10 (L low back)      Objective:   At IE:  Observation/Posture: thoracic kyphosis, scoliosis    Palpation: TTP lumbar spine (L>R)    Heel slide:  normal mechanics, no more valgus deviations    Gait: pt ambulates on level ground with SPC, flexed posture.    Assessment: Overall improvement in functional mobility reported by patient with decreased intensity and frequency of pain. He does still have intermittent pain which is likely contributed to by his scoliosis and healing fractures. Recommend patient continue his HEP to maintain gains in core strength to help support his spine in upright positions such as standing and walking, and to continue to let his fractures heal. Patient will follow-up with his MD in February. May return to PT with new order as needed.     Goals:   Goals: (to be met in 8 visits)   Pt will improve transversus abdominis recruitment to perform proper isometric contraction without requiring verbal or tactile cuing to promote advancement of therex - goal met  Pt will demonstrate good understanding of proper posture and body mechanics to decrease pain and improve spinal safety - goal met, difficult d/t scoliosis  Pt will have decreased paraspinal mm tension to tolerate standing >20 minutes for  work and home activities - goal met  Pt will be independent and compliant with comprehensive HEP to maintain progress achieved in PT - goal met    Date: 10/14/2024  TX#: 2/8 Date:  10/16/24               TX#: 3/8 Date:  10/21/24               TX#: 4/8 Date:10/23/24                 TX#: 5/8 Date: 10/28/24  Tx#: 6/8 Date: 10/30/24  Tx: 7/8 Date: 11/6/24  Tx: 8/8   MT: 20 mins  - gentle R lumbar gapping in s/l  - gentle lumbar rot Gr I-II  - STM lumbar paraspinals MT: 15 mins  - traction with SB  - gentle lumbar PA   - gentle paraspinal STM MT: 10 mins  - gentle STM L paraspinals  - gentle lumbar PA Gr II MT: 15 mins  - gentle STM paraspinals  - lower lumbar PA Gr II for pain gaiting MT: 15 mins  - STM L paraspinals  - gentle lumbar mobs, L rib springing MT: 15 mins  - STM L paraspinals  - gentle lumbar mob L, PA Gr II    TE: 24 mins  - hip hike iso in L s/l, 10x10\"  - R QL stretch over EOB (ant)  - hip IR iso over EOB, 10x5\" R  - open book R, x15  - mermaid in R s/l, 2x10 L  - abd bridges with ring, 2x10   TE: 30 mins  - DKTC on SB, x20  - abd bridges, GTB, 2x10  - R hip hikes, x10 with PT resistance  - clamshell, GTB, x10 R/L  - mermaid L, 2x20  - seated SB press down, 2x10  - FF SB roll out, x10   TE: 30 mins  - SKTC on ball, TA activation, x20 R/L  - s/l forearm/knee plank/hip lift, x10 R/L - more challenging on R side*L  - standing SB press down (C/R/L), x20 ea  - stand hip ext glider, x10 R/L, BUE on // bar TE: 25 mins  - mermaid, x20 L  - s/l hip abd, 2# at knee, 2x10 R/L  - open book R, x10  - seated SB press down, x10  - stand SB press down (C/R/L), x20 ea   TE: 23 mins  - NuStep, L3, x5' - UE and LE  - stand SB press down, x20 ea (C/R/L)  - FF SB roll, x10 (better)  - s/l hip abd, 2# at knee, x20 R/L TE: 15 mins  - passive L HF stretch in s/l  - clamshell, x20 L  - trial pallof press, RTB, x6 R/L - d/c d/t pain  - lateral stepping, d/c d/t pain TE: 38 mins  - review POC, progress, reassessment  - TA heel  slides, 2x15 R/L  - BKFO, RTB, 2x10 R/L  - seated pallof press, RTB, x20 R?L  - stand hip ext glider, x20 R/L, BUE support     NR: 13 mins  - bridges with TA, 3x10 (2nd set with ball at knees, 3rd set with ring at knees)  - review TA activation   NR: 13 mins  - stand hip ext glider, 2x10 R/L, BUE - cuing for TA  - stand pallof, YTB, x20 R/L - more challenging w/ resistance to R  - seated SB march, x20 NR: 15 mins  - pallof press, RTB, x20 R/L  - stand airex + sh ext, RTB (core stab)  - stand hip ext, 2x10 R/L, 1-2UE support NR: 8 mins  - seated SB press down, 2x10  - heel slides, x15 R/L               HEP: neutral sitting posture, lumbar support, R open book, TA heel slide (neutral mechanics R)  10/14/24: TA heel slides B, bridges (optional)  10/16/24: GTB for abd bridges, BKFO (GTB), SB press down (seated) - 10/21/24: review TA with bridges  10/23/24: discontinue  GTB bridges, BKFO; progress SB press down to standing, add standing pallof press (YTB)  Charges: 3TE       Total Timed Treatment: 38 min  Total Treatment Time: 40 min  Oswestry Disability Index Score  Score: 46 % (9/23/2024 11:42 AM)    Post Oswestry Disability Index Score  Post Score: 32 % (11/6/2024 10:45 AM)    14 % improvement    Plan: Discharge from PT. Pt will continue to perform HEP and follow-up with MD as needed.    Thank you for your referral. If you have any questions, please contact me at Dept: [phone].    Sincerely,  Electronically signed by therapist: Heather Brunner, PT

## 2024-11-10 NOTE — PROGRESS NOTES
Armando Forrester is a 83 year old male.  HPI:     CC:    Chief Complaint   Patient presents with    Upper Body Exam     LOV 10/2023. Pt present for UBSE. Hx of SCC (left temple) and AK's. No spots of concern today.          Allergies:  Clindamycin and Lisinopril    HISTORY:    Past Medical History:    Aortic atherosclerosis (HCC)    CT scan 6-16    BPH (benign prostatic hyperplasia)    With abnormal PSA, Rx Proscar    Diverticulosis    Glaucoma    Herpes zoster    Left face    High blood pressure    High cholesterol    Hx of adenomatous colonic polyps    Hypercholesterolemia    Hypertension    Iron deficiency anemia    Ischemic colitis (HCC)    Recurrent 12-21    Osteoporosis    T9 compression fracture, T score -3.8 spine and -0.9 hip, Rx Fosamax; repeat T score -2.5 spine and -1.0 hip 10-10, repeat T score -0.7 hip and -2.2 lumbar spine 3-16, Fosamax DC'd; L2 compression fracture 1-24; T12 L2 L3 compression fracture 4-24; DEXA 4-24 with T-score -1.6 femoral neck and +2.5 lumbar spine    Peptic ulcer disease    SCC (squamous cell carcinoma)    Left temple    Subclinical hypothyroidism      Past Surgical History:   Procedure Laterality Date    Appendectomy  1961    Appendectomy  8/1961    Back surgery Left 12/2011    L4-L5 microdiscectomy    Cataract Left 10/2010    Cataract Right 12/2010    Colonoscopy  01/2016    Colonoscopy  2022    Glaucoma surgery  ?    Hernia surgery  ?    Inguinal hernia repair Bilateral 1970    Ir kyphoplasty  04/08/2024    T12, L2, L3    Other  01/2017    Excision SCCa left temple    Skin surgery Right 10/28/2016    Excision epidermal inclusion cyst back    Spinal fusion  10/2011    L4-L5    Tonsillectomy  1968      Family History   Problem Relation Age of Onset    Heart Disease Father         CHF age 68    Hypertension Mother     Other (Kidney Cancer[other]) Brother     Cancer Brother         Kidney cancer      Social History     Socioeconomic History    Marital status:     Number  of children: 2   Occupational History    Occupation:      Comment: part time    Occupation:  High teacher, [employer]     Comment: retired   Tobacco Use    Smoking status: Former     Current packs/day: 0.00     Types: Cigarettes     Quit date: 1957     Years since quittin.7    Smokeless tobacco: Never   Vaping Use    Vaping status: Never Used   Substance and Sexual Activity    Alcohol use: Yes     Alcohol/week: 1.0 standard drink of alcohol     Types: 1 Glasses of wine per week     Comment: Occasional glass of wine    Drug use: No   Other Topics Concern    Pt has a pacemaker No    Pt has a defibrillator No    Reaction to local anesthetic No    Caffeine Concern No    Stress Concern No    Weight Concern No    Special Diet No    Exercise No    Seat Belt No     Social Drivers of Health     Financial Resource Strain: Low Risk  (2021)    [facility] from [facility], [facility]    Overall Financial Resource Strain (CARDI)     Difficulty of Paying Living Expenses: Not very hard   Food Insecurity: No Food Insecurity (2024)    Food Insecurity     Food Insecurity: Never true   Transportation Needs: No Transportation Needs (2024)    Transportation Needs     Lack of Transportation: No   Housing Stability: Low Risk  (2024)    Housing Stability     Housing Instability: No        Current Outpatient Medications   Medication Sig Dispense Refill    TRAMADOL 50 MG Oral Tab TAKE ONE TABLET BY MOUTH EVERY SIX HOURS AS NEEDED FOR PAIN 15 tablet 0    amLODIPine 10 MG Oral Tab Take 1 tablet (10 mg total) by mouth daily. 90 tablet 3    Multiple Vitamins-Minerals (ALGAE BASED CALCIUM) Oral Tab Take by mouth. Taking 2 tablets once at bedtime      atorvastatin 20 MG Oral Tab Take 1 tablet (20 mg total) by mouth every evening. 90 tablet 3    latanoprost 0.005 % Ophthalmic Solution Place 1 drop into both eyes nightly.      Ascorbic Acid (VITAMIN C) 500 MG Oral Cap Take 1 tablet by mouth  daily.      Multiple Vitamins-Minerals (PRESERVISION AREDS) Oral Tab Take 2 tablets by mouth daily.      Omega-3 Fatty Acids (FISH OIL OR) Take 1 capsule by mouth 3 (three) times daily.      Calcium Carbonate-Vitamin D (CALCIUM 600 + D OR) Take 1 tablet by mouth 2 (two) times daily.      Coenzyme Q10 (COQ10 OR) Take 1 capsule by mouth daily.      Probiotic Product (PROBIOTIC OR) Take 1 capsule by mouth daily.      Boswellia Judah (BOSWELLIA OR) Take 2 tablets by mouth 3 (three) times daily.      Cholecalciferol (VITAMIN D3) 1000 UNITS Oral Cap Take  by mouth. take 1 daily       Allergies:   Allergies   Allergen Reactions    Clindamycin OTHER (SEE COMMENTS)     Angioedema    Lisinopril OTHER (SEE COMMENTS)     Angioedema       Past Medical History:    Aortic atherosclerosis (HCC)    CT scan 6-16    BPH (benign prostatic hyperplasia)    With abnormal PSA, Rx Proscar    Diverticulosis    Glaucoma    Herpes zoster    Left face    High blood pressure    High cholesterol    Hx of adenomatous colonic polyps    Hypercholesterolemia    Hypertension    Iron deficiency anemia    Ischemic colitis (HCC)    Recurrent 12-21    Osteoporosis    T9 compression fracture, T score -3.8 spine and -0.9 hip, Rx Fosamax; repeat T score -2.5 spine and -1.0 hip 10-10, repeat T score -0.7 hip and -2.2 lumbar spine 3-16, Fosamax DC'd; L2 compression fracture 1-24; T12 L2 L3 compression fracture 4-24; DEXA 4-24 with T-score -1.6 femoral neck and +2.5 lumbar spine    Peptic ulcer disease    SCC (squamous cell carcinoma)    Left temple    Subclinical hypothyroidism     Past Surgical History:   Procedure Laterality Date    Appendectomy  1961    Appendectomy  8/1961    Back surgery Left 12/2011    L4-L5 microdiscectomy    Cataract Left 10/2010    Cataract Right 12/2010    Colonoscopy  01/2016    Colonoscopy  2022    Glaucoma surgery  ?    Hernia surgery  ?    Inguinal hernia repair Bilateral 1970    Ir kyphoplasty  04/08/2024    T12, L2, L3     Other  2017    Excision SCCa left temple    Skin surgery Right 10/28/2016    Excision epidermal inclusion cyst back    Spinal fusion  10/2011    L4-L5    Tonsillectomy  1968     Social History     Socioeconomic History    Marital status:      Spouse name: Not on file    Number of children: 2    Years of education: Not on file    Highest education level: Not on file   Occupational History    Occupation:      Comment: part time    Occupation:  High teacher, language arts     Comment: retired   Tobacco Use    Smoking status: Former     Current packs/day: 0.00     Types: Cigarettes     Quit date: 1957     Years since quittin.7    Smokeless tobacco: Never   Vaping Use    Vaping status: Never Used   Substance and Sexual Activity    Alcohol use: Yes     Alcohol/week: 1.0 standard drink of alcohol     Types: 1 Glasses of wine per week     Comment: Occasional glass of wine    Drug use: No    Sexual activity: Not on file   Other Topics Concern    Grew up on a farm Not Asked    History of tanning Not Asked    Outdoor occupation Not Asked    Pt has a pacemaker No    Pt has a defibrillator No    Reaction to local anesthetic No     Service Not Asked    Blood Transfusions Not Asked    Caffeine Concern No    Occupational Exposure Not Asked    Hobby Hazards Not Asked    Sleep Concern Not Asked    Stress Concern No    Weight Concern No    Special Diet No    Back Care Not Asked    Exercise No    Bike Helmet Not Asked    Seat Belt No    Self-Exams Not Asked   Social History Narrative    Not on file     Social Drivers of Health     Financial Resource Strain: Low Risk  (2021)    [facility], [facility]    Overall Financial Resource Strain (CARDIA)     Difficulty of Paying Living Expenses: Not very hard   Food Insecurity: No Food Insecurity (2024)    Food Insecurity     Food Insecurity: Never true   Transportation Needs: No Transportation Needs (2024)     Transportation Needs     Lack of Transportation: No   Physical Activity: Not on file   Stress: Not on file   Social Connections: Not on file   Housing Stability: Low Risk  (4/5/2024)    Housing Stability     Housing Instability: No     Housing Instability Emergency: Not on file     Crib or Bassinette: Not on file     Family History   Problem Relation Age of Onset    Heart Disease Father         CHF age 68    Hypertension Mother     Other (Kidney Cancer[other]) Brother     Cancer Brother         Kidney cancer       There were no vitals filed for this visit.    HPI:    Chief Complaint   Patient presents with    Upper Body Exam     LOV 10/2023. Pt present for UBSE. Hx of SCC (left temple) and AK's. No spots of concern today.      Of note patient's daughter physician at [facility] care-remains in [facility] new preventative medicine program through [place]-other daughter in [place].  Patient hopes to travel this year    Does generally wear hat when he is outside= [employer]   Follow-up AK's no particular concerns.  Dermatitic changes have resolved    Follow-up history of SKs, history of skin cancer SCC left temple January 2017.  History of skin cyst.  No particular worrisome lesions at this time has been careful to use sun protection.    Patient presents with concerns above.    Past notes/ records and appropriate/relevant lab results including pathology and past body maps reviewed. Updated and new information noted in current visit.     Patient has been in their usual state of health.  History, medications, allergies reviewed as noted.      ROS:  Denies any other systemic complaints.  No new or changeing lesions other than noted above. No fevers, chills, night sweats, unusual sun sensitivity.  No other skin complaints.        History, medications, allergies reviewed as noted.       Physical Examination:     Well-developed well-nourished patient alert oriented in no acute  distress.  Examp erformed, including scalp, head, neck, face,nails, hair, external eyes, including conjunctival mucosa, eyelids, lips external ears, upper back, upper chest, arms, palms.     Multiple light to medium brown, well marginated, uniformly pigmented, macules and papules 6 mm and less scattered on exam. pigmented lesions examined with dermoscopy benign-appearing patterns.     Waxy tannish keratotic papules scattered, cherry-red vascular papules scattered.    See map today's date for lesions noted .  Background of sun damage temples forehead brow with minimal erythema and scaling.  Minimal erythema at left brow near area of previous SCC no evidence recurrence multiple benign keratoses stable  Otherwise remarkable for lesions as noted on map.  See Assessment /Plan for additional history and physical exam also:    Assessment / plan:    No orders of the defined types were placed in this encounter.      Meds & Refills for this Visit:  Requested Prescriptions      No prescriptions requested or ordered in this encounter         Encounter Diagnoses   Name Primary?    AK (actinic keratosis) Yes    Seborrheic keratoses     Lentigo     Multiple nevi     Encounter for follow-up surveillance of skin cancer     Benign neoplasm of skin, unspecified location        Erythematous scaling keratotic papules noted at sites noted on map  Actinic Keratoses.  Precancerous nature discussed. Sun protection, sunscreen/ blocks encouraged Lesions treated with cryo- .  Biopsy if not resolved.    Left lateral temple    Arms and hands okay no active AK's in this area    Continue sunscreen    Multiple benign keratoses over the face neck arms hands back reassurance.    Papule at right forehead observe.  If appears to be small cyst versus intradermal nevus monitor recheck at follow-up if this does become symptomatic we will recheck sooner overall stable    Scattered benign keratoses over the face scalp neck arms hands       Continue careful  sun protection, regular skin checks history of SCC no recurrence.  No new suspicious lesions.  Moderate sun damage continue careful sun protection hat use sunscreen use    Patient is an avid  continue careful sun protection.  Does wear hat and sunscreen.  Patient's daughter is preventative health provider at [facility].  No recent trips  Multiple waxy tan keratotic papules over the upper back.    Cystic papule at left lateral back reassurance.  Discussed pros and cons of removal not bothersome currently observation.    No other susupicious lesions on todays  exam.      Benign seborrheic keratoses lentigines.  No significant changes  Please refer to map for specific lesions.  See additional diagnoses.  Pros cons of various therapies, risks benefits discussed.Pathophysiology discussed with patient.  Therapeutic options reviewed.  See  Medications in grid.  Instructions reviewed at length.    Benign nevi, seborrheic  keratoses, cherry angiomas:  Reassurance regarding other benign skin lesions.Signs and symptoms of skin cancer, ABCDE's of melanoma discussed with patient. Sunscreen use, sun protection, self exams reviewed.  Followup as noted RTC routine checkup 6 mos - one year or p.r.n.    Encounter Times  Including precharting, reviewing chart, prior notes obtaining history: 5 minutes, medical exam :10 minutes, notes on body map, plan, counseling 10minutes My total time spent caring for the patient on the day of the encounter: 25 minutes     The patient indicates understanding of these issues and agrees to the plan.  The patient is asked to return as noted in follow-up/ above.    This note was generated using Dragon voice recognition software.  Please contact me regarding any confusion resulting from errors in recognition.   Note to patient and family: The 21st Century Cures Act makes medical notes like these available to patients. However, be advised this is a medical document. It is intended as  zhvo-hi-ayew communication and monitoring of a patient's care needs. It is written in medical language and may contain abbreviations or verbiage that are unfamiliar. It may appear blunt or direct. Medical documents are intended to carry relevant information, facts as evident and the clinical opinion of the practitioner.

## 2024-11-15 ENCOUNTER — HOSPITAL ENCOUNTER (OUTPATIENT)
Dept: ULTRASOUND IMAGING | Facility: HOSPITAL | Age: 84
Discharge: HOME | End: 2024-11-15
Attending: INTERNAL MEDICINE
Payer: MEDICARE

## 2024-11-15 DIAGNOSIS — N17.9 ACUTE KIDNEY INJURY (HCC): ICD-10-CM

## 2024-11-15 PROCEDURE — 76775 US EXAM ABDO BACK WALL LIM: CPT | Performed by: INTERNAL MEDICINE

## 2024-12-06 ENCOUNTER — LAB ENCOUNTER (OUTPATIENT)
Dept: LAB | Facility: HOSPITAL | Age: 84
End: 2024-12-06
Attending: INTERNAL MEDICINE
Payer: MEDICARE

## 2024-12-06 DIAGNOSIS — R41.3 MEMORY LOSS: Primary | ICD-10-CM

## 2024-12-06 LAB
ANION GAP SERPL CALC-SCNC: 6 MMOL/L (ref 0–18)
BILIRUB UR QL: NEGATIVE
BUN BLD-MCNC: 23 MG/DL (ref 9–23)
BUN/CREAT SERPL: 20 (ref 10–20)
CALCIUM BLD-MCNC: 9.8 MG/DL (ref 8.7–10.4)
CHLORIDE SERPL-SCNC: 103 MMOL/L (ref 98–112)
CO2 SERPL-SCNC: 32 MMOL/L (ref 21–32)
COLOR UR: YELLOW
CREAT BLD-MCNC: 1.15 MG/DL
EGFRCR SERPLBLD CKD-EPI 2021: 63 ML/MIN/1.73M2 (ref 60–?)
FASTING STATUS PATIENT QL REPORTED: YES
GLUCOSE BLD-MCNC: 97 MG/DL (ref 70–99)
GLUCOSE UR-MCNC: NORMAL MG/DL
HGB UR QL STRIP.AUTO: NEGATIVE
HYALINE CASTS #/AREA URNS AUTO: PRESENT /LPF
KETONES UR-MCNC: NEGATIVE MG/DL
LEUKOCYTE ESTERASE UR QL STRIP.AUTO: NEGATIVE
NITRITE UR QL STRIP.AUTO: NEGATIVE
OSMOLALITY SERPL CALC.SUM OF ELEC: 296 MOSM/KG (ref 275–295)
PH UR: 7 [PH] (ref 5–8)
POTASSIUM SERPL-SCNC: 4.1 MMOL/L (ref 3.5–5.1)
PROT UR-MCNC: NEGATIVE MG/DL
SODIUM SERPL-SCNC: 141 MMOL/L (ref 136–145)
SP GR UR STRIP: 1.01 (ref 1–1.03)
UROBILINOGEN UR STRIP-ACNC: NORMAL

## 2024-12-06 PROCEDURE — 36415 COLL VENOUS BLD VENIPUNCTURE: CPT

## 2024-12-06 PROCEDURE — 80048 BASIC METABOLIC PNL TOTAL CA: CPT | Performed by: INTERNAL MEDICINE

## 2024-12-06 PROCEDURE — 81001 URINALYSIS AUTO W/SCOPE: CPT | Performed by: INTERNAL MEDICINE

## 2024-12-06 PROCEDURE — 84207 ASSAY OF VITAMIN B-6: CPT

## 2024-12-10 ENCOUNTER — OFFICE VISIT (OUTPATIENT)
Dept: INTERNAL MEDICINE CLINIC | Facility: CLINIC | Age: 84
End: 2024-12-10
Payer: MEDICARE

## 2024-12-10 ENCOUNTER — LAB ENCOUNTER (OUTPATIENT)
Dept: LAB | Facility: HOSPITAL | Age: 84
End: 2024-12-10
Attending: INTERNAL MEDICINE
Payer: MEDICARE

## 2024-12-10 VITALS
BODY MASS INDEX: 21.76 KG/M2 | HEART RATE: 73 BPM | HEIGHT: 65 IN | RESPIRATION RATE: 18 BRPM | OXYGEN SATURATION: 97 % | DIASTOLIC BLOOD PRESSURE: 62 MMHG | WEIGHT: 130.63 LBS | SYSTOLIC BLOOD PRESSURE: 134 MMHG

## 2024-12-10 DIAGNOSIS — E03.8 SUBCLINICAL HYPOTHYROIDISM: ICD-10-CM

## 2024-12-10 DIAGNOSIS — M80.00XS OSTEOPOROSIS WITH CURRENT PATHOLOGICAL FRACTURE, UNSPECIFIED OSTEOPOROSIS TYPE, SEQUELA: ICD-10-CM

## 2024-12-10 DIAGNOSIS — R41.3 MEMORY IMPAIRMENT: ICD-10-CM

## 2024-12-10 DIAGNOSIS — N18.31 STAGE 3A CHRONIC KIDNEY DISEASE (HCC): ICD-10-CM

## 2024-12-10 DIAGNOSIS — I10 PRIMARY HYPERTENSION: Primary | ICD-10-CM

## 2024-12-10 PROBLEM — Z99.89 USES WALKER: Status: RESOLVED | Noted: 2024-04-23 | Resolved: 2024-12-10

## 2024-12-10 PROBLEM — E87.6 HYPOKALEMIA: Status: RESOLVED | Noted: 2024-04-05 | Resolved: 2024-12-10

## 2024-12-10 PROBLEM — R73.9 HYPERGLYCEMIA: Status: RESOLVED | Noted: 2021-12-02 | Resolved: 2024-12-10

## 2024-12-10 LAB — TSI SER-ACNC: 3.33 UIU/ML (ref 0.55–4.78)

## 2024-12-10 PROCEDURE — 1170F FXNL STATUS ASSESSED: CPT | Performed by: INTERNAL MEDICINE

## 2024-12-10 PROCEDURE — 36415 COLL VENOUS BLD VENIPUNCTURE: CPT | Performed by: INTERNAL MEDICINE

## 2024-12-10 PROCEDURE — 99214 OFFICE O/P EST MOD 30 MIN: CPT | Performed by: INTERNAL MEDICINE

## 2024-12-10 PROCEDURE — 84443 ASSAY THYROID STIM HORMONE: CPT | Performed by: INTERNAL MEDICINE

## 2024-12-10 PROCEDURE — 1125F AMNT PAIN NOTED PAIN PRSNT: CPT | Performed by: INTERNAL MEDICINE

## 2024-12-10 PROCEDURE — 1159F MED LIST DOCD IN RCRD: CPT | Performed by: INTERNAL MEDICINE

## 2024-12-10 PROCEDURE — G2211 COMPLEX E/M VISIT ADD ON: HCPCS | Performed by: INTERNAL MEDICINE

## 2024-12-10 PROCEDURE — 1160F RVW MEDS BY RX/DR IN RCRD: CPT | Performed by: INTERNAL MEDICINE

## 2024-12-10 NOTE — PROGRESS NOTES
Armando Forrester male 83 year old         Chief Complaint   Patient presents with    Follow - Up  memory  and  back pain        Getting  tramadol from pain doc  -  slowly improving  - now  pain is  0  sitting   and  lying  - when amb  3/10 now done with physical therapy.  Follows with pain doctor.  Was on fosamax/reclast  in past -he had a bone density in April.  He has osteopenia      Discussed memory in detail  thinks  better - atributes  issues with pain and concentration now  ok      Reviewd labs    no TSH  done - in April in 4s   test  ok  b12 ok , psa  good       Bp good      Has hx of  isch  colitis  -       Kidny  ult  no hydro  for  ckd    ROS  no cp or  sob no neuro  sx -  appetite good  -   no GI  GI or  issues   Patient Active Problem List   Diagnosis    Hypertension    Osteoporosis    Hypercholesterolemia    Glaucoma    History of ischemic colitis    History of skin cancer    Aortic atherosclerosis (HCC)    Hx of adenomatous colonic polyps    Subclinical hypothyroidism    Closed fracture of third lumbar vertebra, unspecified fracture morphology, initial encounter ([facility])    Senile osteoporosis          Current Outpatient Medications on File Prior to Visit   Medication Sig Dispense Refill    TRAMADOL 50 MG Oral Tab TAKE ONE TABLET BY MOUTH EVERY SIX HOURS AS NEEDED FOR PAIN 15 tablet 0    amLODIPine 10 MG Oral Tab Take 1 tablet (10 mg total) by mouth daily. 90 tablet 3    Multiple Vitamins-Minerals (ALGAE BASED CALCIUM) Oral Tab Take by mouth. Taking 2 tablets once at bedtime      atorvastatin 20 MG Oral Tab Take 1 tablet (20 mg total) by mouth every evening. 90 tablet 3    latanoprost 0.005 % Ophthalmic Solution Place 1 drop into both eyes nightly.      Ascorbic Acid (VITAMIN C) 500 MG Oral Cap Take 1 tablet by mouth daily.      Multiple Vitamins-Minerals (PRESERVISION AREDS) Oral Tab Take 2 tablets by mouth daily.      Omega-3 Fatty Acids (FISH OIL OR) Take 1 capsule by mouth 3 (three) times  daily.      Calcium Carbonate-Vitamin D (CALCIUM 600 + D OR) Take 1 tablet by mouth 2 (two) times daily.      Coenzyme Q10 (COQ10 OR) Take 1 capsule by mouth daily.      Probiotic Product (PROBIOTIC OR) Take 1 capsule by mouth daily.      Boswellia Judah (BOSWELLIA OR) Take 2 tablets by mouth 3 (three) times daily.      Cholecalciferol (VITAMIN D3) 1000 UNITS Oral Cap Take  by mouth. take 1 daily       No current facility-administered medications on file prior to visit.          Vitals:    12/10/24 1126   BP: 134/62   Pulse: 73   Resp: 18   VITALSBody mass index is 21.73 kg/m².    Pertinent findings on the physical exam; 3/3   recall not appear in any acute distress.  Blood pressure is good heart regular.  No neurological findings    Armando was seen today for follow - up.    Diagnoses and all orders for this visit:    Primary hypertension    Osteoporosis with current pathological fracture, unspecified osteoporosis type, sequela    Subclinical hypothyroidism  -     TSH W Reflex To Free T4 [E]    Stage 3a chronic kidney disease (HCC)    Memory impairment         Back has improved.    Bp at goal  memory  is now  great  - was related to pain do not believe he has any dementia at this point.      Defer  further  rx for osteoporosis  to endocurrently is on supplements including calcium and vitamin D      Needs TSH  f/u     He had very minimal anemia back in April.  For his age I think it is stable will not workup at this time.    This note was prepared using Dragon Medical voice recognition dictation software and as a result, errors may occur. When identified, these errors have been corrected. While every attempt is made to correct errors during dictation, discrepancies may still exist

## 2024-12-12 LAB — VITAMIN B6: 54 UG/L

## 2025-02-26 ENCOUNTER — OFFICE VISIT (OUTPATIENT)
Age: 85
End: 2025-02-26
Payer: MEDICARE

## 2025-02-26 VITALS
DIASTOLIC BLOOD PRESSURE: 64 MMHG | HEIGHT: 65 IN | BODY MASS INDEX: 22.96 KG/M2 | SYSTOLIC BLOOD PRESSURE: 132 MMHG | HEART RATE: 71 BPM | OXYGEN SATURATION: 98 % | WEIGHT: 137.81 LBS | RESPIRATION RATE: 18 BRPM

## 2025-02-26 DIAGNOSIS — M80.00XS OSTEOPOROSIS WITH CURRENT PATHOLOGICAL FRACTURE, UNSPECIFIED OSTEOPOROSIS TYPE, SEQUELA: Primary | ICD-10-CM

## 2025-02-26 DIAGNOSIS — Z87.19 HISTORY OF ISCHEMIC COLITIS: ICD-10-CM

## 2025-02-26 DIAGNOSIS — I10 PRIMARY HYPERTENSION: ICD-10-CM

## 2025-02-26 DIAGNOSIS — N18.31 STAGE 3A CHRONIC KIDNEY DISEASE (HCC): ICD-10-CM

## 2025-02-26 PROCEDURE — G2211 COMPLEX E/M VISIT ADD ON: HCPCS | Performed by: INTERNAL MEDICINE

## 2025-02-26 PROCEDURE — 1160F RVW MEDS BY RX/DR IN RCRD: CPT | Performed by: INTERNAL MEDICINE

## 2025-02-26 PROCEDURE — 1159F MED LIST DOCD IN RCRD: CPT | Performed by: INTERNAL MEDICINE

## 2025-02-26 PROCEDURE — 1170F FXNL STATUS ASSESSED: CPT | Performed by: INTERNAL MEDICINE

## 2025-02-26 PROCEDURE — 99214 OFFICE O/P EST MOD 30 MIN: CPT | Performed by: INTERNAL MEDICINE

## 2025-02-26 NOTE — PROGRESS NOTES
Armando Forrester male 84 year old       Chief complaint:      spine  pain- compression fracture      pain is still in spine  imelda in evening  but off pain meds    Stiff ache      S/p  reclast  last  April       Vit D  and  Ca  tested  good      Bp at  goal today   on amlodipine     Has hx of ischemic  colits  -  no  sxs  - no clear risk factors        Had  elevated  cr  in oct  better in  dec      Npo anaboliic meds                                     Patient Active Problem List   Diagnosis    Hypertension    Osteoporosis    Hypercholesterolemia    Glaucoma    History of ischemic colitis    History of skin cancer    Aortic atherosclerosis    Hx of adenomatous colonic polyps    Subclinical hypothyroidism    Closed fracture of third lumbar vertebra, unspecified fracture morphology, initial encounter ([facility])    Senile osteoporosis    Stage 3a chronic kidney disease ([facility])        Current Outpatient Medications   Medication Sig Dispense Refill    amLODIPine 10 MG Oral Tab Take 1 tablet (10 mg total) by mouth daily. 90 tablet 3    Multiple Vitamins-Minerals (ALGAE BASED CALCIUM) Oral Tab Take by mouth. Taking 2 tablets once at bedtime      atorvastatin 20 MG Oral Tab Take 1 tablet (20 mg total) by mouth every evening. 90 tablet 3    latanoprost 0.005 % Ophthalmic Solution Place 1 drop into both eyes nightly.      Ascorbic Acid (VITAMIN C) 500 MG Oral Cap Take 1 tablet by mouth daily.      Multiple Vitamins-Minerals (PRESERVISION AREDS) Oral Tab Take 2 tablets by mouth daily.      Omega-3 Fatty Acids (FISH OIL OR) Take 1 capsule by mouth 3 (three) times daily.      Calcium Carbonate-Vitamin D (CALCIUM 600 + D OR) Take 1 tablet by mouth 2 (two) times daily.      Coenzyme Q10 (COQ10 OR) Take 1 capsule by mouth daily.      Probiotic Product (PROBIOTIC OR) Take 1 capsule by mouth daily.      Boswellia Judah (BOSWELLIA OR) Take 2 tablets by mouth 3 (three) times daily.      Cholecalciferol (VITAMIN D3) 1000 UNITS Oral Cap  Take  by mouth. take 1 daily          Vitals:    02/26/25 1343   BP: 132/64   Pulse: 71   Resp: 18   VITALSBody mass index is 22.93 kg/m².     General: Healthy looking   Normal affect ,  normal cognition  neuro nl   Heart  regular   lungs clear   ext  no edema , no joint swelling        Armando was seen today for follow - up.    Diagnoses and all orders for this visit:    Osteoporosis with current pathological fracture, unspecified osteoporosis type, sequela    Stage 3a chronic kidney disease (HCC)  -     Basic Metabolic Panel (8)    Primary hypertension    History of ischemic colitis       S/p reclast last  spring -  on vit D  - not sure if candidate  for  anabolics  at the same time  see endo      Pain is  tolerable  - off meds      Recheck labs  for  ckd -  had nl ultrasound       Bp at goal      No  GI  issues  - hx of isch  clitis                                      This note was prepared using Dragon Medical voice recognition dictation software and as a result, errors may occur. When identified, these errors have been corrected. While every attempt is made to correct errors during dictation, discrepancies may still exist

## 2025-02-28 LAB
BUN: 22 MG/DL (ref 7–25)
CALCIUM: 9.1 MG/DL (ref 8.6–10.3)
CARBON DIOXIDE: 30 MMOL/L (ref 20–32)
CHLORIDE: 101 MMOL/L (ref 98–110)
CREATININE: 1.05 MG/DL (ref 0.7–1.22)
EGFR: 70 ML/MIN/1.73M2
GLUCOSE: 116 MG/DL (ref 65–139)
POTASSIUM: 4 MMOL/L (ref 3.5–5.3)
SODIUM: 139 MMOL/L (ref 135–146)

## 2025-04-25 ENCOUNTER — HOSPITAL ENCOUNTER (OUTPATIENT)
Dept: GENERAL RADIOLOGY | Facility: HOSPITAL | Age: 85
Discharge: HOME | End: 2025-04-25
Attending: INTERNAL MEDICINE
Payer: MEDICARE

## 2025-04-25 ENCOUNTER — TELEPHONE (OUTPATIENT)
Facility: LOCATION | Age: 85
End: 2025-04-25

## 2025-04-25 ENCOUNTER — OFFICE VISIT (OUTPATIENT)
Facility: CLINIC | Age: 85
End: 2025-04-25
Payer: MEDICARE

## 2025-04-25 VITALS
DIASTOLIC BLOOD PRESSURE: 60 MMHG | SYSTOLIC BLOOD PRESSURE: 120 MMHG | HEART RATE: 62 BPM | BODY MASS INDEX: 22.33 KG/M2 | WEIGHT: 134 LBS | HEIGHT: 65 IN

## 2025-04-25 DIAGNOSIS — M80.00XS OSTEOPOROSIS WITH CURRENT PATHOLOGICAL FRACTURE, UNSPECIFIED OSTEOPOROSIS TYPE, SEQUELA: ICD-10-CM

## 2025-04-25 DIAGNOSIS — I10 PRIMARY HYPERTENSION: ICD-10-CM

## 2025-04-25 DIAGNOSIS — I70.0 AORTIC ATHEROSCLEROSIS: ICD-10-CM

## 2025-04-25 DIAGNOSIS — M25.552 LEFT HIP PAIN: ICD-10-CM

## 2025-04-25 DIAGNOSIS — E03.8 SUBCLINICAL HYPOTHYROIDISM: ICD-10-CM

## 2025-04-25 DIAGNOSIS — S32.039A CLOSED FRACTURE OF THIRD LUMBAR VERTEBRA, UNSPECIFIED FRACTURE MORPHOLOGY, INITIAL ENCOUNTER (HCC): ICD-10-CM

## 2025-04-25 DIAGNOSIS — M25.552 LEFT HIP PAIN: Primary | ICD-10-CM

## 2025-04-25 DIAGNOSIS — E78.00 HYPERCHOLESTEROLEMIA: ICD-10-CM

## 2025-04-25 PROCEDURE — 1160F RVW MEDS BY RX/DR IN RCRD: CPT | Performed by: INTERNAL MEDICINE

## 2025-04-25 PROCEDURE — 73502 X-RAY EXAM HIP UNI 2-3 VIEWS: CPT | Performed by: INTERNAL MEDICINE

## 2025-04-25 PROCEDURE — 73552 X-RAY EXAM OF FEMUR 2/>: CPT | Performed by: INTERNAL MEDICINE

## 2025-04-25 PROCEDURE — 1159F MED LIST DOCD IN RCRD: CPT | Performed by: INTERNAL MEDICINE

## 2025-04-25 PROCEDURE — 99214 OFFICE O/P EST MOD 30 MIN: CPT | Performed by: INTERNAL MEDICINE

## 2025-04-25 PROCEDURE — G2211 COMPLEX E/M VISIT ADD ON: HCPCS | Performed by: INTERNAL MEDICINE

## 2025-04-25 RX ORDER — ZOLEDRONIC ACID 0.05 MG/ML
5 INJECTION, SOLUTION INTRAVENOUS ONCE
OUTPATIENT
Start: 2025-05-13

## 2025-04-25 NOTE — TELEPHONE ENCOUNTER
Good afternoon,     Verified coverage, no authorization is needed for Reclast infusion.     Okay to schedule patient.       Thank You,  Rosie

## 2025-04-25 NOTE — PROGRESS NOTES
Reason for Visit:   osteoporosis with L2 and L3 compression fractures    Requesting Physician:   ..Enzo Gaviria MD    CHIEF COMPLAINT:    Chief Complaint   Patient presents with    Osteoporosis     F/u        HISTORY OF PRESENT ILLNESS:   Armando Forrester is a 84 year old male who presents with  osteoporosis with L2 and L3 compression fractures       last Reclast 5/13/2024  Had kyphplasty in 4/2024   Completed  PT 2/2025  On vit D/ca/fall precautions  Daughter is a doctor so rec' few supplements   Not using walkers now   He has a list of Q:   Pain around the waist - will discuss with PCP   2 weeks ago started having ache in Lt thigh and more noticeable when puts weight on it. Improves as the day goes but worse in the morning. - will order Xray   Low energy   Cannot bend over     Osteoporosis was Dx in 2024  Had DXA scan in 2024 showed osteopenia   Fragility fracture yes L2 and L3    Height loss yes ~ 3 inches   Denied risk factors: steroid use, alcohol abuse, liver disease, kidney disease, hyperthyroid, seizure medications.   Family history of osteoporosis or fragility fracture: no    Patient is on fall precaution.    Patient is taking Vitamin D and calcium    Educated the patient to do wt-bearing exercise, but avoid falls.    1/2024, had lower back pain the day after shoveling wet snow. He thinks he had a fracture then.  Feb 2024 CT showed new compression fracture in L2   4/2024 CT ADRENALS: Unremarkable  Fell on 4/5/2024   CT showed Acute L3 vertebral body fracture with 50% loss of vertebral body height.  Subacute fracture of the L2 vertebral body with 50% loss of vertebral body height has progressed since 2/2/2024.          ASSESSMENT AND PLAN:    84 year old  male who presents with  osteoporosis with L2 and L3 compression fractures   We did w/u for secondary osteoporosis which came back negative.    D/w pt fall precautions.   last Reclast 5/13/2024  Dxa in 4/2026    Plan   Reclast 6/ 2025  Xray for Lt hip  and femur   Follow up with PCP   RTC in 1 year and labs before      Take vitamin D3 2000 international unit a day and calcium 600 mg twice a day or 3 servings of dairy a day   Do weight bearing exercise   Follow fall precautions       Side effect from osteoporosis meds   Hypocalcemia: Adequately supplement calcium and vitamin D during  Osteonecrosis of the Jaw: Monitor for symptoms.   Atypical Femoral Fracture: Evaluate new or unusual thigh, hip, or groin    PAST MEDICAL HISTORY:   Past Medical History:    Aortic atherosclerosis    CT scan 6-16    BPH (benign prostatic hyperplasia)    With abnormal PSA, Rx Proscar    Diverticulosis    Glaucoma    Herpes zoster    Left face    High blood pressure    High cholesterol    Hx of adenomatous colonic polyps    Hypercholesterolemia    Hypertension    Iron deficiency anemia    Ischemic colitis (HCC)    Recurrent 12-21    Osteoporosis    T9 compression fracture, T score -3.8 spine and -0.9 hip, Rx Fosamax; repeat T score -2.5 spine and -1.0 hip 10-10, repeat T score -0.7 hip and -2.2 lumbar spine 3-16, Fosamax DC'd; L2 compression fracture 1-24; T12 L2 L3 compression fracture 4-24; DEXA 4-24 with T-score -1.6 femoral neck and +2.5 lumbar spine    Peptic ulcer disease    SCC (squamous cell carcinoma)    Left temple    Subclinical hypothyroidism       PAST SURGICAL HISTORY:   Past Surgical History:   Procedure Laterality Date    Appendectomy  1961    Appendectomy  8/1961    Back surgery Left 12/2011    L4-L5 microdiscectomy    Cataract Left 10/2010    Cataract Right 12/2010    Colonoscopy  01/2016    Colonoscopy  2022    Glaucoma surgery  ?    Hernia surgery  ?    Inguinal hernia repair Bilateral 1970    Ir kyphoplasty  04/08/2024    T12, L2, L3    Other  01/2017    Excision SCCa left temple    Skin surgery Right 10/28/2016    Excision epidermal inclusion cyst back    Spinal fusion  10/2011    L4-L5    Tonsillectomy  1968       CURRENT MEDICATIONS:     Cholecalciferol (VITAMIN  D3) 1000 UNITS Oral Cap Take  by mouth. take 1 daily         ALLERGIES:  Allergies   Allergen Reactions    Clindamycin OTHER (SEE COMMENTS)     Angioedema    Lisinopril OTHER (SEE COMMENTS)     Angioedema       SOCIAL HISTORY:    Social History     Socioeconomic History    Marital status:     Number of children: 2   Occupational History    Occupation: [employer]     Comment: part time    Occupation:  High teacher, language arts     Comment: retired   Tobacco Use    Smoking status: Former     Current packs/day: 0.00     Types: Cigarettes     Quit date: 1957     Years since quittin.2    Smokeless tobacco: Never   Vaping Use    Vaping status: Never Used   Substance and Sexual Activity    Alcohol use: Yes     Alcohol/week: 1.0 standard drink of alcohol     Types: 1 Glasses of wine per week     Comment: Occasional glass of wine    Drug use: No   Other Topics Concern    Pt has a pacemaker No    Pt has a defibrillator No    Reaction to local anesthetic No    Caffeine Concern No    Stress Concern No    Weight Concern No    Special Diet No    Exercise No    Seat Belt No       FAMILY HISTORY:   Family History   Problem Relation Age of Onset    Heart Disease Father         CHF age 68    Hypertension Mother     Other (Kidney Cancer[other]) Brother     Cancer Brother         Kidney cancer        PHYSICAL EXAM:   Height: 5' 5\" (165.1 cm) (819)  Weight: 134 lb (60.8 kg) (819)  BSA (Calculated - sq m): 1.67 sq meters (819)  Pulse: 62 (819)  BP: 156/77 (819)  Temp: --  Do Not Use - Resp Rate: --  SpO2: --    Body mass index is 22.3 kg/m².  No spine tenderness          DATA:     Pertinent data   Latest Reference Range & Units 24 08:35   VITAMIN D, 25-OH, TOTAL 30.0 - 100.0 ng/mL 63.8        2024 SPEP   Essentially normal.    No evidence of monoclonal proteins identified.      Latest Reference Range & Units 24 08:35   Vit D 1,25 di-OH 24.8 - 81.5 pg/mL 32.2       Latest Reference Range & Units 04/27/24 08:35   TSH 0.550 - 4.780 mIU/mL 4.772   PTH INTACT 18.5 - 88.0 pg/mL 40.1   Cortisol ug/dL 19.0   Prolactin 2.1 - 17.7 ng/mL 8.7      Latest Reference Range & Units 04/27/24 08:35   CALCIUM 8.7 - 10.4 mg/dL 9.6   BUN/CREATININE RATIO 10.0 - 20.0  28.6 (H)   EGFR >=60 mL/min/1.73m2 70       Latest Reference Range & Units 04/27/24 08:39   PROTEIN URINE CALC <149.1 mg/24 hr 237.6 (H)   Urine Volume 24Hr mL  mL 1,650  1,650   CALCIUM URINE CALC 100 - 300 mg/24hr 281   Creatinine, Urine Not Estab. mg/dL 67.6       Latest Reference Range & Units 04/27/24 08:39   URINE BENCE MARINELLI PROTEIN 24HR  24-hour urine concentrated 50X is negative for Free Monoclonal Light Chains (Bence Marinelli Protein).     Cortisol Free 24h Ur 3 - 49 ug/24 hr 20   Cortisol Free Ur Undefined ug/L 12   Creat 24h Ur 1000 - 2000 mg/24 hr 1115      Latest Reference Range & Units 04/27/24 08:35   Sex Horm Bind Glob 19.3 - 76.4 nmol/L 57.2   Testost % Free+Weak Bnd 9.0 - 46.0 % 9.2   Testost Free+Weak Bnd 40.0 - 250.0 ng/dL 40.3   Testosterone Tot LC/.0 - 916.0 ng/dL 438.5      Latest Reference Range & Units 04/09/24 05:38   EGFR >=60 mL/min/1.73m2 85      Latest Reference Range & Units Most Recent   VITAMIN D, 25-OH, TOTAL 30.0 - 100.0 ng/mL 47.1  3/2/22 07:33      Latest Reference Range & Units Most Recent   T4,Free (Direct) 0.8 - 1.7 ng/dL 1.0  3/27/24 07:58   TSH 0.550 - 4.780 mIU/mL 5.580 (H)  3/27/24 07:58      DXA 2024CONCLUSION:   1. Scans of the lumbar spine, left forearm and left hip are consistent with osteopenia, which according to World Health Organization criteria place the patient at a mild-to-moderately increased risk for fracture.      2. Compared to the prior study, bone mineral density has decreased in the left hip.  No prior comparison studies of the left forearm are available.  Please note that assessment for change in bone mineral density in the lumbar spine is limited as the   patient  underwent interval kyphoplasties at L1, L3 and L4 since the 2016 DEXA scan.      3. Based on left femoral neck bone mineral density, the FRAX 10 year probability of a major osteoporotic fracture is 9.0% and the 10 year probability of a hip fracture is 3.3%.      No results for input(s): \"TSH\", \"T4F\", \"T3F\", \"THYP\" in the last 72 hours.  No results found.    Orders Placed This Encounter   Procedures    Comp Metabolic Panel (14)     Orders Placed This Encounter    Comp Metabolic Panel (14)     Standing Status:   Future     Number of Occurrences:   1     Expected Date:   10/25/2025     Expiration Date:   4/25/2026     Release to patient:   Immediate          This is a specialized patient consultation in endocrinology and required comprehensive review of prior records, as well as current evaluation, with time required for consideration of complex endocrine issues and consultation. For this visit, I personally interviewed the patient, and family member if accompanied, performed the pertinent parts of the history and physical examination. ROS included screening for appropriate endocrine conditions.   Today's diagnosis and plan were reviewed in detail with the patient who states understanding and agrees with plan. I discussed with the patient possible diagnosis, differential diagnosis, need for work up, treatment options, alternatives and side effects.     Please see note for details about time spent which includes:   · pre-visit preparation  · reviewing records  · face to face time with the patient   · timely documentation of the encounter  · ordering medications/tests  · communication with care team  · care coordination    I appreciate the opportunity to be part of your patient's medical care and will keep you, as the referring and primary physicians, informed about the care of your patient. Please feel free to contact me should you have any questions.    The 21st Century Cures Act makes medical notes like these  available to patients in the interest of transparency. Please be advised this is a medical document. Medical documents are intended to carry relevant information, facts as evident, and the clinical opinion of the practitioner. The medical note is intended as peer to peer communication and may appear blunt or direct. It is written in medical language and may contain abbreviations or verbiage that are unfamiliar.   Maria R Yanes MD

## 2025-04-25 NOTE — PATIENT INSTRUCTIONS
Reclast 6/ 2025  Xray for Lt hip and femur   Follow up with PCP   RTC in 1 year and labs before      Take vitamin D3 2000 international unit a day and calcium 600 mg twice a day or 3 servings of dairy a day   Do weight bearing exercise   Follow fall precautions       Side effect from osteoporosis meds   Hypocalcemia: Adequately supplement calcium and vitamin D during  Osteonecrosis of the Jaw: Monitor for symptoms.   Atypical Femoral Fracture: Evaluate new or unusual thigh, hip, or groin

## 2025-04-28 DIAGNOSIS — M80.00XS OSTEOPOROSIS WITH CURRENT PATHOLOGICAL FRACTURE, UNSPECIFIED OSTEOPOROSIS TYPE, SEQUELA: Primary | ICD-10-CM

## 2025-04-28 RX ORDER — ZOLEDRONIC ACID 0.05 MG/ML
5 INJECTION, SOLUTION INTRAVENOUS ONCE
OUTPATIENT
Start: 2025-04-28

## 2025-04-30 ENCOUNTER — LAB ENCOUNTER (OUTPATIENT)
Dept: LAB | Facility: HOSPITAL | Age: 85
End: 2025-04-30
Attending: INTERNAL MEDICINE
Payer: MEDICARE

## 2025-04-30 DIAGNOSIS — M80.00XS OSTEOPOROSIS WITH CURRENT PATHOLOGICAL FRACTURE, UNSPECIFIED OSTEOPOROSIS TYPE, SEQUELA: ICD-10-CM

## 2025-04-30 LAB
ALBUMIN SERPL-MCNC: 4.5 G/DL (ref 3.2–4.8)
CALCIUM BLD-MCNC: 9.3 MG/DL (ref 8.7–10.4)
CREAT BLD-MCNC: 1.12 MG/DL (ref 0.7–1.3)
EGFRCR SERPLBLD CKD-EPI 2021: 65 ML/MIN/1.73M2 (ref 60–?)

## 2025-04-30 PROCEDURE — 82040 ASSAY OF SERUM ALBUMIN: CPT

## 2025-04-30 PROCEDURE — 82565 ASSAY OF CREATININE: CPT

## 2025-04-30 PROCEDURE — 82310 ASSAY OF CALCIUM: CPT

## 2025-04-30 PROCEDURE — 36415 COLL VENOUS BLD VENIPUNCTURE: CPT

## 2025-05-08 ENCOUNTER — TELEPHONE (OUTPATIENT)
Age: 85
End: 2025-05-08

## 2025-05-08 NOTE — TELEPHONE ENCOUNTER
Pt is calling requesting a note for jury duty. Pt states that got a letter for jury duty for 05/19 but due to his back pain is not able to sit or stand  for long periods of time. Pt is asking if doctor could provide him with a note for jury duty.       Please call and advise

## 2025-05-08 NOTE — TELEPHONE ENCOUNTER
Note for jury duty excuse has been made. Patient is aware and will  at . Paper copy printed and left at the front.

## 2025-05-20 RX ORDER — ATORVASTATIN CALCIUM 20 MG/1
20 TABLET, FILM COATED ORAL EVERY EVENING
Qty: 90 TABLET | Refills: 3 | Status: SHIPPED | OUTPATIENT
Start: 2025-05-20

## 2025-05-23 ENCOUNTER — OFFICE VISIT (OUTPATIENT)
Facility: CLINIC | Age: 85
End: 2025-05-23

## 2025-05-23 DIAGNOSIS — M80.00XS OSTEOPOROSIS WITH CURRENT PATHOLOGICAL FRACTURE, UNSPECIFIED OSTEOPOROSIS TYPE, SEQUELA: Primary | ICD-10-CM

## 2025-05-23 DIAGNOSIS — Z91.81 AT HIGH RISK FOR FALLS: ICD-10-CM

## 2025-05-23 DIAGNOSIS — M54.50 LOW BACK PAIN WITHOUT SCIATICA, UNSPECIFIED BACK PAIN LATERALITY, UNSPECIFIED CHRONICITY: ICD-10-CM

## 2025-05-23 NOTE — PROGRESS NOTES
Reason for Visit:   osteoporosis with L2 and L3 compression fractures    Requesting Physician:   ..Enzo Gaviria MD    CHIEF COMPLAINT:    Chief Complaint   Patient presents with    Osteoporosis     F/u      HISTORY OF PRESENT ILLNESS:   Armando Forrester is a 84 year old male who presents with  osteoporosis with L2 and L3 compression fractures     Has few Qs   He had Xrays. We reviewed and discussed. No fractures   Has has back pain still   Has risk of falling   Takes vit D and calcium   Reclast is on 6/6/2025    last Reclast 5/13/2024  Had kyphplasty in 4/2024  On vit D/ca/fall precautions     Osteoporosis was Dx in 2024  Had DXA scan in 2024 showed osteopenia   Fragility fracture yes L2 and L3    Height loss yes ~ 3 inches   Denied risk factors for osteoporosis   Family history of osteoporosis or fragility fracture: no    1/2024, had lower back pain the day after shoveling wet snow. He thinks he had a fracture then.  Feb 2024 CT showed new compression fracture in L2   4/2024 CT ADRENALS: Unremarkable  Fell on 4/5/2024   CT showed Acute L3 vertebral body fracture with 50% loss of vertebral body height.  Subacute fracture of the L2 vertebral body with 50% loss of vertebral body height has progressed since 2/2/2024.        4/25/2025 Xrays  CONCLUSION: Normal radiographs of the left femur   4/25/2025 Xrays  Minimal symmetric degenerative changes within both hips     ASSESSMENT AND PLAN:    84 year old  male who presents with  osteoporosis with L2 and L3 compression fractures   Negative secondary osteoporosis workup      D/w pt fall precautions.   last Reclast 5/13/2024  DXA in 4/2026  Plan   RTC in 1 year   Reclast 6/ 2025  Will refer to PT   Follow up with PCP        Take vitamin D3 2000 international unit a day and calcium 600 mg twice a day or 3 servings of dairy a day   Do weight bearing exercise   Follow fall precautions       Side effect from osteoporosis meds   Hypocalcemia: Adequately supplement calcium and  vitamin D during  Osteonecrosis of the Jaw: Monitor for symptoms.   Atypical Femoral Fracture: Evaluate new or unusual thigh, hip, or groin    PAST MEDICAL HISTORY:   Past Medical History:    Aortic atherosclerosis    CT scan 6-16    BPH (benign prostatic hyperplasia)    With abnormal PSA, Rx Proscar    Diverticulosis    Glaucoma    Herpes zoster    Left face    High blood pressure    High cholesterol    Hx of adenomatous colonic polyps    Hypercholesterolemia    Hypertension    Iron deficiency anemia    Ischemic colitis (HCC)    Recurrent 12-21    Osteoporosis    T9 compression fracture, T score -3.8 spine and -0.9 hip, Rx Fosamax; repeat T score -2.5 spine and -1.0 hip 10-10, repeat T score -0.7 hip and -2.2 lumbar spine 3-16, Fosamax DC'd; L2 compression fracture 1-24; T12 L2 L3 compression fracture 4-24; DEXA 4-24 with T-score -1.6 femoral neck and +2.5 lumbar spine    Peptic ulcer disease    SCC (squamous cell carcinoma)    Left temple    Subclinical hypothyroidism       PAST SURGICAL HISTORY:   Past Surgical History:   Procedure Laterality Date    Appendectomy  1961    Appendectomy  8/1961    Back surgery Left 12/2011    L4-L5 microdiscectomy    Cataract Left 10/2010    Cataract Right 12/2010    Colonoscopy  01/2016    Colonoscopy  2022    Glaucoma surgery  ?    Hernia surgery  ?    Inguinal hernia repair Bilateral 1970    Ir kyphoplasty  04/08/2024    T12, L2, L3    Other  01/2017    Excision SCCa left temple    Skin surgery Right 10/28/2016    Excision epidermal inclusion cyst back    Spinal fusion  10/2011    L4-L5    Tonsillectomy  1968       CURRENT MEDICATIONS:    No outpatient medications have been marked as taking for the 5/23/25 encounter (Office Visit) with Maria R Yanes MD.       ALLERGIES:  Allergies   Allergen Reactions    Clindamycin OTHER (SEE COMMENTS)     Angioedema    Lisinopril OTHER (SEE COMMENTS)     Angioedema       SOCIAL HISTORY:    Social History     Socioeconomic History     Marital status:     Number of children: 2   Occupational History    Occupation: [employer]     Comment: part time    Occupation:  High teacher, language arts     Comment: retired   Tobacco Use    Smoking status: Former     Current packs/day: 0.00     Types: Cigarettes     Quit date: 1957     Years since quittin.3    Smokeless tobacco: Never   Vaping Use    Vaping status: Never Used   Substance and Sexual Activity    Alcohol use: Yes     Alcohol/week: 1.0 standard drink of alcohol     Types: 1 Glasses of wine per week     Comment: Occasional glass of wine    Drug use: No   Other Topics Concern    Pt has a pacemaker No    Pt has a defibrillator No    Reaction to local anesthetic No    Caffeine Concern No    Stress Concern No    Weight Concern No    Special Diet No    Exercise No    Seat Belt No       FAMILY HISTORY:   Family History   Problem Relation Age of Onset    Heart Disease Father         CHF age 68    Hypertension Mother     Other (Kidney Cancer[other]) Brother     Cancer Brother         Kidney cancer        PHYSICAL EXAM:   Height: --  Weight: --  BSA (Calculated - sq m): --  Pulse: --  BP: --  Temp: --  Do Not Use - Resp Rate: --  SpO2: --    There is no height or weight on file to calculate BMI.      DATA:     Pertinent data   Latest Reference Range & Units 24 08:35   VITAMIN D, 25-OH, TOTAL 30.0 - 100.0 ng/mL 63.8        2024 SPEP   Essentially normal.    No evidence of monoclonal proteins identified.      Latest Reference Range & Units 24 08:35   Vit D 1,25 di-OH 24.8 - 81.5 pg/mL 32.2      Latest Reference Range & Units 24 08:35   TSH 0.550 - 4.780 mIU/mL 4.772   PTH INTACT 18.5 - 88.0 pg/mL 40.1   Cortisol ug/dL 19.0   Prolactin 2.1 - 17.7 ng/mL 8.7      Latest Reference Range & Units 24 08:35   CALCIUM 8.7 - 10.4 mg/dL 9.6   BUN/CREATININE RATIO 10.0 - 20.0  28.6 (H)   EGFR >=60 mL/min/1.73m2 70       Latest Reference Range & Units 24 08:39   PROTEIN  URINE CALC <149.1 mg/24 hr 237.6 (H)   Urine Volume 24Hr mL  mL 1,650  1,650   CALCIUM URINE CALC 100 - 300 mg/24hr 281   Creatinine, Urine Not Estab. mg/dL 67.6       Latest Reference Range & Units 04/27/24 08:39   URINE BENCE MARINELLI PROTEIN 24HR  24-hour urine concentrated 50X is negative for Free Monoclonal Light Chains (Bence Marinelli Protein).     Cortisol Free 24h Ur 3 - 49 ug/24 hr 20   Cortisol Free Ur Undefined ug/L 12   Creat 24h Ur 1000 - 2000 mg/24 hr 1115      Latest Reference Range & Units 04/27/24 08:35   Sex Horm Bind Glob 19.3 - 76.4 nmol/L 57.2   Testost % Free+Weak Bnd 9.0 - 46.0 % 9.2   Testost Free+Weak Bnd 40.0 - 250.0 ng/dL 40.3   Testosterone Tot LC/.0 - 916.0 ng/dL 438.5      Latest Reference Range & Units 04/09/24 05:38   EGFR >=60 mL/min/1.73m2 85      Latest Reference Range & Units Most Recent   VITAMIN D, 25-OH, TOTAL 30.0 - 100.0 ng/mL 47.1  3/2/22 07:33      Latest Reference Range & Units Most Recent   T4,Free (Direct) 0.8 - 1.7 ng/dL 1.0  3/27/24 07:58   TSH 0.550 - 4.780 mIU/mL 5.580 (H)  3/27/24 07:58      DXA 2024CONCLUSION:   1. Scans of the lumbar spine, left forearm and left hip are consistent with osteopenia, which according to World Health Organization criteria place the patient at a mild-to-moderately increased risk for fracture.      2. Compared to the prior study, bone mineral density has decreased in the left hip.  No prior comparison studies of the left forearm are available.  Please note that assessment for change in bone mineral density in the lumbar spine is limited as the   patient underwent interval kyphoplasties at L1, L3 and L4 since the 2016 DEXA scan.      3. Based on left femoral neck bone mineral density, the FRAX 10 year probability of a major osteoporotic fracture is 9.0% and the 10 year probability of a hip fracture is 3.3%.      No results for input(s): \"TSH\", \"T4F\", \"T3F\", \"THYP\" in the last 72 hours.  No results found.    No orders of the defined types  were placed in this encounter.    No orders of the defined types were placed in this encounter.         This is a specialized patient consultation in endocrinology and required comprehensive review of prior records, as well as current evaluation, with time required for consideration of complex endocrine issues and consultation. For this visit, I personally interviewed the patient, and family member if accompanied, performed the pertinent parts of the history and physical examination. ROS included screening for appropriate endocrine conditions.   Today's diagnosis and plan were reviewed in detail with the patient who states understanding and agrees with plan. I discussed with the patient possible diagnosis, differential diagnosis, need for work up, treatment options, alternatives and side effects.     Please see note for details about time spent which includes:   · pre-visit preparation  · reviewing records  · face to face time with the patient   · timely documentation of the encounter  · ordering medications/tests  · communication with care team  · care coordination    I appreciate the opportunity to be part of your patient's medical care and will keep you, as the referring and primary physicians, informed about the care of your patient. Please feel free to contact me should you have any questions.    The 21st Century Cures Act makes medical notes like these available to patients in the interest of transparency. Please be advised this is a medical document. Medical documents are intended to carry relevant information, facts as evident, and the clinical opinion of the practitioner. The medical note is intended as peer to peer communication and may appear blunt or direct. It is written in medical language and may contain abbreviations or verbiage that are unfamiliar.   Maria R Yanes MD

## 2025-06-02 ENCOUNTER — LAB ENCOUNTER (OUTPATIENT)
Dept: LAB | Facility: HOSPITAL | Age: 85
End: 2025-06-02
Attending: INTERNAL MEDICINE
Payer: MEDICARE

## 2025-06-02 LAB
ALBUMIN SERPL-MCNC: 4.3 G/DL (ref 3.2–4.8)
ALBUMIN/GLOB SERPL: 1.7 {RATIO} (ref 1–2)
ALP LIVER SERPL-CCNC: 95 U/L (ref 45–117)
ALT SERPL-CCNC: 26 U/L (ref 10–49)
ANION GAP SERPL CALC-SCNC: 7 MMOL/L (ref 0–18)
AST SERPL-CCNC: 30 U/L (ref ?–34)
BILIRUB SERPL-MCNC: 0.5 MG/DL (ref 0.2–1.1)
BUN BLD-MCNC: 25 MG/DL (ref 9–23)
BUN/CREAT SERPL: 19.1 (ref 10–20)
CALCIUM BLD-MCNC: 9.7 MG/DL (ref 8.7–10.4)
CHLORIDE SERPL-SCNC: 101 MMOL/L (ref 98–112)
CO2 SERPL-SCNC: 31 MMOL/L (ref 21–32)
CREAT BLD-MCNC: 1.31 MG/DL (ref 0.7–1.3)
EGFRCR SERPLBLD CKD-EPI 2021: 54 ML/MIN/1.73M2 (ref 60–?)
FASTING STATUS PATIENT QL REPORTED: NO
GLOBULIN PLAS-MCNC: 2.6 G/DL (ref 2–3.5)
GLUCOSE BLD-MCNC: 93 MG/DL (ref 70–99)
OSMOLALITY SERPL CALC.SUM OF ELEC: 292 MOSM/KG (ref 275–295)
POTASSIUM SERPL-SCNC: 5 MMOL/L (ref 3.5–5.1)
PROT SERPL-MCNC: 6.9 G/DL (ref 5.7–8.2)
SODIUM SERPL-SCNC: 139 MMOL/L (ref 136–145)

## 2025-06-02 PROCEDURE — 36415 COLL VENOUS BLD VENIPUNCTURE: CPT | Performed by: INTERNAL MEDICINE

## 2025-06-02 PROCEDURE — 80053 COMPREHEN METABOLIC PANEL: CPT | Performed by: INTERNAL MEDICINE

## 2025-06-06 ENCOUNTER — OFFICE VISIT (OUTPATIENT)
Age: 85
End: 2025-06-06
Attending: INTERNAL MEDICINE
Payer: MEDICARE

## 2025-06-06 VITALS
RESPIRATION RATE: 18 BRPM | HEART RATE: 61 BPM | OXYGEN SATURATION: 97 % | WEIGHT: 133.69 LBS | BODY MASS INDEX: 22 KG/M2 | DIASTOLIC BLOOD PRESSURE: 64 MMHG | SYSTOLIC BLOOD PRESSURE: 136 MMHG | TEMPERATURE: 98 F

## 2025-06-06 DIAGNOSIS — M80.00XS OSTEOPOROSIS WITH CURRENT PATHOLOGICAL FRACTURE, UNSPECIFIED OSTEOPOROSIS TYPE, SEQUELA: Primary | ICD-10-CM

## 2025-06-06 RX ORDER — ZOLEDRONIC ACID 0.05 MG/ML
5 INJECTION, SOLUTION INTRAVENOUS ONCE
Status: COMPLETED | OUTPATIENT
Start: 2025-06-06 | End: 2025-06-06

## 2025-06-06 RX ORDER — ZOLEDRONIC ACID 0.05 MG/ML
5 INJECTION, SOLUTION INTRAVENOUS ONCE
Status: CANCELLED | OUTPATIENT
Start: 2025-06-06

## 2025-06-06 RX ORDER — ZOLEDRONIC ACID 0.05 MG/ML
INJECTION, SOLUTION INTRAVENOUS
Status: COMPLETED
Start: 2025-06-06 | End: 2025-06-06

## 2025-06-06 RX ADMIN — ZOLEDRONIC ACID 5 MG: 0.05 INJECTION, SOLUTION INTRAVENOUS at 13:24:00

## 2025-06-06 NOTE — PROGRESS NOTES
Pt here for Reclast . Pt denies any issues or concerns.   Reports tolerating last year's infusion well without any side effects.     Ordering Provider: Dr. Yanes  Order Exp: After this dose.     Pt tolerated infusion without difficulty or complaint. Reviewed next apt date/time: N/A, new order needed.       Education Record  Learner:  Patient  Disease / Diagnosis: osteoporosis.   Barriers / Limitations:  None  Method:  Reinforcement  General Topics:  Plan of care reviewed  Outcome:  Shows understanding

## 2025-06-18 ENCOUNTER — OFFICE VISIT (OUTPATIENT)
Dept: DERMATOLOGY CLINIC | Facility: CLINIC | Age: 85
End: 2025-06-18
Payer: MEDICARE

## 2025-06-18 DIAGNOSIS — L57.0 AK (ACTINIC KERATOSIS): Primary | ICD-10-CM

## 2025-06-18 DIAGNOSIS — Z85.828 ENCOUNTER FOR FOLLOW-UP SURVEILLANCE OF SKIN CANCER: ICD-10-CM

## 2025-06-18 DIAGNOSIS — D22.9 MULTIPLE NEVI: ICD-10-CM

## 2025-06-18 DIAGNOSIS — L81.4 LENTIGO: ICD-10-CM

## 2025-06-18 DIAGNOSIS — Z08 ENCOUNTER FOR FOLLOW-UP SURVEILLANCE OF SKIN CANCER: ICD-10-CM

## 2025-06-18 DIAGNOSIS — L82.1 SEBORRHEIC KERATOSES: ICD-10-CM

## 2025-06-18 DIAGNOSIS — D23.9 BENIGN NEOPLASM OF SKIN, UNSPECIFIED LOCATION: ICD-10-CM

## 2025-06-18 PROCEDURE — 17000 DESTRUCT PREMALG LESION: CPT | Performed by: DERMATOLOGY

## 2025-06-18 PROCEDURE — 1160F RVW MEDS BY RX/DR IN RCRD: CPT | Performed by: DERMATOLOGY

## 2025-06-18 PROCEDURE — 1159F MED LIST DOCD IN RCRD: CPT | Performed by: DERMATOLOGY

## 2025-06-18 PROCEDURE — 99213 OFFICE O/P EST LOW 20 MIN: CPT | Performed by: DERMATOLOGY

## 2025-06-18 NOTE — PROGRESS NOTES
The following individual(s) verbally consented to be recorded using ambient AI listening technology and understand that they can each withdraw their consent to this listening technology at any point by asking the clinician to turn off or pause the recording:    Patient name: Armando Forrester

## 2025-06-23 NOTE — PROGRESS NOTES
Armando Forrester is a 84 year old male.  HPI:     CC:    Chief Complaint   Patient presents with    Upper Body Exam     LOV 10/2024. Pt present for upper body skin exam. Hx of SCC (left temple) and AK's.          Allergies:  Clindamycin and Lisinopril    HISTORY:    Past Medical History:    Aortic atherosclerosis    CT scan 6-16    BPH (benign prostatic hyperplasia)    With abnormal PSA, Rx Proscar    Diverticulosis    Glaucoma    Herpes zoster    Left face    High blood pressure    High cholesterol    Hx of adenomatous colonic polyps    Hypercholesterolemia    Hypertension    Iron deficiency anemia    Ischemic colitis (HCC)    Recurrent 12-21    Osteoporosis    T9 compression fracture, T score -3.8 spine and -0.9 hip, Rx Fosamax; repeat T score -2.5 spine and -1.0 hip 10-10, repeat T score -0.7 hip and -2.2 lumbar spine 3-16, Fosamax DC'd; L2 compression fracture 1-24; T12 L2 L3 compression fracture 4-24; DEXA 4-24 with T-score -1.6 femoral neck and +2.5 lumbar spine    Peptic ulcer disease    SCC (squamous cell carcinoma)    Left temple    Subclinical hypothyroidism      Past Surgical History:   Procedure Laterality Date    Appendectomy  1961    Appendectomy  8/1961    Back surgery Left 12/2011    L4-L5 microdiscectomy    Cataract Left 10/2010    Cataract Right 12/2010    Colonoscopy  01/2016    Colonoscopy  2022    Glaucoma surgery  ?    Hernia surgery  ?    Inguinal hernia repair Bilateral 1970    Ir kyphoplasty  04/08/2024    T12, L2, L3    Other  01/2017    Excision SCCa left temple    Skin surgery Right 10/28/2016    Excision epidermal inclusion cyst back    Spinal fusion  10/2011    L4-L5    Tonsillectomy  1968      Family History   Problem Relation Age of Onset    Heart Disease Father         CHF age 68    Hypertension Mother     Other (Kidney Cancer[other]) Brother     Cancer Brother         Kidney cancer      Social History     Socioeconomic History    Marital status:     Number of children: 2    Occupational History    Occupation:      Comment: part time    Occupation:  High teacher, [employer]     Comment: retired   Tobacco Use    Smoking status: Former     Current packs/day: 0.00     Types: Cigarettes     Quit date: 1957     Years since quittin.3    Smokeless tobacco: Never   Vaping Use    Vaping status: Never Used   Substance and Sexual Activity    Alcohol use: Yes     Alcohol/week: 1.0 standard drink of alcohol     Types: 1 Glasses of wine per week     Comment: Occasional glass of wine    Drug use: No   Other Topics Concern    Pt has a pacemaker No    Pt has a defibrillator No    Reaction to local anesthetic No    Caffeine Concern No    Stress Concern No    Weight Concern No    Special Diet No    Exercise No    Seat Belt No     Social Drivers of Health     Food Insecurity: No Food Insecurity (2024)    Food Insecurity     Food Insecurity: Never true   Transportation Needs: No Transportation Needs (2024)    Transportation Needs     Lack of Transportation: No   Housing Stability: Low Risk  (2024)    Housing Stability     Housing Instability: No        Current Outpatient Medications   Medication Sig Dispense Refill    atorvastatin 20 MG Oral Tab Take 1 tablet (20 mg total) by mouth every evening. 90 tablet 3    amLODIPine 10 MG Oral Tab Take 1 tablet (10 mg total) by mouth daily. 90 tablet 3    Multiple Vitamins-Minerals (ALGAE BASED CALCIUM) Oral Tab Take by mouth. Taking 2 tablets once at bedtime      latanoprost 0.005 % Ophthalmic Solution Place 1 drop into both eyes nightly.      Ascorbic Acid (VITAMIN C) 500 MG Oral Cap Take 1 tablet by mouth daily.      Multiple Vitamins-Minerals (PRESERVISION AREDS) Oral Tab Take 2 tablets by mouth daily.      Omega-3 Fatty Acids (FISH OIL OR) Take 1 capsule by mouth 3 (three) times daily.      Calcium Carbonate-Vitamin D (CALCIUM 600 + D OR) Take 1 tablet by mouth 2 (two) times daily.      Coenzyme Q10 (COQ10 OR) Take 1 capsule by  mouth daily.      Probiotic Product (PROBIOTIC OR) Take 1 capsule by mouth daily.      Boswellia Judah (BOSWELLIA OR) Take 2 tablets by mouth 3 (three) times daily.      Cholecalciferol (VITAMIN D3) 1000 UNITS Oral Cap Take  by mouth. take 1 daily       Allergies:   Allergies   Allergen Reactions    Clindamycin OTHER (SEE COMMENTS)     Angioedema    Lisinopril OTHER (SEE COMMENTS)     Angioedema       Past Medical History:    Aortic atherosclerosis    CT scan 6-16    BPH (benign prostatic hyperplasia)    With abnormal PSA, Rx Proscar    Diverticulosis    Glaucoma    Herpes zoster    Left face    High blood pressure    High cholesterol    Hx of adenomatous colonic polyps    Hypercholesterolemia    Hypertension    Iron deficiency anemia    Ischemic colitis (HCC)    Recurrent 12-21    Osteoporosis    T9 compression fracture, T score -3.8 spine and -0.9 hip, Rx Fosamax; repeat T score -2.5 spine and -1.0 hip 10-10, repeat T score -0.7 hip and -2.2 lumbar spine 3-16, Fosamax DC'd; L2 compression fracture 1-24; T12 L2 L3 compression fracture 4-24; DEXA 4-24 with T-score -1.6 femoral neck and +2.5 lumbar spine    Peptic ulcer disease    SCC (squamous cell carcinoma)    Left temple    Subclinical hypothyroidism     Past Surgical History:   Procedure Laterality Date    Appendectomy  1961    Appendectomy  8/1961    Back surgery Left 12/2011    L4-L5 microdiscectomy    Cataract Left 10/2010    Cataract Right 12/2010    Colonoscopy  01/2016    Colonoscopy  2022    Glaucoma surgery  ?    Hernia surgery  ?    Inguinal hernia repair Bilateral 1970    Ir kyphoplasty  04/08/2024    T12, L2, L3    Other  01/2017    Excision SCCa left temple    Skin surgery Right 10/28/2016    Excision epidermal inclusion cyst back    Spinal fusion  10/2011    L4-L5    Tonsillectomy  1968     Social History     Socioeconomic History    Marital status:      Spouse name: Not on file    Number of children: 2    Years of education: Not on file     Highest education level: Not on file   Occupational History    Occupation: [employer]     Comment: part time    Occupation:  High teacher, [employer]     Comment: retired   Tobacco Use    Smoking status: Former     Current packs/day: 0.00     Types: Cigarettes     Quit date: 1957     Years since quittin.3    Smokeless tobacco: Never   Vaping Use    Vaping status: Never Used   Substance and Sexual Activity    Alcohol use: Yes     Alcohol/week: 1.0 standard drink of alcohol     Types: 1 Glasses of wine per week     Comment: Occasional glass of wine    Drug use: No    Sexual activity: Not on file   Other Topics Concern    Grew up on a farm Not Asked    History of tanning Not Asked    Outdoor occupation Not Asked    Pt has a pacemaker No    Pt has a defibrillator No    Reaction to local anesthetic No     Service Not Asked    Blood Transfusions Not Asked    Caffeine Concern No    Occupational Exposure Not Asked    Hobby Hazards Not Asked    Sleep Concern Not Asked    Stress Concern No    Weight Concern No    Special Diet No    Back Care Not Asked    Exercise No    Bike Helmet Not Asked    Seat Belt No    Self-Exams Not Asked   Social History Narrative    Not on file     Social Drivers of Health     Food Insecurity: No Food Insecurity (2024)    Food Insecurity     Food Insecurity: Never true   Transportation Needs: No Transportation Needs (2024)    Transportation Needs     Lack of Transportation: No   Stress: Not on file   Housing Stability: Low Risk  (2024)    Housing Stability     Housing Instability: No     Housing Instability Emergency: Not on file     Crib or Bassinette: Not on file     Family History   Problem Relation Age of Onset    Heart Disease Father         CHF age 68    Hypertension Mother     Other (Kidney Cancer[other]) Brother     Cancer Brother         Kidney cancer       There were no vitals filed for this visit.    HPI:    Chief Complaint   Patient presents with    Upper  Body Exam     LOV 10/2024. Pt present for upper body skin exam. Hx of SCC (left temple) and AK's.      Of note patient's daughter physician at [facility] care-remains in [place] starting new preventative medicine program through [place]-other daughter in [place].  Patient hopes to travel this year    Does generally wear hat when he is outside= [employer]   Follow-up AK's no particular concerns.  Dermatitic changes have resolved    Follow-up history of SKs, history of skin cancer SCC left temple January 2017.  History of skin cyst.  No particular worrisome lesions at this time has been careful to use sun protection.    History of Present Illness  Armando Forrester is an 84 year old male who presents for a dermatology follow-up regarding age spots and keratosis.    He has multiple age spots and keratosis, particularly on his left forehead, attributed to sun exposure over his lifetime. These lesions are not currently bleeding, crusting, or causing significant concern. He has been treated for these conditions before and is not ready for a biopsy at this time. No new bleeding, crusting, or concerning changes in his skin lesions.    He spends time in his garden but limits his time outside due to back issues from fractures last year. He needs to kneel rather than bend over to manage his gardening tasks and often needs to lie down to relieve back soreness.    His wife, Ilsa, was hospitalized for sepsis for five days and has been recovering for the past three weeks but still struggles with low stamina.      Patient presents with concerns above.    Past notes/ records and appropriate/relevant lab results including pathology and past body maps reviewed. Updated and new information noted in current visit.     Patient has been in their usual state of health.  History, medications, allergies reviewed as noted.      ROS:  Denies any other systemic complaints.  No new or changeing lesions  other than noted above. No fevers, chills, night sweats, unusual sun sensitivity.  No other skin complaints.        History, medications, allergies reviewed as noted.       Physical Examination:     Well-developed well-nourished patient alert oriented in no acute distress.  Examp erformed, including scalp, head, neck, face,nails, hair, external eyes, including conjunctival mucosa, eyelids, lips external ears, upper back, upper chest, arms, palms.     Multiple light to medium brown, well marginated, uniformly pigmented, macules and papules 6 mm and less scattered on exam. pigmented lesions examined with dermoscopy benign-appearing patterns.     Waxy tannish keratotic papules scattered, cherry-red vascular papules scattered.    See map today's date for lesions noted .  Background of sun damage temples forehead brow with minimal erythema and scaling.  Minimal erythema at left brow near area of previous SCC no evidence recurrence multiple benign keratoses stable  Otherwise remarkable for lesions as noted on map.  See Assessment /Plan for additional history and physical exam also:    Assessment / plan:    No orders of the defined types were placed in this encounter.      Meds & Refills for this Visit:  Requested Prescriptions      No prescriptions requested or ordered in this encounter         Encounter Diagnoses   Name Primary?    AK (actinic keratosis) Yes    Seborrheic keratoses     Lentigo     Multiple nevi     Encounter for follow-up surveillance of skin cancer     Benign neoplasm of skin, unspecified location      Physical Exam  SKIN: Skin with age spots and keratosis, no concerning lesions.    Results        Assessment & Plan  Seborrheic Keratosis  Multiple seborrheic keratoses are present on the back without concerning features such as bleeding or crusting. The condition is well-managed and does not require biopsy or intervention.    Lentigo  Lentigines are present on the left forehead, likely due to chronic sun  exposure. No new or concerning changes such as bleeding, crusting, or peeling are reported.  - Monitor for symptoms such as bleeding, crusting, or peeling.          Erythematous scaling keratotic papules noted at sites noted on map  Actinic Keratoses.  Precancerous nature discussed. Sun protection, sunscreen/ blocks encouraged Lesions treated with cryo- .  Biopsy if not resolved.    Left lateral orbit    Arms and hands okay no active AK's in this area    Continue sunscreen    Multiple benign keratoses over the face neck arms hands back reassurance.    Papule at right forehead observe.  If appears to be small cyst versus intradermal nevus monitor recheck at follow-up if this does become symptomatic we will recheck sooner overall stable    Scattered benign keratoses over the face scalp neck arms hands       Continue careful sun protection, regular skin checks history of SCC no recurrence.  No new suspicious lesions.  Moderate sun damage continue careful sun protection hat use sunscreen use    Patient is an avid  continue careful sun protection.  Does wear hat and sunscreen.  Patient's daughter is preventative health provider at [facility].  No recent trips  Multiple waxy tan keratotic papules over the upper back.    Cystic papule at left lateral back reassurance.  Discussed pros and cons of removal not bothersome currently observation.    No other susupicious lesions on todays  exam.      Benign seborrheic keratoses lentigines.  No significant changes  Please refer to map for specific lesions.  See additional diagnoses.  Pros cons of various therapies, risks benefits discussed.Pathophysiology discussed with patient.  Therapeutic options reviewed.  See  Medications in grid.  Instructions reviewed at length.    Benign nevi, seborrheic  keratoses, cherry angiomas:  Reassurance regarding other benign skin lesions.    Monitor for new or changing lesions. Signs and symptoms of skin cancer, ABCDE's of melanoma  ( additional information available at AAD.org, skincancer.org) Encourage Sunscreen (broad-spectrum, ideally mineral-based-UVA/UVB -SPF 30 or higher) use encouraged, sun protection/sun protective clothing, self exams reviewed Followup as noted RTC ---routine checkup 6 mos -one year or p.r.n.    Encounter Times   Including precharting, reviewing chart, prior notes obtaining history: 10 minutes, medical exam :10 minutes, notes on body map, plan, counseling 10minutes My total time spent caring for the patient on the day of the encounter: 30 minutes     The patient indicates understanding of these issues and agrees to the plan.  The patient is asked to return as noted in follow-up/ above.    This note was generated using Dragon voice recognition software.  Please contact me regarding any confusion resulting from errors in recognition..  Note to patient and family: The 21st Century Cures Act makes medical notes like these available to patients. However, be advised this is a medical document. It is intended as [id number] communication and monitoring of a patient's care needs. It is written in medical language and may contain abbreviations or verbiage that are unfamiliar. It may appear blunt or direct. Medical documents are intended to carry relevant information, facts as evident and the clinical opinion of the practitioner.

## 2025-06-24 ENCOUNTER — TELEPHONE (OUTPATIENT)
Dept: PHYSICAL THERAPY | Facility: HOSPITAL | Age: 85
End: 2025-06-24

## 2025-06-27 ENCOUNTER — APPOINTMENT (OUTPATIENT)
Dept: GENERAL RADIOLOGY | Facility: HOSPITAL | Age: 85
End: 2025-06-27
Attending: EMERGENCY MEDICINE
Payer: MEDICARE

## 2025-06-27 ENCOUNTER — HOSPITAL ENCOUNTER (EMERGENCY)
Facility: HOSPITAL | Age: 85
Discharge: HOME | End: 2025-06-27
Attending: EMERGENCY MEDICINE
Payer: MEDICARE

## 2025-06-27 VITALS
BODY MASS INDEX: 22 KG/M2 | SYSTOLIC BLOOD PRESSURE: 148 MMHG | OXYGEN SATURATION: 97 % | RESPIRATION RATE: 19 BRPM | DIASTOLIC BLOOD PRESSURE: 66 MMHG | TEMPERATURE: 98 F | HEART RATE: 62 BPM | WEIGHT: 133 LBS

## 2025-06-27 DIAGNOSIS — S93.401A MODERATE RIGHT ANKLE SPRAIN, INITIAL ENCOUNTER: Primary | ICD-10-CM

## 2025-06-27 PROCEDURE — 99284 EMERGENCY DEPT VISIT MOD MDM: CPT

## 2025-06-27 PROCEDURE — 99283 EMERGENCY DEPT VISIT LOW MDM: CPT

## 2025-06-27 PROCEDURE — 73610 X-RAY EXAM OF ANKLE: CPT | Performed by: EMERGENCY MEDICINE

## 2025-06-28 NOTE — ED INITIAL ASSESSMENT (HPI)
Patient states he was sitting on chair this afternoon, felt right leg falling' asleep\" and when trying to walk his right leg gave out and fell. Patient denies hitting head. Currently c/o of right ankle pain.

## 2025-06-28 NOTE — ED PROVIDER NOTES
You kidding me patient Seen in: [facility] Emergency Department    History     Chief Complaint   Patient presents with    Leg or Foot Injury     Right ankle       HPI    The patient presents to the ED after falling today injuring his right ankle.  He states that even sitting in his recliner for some time and his right leg fell asleep but he then decided to get up and try to go and close the shades and and when he tried to walk his right leg gave out as it was numb and he fell.  He denies other injury.    History reviewed. Past Medical History[1]    History reviewed. Past Surgical History[2]      Medications :  Prescriptions Prior to Admission[3]     Family History[4]    Smoking Status: Social Hx on file[5]    Constitutional and vital signs reviewed.      Social History and Family History elements reviewed from today, pertinent positives to the presenting problem noted.    Physical Exam     ED Triage Vitals [06/27/25 1931]   /65   Pulse 69   Resp 19   Temp 97.9 °F (36.6 °C)   Temp src Temporal   SpO2 98 %   O2 Device None (Room air)       All measures to prevent infection transmission during my interaction with the patient were taken. Handwashing was performed prior to and after the exam.  Stethoscope and any equipment used during my examination was cleaned with super sani-cloth germicidal wipes following the exam.     Physical Exam  Constitutional:       Appearance: Normal appearance.   Pulmonary:      Effort: Pulmonary effort is normal. No respiratory distress.   Musculoskeletal:         General: Swelling and tenderness present.      Comments: Mild tenderness and swelling to the right lateral ankle.  No obvious bony fracture.   Neurological:      Mental Status: He is alert. Mental status is at baseline.   Psychiatric:         Mood and Affect: Mood normal.         Behavior: Behavior normal.         ED Course      Labs Reviewed - No data to display    As Interpreted by me    Imaging Results Available and  Reviewed while in ED: XR ANKLE (MIN 3 VIEWS), RIGHT (CPT=73610)  Result Date: 6/27/2025  CONCLUSION: No acute fracture or subluxation. Electronically Verified and Signed by Attending Radiologist: Hal Saldivar MD 6/27/2025 8:33 PM Workstation: [employer]    ED Medications Administered: Medications - No data to display      MDM     Vitals:    06/27/25 1931 06/27/25 2001   BP: 132/65 148/66   Pulse: 69 62   Resp: 19    Temp: 97.9 °F (36.6 °C)    TempSrc: Temporal    SpO2: 98% 97%   Weight: 60.3 kg      *I personally reviewed and interpreted all ED vitals.    Pulse Ox: 97%, Room air, Normal     Differential Diagnosis/ Diagnostic Considerations: Ankle sprain, ankle fracture, other    Complicating Factors: The patient already has does not have any pertinent problems on file. to contribute to the complexity of this ED evaluation.    Medical Decision Making  Patient presents to the ED after a fall and right ankle injury.  No evidence for fracture on x-ray imaging.  Patient placed in [facility] and stable for discharge with outpatient follow-up.    Problems Addressed:  Moderate right ankle sprain, initial encounter: acute illness or injury    Amount and/or Complexity of Data Reviewed  Radiology: ordered and independent interpretation performed. Decision-making details documented in ED Course.     Details: I personally reviewed the patient's right ankle x-ray images and noted no fracture        Condition upon leaving the department: Stable    Disposition and Plan     Clinical Impression:  1. Moderate right ankle sprain, initial encounter        Disposition:  Discharge    Follow-up:  Enzo Gaviria  [address] [address]  [phone]    Schedule an appointment as soon as possible for a visit in 3 day(s)        Medications Prescribed:  Discharge Medication List as of 6/27/2025  8:54 PM                           [1]   Past Medical History:   Aortic atherosclerosis    CT scan 6-16    BPH  (benign prostatic hyperplasia)    With abnormal PSA, Rx Proscar    Diverticulosis    Glaucoma    Herpes zoster    Left face    High blood pressure    High cholesterol    Hx of adenomatous colonic polyps    Hypercholesterolemia    Hypertension    Iron deficiency anemia    Ischemic colitis (HCC)    Recurrent 12-21    Osteoporosis    T9 compression fracture, T score -3.8 spine and -0.9 hip, Rx Fosamax; repeat T score -2.5 spine and -1.0 hip 10-10, repeat T score -0.7 hip and -2.2 lumbar spine 3-16, Fosamax DC'd; L2 compression fracture 1-24; T12 L2 L3 compression fracture 4-24; DEXA 4-24 with T-score -1.6 femoral neck and +2.5 lumbar spine    Peptic ulcer disease    SCC (squamous cell carcinoma)    Left temple    Subclinical hypothyroidism   [2]   Past Surgical History:  Procedure Laterality Date    Appendectomy      Appendectomy  1961    Back surgery Left 2011    L4-L5 microdiscectomy    Cataract Left 10/2010    Cataract Right 2010    Colonoscopy  2016    Colonoscopy      Glaucoma surgery  ?    Hernia surgery  ?    Inguinal hernia repair Bilateral 1970    Ir kyphoplasty  2024    T12, L2, L3    Other  2017    Excision SCCa left temple    Skin surgery Right 10/28/2016    Excision epidermal inclusion cyst back    Spinal fusion  10/2011    L4-L5    Tonsillectomy  1968   [3] (Not in a hospital admission)  [4]   Family History  Problem Relation Age of Onset    Heart Disease Father         CHF age 68    Hypertension Mother     Other (Kidney Cancer[other]) Brother     Cancer Brother         Kidney cancer   [5]   Social History  Socioeconomic History    Marital status:     Number of children: 2   Occupational History    Occupation: [employer]     Comment: part time    Occupation:  High teacher, language arts     Comment: retired   Tobacco Use    Smoking status: Former     Current packs/day: 0.00     Types: Cigarettes     Quit date: 1957     Years since quittin.4    Smokeless tobacco:  Never   Vaping Use    Vaping status: Never Used   Substance and Sexual Activity    Alcohol use: Yes     Alcohol/week: 1.0 standard drink of alcohol     Types: 1 Glasses of wine per week     Comment: Occasional glass of wine    Drug use: No   Other Topics Concern    Pt has a pacemaker No    Pt has a defibrillator No    Reaction to local anesthetic No    Caffeine Concern No    Stress Concern No    Weight Concern No    Special Diet No    Exercise No    Seat Belt No

## 2025-06-28 NOTE — ED NOTES
Pt received in er due right ankle pain that started at 1430 when patient attempted to get up to close the blinds. Pt states he twisted his right ankle and fell to the fell. Pt denies head injury at time of fall. Pt has chronic bilateral foot pain related to multiple back surgeries. Pt did not take meds pta for pain. Pt aox4 ambulatory with wc at this time due to pain. Wife at bs.

## 2025-06-30 ENCOUNTER — TELEPHONE (OUTPATIENT)
Age: 85
End: 2025-06-30

## 2025-06-30 NOTE — TELEPHONE ENCOUNTER
MyChart message sent to patient w/ follow-up ankle sprain self-care advice for mild tenderness/ swelling of right ankle (per ER/ Epic Notes).      No F/U appt required unless ankle symptoms worsening-- severe pain/ swelling.

## 2025-06-30 NOTE — TELEPHONE ENCOUNTER
Pt is calling stating that went to er on 06/27 for sprain ankle and was told needs to follow up with pcp.       Please call and advise

## 2025-07-01 ENCOUNTER — OFFICE VISIT (OUTPATIENT)
Dept: PHYSICAL THERAPY | Facility: HOSPITAL | Age: 85
End: 2025-07-01
Attending: INTERNAL MEDICINE
Payer: MEDICARE

## 2025-07-01 DIAGNOSIS — Z91.81 AT HIGH RISK FOR FALLS: ICD-10-CM

## 2025-07-01 DIAGNOSIS — M80.00XS OSTEOPOROSIS WITH CURRENT PATHOLOGICAL FRACTURE, UNSPECIFIED OSTEOPOROSIS TYPE, SEQUELA: Primary | ICD-10-CM

## 2025-07-01 PROCEDURE — 97110 THERAPEUTIC EXERCISES: CPT

## 2025-07-01 PROCEDURE — 97162 PT EVAL MOD COMPLEX 30 MIN: CPT

## 2025-07-01 NOTE — PROGRESS NOTES
SPINE EVALUATION:     Diagnosis:   Osteoporosis with current pathological fracture, unspecified osteoporosis type, sequela (M80.00XS)  At high risk for falls (Z91.81) Patient:  Armando Forrester (84 year old, male)        Referring Provider: Maria R Yanes  Today's Date   7/1/2025    Precautions:  Other (use comment) (Hx of compresson fractures at T7, T9, T10, T11 and kyphoplasty at L1 due to compression fracture. Scoliosis)   Date of Evaluation: 07/01/25  Next MD visit: TBD  Date of Surgery: N/A     PATIENT SUMMARY     Summary of chief complaints: Back pain due hx of compression fractures.  History of current condition: Pt reports a hx of back pain due to multiple compression fractures with use of skilled PT previously. Pt reports back pain flares up in the morning and evenings. He reports pain/soreness and stiffness in low back. Describes pain as dull and achey in across low back. He reports this limits participation in ADL's. Denies pain in legs. Denies N/T in legs. (Pt reports he is performing R sided open books, B heel slides, B BKFO at home)   Pain level: current 0 /10, at best 0 /10 (Eases: Sitting and laying down), at worst 7 /10 (Aggs: pt unsure)  Description of symptoms: Dull, Ache   Occupation: Retired   Leisure activities/Hobbies: Reading, Theater, Gardening   Prior level of function: Pain with ADL's and recreational activites making daily activites challenging.  Current limitations: walking, standing, stair ambulation (carrying food tray and/or clothes up to his wife on the 2nd floor)  Pt goals: Decrease pain and learn exercises to do at home  Red flag signs/symptoms: Pt denies dizziness, drop attacks, dysphagia, dysarthria, diplopia; Pt denies changes in bowel/bladder function, saddle anesthesia; Pt denies pain that wakes in sleep, fever, recent trauma, history of CA, pain unchanged with movement/activity    Past medical history was reviewed with Armando.  Significant findings include: Hx of  compresson fractures at T7, T9, T10, T11 and kyphoplasty at L1 due to compression fracture.  Imaging/Tests: CT, MRI, X-ray   Armando  has a past medical history of Aortic atherosclerosis, BPH (benign prostatic hyperplasia) (09/2008), Diverticulosis (2007), Glaucoma, Herpes zoster (2008), High blood pressure, High cholesterol, adenomatous colonic polyps (07/2022), Hypercholesterolemia, Hypertension (08/2011), Iron deficiency anemia (09/2008), Ischemic colitis (HCC) (12/2015), Osteoporosis (01/2002), Peptic ulcer disease (1980), SCC (squamous cell carcinoma) (01/2017), and Subclinical hypothyroidism.  He  has a past surgical history that includes appendectomy (1961); tonsillectomy (1968); Inguinal hernia repair (Bilateral, 1970); cataract (Left, 10/2010); cataract (Right, 12/2010); back surgery (Left, 12/2011); spinal fusion (10/2011); colonoscopy (01/2016); skin surgery (Right, 10/28/2016); other (01/2017); ir kyphoplasty (04/08/2024); appendectomy (8/1961); colonoscopy (2022); glaucoma surgery (?); and hernia surgery (?).    ASSESSMENT  Armando presents to physical therapy evaluation with primary c/o Back pain due hx of compression fractures.. The results of the objective tests and measures show ROM deficits, poor posture, hip weakness.. Functional deficits include but are not limited to walking, standing, stair ambulation (carrying food tray and/or clothes up to his wife on the 2nd floor). Signs and symptoms are consistent with diagnosis of Osteoporosis with current pathological fracture, unspecified osteoporosis type, sequela (M80.00XS)  At high risk for falls (Z91.81). Pt and PT discussed evaluation findings, pathology, POC and HEP.  Pt voiced understanding and performs HEP correctly without reported pain. Skilled Physical Therapy is medically necessary to address the above impairments and reach functional goals.    OBJECTIVE:      Musculoskeletal:  Observation/Posture: forward head posture; increased thoracic  kyphosis; rounded shoulders; anterior pelvic tilt (and Scoliosis)   Accessory Motion: Not assessed   Palpation: Denies TTP: B PS, QL       ROM and Strength:  (* denotes performed with pain)  Trunk ROM     Flex 50     Ext Significantly limited    R L     Side bend 15 15     Rotation Moderately limited Moderately limited   ,   Hip   MMT (-/5)    R L     Flex (L2) 5 4     Abd 3- 3-     ER 5 5     IR 5 5    ,   Knee   MMT (-/5)    R L     Flex 5 5     Ext (L3) 5 5     ,   Ankle/Foot   MMT (-/5)    R L     PF Avoided due to sprained ankle 5 (in [place])     DF (L4) Avoided due to sprained ankle 5       Flexibility:  LE Flexibility R L     Hip Flexor mod restricted mod restricted     Hamstrings WNL WNL     Piriformis WNL WNL     Quads WNL WNL       Balance and Functional Mobility:  Gait: pt ambulates on level ground with stooped posture/forward lean.  Balance: SLS: EO R -- (TBD), EO L -- (TBD)        Today's Treatment and Response:   Pt education was provided on exam findings, treatment diagnosis, treatment plan, expectations, and prognosis.    Today's Treatment       7/1/2025   Spine Treatment   Therapeutic Exercise LTR x 10 (B)  PPT 3 sec x 10   Clamshells x 10 (B)   Therapeutic Exercise Minutes 15   Evaluation Minutes 30   Total Time Of Timed Procedures 15   Total Time Of Service-Based Procedures 30   Total Treatment Time 45   HEP Access Code: [email or web address]  URL: [email or web address]  Date: 07/01/2025  Prepared by: Deven Armendariz    Exercises  - Supine Lower Trunk Rotation  - 1 x daily - 7 x weekly - 2 sets - 10 reps  - Supine Posterior Pelvic Tilt  - 1 x daily - 7 x weekly - 1-2 sets - 10 reps - 3 sec hold  - Clamshell  - 1 x daily - 7 x weekly - 2 sets - 10 reps        Patient was instructed in and issued a HEP for: Access Code: [email or web address]  URL: [email or web address]  Date: 07/01/2025  Prepared by: Deven Armendariz    Exercises  - Supine Lower Trunk Rotation  - 1 x daily - 7 x weekly - 2 sets -  10 reps  - Supine Posterior Pelvic Tilt  - 1 x daily - 7 x weekly - 1-2 sets - 10 reps - 3 sec hold  - Clamshell  - 1 x daily - 7 x weekly - 2 sets - 10 reps    Charges:  PT EVAL: Moderate Complexity, 1 TE  In agreement with evaluation findings and clinical rationale, this evaluation involved MODERATE COMPLEXITY decision making due to 1-2 personal factors/comorbidities, 3 or more body structures involved/activity limitations, and evolving symptoms as documented in the evaluation.                                                                         PLAN OF CARE:      Goals: (to be met in 10 visits)    Not Met Progress Toward Partially Met Met   Pt will improve transversus abdominis recruitment to perform proper isometric contraction without requiring verbal or tactile cuing to promote advancement of therex. [] [] [] []   Pt will demonstrate good understanding of proper posture and body mechanics to decrease pain and improve spinal safety. [] [] [] []   Pt will improve lumbar spine AROM flexion to >55 deg to allow increase ease with bending forward to don shoes. [] [] [] []   Pt will report improved ability to ambulate stairs safely and with 3/10 pain or less, to improve ability to complete tasks required of him at home [] [] [] []   Pt will have decreased paraspinal mm tension to tolerate standing >30 minutes for work and home activities. [] [] [] []   Pt will demonstrate improved core strength to be able to carry up his wife's clothes to her with <3/10 pain. [] [] [] []   Pt will be independent and compliant with comprehensive HEP to maintain progress achieved in PT [] [] [] []         Frequency / Duration: Patient will be seen 1-2x/week or a total of 10  visits over a 90 day period. Treatment will include: Gait training; Manual Therapy; Neuromuscular Re-education; Therapeutic Activities; Therapeutic Exercise; Home Exercise Program instruction    Education or treatment limitation: None   Rehab Potential:  good     Oswestry Disability Index Score  Score: (Patient-Rptd) 26 % (6/24/2025 10:51 AM)      Patient/Family/Caregiver was advised of these findings, precautions, and treatment options and has agreed to actively participate in planning and for this course of care.    Thank you for your referral. Please co-sign or sign and return this letter via fax as soon as possible to [phone]. If you have any questions, please contact me at Dept: [phone]    Sincerely,  Electronically signed by therapist: Deven Armendariz PT  Physician's certification required: Yes  I certify the need for these services furnished under this plan of treatment and while under my care.    X___________________________________________________ Date____________________    Certification From: 7/1/2025  To: 9/29/2025

## 2025-07-03 ENCOUNTER — OFFICE VISIT (OUTPATIENT)
Dept: PHYSICAL THERAPY | Facility: HOSPITAL | Age: 85
End: 2025-07-03
Attending: INTERNAL MEDICINE
Payer: MEDICARE

## 2025-07-03 PROCEDURE — 97112 NEUROMUSCULAR REEDUCATION: CPT

## 2025-07-03 PROCEDURE — 97110 THERAPEUTIC EXERCISES: CPT

## 2025-07-03 NOTE — PROGRESS NOTES
Patient: Armando Forrester (84 year old, male) Referring Provider:  Insurance:   Diagnosis: Osteoporosis with current pathological fracture, unspecified osteoporosis type, sequela (M80.00XS)  At high risk for falls (Z91.81) Maria R LIANG   Date of Surgery: N/A Next MD visit:  N/A   Precautions:  Other (use comment) (Hx of compresson fractures at T7, T9, T10, T11 and kyphoplasty at L1 due to compression fracture. Scoliosis) TBD Referral Information:    Date of Evaluation: Req: 0, Auth: 0, Exp:     07/01/25 POC Auth Visits:  10       Today's Date   7/3/2025    Subjective  Pt has multiple questions on HEP. He reports mild pain, reporting pain is always there, just varies day to day.       Pain: 3/10     Objective  See ROM/MMT below       Trunk       7/1/2025   Trunk ROM/MMT   Trunk Flex 50   Trunk Extension Significantly limited   Trunk Rt Side Bend 15   Trunk Lt Side Bend 15   Trunk Rt Rotation Moderately limited   Trunk Lt Rotation Moderately limited   , Hip       7/1/2025   Hip ROM/MMT   Rt Hip Flexion MMT (L2) 5   Lt Hip Flexion MMT (L2) 4   Rt Hip Abduction MMT 3-   Lt Hip Abduction MMT 3-   Rt Hip ER MMT 5   Lt Hip ER MMT 5   Rt Hip IR MMT 5   Lt Hip IR MMT 5        Assessment  Review of HEP, answering pt questions and performed in session today. Pt able to demonstrate good performance with HEP independently by end of session.     Goals (to be met in 10 visits)      Not Met Progress Toward Partially Met Met   Pt will improve transversus abdominis recruitment to perform proper isometric contraction without requiring verbal or tactile cuing to promote advancement of therex. [] [] [] []   Pt will demonstrate good understanding of proper posture and body mechanics to decrease pain and improve spinal safety. [] [] [] []   Pt will improve lumbar spine AROM flexion to >55 deg to allow increase ease with bending forward to don shoes. [] [] [] []   Pt will report improved ability to ambulate stairs safely and  with 3/10 pain or less, to improve ability to complete tasks required of him at home [] [] [] []   Pt will have decreased paraspinal mm tension to tolerate standing >30 minutes for work and home activities. [] [] [] []   Pt will demonstrate improved core strength to be able to carry up his wife's clothes to her with <3/10 pain. [] [] [] []   Pt will be independent and compliant with comprehensive HEP to maintain progress achieved in PT [] [] [] []             Plan  Add Side Steps    Treatment Last 4 Visits  Treatment Day: 2       7/1/2025 7/3/2025   Spine Treatment   Therapeutic Exercise LTR x 10 (B)  PPT 3 sec x 10   Clamshells x 10 (B) SB DKTC x 12   LTR x 10 (B)  HEP review and discussion  Bridge 3 sec x 10   Clamshells x 15 (B)     Neuro Re-Education  PPT 3 sec x 15 (poor performance initially, but at the end of repetition with verbal and tactile cueing pt performed well independently)   Hip abduction into pilates ring isometric 5 sec x 12  YTB BKFO x 10 (B)  TrA with hip abd iso into ring 3 sec x 10    Therapeutic Exercise Minutes 15 20   Neuro Re-Educ Minutes  23   Evaluation Minutes 30    Total Time Of Timed Procedures 15 43   Total Time Of Service-Based Procedures 30 0   Total Treatment Time 45 43   HEP Access Code: [email or web address]  URL: [email or web address]  Date: 07/01/2025  Prepared by: Deven Armendariz    Exercises  - Supine Lower Trunk Rotation  - 1 x daily - 7 x weekly - 2 sets - 10 reps  - Supine Posterior Pelvic Tilt  - 1 x daily - 7 x weekly - 1-2 sets - 10 reps - 3 sec hold  - Clamshell  - 1 x daily - 7 x weekly - 2 sets - 10 reps         HEP  Access Code: [email or web address]  URL: [email or web address]  Date: 07/01/2025  Prepared by: Deven Armendariz    Exercises  - Supine Lower Trunk Rotation  - 1 x daily - 7 x weekly - 2 sets - 10 reps  - Supine Posterior Pelvic Tilt  - 1 x daily - 7 x weekly - 1-2 sets - 10 reps - 3 sec hold  - Clamshell  - 1 x daily - 7 x weekly - 2 sets - 10  reps    Charges  1 TE: 2 NRE

## 2025-07-07 ENCOUNTER — OFFICE VISIT (OUTPATIENT)
Dept: PHYSICAL THERAPY | Facility: HOSPITAL | Age: 85
End: 2025-07-07
Attending: INTERNAL MEDICINE
Payer: MEDICARE

## 2025-07-07 PROCEDURE — 97110 THERAPEUTIC EXERCISES: CPT

## 2025-07-07 PROCEDURE — 97112 NEUROMUSCULAR REEDUCATION: CPT

## 2025-07-07 NOTE — PROGRESS NOTES
Patient: Armando Forrester (84 year old, male) Referring Provider:  Insurance:   Diagnosis: Osteoporosis with current pathological fracture, unspecified osteoporosis type, sequela (M80.00XS)  At high risk for falls (Z91.81) Maria R CARMEN MCR   Date of Surgery: N/A Next MD visit:  N/A   Precautions:  Other (use comment) (Hx of compresson fractures at T7, T9, T10, T11 and kyphoplasty at L1 due to compression fracture. Scoliosis) TBD Referral Information:    Date of Evaluation: Req: 0, Auth: 0, Exp:     07/01/25 POC Auth Visits:  10       Today's Date   7/7/2025    Subjective  Pt reports walking around [employer] for 45 minutes increased pain across the low back.       Pain: 4/10     Objective  See ROM/MMT below          Trunk       7/1/2025   Trunk ROM/MMT   Trunk Flex 50   Trunk Extension Significantly limited   Trunk Rt Side Bend 15   Trunk Lt Side Bend 15   Trunk Rt Rotation Moderately limited   Trunk Lt Rotation Moderately limited   , Hip       7/1/2025   Hip ROM/MMT   Rt Hip Flexion MMT (L2) 5   Lt Hip Flexion MMT (L2) 4   Rt Hip Abduction MMT 3-   Lt Hip Abduction MMT 3-   Rt Hip ER MMT 5   Lt Hip ER MMT 5   Rt Hip IR MMT 5   Lt Hip IR MMT 5            Assessment  Progression of TrA recruitment with pt continuing to require PT verbal and tactile cueing to perform properly. Progressed hip strengthening exercises as well. No increase in pain with exercises.    Goals (to be met in 10 visits)      Not Met Progress Toward Partially Met Met   Pt will improve transversus abdominis recruitment to perform proper isometric contraction without requiring verbal or tactile cuing to promote advancement of therex. [] [] [] []   Pt will demonstrate good understanding of proper posture and body mechanics to decrease pain and improve spinal safety. [] [] [] []   Pt will improve lumbar spine AROM flexion to >55 deg to allow increase ease with bending forward to don shoes. [] [] [] []   Pt will report improved ability to  ambulate stairs safely and with 3/10 pain or less, to improve ability to complete tasks required of him at home [] [] [] []   Pt will have decreased paraspinal mm tension to tolerate standing >30 minutes for work and home activities. [] [] [] []   Pt will demonstrate improved core strength to be able to carry up his wife's clothes to her with <3/10 pain. [] [] [] []   Pt will be independent and compliant with comprehensive HEP to maintain progress achieved in PT [] [] [] []                 Plan  Add Pallof Press    Treatment Last 4 Visits  Treatment Day: 3       7/1/2025 7/3/2025 7/7/2025   Spine Treatment   Therapeutic Exercise LTR x 10 (B)  PPT 3 sec x 10   Clamshells x 10 (B) SB DKTC x 12   LTR x 10 (B)  HEP review and discussion  Bridge 3 sec x 10   Clamshells x 15 (B)   BKFO ytb x 15 (B)  Clamshells ytb x 15 (B)  Side stepping YTB 8 ft x 6     Neuro Re-Education  PPT 3 sec x 15 (poor performance initially, but at the end of repetition with verbal and tactile cueing pt performed well independently)   Hip abduction into pilates ring isometric 5 sec x 12  YTB BKFO x 10 (B)  TrA with hip abd iso into ring 3 sec x 10  PPT 3 sec x 12  TrA with hip march x 10 (B)  TrA with heel slides x 10 (B)  TrA with hip abd iso x 15 (B)   TrA w/ hip slider abd 2 x 10 (B)   Therapeutic Exercise Minutes 15 20 17   Neuro Re-Educ Minutes  23 25   Evaluation Minutes 30     Total Time Of Timed Procedures 15 43 42   Total Time Of Service-Based Procedures 30 0 0   Total Treatment Time 45 43 42   HEP Access Code: [email or web address]  URL: [email or web address]  Date: 07/01/2025  Prepared by: Deven Armendariz    Exercises  - Supine Lower Trunk Rotation  - 1 x daily - 7 x weekly - 2 sets - 10 reps  - Supine Posterior Pelvic Tilt  - 1 x daily - 7 x weekly - 1-2 sets - 10 reps - 3 sec hold  - Clamshell  - 1 x daily - 7 x weekly - 2 sets - 10 reps          HEP  Access Code: [email or web address]  URL: [email or web address]  Date:  07/01/2025  Prepared by: Deven Armendariz    Exercises  - Supine Lower Trunk Rotation  - 1 x daily - 7 x weekly - 2 sets - 10 reps  - Supine Posterior Pelvic Tilt  - 1 x daily - 7 x weekly - 1-2 sets - 10 reps - 3 sec hold  - Clamshell  - 1 x daily - 7 x weekly - 2 sets - 10 reps    Charges  1 TE: 2 NRE

## 2025-07-10 ENCOUNTER — OFFICE VISIT (OUTPATIENT)
Dept: PHYSICAL THERAPY | Facility: HOSPITAL | Age: 85
End: 2025-07-10
Attending: INTERNAL MEDICINE
Payer: MEDICARE

## 2025-07-10 PROCEDURE — 97110 THERAPEUTIC EXERCISES: CPT

## 2025-07-10 PROCEDURE — 97112 NEUROMUSCULAR REEDUCATION: CPT

## 2025-07-10 NOTE — PROGRESS NOTES
Patient: Armando Forrester (84 year old, male) Referring Provider:  Insurance:   Diagnosis: Osteoporosis with current pathological fracture, unspecified osteoporosis type, sequela (M80.00XS)  At high risk for falls (Z91.81) Maria R LIANG   Date of Surgery: N/A Next MD visit:  N/A   Precautions:  Other (use comment) (Hx of compresson fractures at T7, T9, T10, T11 and kyphoplasty at L1 due to compression fracture. Scoliosis) TBD Referral Information:    Date of Evaluation: Req: 0, Auth: 0, Exp:     07/01/25 POC Auth Visits:  10       Today's Date   7/10/2025    Subjective  Pt reports his back feels a little bit better. He reports he has noticed humidity increases his pain. He has been outside working on his yard watering.       Pain: 4/10     Objective  See ROM/MMT below       Trunk       7/1/2025 7/10/2025   Trunk ROM/MMT   Trunk Flex 50 50   Trunk Extension Significantly limited 5   Trunk Rt Side Bend 15    Trunk Lt Side Bend 15    Trunk Rt Rotation Moderately limited    Trunk Lt Rotation Moderately limited         Assessment  Difficulty standing upright and without excessive anterior pelvic tilt. TrA exercises while incorporating LE based movements, are more challenging when performed on the L side in comparison to R.    Goals (to be met in 10 visits)      Not Met Progress Toward Partially Met Met   Pt will improve transversus abdominis recruitment to perform proper isometric contraction without requiring verbal or tactile cuing to promote advancement of therex. [] [] [] []   Pt will demonstrate good understanding of proper posture and body mechanics to decrease pain and improve spinal safety. [] [] [] []   Pt will improve lumbar spine AROM flexion to >55 deg to allow increase ease with bending forward to don shoes. [] [] [] []   Pt will report improved ability to ambulate stairs safely and with 3/10 pain or less, to improve ability to complete tasks required of him at home [] [] [] []   Pt will have  decreased paraspinal mm tension to tolerate standing >30 minutes for work and home activities. [] [] [] []   Pt will demonstrate improved core strength to be able to carry up his wife's clothes to her with <3/10 pain. [] [] [] []   Pt will be independent and compliant with comprehensive HEP to maintain progress achieved in PT [] [] [] []                     Plan  Assess pt response to new exercises    Treatment Last 4 Visits  Treatment Day: 4       7/1/2025 7/3/2025 7/7/2025 7/10/2025   Spine Treatment   Therapeutic Exercise LTR x 10 (B)  PPT 3 sec x 10   Clamshells x 10 (B) SB DKTC x 12   LTR x 10 (B)  HEP review and discussion  Bridge 3 sec x 10   Clamshells x 15 (B)   BKFO ytb x 15 (B)  Clamshells ytb x 15 (B)  Side stepping YTB 8 ft x 6   Side stepping YTB 8 ft x 6   Standing at ballet bar: hip extension on slider x 15 (B)  Standing at ballet bar; hip abduction on slider x 15 (B)  Bridges with YTB around thighs x 15   H/L clamshelll YTB x 15    Neuro Re-Education  PPT 3 sec x 15 (poor performance initially, but at the end of repetition with verbal and tactile cueing pt performed well independently)   Hip abduction into pilates ring isometric 5 sec x 12  YTB BKFO x 10 (B)  TrA with hip abd iso into ring 3 sec x 10  PPT 3 sec x 12  TrA with hip march x 10 (B)  TrA with heel slides x 10 (B)  TrA with hip abd iso x 15 (B)   TrA w/ hip slider abd 2 x 10 (B) Pallof press YTB 5 sec x 5 (B)  TrA with YTB x 5 (B) - more challenging w/ step to the L   TrA Rows YTB x 12   TrA GHJ extension YTB x 15   TrA with hip march x 10 (B) - L more fatiguing than R   Therapeutic Exercise Minutes 15 20 17 17   Neuro Re-Educ Minutes  23 25 25   Evaluation Minutes 30      Total Time Of Timed Procedures 15 43 42 42   Total Time Of Service-Based Procedures 30 0 0 0   Total Treatment Time 45 43 42 42   HEP Access Code: [email or web address]  URL: [email or web address]  Date: 07/01/2025  Prepared by: Deven Armendariz    Exercises  - Supine  Lower Trunk Rotation  - 1 x daily - 7 x weekly - 2 sets - 10 reps  - Supine Posterior Pelvic Tilt  - 1 x daily - 7 x weekly - 1-2 sets - 10 reps - 3 sec hold  - Clamshell  - 1 x daily - 7 x weekly - 2 sets - 10 reps           HEP  Access Code: [email or web address]  URL: [email or web address]  Date: 07/01/2025  Prepared by: Deven Armendariz    Exercises  - Supine Lower Trunk Rotation  - 1 x daily - 7 x weekly - 2 sets - 10 reps  - Supine Posterior Pelvic Tilt  - 1 x daily - 7 x weekly - 1-2 sets - 10 reps - 3 sec hold  - Clamshell  - 1 x daily - 7 x weekly - 2 sets - 10 reps    Charges  1 TE: 2 NRE

## 2025-07-14 ENCOUNTER — OFFICE VISIT (OUTPATIENT)
Dept: PHYSICAL THERAPY | Facility: HOSPITAL | Age: 85
End: 2025-07-14
Attending: INTERNAL MEDICINE
Payer: MEDICARE

## 2025-07-14 PROCEDURE — 97112 NEUROMUSCULAR REEDUCATION: CPT

## 2025-07-14 PROCEDURE — 97110 THERAPEUTIC EXERCISES: CPT

## 2025-07-14 NOTE — PROGRESS NOTES
Patient: Armando Forrester (84 year old, male) Referring Provider:  Insurance:   Diagnosis: Osteoporosis with current pathological fracture, unspecified osteoporosis type, sequela (M80.00XS)  At high risk for falls (Z91.81) Maria R LIANG   Date of Surgery: N/A Next MD visit:  N/A   Precautions:  Other (use comment) (Hx of compresson fractures at T7, T9, T10, T11 and kyphoplasty at L1 due to compression fracture. Scoliosis) TBD Referral Information:    Date of Evaluation: Req: 0, Auth: 0, Exp:     07/01/25 POC Auth Visits:  10       Today's Date   7/14/2025    Subjective  Pt reports weeding yesterday that increased his back pain. Before that reports an ache in back Thursday-Saturday, but reports no need for Tylenol.       Pain: 3/10     Objective  See ROM/MMT below          Trunk       7/1/2025 7/10/2025   Trunk ROM/MMT   Trunk Flex 50 50   Trunk Extension Significantly limited 5   Trunk Rt Side Bend 15    Trunk Lt Side Bend 15    Trunk Rt Rotation Moderately limited    Trunk Lt Rotation Moderately limited             Assessment  Continued focus on improving core, glute and hip strength to limit load on lumbar spine with standing and walking. Pt requires cueing to perform exercises properly. No excerbation of pain reported.    Goals (to be met in 10 visits)      Not Met Progress Toward Partially Met Met   Pt will improve transversus abdominis recruitment to perform proper isometric contraction without requiring verbal or tactile cuing to promote advancement of therex. [] [] [] []   Pt will demonstrate good understanding of proper posture and body mechanics to decrease pain and improve spinal safety. [] [] [] []   Pt will improve lumbar spine AROM flexion to >55 deg to allow increase ease with bending forward to don shoes. [] [] [] []   Pt will report improved ability to ambulate stairs safely and with 3/10 pain or less, to improve ability to complete tasks required of him at home [] [] [] []   Pt will  have decreased paraspinal mm tension to tolerate standing >30 minutes for work and home activities. [] [] [] []   Pt will demonstrate improved core strength to be able to carry up his wife's clothes to her with <3/10 pain. [] [] [] []   Pt will be independent and compliant with comprehensive HEP to maintain progress achieved in PT [] [] [] []                         Plan  Add: standing hip abd/extension w/o sliders    Treatment Last 4 Visits  Treatment Day: 5       7/3/2025 7/7/2025 7/10/2025 7/14/2025   Spine Treatment   Therapeutic Exercise SB DKTC x 12   LTR x 10 (B)  HEP review and discussion  Bridge 3 sec x 10   Clamshells x 15 (B)   BKFO ytb x 15 (B)  Clamshells ytb x 15 (B)  Side stepping YTB 8 ft x 6   Side stepping YTB 8 ft x 6   Standing at ballet bar: hip extension on slider x 15 (B)  Standing at ballet bar; hip abduction on slider x 15 (B)  Bridges with YTB around thighs x 15   H/L clamshelll YTB x 15  Side stepping YTB 8 ft x 6   Standing at ballet bar: hip extension on slider x 15 (B)   Standing at ballet bar; hip abduction on slider x 15 (B)   Bridges with YTB around thighs x 15   H/L clamshelll YTB x 15 (cueing to push w/ similar amount of force bilaterally)   Neuro Re-Education PPT 3 sec x 15 (poor performance initially, but at the end of repetition with verbal and tactile cueing pt performed well independently)   Hip abduction into pilates ring isometric 5 sec x 12  YTB BKFO x 10 (B)  TrA with hip abd iso into ring 3 sec x 10  PPT 3 sec x 12  TrA with hip march x 10 (B)  TrA with heel slides x 10 (B)  TrA with hip abd iso x 15 (B)   TrA w/ hip slider abd 2 x 10 (B) Pallof press YTB 5 sec x 5 (B)  TrA with YTB x 5 (B) - more challenging w/ step to the L   TrA Rows YTB x 12   TrA GHJ extension YTB x 15   TrA with hip march x 10 (B) - L more fatiguing than R Pallof press YTB 5 sec x 10 (B)   TrA with YTB x 5 (B) - more challenging w/ step to the L   TrA Rows YTB x 15  TrA GHJ extension YTB x 15   TrA  with hip march YTB  x 15 (B) - L more fatiguing than R      Manual Therapy    --   Therapeutic Exercise Minutes 20 17 17 17   Neuro Re-Educ Minutes 23 25 25 26   Total Time Of Timed Procedures 43 42 42 43   Total Time Of Service-Based Procedures 0 0 0 0   Total Treatment Time 43 42 42 43        HEP  Access Code: [email or web address]  URL: [email or web address]  Date: 07/01/2025  Prepared by: Deven Armendariz    Exercises  - Supine Lower Trunk Rotation  - 1 x daily - 7 x weekly - 2 sets - 10 reps  - Supine Posterior Pelvic Tilt  - 1 x daily - 7 x weekly - 1-2 sets - 10 reps - 3 sec hold  - Clamshell  - 1 x daily - 7 x weekly - 2 sets - 10 reps    Charges  1 TE: 2 NRE

## 2025-07-17 ENCOUNTER — OFFICE VISIT (OUTPATIENT)
Dept: PHYSICAL THERAPY | Facility: HOSPITAL | Age: 85
End: 2025-07-17
Attending: INTERNAL MEDICINE
Payer: MEDICARE

## 2025-07-17 PROCEDURE — 97112 NEUROMUSCULAR REEDUCATION: CPT

## 2025-07-17 PROCEDURE — 97110 THERAPEUTIC EXERCISES: CPT

## 2025-07-17 NOTE — PROGRESS NOTES
Patient: Armando Forrester (84 year old, male) Referring Provider:  Insurance:   Diagnosis: Osteoporosis with current pathological fracture, unspecified osteoporosis type, sequela (M80.00XS)  At high risk for falls (Z91.81) Maria R LIANG   Date of Surgery: N/A Next MD visit:  N/A   Precautions:  Other (use comment) (Hx of compresson fractures at T7, T9, T10, T11 and kyphoplasty at L1 due to compression fracture. Scoliosis) TBD Referral Information:    Date of Evaluation: Req: 0, Auth: 0, Exp:     07/01/25 POC Auth Visits:  10       Today's Date   7/17/2025    Subjective  Pt reports this AM he was picking up branches in the yard, his back increased in soreness to a 6-7/10. He reports going to lay down and pain decreased to a 0/10. Pain increased again when he had to get up and WB.       Pain: 4/10 (Standing and walking into therapy - across low back)     Objective  See ROM/MMT below             Trunk       7/1/2025 7/10/2025   Trunk ROM/MMT   Trunk Flex 50 50   Trunk Extension Significantly limited 5   Trunk Rt Side Bend 15    Trunk Lt Side Bend 15    Trunk Rt Rotation Moderately limited    Trunk Lt Rotation Moderately limited                 Assessment  Progression of exercises today. Wall angels are challenging for pt due to the curvature of his spine. Progression of strengthening exercises today, hip abd/extension without slider are challening due to weakness and hip stiffness. Improved participation of hip abduction when performed in supine. Improved ability to complete L hip march with resistance today.    Goals (to be met in 10 visits)      Not Met Progress Toward Partially Met Met   Pt will improve transversus abdominis recruitment to perform proper isometric contraction without requiring verbal or tactile cuing to promote advancement of therex. [] [] [] []   Pt will demonstrate good understanding of proper posture and body mechanics to decrease pain and improve spinal safety. [] [] [] []   Pt  will improve lumbar spine AROM flexion to >55 deg to allow increase ease with bending forward to don shoes. [] [] [] []   Pt will report improved ability to ambulate stairs safely and with 3/10 pain or less, to improve ability to complete tasks required of him at home [] [] [] []   Pt will have decreased paraspinal mm tension to tolerate standing >30 minutes for work and home activities. [] [] [] []   Pt will demonstrate improved core strength to be able to carry up his wife's clothes to her with <3/10 pain. [] [] [] []   Pt will be independent and compliant with comprehensive HEP to maintain progress achieved in PT [] [] [] []                             Plan  Continue with LE strength and core activation. Add bracing.    Treatment Last 4 Visits  Treatment Day: 6       7/7/2025 7/10/2025 7/14/2025 7/17/2025   Spine Treatment   Therapeutic Exercise BKFO ytb x 15 (B)  Clamshells ytb x 15 (B)  Side stepping YTB 8 ft x 6   Side stepping YTB 8 ft x 6   Standing at ballet bar: hip extension on slider x 15 (B)  Standing at ballet bar; hip abduction on slider x 15 (B)  Bridges with YTB around thighs x 15   H/L clamshelll YTB x 15  Side stepping YTB 8 ft x 6   Standing at ballet bar: hip extension on slider x 15 (B)   Standing at ballet bar; hip abduction on slider x 15 (B)   Bridges with YTB around thighs x 15   H/L clamshelll YTB x 15 (cueing to push w/ similar amount of force bilaterally) Shuttle Press: DLP 30lb x 20  Standing hip extension with BUE at counter x 10 (B)  Standing hip abduction with BUE at counter x 10 (B)  Supine hip abduction w/ resistance YTB x 15 (B)  Glute bridge with abd into YTB x 15     Neuro Re-Education PPT 3 sec x 12  TrA with hip march x 10 (B)  TrA with heel slides x 10 (B)  TrA with hip abd iso x 15 (B)   TrA w/ hip slider abd 2 x 10 (B) Pallof press YTB 5 sec x 5 (B)  TrA with YTB x 5 (B) - more challenging w/ step to the L   TrA Rows YTB x 12   TrA GHJ extension YTB x 15   TrA with hip  march x 10 (B) - L more fatiguing than R Pallof press YTB 5 sec x 10 (B)   TrA with YTB x 5 (B) - more challenging w/ step to the L   TrA Rows YTB x 15  TrA GHJ extension YTB x 15   TrA with hip march YTB  x 15 (B) - L more fatiguing than R    TrA with rows RTB x 15   TrA with extensions RTB x 15  TrA with hip march YTB x 15 (B)  Wall angels x 15 (challenging)   Manual Therapy   --    Therapeutic Exercise Minutes 17 17 17 24   Neuro Re-Educ Minutes 25 25 26 17   Total Time Of Timed Procedures 42 42 43 41   Total Time Of Service-Based Procedures 0 0 0 0   Total Treatment Time 42 42 43 41        HEP  Access Code: [email or web address]  URL: [email or web address]  Date: 07/01/2025  Prepared by: Deven Armendariz    Exercises  - Supine Lower Trunk Rotation  - 1 x daily - 7 x weekly - 2 sets - 10 reps  - Supine Posterior Pelvic Tilt  - 1 x daily - 7 x weekly - 1-2 sets - 10 reps - 3 sec hold  - Clamshell  - 1 x daily - 7 x weekly - 2 sets - 10 reps    Charges  2 TE: 1 NRE

## 2025-07-21 ENCOUNTER — APPOINTMENT (OUTPATIENT)
Dept: PHYSICAL THERAPY | Facility: HOSPITAL | Age: 85
End: 2025-07-21
Attending: INTERNAL MEDICINE
Payer: MEDICARE

## 2025-07-22 ENCOUNTER — OFFICE VISIT (OUTPATIENT)
Dept: PHYSICAL THERAPY | Facility: HOSPITAL | Age: 85
End: 2025-07-22
Attending: INTERNAL MEDICINE
Payer: MEDICARE

## 2025-07-22 PROCEDURE — 97112 NEUROMUSCULAR REEDUCATION: CPT

## 2025-07-22 PROCEDURE — 97110 THERAPEUTIC EXERCISES: CPT

## 2025-07-22 NOTE — PROGRESS NOTES
Patient: Armando Forrester (84 year old, male) Referring Provider:  Insurance:   Diagnosis: Osteoporosis with current pathological fracture, unspecified osteoporosis type, sequela (M80.00XS)  At high risk for falls (Z91.81) Maria R LIANG   Date of Surgery: N/A Next MD visit:  N/A   Precautions:  Other (use comment) (Hx of compresson fractures at T7, T9, T10, T11 and kyphoplasty at L1 due to compression fracture. Scoliosis) TBD Referral Information:    Date of Evaluation: Req: 0, Auth: 0, Exp:     07/01/25 POC Auth Visits:  10       Today's Date   7/22/2025    Subjective  Pt reports his back was achey when he woke up. He reports he went to a plant nursey. Lifted 12 small potted plants, which was challenging.       Pain: 4/10 (across low back with walking, 0/10 with sitting)     Objective  See ROM/MMT below       Trunk       7/1/2025 7/10/2025 7/22/2025   Trunk ROM/MMT   Trunk Flex 50 50 55   Trunk Extension Significantly limited 5 8   Trunk Rt Side Bend 15     Trunk Lt Side Bend 15     Trunk Rt Rotation Moderately limited     Trunk Lt Rotation Moderately limited          Assessment  Pt is now independent with HEP. Plan to discharge with HEP to continue with independent strengthening.Pt is agreeable to plan.    Goals (to be met in 10 visits)      Not Met Progress Toward Partially Met Met   Pt will improve transversus abdominis recruitment to perform proper isometric contraction without requiring verbal or tactile cuing to promote advancement of therex. [] [] [] [x]   Pt will demonstrate good understanding of proper posture and body mechanics to decrease pain and improve spinal safety. [] [] [] [x]   Pt will improve lumbar spine AROM flexion to >55 deg to allow increase ease with bending forward to don shoes. [] [x] [] []   Pt will report improved ability to ambulate stairs safely and with 3/10 pain or less, to improve ability to complete tasks required of him at home [] [x] [] []   Pt will have decreased  paraspinal mm tension to tolerate standing >30 minutes for work and home activities. [] [x] [] []   Pt will demonstrate improved core strength to be able to carry up his wife's clothes to her with <3/10 pain. [] [x] [] []   Pt will be independent and compliant with comprehensive HEP to maintain progress achieved in PT [] [] [] [x]         Plan  Plan to discharge next session.    Treatment Last 4 Visits  Treatment Day: 7       7/10/2025 7/14/2025 7/17/2025 7/22/2025   Spine Treatment   Therapeutic Exercise Side stepping YTB 8 ft x 6   Standing at ballet bar: hip extension on slider x 15 (B)  Standing at ballet bar; hip abduction on slider x 15 (B)  Bridges with YTB around thighs x 15   H/L clamshelll YTB x 15  Side stepping YTB 8 ft x 6   Standing at ballet bar: hip extension on slider x 15 (B)   Standing at ballet bar; hip abduction on slider x 15 (B)   Bridges with YTB around thighs x 15   H/L clamshelll YTB x 15 (cueing to push w/ similar amount of force bilaterally) Shuttle Press: DLP 30lb x 20  Standing hip extension with BUE at counter x 10 (B)  Standing hip abduction with BUE at counter x 10 (B)  Supine hip abduction w/ resistance YTB x 15 (B)  Glute bridge with abd into YTB x 15   Supine hip abduction w/ resistance YTB x 15 (B)   Glute bridge with abd into YTB x 10  Side stepping RTB 8 ft x 8  S/L hip abd x 10 (B)     Neuro Re-Education Pallof press YTB 5 sec x 5 (B)  TrA with YTB x 5 (B) - more challenging w/ step to the L   TrA Rows YTB x 12   TrA GHJ extension YTB x 15   TrA with hip march x 10 (B) - L more fatiguing than R Pallof press YTB 5 sec x 10 (B)   TrA with YTB x 5 (B) - more challenging w/ step to the L   TrA Rows YTB x 15  TrA GHJ extension YTB x 15   TrA with hip march YTB  x 15 (B) - L more fatiguing than R    TrA with rows RTB x 15   TrA with extensions RTB x 15  TrA with hip march YTB x 15 (B)  Wall angels x 15 (challenging) TrA with rows GTB x 15  TrA with extensions GTB x 15  TrA with hip  march YTB x 15 (B)  Wall angels x 15 (challenging) -  Chopping GTB x 10 (B)  TrA step outs YTB x 10 (B)     Manual Therapy  --     Therapeutic Exercise Minutes 17 17 24 15   Neuro Re-Educ Minutes 25 26 17 25   Total Time Of Timed Procedures 42 43 41 40   Total Time Of Service-Based Procedures 0 0 0 0   Total Treatment Time 42 43 41 40        HEP  Access Code: [email or web address]  URL: [email or web address]  Date: 07/01/2025  Prepared by: Deven Armendariz    Exercises  - Supine Lower Trunk Rotation  - 1 x daily - 7 x weekly - 2 sets - 10 reps  - Supine Posterior Pelvic Tilt  - 1 x daily - 7 x weekly - 1-2 sets - 10 reps - 3 sec hold  - Clamshell  - 1 x daily - 7 x weekly - 2 sets - 10 reps    Charges  2 NRE: 1 TE

## 2025-07-24 ENCOUNTER — OFFICE VISIT (OUTPATIENT)
Dept: PHYSICAL THERAPY | Facility: HOSPITAL | Age: 85
End: 2025-07-24
Attending: INTERNAL MEDICINE
Payer: MEDICARE

## 2025-07-24 PROCEDURE — 97110 THERAPEUTIC EXERCISES: CPT

## 2025-07-24 PROCEDURE — 97112 NEUROMUSCULAR REEDUCATION: CPT

## 2025-07-24 NOTE — PROGRESS NOTES
Discharge Summary  Pt has attended 8 visits in Physical Therapy.    Patient: Armando Forrester (84 year old, male) Referring Provider:  Insurance:   Diagnosis: Osteoporosis with current pathological fracture, unspecified osteoporosis type, sequela (M80.00XS)  At high risk for falls (Z91.81) Maria R LIANG   Date of Surgery: N/A Next MD visit:  N/A   Precautions:  Other (use comment) (Hx of compresson fractures at T7, T9, T10, T11 and kyphoplasty at L1 due to compression fracture. Scoliosis) TBD Referral Information:    Date of Evaluation: Req: 0, Auth: 0, Exp:     07/01/25 POC Auth Visits:  10       Today's Date   7/24/2025    Subjective  Pt reports he woke up today less sore in low back. Pt reports he is now able to carry his wife's clothes upstairs with improved participation, and back not interferring.       Pain: 2/10     Objective  See ROM/MMT below       Trunk       7/10/2025 7/22/2025 7/24/2025   Trunk ROM/MMT   Trunk Flex 50 55 60   Trunk Extension 5 8 10   , Hip       7/1/2025 7/24/2025   Hip ROM/MMT   Rt Hip Flexion MMT (L2) 5 5   Lt Hip Flexion MMT (L2) 4 5   Rt Hip Abduction MMT 3- 3-   Lt Hip Abduction MMT 3- 3-   Rt Hip ER MMT 5 5   Lt Hip ER MMT 5 5   Rt Hip IR MMT 5 5   Lt Hip IR MMT 5 5   , Knee       7/1/2025 7/24/2025   Knee ROM/MMT   Rt Knee Flexion MMT 5 5   Lt Knee Flexion MMT 5 5   Rt Knee Extension MMT (L3) 5 5   Lt Knee Extension MMT (L3) 5 5        Assessment  Pt has demonstrated improvement in lumbar ROM, understanding of posture and is independent with HEP. However, he continues to demonstrated hip abduction weakness. Pt is discharging today with recommendation to continue independent strenghtening, pt agreeable to plan.    Goals (to be met in 10 visits)      Not Met Progress Toward Partially Met Met   Pt will improve transversus abdominis recruitment to perform proper isometric contraction without requiring verbal or tactile cuing to promote advancement of therex. [] [] []  [x]   Pt will demonstrate good understanding of proper posture and body mechanics to decrease pain and improve spinal safety. [] [] [] [x]   Pt will improve lumbar spine AROM flexion to >55 deg to allow increase ease with bending forward to don shoes. [] [] [] [x]   Pt will report improved ability to ambulate stairs safely and with 3/10 pain or less, to improve ability to complete tasks required of him at home [] [] [x] []   Pt will have decreased paraspinal mm tension to tolerate standing >30 minutes for work and home activities. [x] [] [] []   Pt will demonstrate improved core strength to be able to carry up his wife's clothes to her with <3/10 pain. [] [] [] [x]   Pt will be independent and compliant with comprehensive HEP to maintain progress achieved in PT [] [] [] [x]             Plan  Discharge with HEP.  Post Oswestry Disability Index Score  Post Score: (Patient-Rptd) 22 % (7/24/2025 12:01 PM)    4 % improvement    Patient/Family/Caregiver was advised of these findings, precautions, and treatment options and has agreed to actively participate in planning and for this course of care.    Thank you for your referral. If you have any questions, please contact me at Dept: [phone].    Sincerely,  Electronically signed by therapist: Deven Armendariz PT     Physician's certification required:  No  Please co-sign or sign and return this letter via fax as soon as possible to [phone].   I certify the need for these services furnished under this plan of treatment and while under my care.    X___________________________________________________ Date____________________    Certification From: 7/24/2025  To:10/22/2025      Treatment Last 4 Visits  Treatment Day: 8       7/14/2025 7/17/2025 7/22/2025 7/24/2025   Spine Treatment   Therapeutic Exercise Side stepping YTB 8 ft x 6   Standing at ballet bar: hip extension on slider x 15 (B)   Standing at ballet bar; hip abduction on slider x 15 (B)   Bridges with YTB around  thighs x 15   H/L clamshelll YTB x 15 (cueing to push w/ similar amount of force bilaterally) Shuttle Press: DLP 30lb x 20  Standing hip extension with BUE at counter x 10 (B)  Standing hip abduction with BUE at counter x 10 (B)  Supine hip abduction w/ resistance YTB x 15 (B)  Glute bridge with abd into YTB x 15   Supine hip abduction w/ resistance YTB x 15 (B)   Glute bridge with abd into YTB x 10  Side stepping RTB 8 ft x 8  S/L hip abd x 10 (B)   HEP update and review  Reassessment   Supine hip abduction w/ resistance RTB x 15 (B)   Glute bridge with abd into RTB x 10      Neuro Re-Education Pallof press YTB 5 sec x 10 (B)   TrA with YTB x 5 (B) - more challenging w/ step to the L   TrA Rows YTB x 15  TrA GHJ extension YTB x 15   TrA with hip march YTB  x 15 (B) - L more fatiguing than R    TrA with rows RTB x 15   TrA with extensions RTB x 15  TrA with hip march YTB x 15 (B)  Wall angels x 15 (challenging) TrA with rows GTB x 15  TrA with extensions GTB x 15  TrA with hip march YTB x 15 (B)  Wall angels x 15 (challenging) -  Chopping GTB x 10 (B)  TrA step outs YTB x 10 (B)   TrA with rows GTB x 15   TrA with extensions GTB x 15   TrA with hip march YTB x 15 (B)   Wall angels x 15      Manual Therapy --      Therapeutic Exercise Minutes 17 24 15 25   Neuro Re-Educ Minutes 26 17 25 18   Total Time Of Timed Procedures 43 41 40 43   Total Time Of Service-Based Procedures 0 0 0 0   Total Treatment Time 43 41 40 43   HEP    Access Code: [email or web address]  URL: [email or web address]  Date: 07/24/2025  Prepared by: Deven Armendariz    Exercises  - Supine Lower Trunk Rotation  - 1 x daily - 7 x weekly - 2 sets - 10 reps  - Supine Posterior Pelvic Tilt  - 1 x daily - 7 x weekly - 1-2 sets - 10 reps - 3 sec hold  - Clamshell  - 1 x daily - 7 x weekly - 2 sets - 10 reps  - Supine Single Leg Hip Abduction with Resistance at Ankles  - 1 x daily - 7 x weekly - 2 sets - 10 reps  - Standing Anti-Rotation Press with  Anchored Resistance  - 1 x daily - 7 x weekly - 2 sets - 10 reps  - Shoulder extension with resistance - Neutral  - 1 x daily - 7 x weekly - 2 sets - 10 reps  - Supine March with Posterior Pelvic Tilt  - 1 x daily - 7 x weekly - 2 sets - 10 reps        HEP  Access Code: [email or web address]  URL: [email or web address]  Date: 07/24/2025  Prepared by: Deven Armendariz    Exercises  - Supine Lower Trunk Rotation  - 1 x daily - 7 x weekly - 2 sets - 10 reps  - Supine Posterior Pelvic Tilt  - 1 x daily - 7 x weekly - 1-2 sets - 10 reps - 3 sec hold  - Clamshell  - 1 x daily - 7 x weekly - 2 sets - 10 reps  - Supine Single Leg Hip Abduction with Resistance at Ankles  - 1 x daily - 7 x weekly - 2 sets - 10 reps  - Standing Anti-Rotation Press with Anchored Resistance  - 1 x daily - 7 x weekly - 2 sets - 10 reps  - Shoulder extension with resistance - Neutral  - 1 x daily - 7 x weekly - 2 sets - 10 reps  - Supine March with Posterior Pelvic Tilt  - 1 x daily - 7 x weekly - 2 sets - 10 reps    Charges  2 TE: 1 NRE

## (undated) DIAGNOSIS — R93.3 ABNORMAL CT SCAN, COLON: ICD-10-CM

## (undated) DIAGNOSIS — K55.9 ISCHEMIC COLITIS (HCC): Primary | ICD-10-CM

## (undated) NOTE — MR AVS SNAPSHOT
After Visit Summary   2024    Armando Forrester   MRN: [id number]           Visit Information     Date & Time  2024  4:00 PM Provider  [facility] INFR[facility] 2 Department  [facility] Phone  [phone]      Your Vitals Were  Most recent update: 2024  4:12 PM    BP   139/53 (BP Location: Right arm, Patient Position: Sitting, Cuff Size: adult)    Pulse   60    Temp   97.6 °F (36.4 °C) (Oral)    Resp   18    SpO2   100%         Allergies as of 2024  Review status set to In Progress on 2024       Noted Reaction Type Reactions    Clindamycin 2016    OTHER (SEE COMMENTS)    Angioedema    Lisinopril 2016    OTHER (SEE COMMENTS)    Angioedema      Your Current Medications        Dosage    naproxen 500 MG Oral Tab Take 1 tablet (500 mg total) by mouth 2 (two) times daily as needed.    atorvastatin 20 MG Oral Tab Take 1 tablet (20 mg total) by mouth every evening.    TRAMADOL 50 MG Oral Tab Take 1 tablet (50 mg total) by mouth every 6 (six) hours as needed for Pain.    zoledronic acid 5 mg/100mL Intravenous Solution () Inject 100 mL (5 mg total) into the vein once for 1 dose.    amLODIPine 10 MG Oral Tab Take 1 tablet (10 mg total) by mouth daily.    latanoprost 0.005 % Ophthalmic Solution Place 1 drop into both eyes nightly.    Ascorbic Acid (VITAMIN C) 500 MG Oral Cap Take 1 tablet by mouth daily.    Multiple Vitamins-Minerals (PRESERVISION AREDS) Oral Tab Take 2 tablets by mouth daily.    Omega-3 Fatty Acids (FISH OIL OR) Take 1 capsule by mouth 3 (three) times daily.    Calcium Carbonate-Vitamin D (CALCIUM 600 + D OR) Take 1 tablet by mouth 2 (two) times daily.    Coenzyme Q10 (COQ10 OR) Take 1 capsule by mouth daily.    Probiotic Product (PROBIOTIC OR) Take 1 capsule by mouth daily.    Boswellia Judah (BOSWELLIA OR) Take 2 tablets by mouth 3 (three) times daily.    Cholecalciferol (VITAMIN D3) 1000 UNITS Oral Cap Take  by mouth. take 1 daily       Diagnoses for This Visit    Senile osteoporosis   [733.01.ICD-9-CM]  -  Primary           Future Appointments        Provider Department    6/10/2024 12:40 PM Everette Enzo [facility]    6/10/2024 4:30 PM [facility]    7/11/2024 2:40 PM [facility]    11/25/2024 1:30 PM [facility]ika [facility]    5/23/2025 3:00 PM Maria R Yanes [facility]                Did you know that [facility] primary care physicians now offer Video Visits through [employer] for adult patients for a variety of conditions such as allergies, back pain and cold symptoms? Skip the drive and waiting room and online chat with a doctor face-to-face using your web-cam enabled computer or mobile device wherever you are. Video Visits cost $50 and can be paid hassle-free using a credit, debit, or health savings card.  Not active on [employer]? Ask us how to get signed up today!          If you receive a survey from [employer], please take a few minutes to complete it and provide feedback. We strive to deliver the best patient experience and are looking for ways to make improvements. Your feedback will help us do so. For more information on [employer], please visit [email or web address]           No text in SmartText           No text in SmartText

## (undated) NOTE — LETTER
[place] [address]  Authorization for Invasive Procedures  Date: 4/8/2024           Time: 9:40    I hereby authorize Dr Avelar/ Dr. Padilla, my physician and his/her assistants (if applicable), which may include medical students, residents, and/or fellows, to perform the following surgical operation/ procedure  Percutaneous kyphoplasty of thoracic level twelve, lumbar level  two and lumbar level three and administer such anesthesia as may be determined necessary by my physician:  Operation/Procedure name (s)  on Armando Forrester   2.   I recognize that during the surgical operation/procedure, unforeseen conditions may necessitate additional or different procedures than those listed above.  I, therefore, further authorize and request that the above-named surgeon, assistants, or designees perform such procedures as are, in their judgment, necessary and desirable.    3.   My surgeon/physician has discussed prior to my surgery the potential benefits, risks and side effects of this procedure; the likelihood of achieving goals; and potential problems that might occur during recuperation.  They also discussed reasonable alternatives to the procedure, including risks, benefits, and side effects related to the alternatives and risks related to not receiving this procedure.  I have had all my questions answered and I acknowledge that no guarantee has been made as to the result that may be obtained.    4.   Should the need arise during my operation/procedure, which includes change of level of care prior to discharge, I also consent to the administration of blood and/or blood products.  Further, I understand that despite careful testing and screening of blood or blood products by collecting agencies, I may still be subject to ill effects as a result of receiving a blood transfusion and/or blood products.  The following are some, but not all, of the potential risks that can occur: fever and allergic  reactions, hemolytic reactions, transmission of diseases such as Hepatitis, AIDS and Cytomegalovirus (CMV) and fluid overload.  In the event that I wish to have an autologous transfusion of my own blood, or a directed donor transfusion, I will discuss this with my physician.  Check only if Refusing Blood or Blood Products  I understand refusal of blood or blood products as deemed necessary by my physician may have serious consequences to my condition to include possible death. I hereby assume responsibility for my refusal and release the hospital, its personnel, and my physicians from any responsibility for the consequences of my refusal.          o  Refuse      5.   I authorize the use of any specimen, organs, tissues, body parts or foreign objects that may be removed from my body during the operation/procedure for diagnosis, research or teaching purposes and their subsequent disposal by hospital authorities.  I also authorize the release of specimen test results and/or written reports to my treating physician on the hospital medical staff or other referring or consulting physicians involved in my care, at the discretion of the Pathologist or my treating physician.    6.   I consent to the photographing or videotaping of the operations or procedures to be performed, including appropriate portions of my body for medical, scientific, or educational purposes, provided my identity is not revealed by the pictures or by descriptive texts accompanying them.  If the procedure has been photographed/videotaped, the surgeon will obtain the original picture, image, videotape or CD.  The hospital will not be responsible for storage, release or maintenance of the picture, image, tape or CD.    7.   I consent to the presence of a  or observers in the operating room as deemed necessary by my physician or their designees.    8.   I recognize that in the event my procedure results in extended X-Ray/fluoroscopy  time, I may develop a skin reaction.    9. If I have a Do Not Attempt Resuscitation (DNAR) order in place, that status will be suspended while in the operating room, procedural suite, and during the recovery period unless otherwise explicitly stated by me (or a person authorized to consent on my behalf). The surgeon or my attending physician will determine when the applicable recovery period ends for purposes of reinstating the DNAR order.  10. Patients having a sterilization procedure: I understand that if the procedure is successful the results will be permanent and it will therefore be impossible for me to inseminate, conceive, or bear children.  I also understand that the procedure is intended to result in sterility, although the result has not been guaranteed.   11. I acknowledge that my physician has explained sedation/analgesia administration to me including the risk and benefits I consent to the administration of sedation/analgesia as may be necessary or desirable in the judgment of my physician.    I CERTIFY THAT I HAVE READ AND FULLY UNDERSTAND THE ABOVE CONSENT TO OPERATION and/or OTHER PROCEDURE.        ____________________________________       _________________________________      ______________________________  Signature of Patient         Signature of Responsible Person        Printed Name of Responsible Person    ____________________________________      _________________________________      ______________________________       Signature of Witness          Relationship to Patient                       Date                                       Time  Patient Name: Armando Forrester  : 1940    Reviewed: 2024   Printed: 2024  Medical Record #: [id number] Page 1 of 2           STATEMENT OF PHYSICIAN My signature below affirms that prior to the time of the procedure; I have explained to the patient and/or his/her legal representative, the risks and benefits involved in the  proposed treatment and any reasonable alternative to the proposed treatment. I have also explained the risks and benefits involved in refusal of the proposed treatment and alternatives to the proposed treatment and have answered the patient's questions. If I have a significant financial interest in a co-management agreement or a significant financial interest in any product or implant, or other significant relationship used in this procedure/surgery, I have disclosed this and had a discussion with my patient.     _______________________________________________________________ _____________________________  (Signature of Physician)                                                                                         (Date)                                   (Time)  Patient Name: Armando Forrester  : 1940    Reviewed: 2024   Printed: 2024  Medical Record #: [id number] Page 2 of 2

## (undated) NOTE — ED AVS SNAPSHOT
Pino Dunn   MRN: [id number]    Department:  [facility] Emergency Department   Date of Visit:  6/7/2019           Disclosure     Insurance plans vary and the physician(s) referred by the ER may not be covered by your plan.  Please contact within the next three months to obtain basic health screening including reassessment of your blood pressure.     IF THERE IS ANY CHANGE OR WORSENING OF YOUR CONDITION, CALL YOUR PRIMARY CARE PHYSICIAN AT ONCE OR RETURN IMMEDIATELY TO THE EMERGENCY DEPARTMEN

## (undated) NOTE — LETTER
[place] [address]  Authorization for Invasive Procedures  Date: ***           Time: ***    {[facility] ivs consent:24607}

## (undated) NOTE — LETTER
5/8/2025              Armando Forrester        [id number] [facility] [address]         To whom it may concern,    Armando Forrester is currently a patient under my medical care.  The patient's medical condition makes serving on jury duty inadvisable at this time.  Please excuse them from serving.  If you require additional information please do not hesitate to contact my office.        Sincerely,    Enzo Gaviria MD  [facility], [address] [address] [phone]        Document electronically generated by:  Alysha SILVA MA

## (undated) NOTE — Clinical Note
YAHAIRA, TCM all made, see [facility] notes. [facility] Attempted to schedule PCP office TCM appointment with patient, Patient states he will call to schedule his TCM appointment as he needs to check his calendar because he has a lot of appointments coming up. Message sent to MD's office.

## (undated) NOTE — MR AVS SNAPSHOT
[facility] SPECIALTY [facility] [place] [address] [address] [address]  [phone]               Thank you for choosing us for your health care visit with Stephanie Guevara MD.  We are glad to serve you and happy to provide you with this summary of ISTALOL 0.5 % (DAILY) Soln   Generic drug:  Timolol Maleate   Apply  to eye. instill 1 drop by ophthalmic route  every day into affected eye(s)           Omega-3-acid Ethyl Esters 1 G Caps   Take 1 g by mouth daily.    Commonly known as:  Queta Russell drinks, candies and desserts   Eat plenty of low-fat dairy products High fat meats and dairy   Choose whole grain products Foods high in sodium   Water is best for hydration Fast food.    Eat at home when possible     Tips for increasing your physical activ

## (undated) NOTE — MR AVS SNAPSHOT
[place]  [address]  [phone]               Thank you for choosing us for your health care visit with Uriel Espinal MD.  We are glad to serve you and happy to provide you with this summ This list is accurate as of: 5/23/17  5:23 PM.  Always use your most recent med list.                AmLODIPine Besylate 5 MG Tabs   TAKE ONE TABLET BY MOUTH DAILY   Commonly known as:  SOCRATES BRADLEY OR   Take 2 tablets by mouth 3 (three) rosalie - finasteride 5 MG Tabs            MyChart     Visit MyChart  You can access your MyChart to more actively manage your health care and view more details from this visit by going to [email or web address].   If you've recently had a stay at the [facility]